# Patient Record
Sex: MALE | Race: WHITE
[De-identification: names, ages, dates, MRNs, and addresses within clinical notes are randomized per-mention and may not be internally consistent; named-entity substitution may affect disease eponyms.]

---
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
## 2018-01-08 PROBLEM — S09.90XA HEAD INJURY: Status: ACTIVE | Noted: 2018-01-08

## 2018-01-08 PROBLEM — J01.00 ACUTE MAXILLARY SINUSITIS: Status: ACTIVE | Noted: 2018-01-08
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
## 2018-07-03 ENCOUNTER — HOSPITAL ENCOUNTER (EMERGENCY)
Dept: HOSPITAL 83 - ED | Age: 53
LOS: 1 days | Discharge: HOME | End: 2018-07-04
Payer: COMMERCIAL

## 2018-07-03 VITALS — HEIGHT: 67.99 IN | WEIGHT: 123 LBS | BODY MASS INDEX: 18.64 KG/M2

## 2018-07-03 DIAGNOSIS — S80.811A: ICD-10-CM
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
## 2018-07-03 DIAGNOSIS — Z88.1: ICD-10-CM
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
## 2018-07-03 DIAGNOSIS — Z91.030: ICD-10-CM
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
## 2018-07-03 DIAGNOSIS — S80.812A: ICD-10-CM

## 2018-07-03 DIAGNOSIS — S20.419A: ICD-10-CM

## 2018-07-03 DIAGNOSIS — S40.219A: ICD-10-CM

## 2018-07-03 DIAGNOSIS — S93.402A: Primary | ICD-10-CM

## 2018-07-04 LAB
ALBUMIN SERPL-MCNC: 3.6 GM/DL (ref 3.1–4.5)
ALP SERPL-CCNC: 97 U/L (ref 45–117)
ALT SERPL W P-5'-P-CCNC: 34 U/L (ref 12–78)
APPEARANCE UR: CLEAR
AST SERPL-CCNC: 43 IU/L (ref 3–35)
BACTERIA #/AREA URNS HPF: (no result) /[HPF]
BASOPHILS # BLD AUTO: 0 10*3/UL (ref 0–0.1)
BASOPHILS NFR BLD AUTO: 0.4 % (ref 0–1)
BILIRUB UR QL STRIP: NEGATIVE
BUN SERPL-MCNC: 18 MG/DL (ref 7–24)
CHLORIDE SERPL-SCNC: 111 MMOL/L (ref 98–107)
COLOR UR: YELLOW
CREAT SERPL-MCNC: 0.83 MG/DL (ref 0.7–1.3)
EOSINOPHIL # BLD AUTO: 0.1 10*3/UL (ref 0–0.4)
EOSINOPHIL # BLD AUTO: 1.1 % (ref 1–4)
ERYTHROCYTE [DISTWIDTH] IN BLOOD BY AUTOMATED COUNT: 12.2 % (ref 0–14.5)
GLUCOSE UR QL: NEGATIVE
HCT VFR BLD AUTO: 39.3 % (ref 42–52)
HGB BLD-MCNC: 13.3 G/DL (ref 14–18)
HGB UR QL STRIP: NEGATIVE
KETONES UR QL STRIP: (no result)
LEUKOCYTE ESTERASE UR QL STRIP: NEGATIVE
LYMPHOCYTES # BLD AUTO: 2.6 10*3/UL (ref 1.3–4.4)
LYMPHOCYTES NFR BLD AUTO: 23.8 % (ref 27–41)
MCH RBC QN AUTO: 32.4 PG (ref 27–31)
MCHC RBC AUTO-ENTMCNC: 33.8 G/DL (ref 33–37)
MCV RBC AUTO: 95.9 FL (ref 80–94)
MONOCYTES # BLD AUTO: 0.7 10*3/UL (ref 0.1–1)
MONOCYTES NFR BLD MANUAL: 6.4 % (ref 3–9)
MUCOUS THREADS URNS QL MICRO: (no result)
NEUT #: 7.4 10*3/UL (ref 2.3–7.9)
NEUT %: 67.9 % (ref 47–73)
NITRITE UR QL STRIP: NEGATIVE
NRBC BLD QL AUTO: 0 10*3/UL (ref 0–0)
PH UR STRIP: 6 [PH] (ref 5–9)
PLATELET # BLD AUTO: 155 10*3/UL (ref 130–400)
PMV BLD AUTO: 10.2 FL (ref 9.6–12.3)
POTASSIUM SERPL-SCNC: 3.6 MMOL/L (ref 3.5–5.1)
PROT SERPL-MCNC: 6.8 GM/DL (ref 6.4–8.2)
RBC # BLD AUTO: 4.1 10*6/UL (ref 4.5–5.9)
RBC #/AREA URNS HPF: (no result) RBC/HPF (ref 0–2)
SODIUM SERPL-SCNC: 147 MMOL/L (ref 136–145)
SP GR UR: >= 1.03 (ref 1–1.03)
UROBILINOGEN UR STRIP-MCNC: 1 E.U./DL (ref 0.2–1)
WBC #/AREA URNS HPF: (no result) WBC/HPF (ref 0–5)
WBC NRBC COR # BLD AUTO: 10.9 10*3/UL (ref 4.8–10.8)

## 2019-08-16 ENCOUNTER — HOSPITAL ENCOUNTER (EMERGENCY)
Dept: HOSPITAL 83 - ED | Age: 54
Discharge: HOME | End: 2019-08-16
Payer: COMMERCIAL

## 2019-08-16 VITALS — HEIGHT: 67.99 IN | BODY MASS INDEX: 18.19 KG/M2 | WEIGHT: 120 LBS

## 2019-08-16 DIAGNOSIS — Z91.030: ICD-10-CM

## 2019-08-16 DIAGNOSIS — Z79.2: ICD-10-CM

## 2019-08-16 DIAGNOSIS — Z88.1: ICD-10-CM
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
## 2019-08-16 DIAGNOSIS — T63.441A: Primary | ICD-10-CM

## 2019-08-16 LAB
ALBUMIN SERPL-MCNC: 3.8 GM/DL (ref 3.1–4.5)
ALP SERPL-CCNC: 89 U/L (ref 45–117)
ALT SERPL W P-5'-P-CCNC: 26 U/L (ref 12–78)
AST SERPL-CCNC: 23 IU/L (ref 3–35)
BASOPHILS # BLD AUTO: 0.1 10*3/UL (ref 0–0.1)
BASOPHILS NFR BLD AUTO: 0.5 % (ref 0–1)
BUN SERPL-MCNC: 15 MG/DL (ref 7–24)
CHLORIDE SERPL-SCNC: 111 MMOL/L (ref 98–107)
CREAT SERPL-MCNC: 1.13 MG/DL (ref 0.7–1.3)
EOSINOPHIL # BLD AUTO: 0.1 10*3/UL (ref 0–0.4)
EOSINOPHIL # BLD AUTO: 0.7 % (ref 1–4)
ERYTHROCYTE [DISTWIDTH] IN BLOOD BY AUTOMATED COUNT: 11.9 % (ref 0–14.5)
HCT VFR BLD AUTO: 39.9 % (ref 42–52)
HGB BLD-MCNC: 14.1 G/DL (ref 14–18)
LYMPHOCYTES # BLD AUTO: 1.7 10*3/UL (ref 1.3–4.4)
LYMPHOCYTES NFR BLD AUTO: 16.3 % (ref 27–41)
MCH RBC QN AUTO: 32.6 PG (ref 27–31)
MCHC RBC AUTO-ENTMCNC: 35.3 G/DL (ref 33–37)
MCV RBC AUTO: 92.4 FL (ref 80–94)
MONOCYTES # BLD AUTO: 0.5 10*3/UL (ref 0.1–1)
MONOCYTES NFR BLD MANUAL: 4.7 % (ref 3–9)
NEUT #: 8.1 10*3/UL (ref 2.3–7.9)
NEUT %: 77.6 % (ref 47–73)
NRBC BLD QL AUTO: 0 % (ref 0–0)
PLATELET # BLD AUTO: 201 10*3/UL (ref 130–400)
PMV BLD AUTO: 10.5 FL (ref 9.6–12.3)
POTASSIUM SERPL-SCNC: 3.7 MMOL/L (ref 3.5–5.1)
PROT SERPL-MCNC: 7.3 GM/DL (ref 6.4–8.2)
RBC # BLD AUTO: 4.32 10*6/UL (ref 4.5–5.9)
SODIUM SERPL-SCNC: 142 MMOL/L (ref 136–145)
WBC NRBC COR # BLD AUTO: 10.4 10*3/UL (ref 4.8–10.8)

## 2019-09-05 ENCOUNTER — HOSPITAL ENCOUNTER (EMERGENCY)
Dept: HOSPITAL 83 - ED | Age: 54
Discharge: ANOTHER HOSPITAL | End: 2019-09-05
Payer: SELF-PAY
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
## 2019-09-05 DIAGNOSIS — T26.12XA: Primary | ICD-10-CM

## 2019-09-05 DIAGNOSIS — Z91.030: ICD-10-CM

## 2019-09-05 DIAGNOSIS — F17.200: ICD-10-CM
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
## 2019-09-05 DIAGNOSIS — Z88.1: ICD-10-CM

## 2020-08-13 ENCOUNTER — HOSPITAL ENCOUNTER (EMERGENCY)
Dept: HOSPITAL 83 - ED | Age: 55
Discharge: HOME | End: 2020-08-13
Payer: SELF-PAY

## 2020-08-13 VITALS — HEIGHT: 60 IN | WEIGHT: 139 LBS

## 2020-08-13 DIAGNOSIS — T63.441A: Primary | ICD-10-CM

## 2020-08-13 DIAGNOSIS — Z88.1: ICD-10-CM
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
## 2020-08-13 DIAGNOSIS — Z91.030: ICD-10-CM

## 2020-08-13 LAB
ALBUMIN SERPL-MCNC: 3.5 GM/DL (ref 3.1–4.5)
ALP SERPL-CCNC: 85 U/L (ref 45–117)
ALT SERPL W P-5'-P-CCNC: 31 U/L (ref 12–78)
AST SERPL-CCNC: 31 IU/L (ref 3–35)
BASOPHILS # BLD AUTO: 0 10*3/UL (ref 0–0.1)
BASOPHILS NFR BLD AUTO: 0.5 % (ref 0–1)
BUN SERPL-MCNC: 11 MG/DL (ref 7–24)
CHLORIDE SERPL-SCNC: 111 MMOL/L (ref 98–107)
CREAT SERPL-MCNC: 0.83 MG/DL (ref 0.7–1.3)
EOSINOPHIL # BLD AUTO: 0.1 10*3/UL (ref 0–0.4)
EOSINOPHIL # BLD AUTO: 2.4 % (ref 1–4)
ERYTHROCYTE [DISTWIDTH] IN BLOOD BY AUTOMATED COUNT: 12.4 % (ref 0–14.5)
HCT VFR BLD AUTO: 39.8 % (ref 42–52)
LYMPHOCYTES # BLD AUTO: 2.1 10*3/UL (ref 1.3–4.4)
LYMPHOCYTES NFR BLD AUTO: 35.2 % (ref 27–41)
MCH RBC QN AUTO: 31.3 PG (ref 27–31)
MCHC RBC AUTO-ENTMCNC: 34.7 G/DL (ref 33–37)
MCV RBC AUTO: 90.2 FL (ref 80–94)
MONOCYTES # BLD AUTO: 0.4 10*3/UL (ref 0.1–1)
MONOCYTES NFR BLD MANUAL: 7.1 % (ref 3–9)
NEUT #: 3.2 10*3/UL (ref 2.3–7.9)
NEUT %: 54.5 % (ref 47–73)
NRBC BLD QL AUTO: 0 10*3/UL (ref 0–0)
PLATELET # BLD AUTO: 163 10*3/UL (ref 130–400)
PMV BLD AUTO: 10.5 FL (ref 9.6–12.3)
POTASSIUM SERPL-SCNC: 3.4 MMOL/L (ref 3.5–5.1)
PROT SERPL-MCNC: 7 GM/DL (ref 6.4–8.2)
RBC # BLD AUTO: 4.41 10*6/UL (ref 4.5–5.9)
SODIUM SERPL-SCNC: 142 MMOL/L (ref 136–145)
TROPONIN I SERPL-MCNC: < 0.015 NG/ML (ref ?–0.04)
WBC NRBC COR # BLD AUTO: 5.9 10*3/UL (ref 4.8–10.8)

## 2022-06-13 ENCOUNTER — APPOINTMENT (OUTPATIENT)
Dept: CT IMAGING | Age: 57
End: 2022-06-13
Payer: COMMERCIAL

## 2022-06-13 ENCOUNTER — HOSPITAL ENCOUNTER (EMERGENCY)
Age: 57
Discharge: HOME | End: 2022-06-13
Payer: COMMERCIAL

## 2022-06-13 VITALS
RESPIRATION RATE: 16 BRPM | BODY MASS INDEX: 18.64 KG/M2 | HEIGHT: 68 IN | WEIGHT: 123 LBS | SYSTOLIC BLOOD PRESSURE: 128 MMHG | HEART RATE: 65 BPM | TEMPERATURE: 97.6 F | DIASTOLIC BLOOD PRESSURE: 66 MMHG | OXYGEN SATURATION: 99 %

## 2022-06-13 DIAGNOSIS — M54.31 BILATERAL SCIATICA: ICD-10-CM

## 2022-06-13 DIAGNOSIS — G89.29 ACUTE EXACERBATION OF CHRONIC LOW BACK PAIN: Primary | ICD-10-CM

## 2022-06-13 DIAGNOSIS — M62.838 SPASM OF MUSCLE: ICD-10-CM

## 2022-06-13 DIAGNOSIS — M48.061 SPINAL STENOSIS OF LUMBAR REGION, UNSPECIFIED WHETHER NEUROGENIC CLAUDICATION PRESENT: ICD-10-CM

## 2022-06-13 DIAGNOSIS — M54.50 ACUTE EXACERBATION OF CHRONIC LOW BACK PAIN: Primary | ICD-10-CM

## 2022-06-13 DIAGNOSIS — M51.36 DEGENERATIVE DISC DISEASE, LUMBAR: ICD-10-CM

## 2022-06-13 DIAGNOSIS — M54.32 BILATERAL SCIATICA: ICD-10-CM

## 2022-06-13 PROCEDURE — 72131 CT LUMBAR SPINE W/O DYE: CPT

## 2022-06-13 PROCEDURE — 96372 THER/PROPH/DIAG INJ SC/IM: CPT

## 2022-06-13 PROCEDURE — 6360000002 HC RX W HCPCS: Performed by: PHYSICIAN ASSISTANT

## 2022-06-13 PROCEDURE — 6370000000 HC RX 637 (ALT 250 FOR IP): Performed by: PHYSICIAN ASSISTANT

## 2022-06-13 PROCEDURE — 99284 EMERGENCY DEPT VISIT MOD MDM: CPT

## 2022-06-13 RX ORDER — DIAZEPAM 5 MG/1
5 TABLET ORAL ONCE
Status: COMPLETED | OUTPATIENT
Start: 2022-06-13 | End: 2022-06-13

## 2022-06-13 RX ORDER — OXYCODONE HYDROCHLORIDE AND ACETAMINOPHEN 5; 325 MG/1; MG/1
1 TABLET ORAL EVERY 6 HOURS PRN
Qty: 20 TABLET | Refills: 0 | Status: SHIPPED | OUTPATIENT
Start: 2022-06-13 | End: 2022-06-18

## 2022-06-13 RX ORDER — DEXAMETHASONE SODIUM PHOSPHATE 10 MG/ML
10 INJECTION INTRAMUSCULAR; INTRAVENOUS ONCE
Status: COMPLETED | OUTPATIENT
Start: 2022-06-13 | End: 2022-06-13

## 2022-06-13 RX ORDER — TIZANIDINE HYDROCHLORIDE 4 MG/1
4 CAPSULE, GELATIN COATED ORAL 2 TIMES DAILY PRN
Qty: 30 CAPSULE | Refills: 0 | Status: SHIPPED | OUTPATIENT
Start: 2022-06-13

## 2022-06-13 RX ORDER — OXYCODONE HYDROCHLORIDE AND ACETAMINOPHEN 5; 325 MG/1; MG/1
1 TABLET ORAL ONCE
Status: COMPLETED | OUTPATIENT
Start: 2022-06-13 | End: 2022-06-13

## 2022-06-13 RX ORDER — PREDNISONE 20 MG/1
TABLET ORAL
Qty: 18 TABLET | Refills: 0 | Status: SHIPPED | OUTPATIENT
Start: 2022-06-13 | End: 2022-06-23

## 2022-06-13 RX ADMIN — DEXAMETHASONE SODIUM PHOSPHATE 10 MG: 10 INJECTION INTRAMUSCULAR; INTRAVENOUS at 15:27

## 2022-06-13 RX ADMIN — OXYCODONE AND ACETAMINOPHEN 1 TABLET: 5; 325 TABLET ORAL at 13:33

## 2022-06-13 RX ADMIN — DIAZEPAM 5 MG: 5 TABLET ORAL at 13:33

## 2022-06-13 ASSESSMENT — PAIN DESCRIPTION - LOCATION
LOCATION: BACK
LOCATION: BACK

## 2022-06-13 ASSESSMENT — PAIN DESCRIPTION - FREQUENCY: FREQUENCY: CONTINUOUS

## 2022-06-13 ASSESSMENT — PAIN DESCRIPTION - DESCRIPTORS: DESCRIPTORS: DISCOMFORT

## 2022-06-13 ASSESSMENT — PAIN DESCRIPTION - PAIN TYPE: TYPE: ACUTE PAIN;CHRONIC PAIN

## 2022-06-13 ASSESSMENT — PAIN - FUNCTIONAL ASSESSMENT: PAIN_FUNCTIONAL_ASSESSMENT: 0-10

## 2022-06-13 ASSESSMENT — PAIN DESCRIPTION - ORIENTATION: ORIENTATION: LOWER

## 2022-06-13 ASSESSMENT — PAIN SCALES - GENERAL
PAINLEVEL_OUTOF10: 8
PAINLEVEL_OUTOF10: 8

## 2022-06-13 NOTE — Clinical Note
Alexandria Dance was seen and treated in our emergency department on 6/13/2022. He may return to work on 06/17/2022. If you have any questions or concerns, please don't hesitate to call.       Laura Roman PA-C

## 2022-06-13 NOTE — ED PROVIDER NOTES
[facility]     Department of Emergency Medicine   ED  Provider Note  Admit Date/RoomTime: 6/13/2022 12:55 PM  ED Room: 31/31   Chief Complaint:   Back Pain (back injury in 1996 no recent injury) and Numbness (both legs numb intermittently-more when he walks)    History of Present Illness      Taina Mercado is a 49 y.o. old male who presents to the ED for lower back pain. Patient has history of lumbar fusion. He states his legs feel weak and he is also having numbness down both legs. He denies loss of bowel or bladder, saddle paresthesias, or urinary retention. Patient states the pain worsened last night while at work. He denies nay direct injury or trauma. He has no recent falls. Patient denies chest pain, SOB, pain with breathing, urinary complaints, abdominal pain, nausea, vomiting, diarrhea, limb swelling, rash, or fever/chills. Patient is alert and oriented x3 and in mi;ld distress due to pain at this exam. He is nontoxic appearing. ROS   Pertinent positives and negatives are stated within HPI, all other systems reviewed and are negative. Past Medical History:  has a past medical history of Cervicalgia, Neuropathy, Retrolisthesis, and Spondylolisthesis. Past Surgical History:  has a past surgical history that includes lumbar laminectomy. Social History:  reports that he has been smoking cigarettes. He has been smoking about 0.50 packs per day. He has quit using smokeless tobacco. He reports that he does not drink alcohol and does not use drugs. Family History: family history is not on file. Allergies: Keflex [cephalexin]    Physical Exam   VS:  /66   Pulse 65   Temp 97.6 °F (36.4 °C) (Temporal)   Resp 16   Ht 5' 8\" (1.727 m)   Wt 123 lb (55.8 kg)   SpO2 99%   BMI 18.70 kg/m²      Oxygen Saturation Interpretation: Normal.    Constitutional:  Alert and oriented x4, development consistent with age, NAD  HEENT:  NC/NT. No bruising or lacerations, Airway patent.   Neck: Normal ROM. Supple. No meningeal signs. Non-tender    Respiratory:  Clear to auscultation and breath sounds equal.  CV:  Regular rate and rhythm  GI:  Abdomen soft, nontender, non-distended, No firm or pulsatile mass. Back: lower lumbar spine bilateral and midline. Tenderness: Moderate. Swelling: no.              Range of Motion: diminished range with pain. Skin:  no erythema, rash or swelling noted. Distal Function:              Motor deficit: none. Sensory deficit: none. Pulse deficit: none. Extremities: Full ROM x4,  No edema or tenderness   Straight leg raising: positive bilaterally   Integument:  Normal turgor. Warm, dry, without visible rash. Neurological: CN II-XII grossly intact, Motor functions intact   Gait: limp. Lab / Imaging Results   (All laboratory and radiology results have been personally reviewed by myself)  Labs:  No results found for this visit on 06/13/22. Imaging: All Radiology results interpreted by Radiologist unless otherwise noted. CT LUMBAR SPINE WO CONTRAST   Final Result   1. Stable postsurgical changes involving lower thoracic and upper lumbar   spine. 2. No acute abnormality involving the lumbar spine. ED Course / Medical Decision Making     Medications   dexamethasone (DECADRON) injection 10 mg (has no administration in time range)   oxyCODONE-acetaminophen (PERCOCET) 5-325 MG per tablet 1 tablet (1 tablet Oral Given 6/13/22 1333)   diazePAM (VALIUM) tablet 5 mg (5 mg Oral Given 6/13/22 1333)     Re-examination:  Patients symptoms are improving. He states he is feeling better after medications. Consult(s):  None    Procedure(s):  None    Medical Decision Making: At this time the patient is without objective evidence of an acute process requiring inpatient management. Counseling:     The emergency provider has spoken with the patient/caregiver and discussed todays results, in addition to providing specific details for the plan of care and counseling regarding the diagnosis and prognosis. Questions are answered at this time and they are agreeable with the plan. I discussed the differential, results and discharge plan with the patient and/or family/friend/caregiver if present. I emphasized the importance of follow-up with the physician I referred them to in the timeframe recommended. I explained reasons for the patient to return to the Emergency Department. Additional verbal discharge instructions were also given and discussed with the patient to supplement those generated by the EMR. We also discussed medications that were prescribed (if any) including common side effects and interactions. The patient was advised to abstain from driving, operating heavy machinery or making significant decisions while taking medications such as opiates and muscle relaxers that may impair this. All questions were addressed. They understand return precautions and discharge instructions. The patient and/or family/friend/caregiver expressed understanding. Vitals were stable and they were in no distress at discharge. Patient was educated on red flag symptoms that would require his emergent return to the ED, including, but not limited to, loss of bowel or bladder, urinary retention, worsening pain, worsening numbness/tingling or sensation changes, limb weakness, or difficulty or inability to ambulate. Assessment      1. Acute exacerbation of chronic low back pain    2. Bilateral sciatica    3. Spinal stenosis of lumbar region, unspecified whether neurogenic claudication present    4. Degenerative disc disease, lumbar    5. Spasm of muscle      Plan   Discharge to home with early PCP follow-up for future MRI   Patient condition is good    New Medications     New Prescriptions    OXYCODONE-ACETAMINOPHEN (PERCOCET) 5-325 MG PER TABLET    Take 1 tablet by mouth every 6 hours as needed for Pain for up to 5 days. PREDNISONE (DELTASONE) 20 MG TABLET    Sig.: Take 60mg  Po qd x 3 days, then 40mg po qd x3 days, then 20mg po qd x 3 days. QS x 9 days    TIZANIDINE (ZANAFLEX) 4 MG CAPSULE    Take 1 capsule by mouth 2 times daily as needed for Muscle spasms     Electronically signed by Jacey Ventura PA-C   DD: 6/13/22    **This report was transcribed using voice recognition software. Every effort was made to ensure accuracy; however, inadvertent computerized transcription errors may be present.     END OF ED PROVIDER NOTE       Jacey Ventura PA-C  06/13/22 0937

## 2022-06-27 ENCOUNTER — INITIAL CONSULT (OUTPATIENT)
Dept: NEUROSURGERY | Age: 57
End: 2022-06-27
Payer: COMMERCIAL

## 2022-06-27 VITALS
WEIGHT: 123 LBS | HEART RATE: 57 BPM | HEIGHT: 68 IN | OXYGEN SATURATION: 100 % | SYSTOLIC BLOOD PRESSURE: 116 MMHG | RESPIRATION RATE: 20 BRPM | BODY MASS INDEX: 18.64 KG/M2 | DIASTOLIC BLOOD PRESSURE: 62 MMHG | TEMPERATURE: 98.1 F

## 2022-06-27 DIAGNOSIS — Z98.1 HISTORY OF LUMBAR FUSION: ICD-10-CM

## 2022-06-27 DIAGNOSIS — G89.29 CHRONIC BILATERAL LOW BACK PAIN WITH BILATERAL SCIATICA: Primary | ICD-10-CM

## 2022-06-27 DIAGNOSIS — M54.41 CHRONIC BILATERAL LOW BACK PAIN WITH BILATERAL SCIATICA: Primary | ICD-10-CM

## 2022-06-27 DIAGNOSIS — M54.42 CHRONIC BILATERAL LOW BACK PAIN WITH BILATERAL SCIATICA: Primary | ICD-10-CM

## 2022-06-27 DIAGNOSIS — M54.9 MID BACK PAIN: ICD-10-CM

## 2022-06-27 PROCEDURE — 99204 OFFICE O/P NEW MOD 45 MIN: CPT | Performed by: PHYSICIAN ASSISTANT

## 2022-06-27 ASSESSMENT — ENCOUNTER SYMPTOMS
BACK PAIN: 1
SHORTNESS OF BREATH: 0
ABDOMINAL PAIN: 0
TROUBLE SWALLOWING: 0
PHOTOPHOBIA: 0

## 2022-06-27 NOTE — PROGRESS NOTES
Subjective:      Patient ID: Rodney Aleman is a 49 y.o. male. Davislaura Crawford is a 49year old male with a past medical history of multiple spine surgeries with fusion from T12-L3 in [place]. Pt smokes 1/2ppd. He presents to the office today as a new patient c/o mid back pain, low back pain, and bilateral leg numbness. Describes the pain as sharp, shooting, stabbing, burning, and aching. Back pain has been present for several years. His leg numbness suddenly developed about 2 weeks ago. Pt states he feels like the screws are popping in and out. Any type of movement of his back makes the pain worse. He has tried heat, laying flat, muscle relaxers, steroids, and oxycodone without significant relief. Denies recent PT or PHYLLIS. Denies loss of bowel or bladder, saddle anesthesia, headache, loss of dexterity, abnormal gait, fever, chills, N/V, SOB, or chest pain. CT lumbar spine 6/13/22: Postsurgical changes at levels of T12 to L3 with evidence of corpectomy and bone graft.  Posterior fusion hardware at L2/L3 and evidence of laminectomy.  There is stable, satisfactory alignment.  No evidence of fracture.  No prevertebral soft tissue edema.              Review of Systems   Constitutional: Negative for fever and unexpected weight change. HENT: Negative for trouble swallowing. Eyes: Negative for photophobia and visual disturbance. Respiratory: Negative for shortness of breath. Cardiovascular: Negative for chest pain. Gastrointestinal: Negative for abdominal pain. Endocrine: Negative for heat intolerance. Genitourinary: Negative for flank pain. Musculoskeletal: Positive for arthralgias, back pain and myalgias. Negative for gait problem and neck pain. Skin: Negative for wound. Neurological: Positive for weakness and numbness. Negative for headaches. Psychiatric/Behavioral: Negative for confusion. Objective:   Physical Exam  Constitutional:       Appearance: Normal appearance.  He is well-developed. HENT:      Head: Normocephalic and atraumatic. Eyes:      Extraocular Movements: Extraocular movements intact. Conjunctiva/sclera: Conjunctivae normal.      Pupils: Pupils are equal, round, and reactive to light. Cardiovascular:      Rate and Rhythm: Normal rate. Pulmonary:      Effort: Pulmonary effort is normal.   Abdominal:      General: There is no distension. Musculoskeletal:      Cervical back: Normal range of motion and neck supple. Comments: Diffuse tenderness to palpation of thoracic and lumbar spine   Skin:     General: Skin is warm and dry. Neurological:      Mental Status: He is alert. Comments: Alert and oriented x3  CN3-12 intact  Upper strength full  BLE 4/5 strength  Decreased sensation lateral right calf compared to left     Psychiatric:         Thought Content: Thought content normal.         Assessment: This is a 49year old male presenting for mid back pain, low back pain, and bilateral leg numbness. Hx multiple spine surgeries including fusion.       Plan:      -Obtain thoracic and lumbar CT myelogram  -OARRS report reviewed   -Return to Neurosurgery clinic after completion of imaging to discuss results and further treatment plan  -Call/return sooner if symptoms worsen or new issues arise in the interim           Cruz Woodall PA-C

## 2022-06-27 NOTE — PROGRESS NOTES
Rodney Aleman, was evaluated through a synchronous (real-time) audio-video encounter. The patient (or guardian if applicable) is aware that this is a billable service. Verbal consent to proceed has been obtained. The visit was conducted pursuant to the emergency declaration under the [address] [place] [address] and the [employer] General Act. Patient identification was verified, and a caregiver was present when appropriate. The patient was located at {[facility] POS - Patient:[id number]}  The provider was located at {[facility] POS - Provider:[id number]}     Total time spent on this encounter: {Time Spent:[id number]}    --Gail Guaman LPN on 7/95/0221 at 98:71 AM    An electronic signature was used to authenticate this note.

## 2022-07-11 ENCOUNTER — TELEPHONE (OUTPATIENT)
Dept: NEUROSURGERY | Age: 57
End: 2022-07-11

## 2022-07-11 NOTE — TELEPHONE ENCOUNTER
[facility]  CT/Myelogram Lumbar  Case# [id number]         APPROVED  Dates 07/11/2022 - 08/09/2022      Faxed to IR for scheduling

## 2022-07-18 NOTE — TELEPHONE ENCOUNTER
[facility]  CT/Myelogram Thoracic  Order # [id number]  Non-Authorized  Submitting additional clinicals    APPROVED  CT Myelogram THORACIC  Order ID: [id number]  Authorized  07/18/2022 - 08/16/2022        Faxed to IR for scheduling

## 2022-07-20 NOTE — TELEPHONE ENCOUNTER
CT/MYELOGRAM Thoracic & Lumbar   Date:8/4/2022  Facility:[facility]  Arrival Time:9:15am    NPO after [employer]  Will be at facility [employer] ID, insurance cards, covid vaccine card and list of current medications. ASA/Ibuprofen/Blood Thinners to be held for 7days prior  Antidepressants hold for 3 days prior    Labs: PT/INR, Platelets  Covid test 5 days prior if not vaccinated      LVM to inform of appt date, time, location and instructions. Pt acknowledged.

## 2022-08-04 ENCOUNTER — HOSPITAL ENCOUNTER (OUTPATIENT)
Dept: GENERAL RADIOLOGY | Age: 57
Discharge: HOME | End: 2022-08-06
Payer: COMMERCIAL

## 2022-08-04 ENCOUNTER — HOSPITAL ENCOUNTER (OUTPATIENT)
Age: 57
Discharge: HOME | End: 2022-08-04
Payer: COMMERCIAL

## 2022-08-04 ENCOUNTER — HOSPITAL ENCOUNTER (OUTPATIENT)
Dept: CT IMAGING | Age: 57
Discharge: HOME | End: 2022-08-06
Payer: COMMERCIAL

## 2022-08-04 VITALS
OXYGEN SATURATION: 98 % | SYSTOLIC BLOOD PRESSURE: 133 MMHG | HEART RATE: 68 BPM | TEMPERATURE: 97 F | DIASTOLIC BLOOD PRESSURE: 65 MMHG | RESPIRATION RATE: 20 BRPM

## 2022-08-04 DIAGNOSIS — M54.41 CHRONIC BILATERAL LOW BACK PAIN WITH BILATERAL SCIATICA: ICD-10-CM

## 2022-08-04 DIAGNOSIS — M54.42 CHRONIC BILATERAL LOW BACK PAIN WITH BILATERAL SCIATICA: ICD-10-CM

## 2022-08-04 DIAGNOSIS — M54.9 MID BACK PAIN: ICD-10-CM

## 2022-08-04 DIAGNOSIS — G89.29 CHRONIC BILATERAL LOW BACK PAIN WITH BILATERAL SCIATICA: ICD-10-CM

## 2022-08-04 DIAGNOSIS — Z98.1 HISTORY OF LUMBAR FUSION: ICD-10-CM

## 2022-08-04 DIAGNOSIS — M54.42 ACUTE BACK PAIN WITH SCIATICA, LEFT: ICD-10-CM

## 2022-08-04 LAB
INR BLD: 1.1
PLATELET # BLD: 178 E9/L (ref 130–450)
PROTHROMBIN TIME: 11.6 SEC (ref 9.3–12.4)

## 2022-08-04 PROCEDURE — 2500000003 HC RX 250 WO HCPCS: Performed by: RADIOLOGY

## 2022-08-04 PROCEDURE — 36415 COLL VENOUS BLD VENIPUNCTURE: CPT

## 2022-08-04 PROCEDURE — 85049 AUTOMATED PLATELET COUNT: CPT

## 2022-08-04 PROCEDURE — 7100000010 HC PHASE II RECOVERY - FIRST 15 MIN

## 2022-08-04 PROCEDURE — 72129 CT CHEST SPINE W/DYE: CPT

## 2022-08-04 PROCEDURE — 6360000004 HC RX CONTRAST MEDICATION: Performed by: RADIOLOGY

## 2022-08-04 PROCEDURE — 72132 CT LUMBAR SPINE W/DYE: CPT

## 2022-08-04 PROCEDURE — 85610 PROTHROMBIN TIME: CPT

## 2022-08-04 PROCEDURE — 7100000011 HC PHASE II RECOVERY - ADDTL 15 MIN

## 2022-08-04 PROCEDURE — 62305 MYELOGRAPHY LUMBAR INJECTION: CPT

## 2022-08-04 RX ORDER — LIDOCAINE HYDROCHLORIDE 10 MG/ML
INJECTION, SOLUTION INFILTRATION; PERINEURAL
Status: COMPLETED | OUTPATIENT
Start: 2022-08-04 | End: 2022-08-04

## 2022-08-04 RX ADMIN — LIDOCAINE HYDROCHLORIDE 10 ML: 10 INJECTION, SOLUTION INFILTRATION; PERINEURAL at 11:37

## 2022-08-04 RX ADMIN — IOPAMIDOL 13 ML: 612 INJECTION, SOLUTION INTRAVENOUS at 12:13

## 2022-08-04 ASSESSMENT — PAIN SCALES - GENERAL
PAINLEVEL_OUTOF10: 0

## 2022-08-04 ASSESSMENT — PAIN DESCRIPTION - LOCATION
LOCATION: BACK

## 2022-08-04 ASSESSMENT — PAIN DESCRIPTION - ORIENTATION
ORIENTATION: LOWER
ORIENTATION: LOWER
ORIENTATION: MID;LOWER
ORIENTATION: LOWER

## 2022-08-04 ASSESSMENT — PAIN - FUNCTIONAL ASSESSMENT: PAIN_FUNCTIONAL_ASSESSMENT: NONE - DENIES PAIN

## 2022-08-04 NOTE — PROGRESS NOTES
PT RECEIVED IN PACU 2 FROM XRAY PT POST MYELOGRAM , AWAKE AND ALERT MENDEZ X4 , FEET FLEX AND DORSOFLEX SLIGHTLY WEAK PT STATES THATS NORMAL FOR ME HAD SEVERAL BACK SURGERIES ALREADY FEET WARM PULSES STRONG NOURISHMENT GIVEN , BANDAID INTACT AT MYELOGRAM SITE/DRY  1330 SMALL DOT SIZE DRAINAGE ON BANDAID , OTHERWISE NO CHANGE PT TAKING PO NO DIFFICULTY   1400 VSS PT COMFORTABLE NO CHANGES FRIEND AT BEDSIDE  1420 DISCHARGE INSTRUCTIONS GIVEN PT AND FRIEND VERBALIZED UNDERSTANDING PAMPHLETS EXPLAINED TO CALL  THAT ORDERED MYELOGRAM AND INFORM THEM TEST WAS DONE SO CAN GET APPOINTMENT SCHEDULED

## 2022-08-04 NOTE — OR NURSING
Patient arrival from home to radiology for lumbar/thoracic myelogram. Vitals taken, consent signed, and Dr. Nona Fleischer in to speak with the patient about the procedure. Patient placed prone on Fluoroscopy table, patient prepped and draped. Using fluoroscopy imaging, needle placed to lower back by Dr. Nona Fleischer @ [phone]. 13 cc Isovue 200M Contrast injected. Needle removed, site cleansed, and band aid applied to site. Patient placed on cart and taken to CT. Report called to stage II recovery MACI Snell.

## 2022-08-04 NOTE — DISCHARGE INSTRUCTIONS
Post Myelogram    1) DRINK PLENTY OF FLUIDS OVER THE NEXT 48 HOURS. Avoid alcoholic beverages, but resume your normal diet. 2) You should remain on bed rest for 2 hours with your head elevated at a 0  degree angle. hours. You may be up to use the toilet but do not bend your head forward. You may resume your normal activities after 48 hours. 3) Call your doctor if you experience persistent headache, neck stiffness, nausea, or vomiting. Do not take any medication for nausea until you have talked with your doctor. 4) If a needle was inserted in the neck, you may use ice on the site for 24 hours. 5) Resume any medications prescribed by your doctor. 6) Some localized pain is not unusual. HEADACHE, NAUSEA, AND VOMITING for the first 24 hours may occur as a side effect of the contrast media. Please follow the instructions in #3 above.

## 2022-08-15 ENCOUNTER — OFFICE VISIT (OUTPATIENT)
Dept: NEUROSURGERY | Age: 57
End: 2022-08-15
Payer: COMMERCIAL

## 2022-08-15 VITALS
OXYGEN SATURATION: 94 % | BODY MASS INDEX: 18.64 KG/M2 | RESPIRATION RATE: 20 BRPM | WEIGHT: 123 LBS | DIASTOLIC BLOOD PRESSURE: 59 MMHG | HEIGHT: 68 IN | HEART RATE: 68 BPM | TEMPERATURE: 98.1 F | SYSTOLIC BLOOD PRESSURE: 115 MMHG

## 2022-08-15 DIAGNOSIS — M54.41 CHRONIC BILATERAL LOW BACK PAIN WITH BILATERAL SCIATICA: Primary | ICD-10-CM

## 2022-08-15 DIAGNOSIS — Z98.1 HISTORY OF LUMBAR FUSION: ICD-10-CM

## 2022-08-15 DIAGNOSIS — G89.29 CHRONIC BILATERAL LOW BACK PAIN WITH BILATERAL SCIATICA: Primary | ICD-10-CM

## 2022-08-15 DIAGNOSIS — M54.9 MID BACK PAIN: ICD-10-CM

## 2022-08-15 DIAGNOSIS — M54.42 CHRONIC BILATERAL LOW BACK PAIN WITH BILATERAL SCIATICA: Primary | ICD-10-CM

## 2022-08-15 PROCEDURE — 99213 OFFICE O/P EST LOW 20 MIN: CPT | Performed by: PHYSICIAN ASSISTANT

## 2022-08-15 NOTE — PROGRESS NOTES
Office Follow-up Visit     Subjective: Julieta Santa is a 49year old male with a past medical history of multiple spine surgeries with fusion from T12-L3 in [place]. Pt smokes 1/2ppd. He initially presented to the office 6/27/22 c/o mid back pain, low back pain, and bilateral leg numbness. Describes the pain as sharp, shooting, stabbing, burning, and aching. Back pain has been present for several years. His leg numbness suddenly developed about 2 weeks ago. Pt states he feels like the screws are popping in and out. Any type of movement of his back makes the pain worse. He has tried heat, laying flat, muscle relaxers, steroids, and oxycodone without significant relief. Denies recent PT or PHYLLIS. Thoracic and lumbar CT myelograms were ordered at that time. Patient presents to the office today to review imaging. Pain remains the same, no better or worse. Denies loss of bowel or bladder, saddle anesthesia, headache, loss of dexterity, abnormal gait, fever, chills, N/V, SOB, or chest pain. Physical Exam:              WDWN, no apparent distress              Non-labored breathing               Vitals Stable              Alert and oriented x3              CN 3-12 intact              PERRL              EOMI  BLE 4/5 strength  Decreased sensation lateral right calf compared to left   Diffuse tenderness to palpation of thoracic and lumbar spine                Imaging: CT myelogram thoracic and lumbar spine 8/4/22:   1. CT myelography was performed. 2. Stable postoperative changes related to surgical fusion from T12-L2. No   postoperative complication seen. 3.  No significant central canal or neural foraminal stenosis. CT OF THE LUMBAR SPINE:       1. Stable postoperative changes from surgical fusion from T12-L2 and at L2-3. No postoperative complication identified. 2.  No fracture or joint dislocation. 3. Mild degenerative changes. No central canal or lateral recess stenosis.    4.  Multilevel neural foraminal stenoses, worst (mild-to-moderate) at L2-3   and L3-4.   5. Bilateral nephrolithiasis. No hydronephrosis. Assessment: This is a 56 y.o.  male presenting for a follow-up for thoracic and lumbar pain/review imaging. Hx T12-L3 corpectomy and fusion in [place]. Plan:  -CT myelograms reviewed and discussed in detail. No further surgical intervention needed  -Referral for PT/dry needling faxed  -EMG bilateral lower extremities ordered to further evaluate numbness. Will call with results  -Smoking cessation   -Letter written for work  -OARRS report reviewed   -Follow-up in neurosurgery clinic PRN  -Call or return to neurosurgery office sooner if symptoms worsen or if new issues arise in the interim.     Electronically signed by Joao Hamilton PA-C on 8/15/2022 at 4:18 PM

## 2022-08-15 NOTE — LETTER
[address] [place] [address] [address] [place] [address]  Phone: [phone]  Fax: [phone]    Daxanile Michael        August 15, 2022     Patient: Elodia Quiñonez   YOB: 1965   Date of Visit: 8/15/2022       To Whom It May Concern:    Sharron Hernandez was seen in our Neurosurgical office today 8/15/22. It is my medical opinion that Sharron Hernandez my return to work without restrictions. If you have any questions or concerns, please don't hesitate to call.     Sincerely,        Main Yoon PA-C

## 2023-09-23 ENCOUNTER — HOSPITAL ENCOUNTER (EMERGENCY)
Age: 58
Discharge: HOME | End: 2023-09-23
Attending: STUDENT IN AN ORGANIZED HEALTH CARE EDUCATION/TRAINING PROGRAM

## 2023-09-23 VITALS
RESPIRATION RATE: 12 BRPM | OXYGEN SATURATION: 96 % | WEIGHT: 123 LBS | BODY MASS INDEX: 18.64 KG/M2 | SYSTOLIC BLOOD PRESSURE: 113 MMHG | TEMPERATURE: 98 F | DIASTOLIC BLOOD PRESSURE: 62 MMHG | HEIGHT: 68 IN | HEART RATE: 55 BPM

## 2023-09-23 DIAGNOSIS — T78.2XXA ANAPHYLAXIS, INITIAL ENCOUNTER: Primary | ICD-10-CM

## 2023-09-23 PROCEDURE — 2500000003 HC RX 250 WO HCPCS: Performed by: STUDENT IN AN ORGANIZED HEALTH CARE EDUCATION/TRAINING PROGRAM

## 2023-09-23 PROCEDURE — 96372 THER/PROPH/DIAG INJ SC/IM: CPT

## 2023-09-23 PROCEDURE — 2580000003 HC RX 258: Performed by: STUDENT IN AN ORGANIZED HEALTH CARE EDUCATION/TRAINING PROGRAM

## 2023-09-23 PROCEDURE — A4216 STERILE WATER/SALINE, 10 ML: HCPCS | Performed by: STUDENT IN AN ORGANIZED HEALTH CARE EDUCATION/TRAINING PROGRAM

## 2023-09-23 PROCEDURE — 99285 EMERGENCY DEPT VISIT HI MDM: CPT

## 2023-09-23 PROCEDURE — 6360000002 HC RX W HCPCS: Performed by: STUDENT IN AN ORGANIZED HEALTH CARE EDUCATION/TRAINING PROGRAM

## 2023-09-23 PROCEDURE — 96374 THER/PROPH/DIAG INJ IV PUSH: CPT

## 2023-09-23 PROCEDURE — 96375 TX/PRO/DX INJ NEW DRUG ADDON: CPT

## 2023-09-23 RX ORDER — DIPHENHYDRAMINE HYDROCHLORIDE 50 MG/ML
50 INJECTION INTRAMUSCULAR; INTRAVENOUS ONCE
Status: COMPLETED | OUTPATIENT
Start: 2023-09-23 | End: 2023-09-23

## 2023-09-23 RX ORDER — EPINEPHRINE 1 MG/ML
0.5 INJECTION, SOLUTION, CONCENTRATE INTRAVENOUS ONCE
Status: COMPLETED | OUTPATIENT
Start: 2023-09-23 | End: 2023-09-23

## 2023-09-23 RX ORDER — EPINEPHRINE 0.3 MG/.3ML
0.3 INJECTION SUBCUTANEOUS
Qty: 1 EACH | Refills: 0 | Status: SHIPPED | OUTPATIENT
Start: 2023-09-23 | End: 2023-09-23

## 2023-09-23 RX ORDER — 0.9 % SODIUM CHLORIDE 0.9 %
2000 INTRAVENOUS SOLUTION INTRAVENOUS ONCE
Status: COMPLETED | OUTPATIENT
Start: 2023-09-23 | End: 2023-09-23

## 2023-09-23 RX ADMIN — EPINEPHRINE 0.5 MG: 1 INJECTION, SOLUTION, CONCENTRATE INTRAVENOUS at 20:02

## 2023-09-23 RX ADMIN — METHYLPREDNISOLONE SODIUM SUCCINATE 125 MG: 125 INJECTION, POWDER, FOR SOLUTION INTRAMUSCULAR; INTRAVENOUS at 20:02

## 2023-09-23 RX ADMIN — DIPHENHYDRAMINE HYDROCHLORIDE 50 MG: 50 INJECTION, SOLUTION INTRAMUSCULAR; INTRAVENOUS at 20:01

## 2023-09-23 RX ADMIN — SODIUM CHLORIDE 2000 ML: 9 INJECTION, SOLUTION INTRAVENOUS at 20:03

## 2023-09-23 RX ADMIN — Medication 20 MG: at 20:02

## 2023-09-23 ASSESSMENT — LIFESTYLE VARIABLES
HOW MANY STANDARD DRINKS CONTAINING ALCOHOL DO YOU HAVE ON A TYPICAL DAY: PATIENT DOES NOT DRINK
HOW OFTEN DO YOU HAVE A DRINK CONTAINING ALCOHOL: NEVER

## 2023-09-23 ASSESSMENT — PAIN - FUNCTIONAL ASSESSMENT: PAIN_FUNCTIONAL_ASSESSMENT: NONE - DENIES PAIN

## 2023-09-24 NOTE — DISCHARGE INSTRUCTIONS
Follow-up closely with family doctor. Refill EpiPen. Use Benadryl as needed for itching. Return for any significant worsening symptoms or other acute symptoms or concerns.

## 2023-09-24 NOTE — ED NOTES
Pt alert. Engaging in conversation.  States no complaints at this time     Elizabeth Manzanares RN  09/23/23 2007

## 2024-08-06 ENCOUNTER — OFFICE VISIT (OUTPATIENT)
Dept: FAMILY MEDICINE CLINIC | Age: 59
End: 2024-08-06

## 2024-08-06 VITALS
WEIGHT: 139 LBS | HEART RATE: 78 BPM | HEIGHT: 68 IN | SYSTOLIC BLOOD PRESSURE: 118 MMHG | DIASTOLIC BLOOD PRESSURE: 68 MMHG | BODY MASS INDEX: 21.07 KG/M2 | TEMPERATURE: 98.4 F | OXYGEN SATURATION: 97 % | RESPIRATION RATE: 18 BRPM

## 2024-08-06 DIAGNOSIS — J01.40 ACUTE NON-RECURRENT PANSINUSITIS: Primary | ICD-10-CM

## 2024-08-06 DIAGNOSIS — H10.33 ACUTE CONJUNCTIVITIS OF BOTH EYES, UNSPECIFIED ACUTE CONJUNCTIVITIS TYPE: ICD-10-CM

## 2024-08-06 PROCEDURE — 99213 OFFICE O/P EST LOW 20 MIN: CPT | Performed by: FAMILY MEDICINE

## 2024-08-06 RX ORDER — AZITHROMYCIN 250 MG/1
TABLET, FILM COATED ORAL
Qty: 6 TABLET | Refills: 0 | Status: SHIPPED | OUTPATIENT
Start: 2024-08-06 | End: 2024-08-16

## 2024-08-06 RX ORDER — MOXIFLOXACIN 5 MG/ML
1 SOLUTION/ DROPS OPHTHALMIC 2 TIMES DAILY
Qty: 3 ML | Refills: 0 | Status: SHIPPED | OUTPATIENT
Start: 2024-08-06 | End: 2024-08-13

## 2024-08-06 SDOH — ECONOMIC STABILITY: HOUSING INSECURITY
IN THE LAST 12 MONTHS, WAS THERE A TIME WHEN YOU DID NOT HAVE A STEADY PLACE TO SLEEP OR SLEPT IN A SHELTER (INCLUDING NOW)?: NO

## 2024-08-06 SDOH — ECONOMIC STABILITY: FOOD INSECURITY: WITHIN THE PAST 12 MONTHS, THE FOOD YOU BOUGHT JUST DIDN'T LAST AND YOU DIDN'T HAVE MONEY TO GET MORE.: NEVER TRUE

## 2024-08-06 SDOH — ECONOMIC STABILITY: FOOD INSECURITY: WITHIN THE PAST 12 MONTHS, YOU WORRIED THAT YOUR FOOD WOULD RUN OUT BEFORE YOU GOT MONEY TO BUY MORE.: NEVER TRUE

## 2024-08-06 SDOH — ECONOMIC STABILITY: INCOME INSECURITY: HOW HARD IS IT FOR YOU TO PAY FOR THE VERY BASICS LIKE FOOD, HOUSING, MEDICAL CARE, AND HEATING?: PATIENT DECLINED

## 2024-08-06 ASSESSMENT — ENCOUNTER SYMPTOMS
VOMITING: 0
NAUSEA: 0
TROUBLE SWALLOWING: 0
PHOTOPHOBIA: 0
SORE THROAT: 0
EYE PAIN: 0
EYE ITCHING: 0
EYE DISCHARGE: 1
ALLERGIC/IMMUNOLOGIC NEGATIVE: 1
COUGH: 0
DIARRHEA: 0
BACK PAIN: 0
BLOOD IN STOOL: 0
EYE REDNESS: 1
SHORTNESS OF BREATH: 0
SINUS PRESSURE: 1
SINUS PAIN: 0
ABDOMINAL PAIN: 0
CHEST TIGHTNESS: 0

## 2024-08-06 ASSESSMENT — PATIENT HEALTH QUESTIONNAIRE - PHQ9
SUM OF ALL RESPONSES TO PHQ QUESTIONS 1-9: 0
SUM OF ALL RESPONSES TO PHQ QUESTIONS 1-9: 0
SUM OF ALL RESPONSES TO PHQ9 QUESTIONS 1 & 2: 0
1. LITTLE INTEREST OR PLEASURE IN DOING THINGS: NOT AT ALL
SUM OF ALL RESPONSES TO PHQ QUESTIONS 1-9: 0
2. FEELING DOWN, DEPRESSED OR HOPELESS: NOT AT ALL
SUM OF ALL RESPONSES TO PHQ QUESTIONS 1-9: 0

## 2024-12-26 ENCOUNTER — OFFICE VISIT (OUTPATIENT)
Dept: FAMILY MEDICINE CLINIC | Age: 59
End: 2024-12-26

## 2024-12-26 VITALS
TEMPERATURE: 97.9 F | WEIGHT: 145 LBS | BODY MASS INDEX: 21.98 KG/M2 | DIASTOLIC BLOOD PRESSURE: 76 MMHG | OXYGEN SATURATION: 98 % | HEART RATE: 68 BPM | SYSTOLIC BLOOD PRESSURE: 110 MMHG | HEIGHT: 68 IN

## 2024-12-26 DIAGNOSIS — R13.10 DYSPHAGIA, UNSPECIFIED TYPE: Primary | ICD-10-CM

## 2024-12-26 DIAGNOSIS — R59.0 ANTERIOR CERVICAL ADENOPATHY: ICD-10-CM

## 2024-12-26 DIAGNOSIS — R13.10 ODYNOPHAGIA: ICD-10-CM

## 2024-12-26 LAB — S PYO AG THROAT QL: NORMAL

## 2024-12-26 PROCEDURE — 87880 STREP A ASSAY W/OPTIC: CPT | Performed by: PHYSICIAN ASSISTANT

## 2024-12-26 PROCEDURE — 99214 OFFICE O/P EST MOD 30 MIN: CPT | Performed by: PHYSICIAN ASSISTANT

## 2024-12-26 NOTE — PROGRESS NOTES
Chief Complaint       Pharyngitis (Sore throat, bilateral ear pain (worse on the R) and fever/)      History of Present Illness   Source of history provided by:  patient.     Prasanna Parnell is a 59 y.o. old male presenting to the walk in clinic for evaluation of painful swallowing and difficulty swallowing which has been present intermittently for the past few months but worsening over the past few days.  Patient also reports intermittent fevers at night.  Reports difficulty swallowing solids and liquids.  Reports swelling of his right lateral neck as well.  Denies any CP, SOB, cough, nausea, vomiting, rash, or lethargy.  Denies sick exposures.    ROS    Unless otherwise stated in this report or unable to obtain because of the patient's clinical or mental status as evidenced by the medical record, this patients's positive and negative responses for Review of Systems, constitutional, psych, eyes, ENT, cardiovascular, respiratory, gastrointestinal, neurological, genitourinary, musculoskeletal, integument systems and systems related to the presenting problem are either stated in the preceding or were not pertinent or were negative for the symptoms and/or complaints related to the medical problem.    Past Medical History:  has a past medical history of Cervicalgia, Neuropathy, Retrolisthesis, and Spondylolisthesis.  Past Surgical History:  has a past surgical history that includes lumbar laminectomy.  Social History:  reports that he has been smoking cigarettes. He has quit using smokeless tobacco. He reports that he does not drink alcohol and does not use drugs.  Family History: family history is not on file.   Allergies: Bee venom and Keflex [cephalexin]    Physical Exam         VS:  /76   Pulse 68   Temp 97.9 °F (36.6 °C)   Ht 1.727 m (5' 8\")   Wt 65.8 kg (145 lb)   SpO2 98%   BMI 22.05 kg/m²    Oxygen Saturation Interpretation: Normal.    Constitutional:  Alert, development consistent with

## 2024-12-27 ENCOUNTER — APPOINTMENT (OUTPATIENT)
Dept: CT IMAGING | Age: 59
End: 2024-12-27

## 2024-12-27 ENCOUNTER — HOSPITAL ENCOUNTER (EMERGENCY)
Age: 59
Discharge: HOME | End: 2024-12-27
Attending: EMERGENCY MEDICINE

## 2024-12-27 ENCOUNTER — TELEPHONE (OUTPATIENT)
Dept: ENT CLINIC | Age: 59
End: 2024-12-27

## 2024-12-27 VITALS
BODY MASS INDEX: 21.13 KG/M2 | DIASTOLIC BLOOD PRESSURE: 64 MMHG | SYSTOLIC BLOOD PRESSURE: 124 MMHG | HEART RATE: 88 BPM | OXYGEN SATURATION: 97 % | WEIGHT: 139 LBS | TEMPERATURE: 97.6 F | RESPIRATION RATE: 18 BRPM

## 2024-12-27 DIAGNOSIS — J44.9 CHRONIC OBSTRUCTIVE PULMONARY DISEASE, UNSPECIFIED COPD TYPE (HCC): ICD-10-CM

## 2024-12-27 DIAGNOSIS — R91.8 LUNG MASS: ICD-10-CM

## 2024-12-27 DIAGNOSIS — K13.70 ORAL LESION: Primary | ICD-10-CM

## 2024-12-27 DIAGNOSIS — R59.1 LYMPHADENOPATHY: ICD-10-CM

## 2024-12-27 LAB
ALBUMIN SERPL-MCNC: 4.2 G/DL (ref 3.5–5.2)
ALP SERPL-CCNC: 111 U/L (ref 40–129)
ALT SERPL-CCNC: 19 U/L (ref 0–40)
ANION GAP SERPL CALCULATED.3IONS-SCNC: 10 MMOL/L (ref 7–16)
AST SERPL-CCNC: 22 U/L (ref 0–39)
BASOPHILS # BLD: 0.05 K/UL (ref 0–0.2)
BASOPHILS NFR BLD: 1 % (ref 0–2)
BILIRUB SERPL-MCNC: 0.5 MG/DL (ref 0–1.2)
BUN SERPL-MCNC: 14 MG/DL (ref 6–20)
CALCIUM SERPL-MCNC: 10 MG/DL (ref 8.6–10.2)
CHLORIDE SERPL-SCNC: 104 MMOL/L (ref 98–107)
CO2 SERPL-SCNC: 26 MMOL/L (ref 22–29)
CREAT SERPL-MCNC: 1 MG/DL (ref 0.7–1.2)
EOSINOPHIL # BLD: 0.13 K/UL (ref 0.05–0.5)
EOSINOPHILS RELATIVE PERCENT: 2 % (ref 0–6)
ERYTHROCYTE [DISTWIDTH] IN BLOOD BY AUTOMATED COUNT: 11.8 % (ref 11.5–15)
FLUAV RNA RESP QL NAA+PROBE: NOT DETECTED
FLUBV RNA RESP QL NAA+PROBE: NOT DETECTED
GFR, ESTIMATED: 87 ML/MIN/1.73M2
GLUCOSE SERPL-MCNC: 106 MG/DL (ref 74–99)
HCT VFR BLD AUTO: 43 % (ref 37–54)
HETEROPH AB BLD QL IA: NEGATIVE
HGB BLD-MCNC: 14.9 G/DL (ref 12.5–16.5)
IMM GRANULOCYTES # BLD AUTO: <0.03 K/UL (ref 0–0.58)
IMM GRANULOCYTES NFR BLD: 0 % (ref 0–5)
LYMPHOCYTES NFR BLD: 2.24 K/UL (ref 1.5–4)
LYMPHOCYTES RELATIVE PERCENT: 29 % (ref 20–42)
MCH RBC QN AUTO: 31.2 PG (ref 26–35)
MCHC RBC AUTO-ENTMCNC: 34.7 G/DL (ref 32–34.5)
MCV RBC AUTO: 90 FL (ref 80–99.9)
MONOCYTES NFR BLD: 0.4 K/UL (ref 0.1–0.95)
MONOCYTES NFR BLD: 5 % (ref 2–12)
NEUTROPHILS NFR BLD: 63 % (ref 43–80)
NEUTS SEG NFR BLD: 4.9 K/UL (ref 1.8–7.3)
PLATELET # BLD AUTO: 189 K/UL (ref 130–450)
PMV BLD AUTO: 9.9 FL (ref 7–12)
POTASSIUM SERPL-SCNC: 4.7 MMOL/L (ref 3.5–5)
PROT SERPL-MCNC: 7.5 G/DL (ref 6.4–8.3)
RBC # BLD AUTO: 4.78 M/UL (ref 3.8–5.8)
SARS-COV-2 RNA RESP QL NAA+PROBE: NOT DETECTED
SODIUM SERPL-SCNC: 140 MMOL/L (ref 132–146)
SOURCE: NORMAL
SPECIMEN DESCRIPTION: NORMAL
SPECIMEN SOURCE: NORMAL
STREP A, MOLECULAR: NEGATIVE
WBC OTHER # BLD: 7.7 K/UL (ref 4.5–11.5)

## 2024-12-27 PROCEDURE — 86308 HETEROPHILE ANTIBODY SCREEN: CPT

## 2024-12-27 PROCEDURE — 2580000003 HC RX 258: Performed by: EMERGENCY MEDICINE

## 2024-12-27 PROCEDURE — 96374 THER/PROPH/DIAG INJ IV PUSH: CPT

## 2024-12-27 PROCEDURE — 85025 COMPLETE CBC W/AUTO DIFF WBC: CPT

## 2024-12-27 PROCEDURE — 6360000002 HC RX W HCPCS: Performed by: EMERGENCY MEDICINE

## 2024-12-27 PROCEDURE — 96375 TX/PRO/DX INJ NEW DRUG ADDON: CPT

## 2024-12-27 PROCEDURE — 80053 COMPREHEN METABOLIC PANEL: CPT

## 2024-12-27 PROCEDURE — 6360000004 HC RX CONTRAST MEDICATION: Performed by: RADIOLOGY

## 2024-12-27 PROCEDURE — 87651 STREP A DNA AMP PROBE: CPT

## 2024-12-27 PROCEDURE — 99285 EMERGENCY DEPT VISIT HI MDM: CPT

## 2024-12-27 PROCEDURE — 71250 CT THORAX DX C-: CPT

## 2024-12-27 PROCEDURE — 87636 SARSCOV2 & INF A&B AMP PRB: CPT

## 2024-12-27 PROCEDURE — 70491 CT SOFT TISSUE NECK W/DYE: CPT

## 2024-12-27 RX ORDER — IOPAMIDOL 755 MG/ML
75 INJECTION, SOLUTION INTRAVASCULAR
Status: COMPLETED | OUTPATIENT
Start: 2024-12-27 | End: 2024-12-27

## 2024-12-27 RX ORDER — PREDNISONE 20 MG/1
40 TABLET ORAL DAILY
Qty: 20 TABLET | Refills: 0 | Status: SHIPPED | OUTPATIENT
Start: 2024-12-27 | End: 2025-01-06

## 2024-12-27 RX ORDER — 0.9 % SODIUM CHLORIDE 0.9 %
1000 INTRAVENOUS SOLUTION INTRAVENOUS ONCE
Status: COMPLETED | OUTPATIENT
Start: 2024-12-27 | End: 2024-12-27

## 2024-12-27 RX ORDER — DEXAMETHASONE SODIUM PHOSPHATE 10 MG/ML
10 INJECTION INTRAMUSCULAR; INTRAVENOUS ONCE
Status: COMPLETED | OUTPATIENT
Start: 2024-12-27 | End: 2024-12-27

## 2024-12-27 RX ORDER — KETOROLAC TROMETHAMINE 30 MG/ML
30 INJECTION, SOLUTION INTRAMUSCULAR; INTRAVENOUS ONCE
Status: COMPLETED | OUTPATIENT
Start: 2024-12-27 | End: 2024-12-27

## 2024-12-27 RX ADMIN — SODIUM CHLORIDE 1000 ML: 9 INJECTION, SOLUTION INTRAVENOUS at 09:44

## 2024-12-27 RX ADMIN — IOPAMIDOL 75 ML: 755 INJECTION, SOLUTION INTRAVENOUS at 10:45

## 2024-12-27 RX ADMIN — KETOROLAC TROMETHAMINE 30 MG: 30 INJECTION, SOLUTION INTRAMUSCULAR at 09:45

## 2024-12-27 RX ADMIN — DEXAMETHASONE SODIUM PHOSPHATE 10 MG: 10 INJECTION INTRAMUSCULAR; INTRAVENOUS at 09:44

## 2024-12-27 ASSESSMENT — PAIN DESCRIPTION - PAIN TYPE: TYPE: ACUTE PAIN

## 2024-12-27 ASSESSMENT — PAIN DESCRIPTION - FREQUENCY: FREQUENCY: CONTINUOUS

## 2024-12-27 ASSESSMENT — PAIN DESCRIPTION - ONSET: ONSET: ON-GOING

## 2024-12-27 ASSESSMENT — PAIN DESCRIPTION - LOCATION: LOCATION: THROAT

## 2024-12-27 ASSESSMENT — PAIN DESCRIPTION - ORIENTATION: ORIENTATION: RIGHT

## 2024-12-27 ASSESSMENT — PAIN SCALES - GENERAL
PAINLEVEL_OUTOF10: 10
PAINLEVEL_OUTOF10: 6

## 2024-12-27 ASSESSMENT — PAIN - FUNCTIONAL ASSESSMENT: PAIN_FUNCTIONAL_ASSESSMENT: 0-10

## 2024-12-27 NOTE — ED PROVIDER NOTES
DISPOSITION  Disposition: Discharge to home  Patient condition is stable    PATIENT REFERRED TO:  Jonathan Bowden DO  [address] [address] [phone]    Call today  otolaryngology    Jeannette Szymanski DO  [address] [address]  [phone]    Call today  pulmonology    [facility]  [phone]  Call   for a primary care physician    [facility]  [address] [place] [address]  [phone]  Go to   If symptoms worsen      DISCHARGE MEDICATIONS:  New Prescriptions    PREDNISONE (DELTASONE) 20 MG TABLET    Take 2 tablets by mouth daily for 10 days       DISCONTINUED MEDICATIONS:  Discontinued Medications    No medications on file              (Please note that portions of this note were completed with a voice recognition program.  Efforts were made to edit the dictations but occasionally words are mis-transcribed.)    Roya Gan DO (electronically signed)            Roya Gan DO  12/27/24 5416

## 2024-12-27 NOTE — DISCHARGE INSTRUCTIONS
Please follow-up with the specified individuals for evaluation in office and potential biopsy.  Do not delay this testing.

## 2024-12-27 NOTE — TELEPHONE ENCOUNTER
LMOVM for patient to call to schedule    Patient seen in [facility] for mouth lesion    Appt 1/2/25 1200 or 6725

## 2025-01-01 ENCOUNTER — APPOINTMENT (OUTPATIENT)
Dept: ULTRASOUND IMAGING | Age: 60
End: 2025-01-01
Payer: MEDICARE

## 2025-01-01 ENCOUNTER — APPOINTMENT (OUTPATIENT)
Dept: GENERAL RADIOLOGY | Age: 60
End: 2025-01-01
Payer: MEDICARE

## 2025-01-01 ENCOUNTER — APPOINTMENT (OUTPATIENT)
Dept: CT IMAGING | Age: 60
End: 2025-01-01
Payer: MEDICARE

## 2025-01-01 ENCOUNTER — HOSPITAL ENCOUNTER (INPATIENT)
Age: 60
LOS: 14 days | End: 2025-08-15
Attending: EMERGENCY MEDICINE | Admitting: STUDENT IN AN ORGANIZED HEALTH CARE EDUCATION/TRAINING PROGRAM
Payer: MEDICARE

## 2025-01-01 ENCOUNTER — HOSPITAL ENCOUNTER (OUTPATIENT)
Dept: INFUSION THERAPY | Age: 60
Discharge: HOME | End: 2025-08-06

## 2025-01-01 ENCOUNTER — APPOINTMENT (OUTPATIENT)
Age: 60
End: 2025-01-01
Payer: MEDICARE

## 2025-01-01 VITALS
WEIGHT: 110.23 LBS | OXYGEN SATURATION: 93 % | SYSTOLIC BLOOD PRESSURE: 93 MMHG | DIASTOLIC BLOOD PRESSURE: 57 MMHG | TEMPERATURE: 99.9 F | BODY MASS INDEX: 16.71 KG/M2 | HEART RATE: 125 BPM | RESPIRATION RATE: 18 BRPM | HEIGHT: 68 IN

## 2025-01-01 DIAGNOSIS — C14.0 THROAT CANCER (HCC): ICD-10-CM

## 2025-01-01 DIAGNOSIS — R13.10 DYSPHAGIA, UNSPECIFIED TYPE: ICD-10-CM

## 2025-01-01 DIAGNOSIS — R62.7 FAILURE TO THRIVE IN ADULT: Primary | ICD-10-CM

## 2025-01-01 DIAGNOSIS — R07.9 CHEST PAIN, UNSPECIFIED TYPE: ICD-10-CM

## 2025-01-01 DIAGNOSIS — R06.02 SHORTNESS OF BREATH: ICD-10-CM

## 2025-01-01 DIAGNOSIS — C09.8 MALIGNANT NEOPLASM OF OVERLAPPING SITES OF TONSIL (HCC): Primary | ICD-10-CM

## 2025-01-01 LAB
ABO/RH: NORMAL
ACB COMPLEX DNA BLD POS QL NAA+NON-PROBE: NOT DETECTED
ALBUMIN SERPL-MCNC: 1.9 G/DL (ref 3.5–5.2)
ALBUMIN SERPL-MCNC: 2 G/DL (ref 3.5–5.2)
ALBUMIN SERPL-MCNC: 2.1 G/DL (ref 3.5–5.2)
ALBUMIN SERPL-MCNC: 2.1 G/DL (ref 3.5–5.2)
ALBUMIN SERPL-MCNC: 2.2 G/DL (ref 3.5–5.2)
ALBUMIN SERPL-MCNC: 2.3 G/DL (ref 3.5–5.2)
ALBUMIN SERPL-MCNC: 2.3 G/DL (ref 3.5–5.2)
ALBUMIN SERPL-MCNC: 2.4 G/DL (ref 3.5–5.2)
ALBUMIN SERPL-MCNC: 2.5 G/DL (ref 3.5–5.2)
ALBUMIN SERPL-MCNC: 2.5 G/DL (ref 3.5–5.2)
ALBUMIN SERPL-MCNC: 3.3 G/DL (ref 3.5–5.2)
ALP SERPL-CCNC: 241 U/L (ref 40–129)
ALP SERPL-CCNC: 279 U/L (ref 40–129)
ALP SERPL-CCNC: 289 U/L (ref 40–129)
ALP SERPL-CCNC: 292 U/L (ref 40–129)
ALP SERPL-CCNC: 292 U/L (ref 40–129)
ALP SERPL-CCNC: 303 U/L (ref 40–129)
ALP SERPL-CCNC: 304 U/L (ref 40–129)
ALP SERPL-CCNC: 343 U/L (ref 40–129)
ALP SERPL-CCNC: 372 U/L (ref 40–129)
ALP SERPL-CCNC: 379 U/L (ref 40–129)
ALP SERPL-CCNC: 381 U/L (ref 40–129)
ALP SERPL-CCNC: 408 U/L (ref 40–129)
ALP SERPL-CCNC: 489 U/L (ref 40–129)
ALP SERPL-CCNC: 563 U/L (ref 40–129)
ALP SERPL-CCNC: 567 U/L (ref 40–129)
ALP SERPL-CCNC: 682 U/L (ref 40–129)
ALP SERPL-CCNC: 769 U/L (ref 40–129)
ALT SERPL-CCNC: 1014 U/L (ref 0–50)
ALT SERPL-CCNC: 197 U/L (ref 0–50)
ALT SERPL-CCNC: 236 U/L (ref 0–50)
ALT SERPL-CCNC: 247 U/L (ref 0–50)
ALT SERPL-CCNC: 266 U/L (ref 0–50)
ALT SERPL-CCNC: 326 U/L (ref 0–50)
ALT SERPL-CCNC: 327 U/L (ref 0–50)
ALT SERPL-CCNC: 358 U/L (ref 0–50)
ALT SERPL-CCNC: 361 U/L (ref 0–50)
ALT SERPL-CCNC: 386 U/L (ref 0–50)
ALT SERPL-CCNC: 394 U/L (ref 0–50)
ALT SERPL-CCNC: 410 U/L (ref 0–50)
ALT SERPL-CCNC: 449 U/L (ref 0–50)
ALT SERPL-CCNC: 559 U/L (ref 0–50)
ALT SERPL-CCNC: 648 U/L (ref 0–50)
ALT SERPL-CCNC: 888 U/L (ref 0–50)
ALT SERPL-CCNC: 915 U/L (ref 0–50)
ANION GAP SERPL CALCULATED.3IONS-SCNC: 0 MMOL/L (ref 7–16)
ANION GAP SERPL CALCULATED.3IONS-SCNC: 10 MMOL/L (ref 7–16)
ANION GAP SERPL CALCULATED.3IONS-SCNC: 11 MMOL/L (ref 7–16)
ANION GAP SERPL CALCULATED.3IONS-SCNC: 11 MMOL/L (ref 7–16)
ANION GAP SERPL CALCULATED.3IONS-SCNC: 13 MMOL/L (ref 7–16)
ANION GAP SERPL CALCULATED.3IONS-SCNC: 13 MMOL/L (ref 7–16)
ANION GAP SERPL CALCULATED.3IONS-SCNC: 5 MMOL/L (ref 7–16)
ANION GAP SERPL CALCULATED.3IONS-SCNC: 6 MMOL/L (ref 7–16)
ANION GAP SERPL CALCULATED.3IONS-SCNC: 7 MMOL/L (ref 7–16)
ANION GAP SERPL CALCULATED.3IONS-SCNC: 8 MMOL/L (ref 7–16)
ANION GAP SERPL CALCULATED.3IONS-SCNC: 9 MMOL/L (ref 7–16)
ANTIBODY SCREEN: NEGATIVE
ARM BAND NUMBER: NORMAL
AST SERPL-CCNC: 111 U/L (ref 0–50)
AST SERPL-CCNC: 144 U/L (ref 0–50)
AST SERPL-CCNC: 150 U/L (ref 0–50)
AST SERPL-CCNC: 174 U/L (ref 0–50)
AST SERPL-CCNC: 175 U/L (ref 0–50)
AST SERPL-CCNC: 220 U/L (ref 0–50)
AST SERPL-CCNC: 272 U/L (ref 0–50)
AST SERPL-CCNC: 278 U/L (ref 0–50)
AST SERPL-CCNC: 280 U/L (ref 0–50)
AST SERPL-CCNC: 294 U/L (ref 0–50)
AST SERPL-CCNC: 356 U/L (ref 0–50)
AST SERPL-CCNC: 430 U/L (ref 0–50)
AST SERPL-CCNC: 716 U/L (ref 0–50)
AST SERPL-CCNC: 845 U/L (ref 0–50)
AST SERPL-CCNC: 897 U/L (ref 0–50)
AST SERPL-CCNC: 949 U/L (ref 0–50)
AST SERPL-CCNC: 959 U/L (ref 0–50)
ATYPICAL LYMPHOCYTE ABSOLUTE COUNT: 0.04 K/UL (ref 0–0.46)
ATYPICAL LYMPHOCYTES: 1 % (ref 0–4)
B FRAGILIS DNA BLD POS QL NAA+NON-PROBE: NOT DETECTED
B PARAP IS1001 DNA NPH QL NAA+NON-PROBE: NOT DETECTED
B PERT DNA SPEC QL NAA+PROBE: NOT DETECTED
B.E.: 11.6 MMOL/L (ref -3–3)
B.E.: 13.1 MMOL/L (ref -3–3)
B.E.: 4.4 MMOL/L (ref -3–3)
B.E.: 8.1 MMOL/L (ref -3–3)
B.E.: 8.1 MMOL/L (ref -3–3)
B.E.: 9.2 MMOL/L (ref -3–3)
B.E.: 9.5 MMOL/L (ref -3–3)
BASOPHILS # BLD: 0 K/UL (ref 0–0.2)
BASOPHILS # BLD: 0.01 K/UL (ref 0–0.2)
BASOPHILS # BLD: 0.05 K/UL (ref 0–0.2)
BASOPHILS NFR BLD: 0 % (ref 0–2)
BASOPHILS NFR BLD: 1 % (ref 0–2)
BILIRUB DIRECT SERPL-MCNC: 0.2 MG/DL (ref 0–0.2)
BILIRUB DIRECT SERPL-MCNC: 0.5 MG/DL (ref 0–0.2)
BILIRUB DIRECT SERPL-MCNC: 0.6 MG/DL (ref 0–0.2)
BILIRUB INDIRECT SERPL-MCNC: 0.1 MG/DL (ref 0–1)
BILIRUB INDIRECT SERPL-MCNC: 0.5 MG/DL (ref 0–1)
BILIRUB INDIRECT SERPL-MCNC: 0.6 MG/DL (ref 0–1)
BILIRUB SERPL-MCNC: 0.2 MG/DL (ref 0–1.2)
BILIRUB SERPL-MCNC: 0.3 MG/DL (ref 0–1.2)
BILIRUB SERPL-MCNC: 0.4 MG/DL (ref 0–1.2)
BILIRUB SERPL-MCNC: 0.5 MG/DL (ref 0–1.2)
BILIRUB SERPL-MCNC: 0.6 MG/DL (ref 0–1.2)
BILIRUB SERPL-MCNC: 0.6 MG/DL (ref 0–1.2)
BILIRUB SERPL-MCNC: 0.8 MG/DL (ref 0–1.2)
BILIRUB SERPL-MCNC: 0.9 MG/DL (ref 0–1.2)
BILIRUB SERPL-MCNC: 1.1 MG/DL (ref 0–1.2)
BIOFIRE TEST COMMENT: ABNORMAL
BLACTX-M ISLT/SPM QL: NOT DETECTED
BLAIMP ISLT/SPM QL: NOT DETECTED
BLAKPC ISLT/SPM QL: NOT DETECTED
BLAOXA-48-LIKE ISLT/SPM QL: NOT DETECTED
BLAVIM ISLT/SPM QL: NOT DETECTED
BLOOD BANK BLOOD PRODUCT EXPIRATION DATE: NORMAL
BLOOD BANK DISPENSE STATUS: NORMAL
BLOOD BANK ISBT PRODUCT BLOOD TYPE: 5100
BLOOD BANK PRODUCT CODE: NORMAL
BLOOD BANK SAMPLE EXPIRATION: NORMAL
BLOOD BANK UNIT TYPE AND RH: NORMAL
BNP SERPL-MCNC: 1043 PG/ML (ref 0–125)
BPU ID: NORMAL
BUN SERPL-MCNC: 17 MG/DL (ref 6–20)
BUN SERPL-MCNC: 17 MG/DL (ref 6–20)
BUN SERPL-MCNC: 18 MG/DL (ref 6–20)
BUN SERPL-MCNC: 19 MG/DL (ref 6–20)
BUN SERPL-MCNC: 20 MG/DL (ref 6–20)
BUN SERPL-MCNC: 21 MG/DL (ref 6–20)
BUN SERPL-MCNC: 21 MG/DL (ref 6–20)
BUN SERPL-MCNC: 22 MG/DL (ref 6–20)
BUN SERPL-MCNC: 22 MG/DL (ref 6–20)
BUN SERPL-MCNC: 23 MG/DL (ref 6–20)
BUN SERPL-MCNC: 24 MG/DL (ref 6–20)
BUN SERPL-MCNC: 26 MG/DL (ref 6–20)
BUN SERPL-MCNC: 37 MG/DL (ref 6–20)
BUN SERPL-MCNC: 55 MG/DL (ref 6–20)
C ALBICANS DNA BLD POS QL NAA+NON-PROBE: NOT DETECTED
C AURIS DNA BLD POS QL NAA+NON-PROBE: NOT DETECTED
C GATTII+NEOFOR DNA BLD POS QL NAA+N-PRB: NOT DETECTED
C GLABRATA DNA BLD POS QL NAA+NON-PROBE: NOT DETECTED
C KRUSEI DNA BLD POS QL NAA+NON-PROBE: NOT DETECTED
C PARAP DNA BLD POS QL NAA+NON-PROBE: NOT DETECTED
C PNEUM DNA NPH QL NAA+NON-PROBE: NOT DETECTED
C TROPICLS DNA BLD POS QL NAA+NON-PROBE: NOT DETECTED
CA-I BLD-SCNC: 1.14 MMOL/L (ref 1.15–1.33)
CA-I BLD-SCNC: 1.15 MMOL/L (ref 1.15–1.33)
CA-I BLD-SCNC: 1.15 MMOL/L (ref 1.15–1.33)
CA-I BLD-SCNC: 1.16 MMOL/L (ref 1.15–1.33)
CA-I BLD-SCNC: 1.17 MMOL/L (ref 1.15–1.33)
CA-I BLD-SCNC: 1.17 MMOL/L (ref 1.15–1.33)
CA-I BLD-SCNC: 1.18 MMOL/L (ref 1.15–1.33)
CA-I BLD-SCNC: 1.19 MMOL/L (ref 1.15–1.33)
CA-I BLD-SCNC: 1.2 MMOL/L (ref 1.15–1.33)
CA-I BLD-SCNC: 1.21 MMOL/L (ref 1.15–1.33)
CALCIUM SERPL-MCNC: 7.6 MG/DL (ref 8.6–10)
CALCIUM SERPL-MCNC: 7.7 MG/DL (ref 8.6–10)
CALCIUM SERPL-MCNC: 7.7 MG/DL (ref 8.6–10)
CALCIUM SERPL-MCNC: 7.8 MG/DL (ref 8.6–10)
CALCIUM SERPL-MCNC: 7.9 MG/DL (ref 8.6–10)
CALCIUM SERPL-MCNC: 7.9 MG/DL (ref 8.6–10)
CALCIUM SERPL-MCNC: 8 MG/DL (ref 8.6–10)
CALCIUM SERPL-MCNC: 8.1 MG/DL (ref 8.6–10)
CALCIUM SERPL-MCNC: 8.2 MG/DL (ref 8.6–10)
CALCIUM SERPL-MCNC: 8.2 MG/DL (ref 8.6–10)
CALCIUM SERPL-MCNC: 8.3 MG/DL (ref 8.6–10)
CALCIUM SERPL-MCNC: 8.3 MG/DL (ref 8.6–10)
CALCIUM SERPL-MCNC: 8.4 MG/DL (ref 8.6–10)
CALCIUM SERPL-MCNC: 8.5 MG/DL (ref 8.6–10)
CALCIUM SERPL-MCNC: 9.5 MG/DL (ref 8.6–10)
CHLORIDE SERPL-SCNC: 100 MMOL/L (ref 98–107)
CHLORIDE SERPL-SCNC: 102 MMOL/L (ref 98–107)
CHLORIDE SERPL-SCNC: 102 MMOL/L (ref 98–107)
CHLORIDE SERPL-SCNC: 103 MMOL/L (ref 98–107)
CHLORIDE SERPL-SCNC: 103 MMOL/L (ref 98–107)
CHLORIDE SERPL-SCNC: 104 MMOL/L (ref 98–107)
CHLORIDE SERPL-SCNC: 105 MMOL/L (ref 98–107)
CHLORIDE SERPL-SCNC: 106 MMOL/L (ref 98–107)
CHLORIDE SERPL-SCNC: 107 MMOL/L (ref 98–107)
CHLORIDE SERPL-SCNC: 107 MMOL/L (ref 98–107)
CHLORIDE SERPL-SCNC: 108 MMOL/L (ref 98–107)
CHLORIDE SERPL-SCNC: 95 MMOL/L (ref 98–107)
CHLORIDE SERPL-SCNC: 96 MMOL/L (ref 98–107)
CHLORIDE SERPL-SCNC: 96 MMOL/L (ref 98–107)
CHLORIDE SERPL-SCNC: 98 MMOL/L (ref 98–107)
CHLORIDE SERPL-SCNC: 99 MMOL/L (ref 98–107)
CO2 SERPL-SCNC: 24 MMOL/L (ref 22–29)
CO2 SERPL-SCNC: 25 MMOL/L (ref 22–29)
CO2 SERPL-SCNC: 26 MMOL/L (ref 22–29)
CO2 SERPL-SCNC: 27 MMOL/L (ref 22–29)
CO2 SERPL-SCNC: 28 MMOL/L (ref 22–29)
CO2 SERPL-SCNC: 28 MMOL/L (ref 22–29)
CO2 SERPL-SCNC: 29 MMOL/L (ref 22–29)
CO2 SERPL-SCNC: 30 MMOL/L (ref 22–29)
CO2 SERPL-SCNC: 31 MMOL/L (ref 22–29)
CO2 SERPL-SCNC: 31 MMOL/L (ref 22–29)
CO2 SERPL-SCNC: 32 MMOL/L (ref 22–29)
CO2 SERPL-SCNC: 33 MMOL/L (ref 22–29)
CO2 SERPL-SCNC: 34 MMOL/L (ref 22–29)
COHB: 0.1 % (ref 0–1.5)
COHB: 0.2 % (ref 0–1.5)
COHB: 0.3 % (ref 0–1.5)
COHB: 0.4 % (ref 0–1.5)
COHB: 0.8 % (ref 0–1.5)
COLISTIN RES MCR-1 ISLT/SPM QL: NOT DETECTED
COMPONENT: NORMAL
COPPER SERPL-MCNC: 120.6 UG/DL (ref 70–140)
CORTIS SERPL-MCNC: 22.7 UG/DL (ref 2.7–18.4)
CORTIS SERPL-MCNC: 49.1 UG/DL (ref 2.7–18.4)
CREAT SERPL-MCNC: 0.2 MG/DL (ref 0.7–1.2)
CREAT SERPL-MCNC: 0.2 MG/DL (ref 0.7–1.2)
CREAT SERPL-MCNC: 0.3 MG/DL (ref 0.7–1.2)
CREAT SERPL-MCNC: 0.4 MG/DL (ref 0.7–1.2)
CREAT SERPL-MCNC: 0.5 MG/DL (ref 0.7–1.2)
CREAT SERPL-MCNC: 0.6 MG/DL (ref 0.7–1.2)
CRITICAL: ABNORMAL
CROSSMATCH RESULT: NORMAL
CRP SERPL HS-MCNC: 86 MG/L (ref 0–5)
CRP SERPL HS-MCNC: 96 MG/L (ref 0–5)
DATE ANALYZED: ABNORMAL
DATE OF COLLECTION: ABNORMAL
E CLOAC COMP DNA BLD POS NAA+NON-PROBE: NOT DETECTED
E COLI DNA BLD POS QL NAA+NON-PROBE: NOT DETECTED
E FAECALIS DNA BLD POS QL NAA+NON-PROBE: NOT DETECTED
E FAECIUM DNA BLD POS QL NAA+NON-PROBE: NOT DETECTED
ECHO BSA: 1.34 M2
ECHO EST RA PRESSURE: 3 MMHG
ECHO LV EF PHYSICIAN: 64 %
ECHO LV FRACTIONAL SHORTENING: 30 % (ref 28–44)
ECHO LV INTERNAL DIMENSION DIASTOLE INDEX: 2.03 CM/M2
ECHO LV INTERNAL DIMENSION DIASTOLIC: 3 CM (ref 4.2–5.9)
ECHO LV INTERNAL DIMENSION SYSTOLIC INDEX: 1.42 CM/M2
ECHO LV INTERNAL DIMENSION SYSTOLIC: 2.1 CM
ECHO RIGHT VENTRICULAR SYSTOLIC PRESSURE (RVSP): 30 MMHG
ECHO RV TAPSE: 1.4 CM (ref 1.7–?)
ECHO TV REGURGITANT MAX VELOCITY: 2.58 M/S
ECHO TV REGURGITANT PEAK GRADIENT: 27 MMHG
EKG ATRIAL RATE: 110 BPM
EKG ATRIAL RATE: 59 BPM
EKG ATRIAL RATE: 65 BPM
EKG ATRIAL RATE: 73 BPM
EKG ATRIAL RATE: 76 BPM
EKG ATRIAL RATE: 86 BPM
EKG P AXIS: 102 DEGREES
EKG P AXIS: 62 DEGREES
EKG P AXIS: 76 DEGREES
EKG P AXIS: 78 DEGREES
EKG P AXIS: 79 DEGREES
EKG P AXIS: 80 DEGREES
EKG P-R INTERVAL: 114 MS
EKG P-R INTERVAL: 118 MS
EKG P-R INTERVAL: 122 MS
EKG P-R INTERVAL: 124 MS
EKG P-R INTERVAL: 128 MS
EKG P-R INTERVAL: 136 MS
EKG Q-T INTERVAL: 324 MS
EKG Q-T INTERVAL: 364 MS
EKG Q-T INTERVAL: 372 MS
EKG Q-T INTERVAL: 386 MS
EKG Q-T INTERVAL: 396 MS
EKG Q-T INTERVAL: 408 MS
EKG QRS DURATION: 102 MS
EKG QRS DURATION: 104 MS
EKG QRS DURATION: 90 MS
EKG QRS DURATION: 94 MS
EKG QRS DURATION: 96 MS
EKG QRS DURATION: 98 MS
EKG QTC CALCULATION (BAZETT): 382 MS
EKG QTC CALCULATION (BAZETT): 409 MS
EKG QTC CALCULATION (BAZETT): 411 MS
EKG QTC CALCULATION (BAZETT): 435 MS
EKG QTC CALCULATION (BAZETT): 438 MS
EKG QTC CALCULATION (BAZETT): 459 MS
EKG R AXIS: 80 DEGREES
EKG R AXIS: 82 DEGREES
EKG R AXIS: 82 DEGREES
EKG R AXIS: 85 DEGREES
EKG R AXIS: 86 DEGREES
EKG R AXIS: 86 DEGREES
EKG T AXIS: -80 DEGREES
EKG T AXIS: -85 DEGREES
EKG T AXIS: 10 DEGREES
EKG T AXIS: 60 DEGREES
EKG T AXIS: 85 DEGREES
EKG T AXIS: 87 DEGREES
EKG VENTRICULAR RATE: 110 BPM
EKG VENTRICULAR RATE: 59 BPM
EKG VENTRICULAR RATE: 65 BPM
EKG VENTRICULAR RATE: 73 BPM
EKG VENTRICULAR RATE: 76 BPM
EKG VENTRICULAR RATE: 86 BPM
ENTEROBACTERALES DNA BLD POS NAA+N-PRB: DETECTED
EOSINOPHIL # BLD: 0 K/UL (ref 0.05–0.5)
EOSINOPHIL # BLD: 0.01 K/UL (ref 0.05–0.5)
EOSINOPHIL # BLD: 0.01 K/UL (ref 0.05–0.5)
EOSINOPHIL # BLD: 0.03 K/UL (ref 0.05–0.5)
EOSINOPHILS RELATIVE PERCENT: 0 % (ref 0–6)
EOSINOPHILS RELATIVE PERCENT: 1 % (ref 0–6)
ERYTHROCYTE [DISTWIDTH] IN BLOOD BY AUTOMATED COUNT: 14.3 % (ref 11.5–15)
ERYTHROCYTE [DISTWIDTH] IN BLOOD BY AUTOMATED COUNT: 14.3 % (ref 11.5–15)
ERYTHROCYTE [DISTWIDTH] IN BLOOD BY AUTOMATED COUNT: 14.4 % (ref 11.5–15)
ERYTHROCYTE [DISTWIDTH] IN BLOOD BY AUTOMATED COUNT: 14.5 % (ref 11.5–15)
ERYTHROCYTE [DISTWIDTH] IN BLOOD BY AUTOMATED COUNT: 14.6 % (ref 11.5–15)
ERYTHROCYTE [DISTWIDTH] IN BLOOD BY AUTOMATED COUNT: 14.8 % (ref 11.5–15)
ERYTHROCYTE [DISTWIDTH] IN BLOOD BY AUTOMATED COUNT: 14.9 % (ref 11.5–15)
ERYTHROCYTE [SEDIMENTATION RATE] IN BLOOD BY WESTERGREN METHOD: 55 MM/HR (ref 0–15)
FERRITIN SERPL-MCNC: >2000 NG/ML
FIO2: 100 %
FIO2: 100 %
FLUAV RNA NPH QL NAA+NON-PROBE: NOT DETECTED
FLUBV RNA NPH QL NAA+NON-PROBE: NOT DETECTED
FOLATE SERPL-MCNC: 6 NG/ML (ref 4.6–34.8)
GFR, ESTIMATED: >90 ML/MIN/1.73M2
GLUCOSE BLD-MCNC: 101 MG/DL (ref 74–99)
GLUCOSE BLD-MCNC: 104 MG/DL (ref 74–99)
GLUCOSE BLD-MCNC: 107 MG/DL (ref 74–99)
GLUCOSE BLD-MCNC: 108 MG/DL (ref 74–99)
GLUCOSE BLD-MCNC: 122 MG/DL (ref 74–99)
GLUCOSE BLD-MCNC: 123 MG/DL (ref 74–99)
GLUCOSE BLD-MCNC: 128 MG/DL (ref 74–99)
GLUCOSE BLD-MCNC: 131 MG/DL (ref 74–99)
GLUCOSE BLD-MCNC: 134 MG/DL (ref 74–99)
GLUCOSE BLD-MCNC: 138 MG/DL (ref 74–99)
GLUCOSE BLD-MCNC: 141 MG/DL (ref 74–99)
GLUCOSE BLD-MCNC: 143 MG/DL (ref 74–99)
GLUCOSE BLD-MCNC: 146 MG/DL (ref 74–99)
GLUCOSE BLD-MCNC: 146 MG/DL (ref 74–99)
GLUCOSE BLD-MCNC: 147 MG/DL (ref 74–99)
GLUCOSE BLD-MCNC: 153 MG/DL (ref 74–99)
GLUCOSE BLD-MCNC: 157 MG/DL (ref 74–99)
GLUCOSE BLD-MCNC: 158 MG/DL (ref 74–99)
GLUCOSE BLD-MCNC: 158 MG/DL (ref 74–99)
GLUCOSE BLD-MCNC: 161 MG/DL (ref 74–99)
GLUCOSE BLD-MCNC: 163 MG/DL (ref 74–99)
GLUCOSE BLD-MCNC: 164 MG/DL (ref 74–99)
GLUCOSE BLD-MCNC: 165 MG/DL (ref 74–99)
GLUCOSE BLD-MCNC: 174 MG/DL (ref 74–99)
GLUCOSE BLD-MCNC: 191 MG/DL (ref 74–99)
GLUCOSE BLD-MCNC: 192 MG/DL (ref 74–99)
GLUCOSE BLD-MCNC: 61 MG/DL (ref 74–99)
GLUCOSE BLD-MCNC: 81 MG/DL (ref 74–99)
GLUCOSE BLD-MCNC: 83 MG/DL (ref 74–99)
GLUCOSE BLD-MCNC: 93 MG/DL (ref 74–99)
GLUCOSE BLD-MCNC: 97 MG/DL (ref 74–99)
GLUCOSE SERPL-MCNC: 101 MG/DL (ref 74–99)
GLUCOSE SERPL-MCNC: 103 MG/DL (ref 74–99)
GLUCOSE SERPL-MCNC: 104 MG/DL (ref 74–99)
GLUCOSE SERPL-MCNC: 108 MG/DL (ref 74–99)
GLUCOSE SERPL-MCNC: 109 MG/DL (ref 74–99)
GLUCOSE SERPL-MCNC: 111 MG/DL (ref 74–99)
GLUCOSE SERPL-MCNC: 114 MG/DL (ref 74–99)
GLUCOSE SERPL-MCNC: 114 MG/DL (ref 74–99)
GLUCOSE SERPL-MCNC: 123 MG/DL (ref 74–99)
GLUCOSE SERPL-MCNC: 123 MG/DL (ref 74–99)
GLUCOSE SERPL-MCNC: 128 MG/DL (ref 74–99)
GLUCOSE SERPL-MCNC: 131 MG/DL (ref 74–99)
GLUCOSE SERPL-MCNC: 132 MG/DL (ref 74–99)
GLUCOSE SERPL-MCNC: 135 MG/DL (ref 74–99)
GLUCOSE SERPL-MCNC: 138 MG/DL (ref 74–99)
GLUCOSE SERPL-MCNC: 141 MG/DL (ref 74–99)
GLUCOSE SERPL-MCNC: 142 MG/DL (ref 74–99)
GLUCOSE SERPL-MCNC: 151 MG/DL (ref 74–99)
GLUCOSE SERPL-MCNC: 152 MG/DL (ref 74–99)
GLUCOSE SERPL-MCNC: 155 MG/DL (ref 74–99)
GLUCOSE SERPL-MCNC: 186 MG/DL (ref 74–99)
GLUCOSE SERPL-MCNC: 53 MG/DL (ref 74–99)
GLUCOSE SERPL-MCNC: 82 MG/DL (ref 74–99)
GLUCOSE SERPL-MCNC: 89 MG/DL (ref 74–99)
GP B STREP DNA BLD POS QL NAA+NON-PROBE: NOT DETECTED
HADV DNA NPH QL NAA+NON-PROBE: NOT DETECTED
HAEM INFLU DNA BLD POS QL NAA+NON-PROBE: NOT DETECTED
HCO3: 28.1 MMOL/L (ref 22–26)
HCO3: 31.8 MMOL/L (ref 22–26)
HCO3: 33.3 MMOL/L (ref 22–26)
HCO3: 33.7 MMOL/L (ref 22–26)
HCO3: 34.3 MMOL/L (ref 22–26)
HCO3: 36.8 MMOL/L (ref 22–26)
HCO3: 37.1 MMOL/L (ref 22–26)
HCOV 229E RNA NPH QL NAA+NON-PROBE: NOT DETECTED
HCOV HKU1 RNA NPH QL NAA+NON-PROBE: NOT DETECTED
HCOV NL63 RNA NPH QL NAA+NON-PROBE: NOT DETECTED
HCOV OC43 RNA NPH QL NAA+NON-PROBE: NOT DETECTED
HCT VFR BLD AUTO: 19.1 % (ref 37–54)
HCT VFR BLD AUTO: 20.2 % (ref 37–54)
HCT VFR BLD AUTO: 21.7 % (ref 37–54)
HCT VFR BLD AUTO: 22.5 % (ref 37–54)
HCT VFR BLD AUTO: 23 % (ref 37–54)
HCT VFR BLD AUTO: 24 % (ref 37–54)
HCT VFR BLD AUTO: 25.2 % (ref 37–54)
HCT VFR BLD AUTO: 25.3 % (ref 37–54)
HCT VFR BLD AUTO: 25.4 % (ref 37–54)
HCT VFR BLD AUTO: 25.5 % (ref 37–54)
HCT VFR BLD AUTO: 26.2 % (ref 37–54)
HCT VFR BLD AUTO: 26.4 % (ref 37–54)
HCT VFR BLD AUTO: 27.9 % (ref 37–54)
HCT VFR BLD AUTO: 28.6 % (ref 37–54)
HCT VFR BLD AUTO: 29 % (ref 37–54)
HCT VFR BLD AUTO: 30.3 % (ref 37–54)
HCT VFR BLD AUTO: 38.8 % (ref 37–54)
HEMOCCULT STL QL: ABNORMAL
HGB BLD-MCNC: 10 G/DL (ref 12.5–16.5)
HGB BLD-MCNC: 12.9 G/DL (ref 12.5–16.5)
HGB BLD-MCNC: 6.6 G/DL (ref 12.5–16.5)
HGB BLD-MCNC: 6.6 G/DL (ref 12.5–16.5)
HGB BLD-MCNC: 7.6 G/DL (ref 12.5–16.5)
HGB BLD-MCNC: 7.8 G/DL (ref 12.5–16.5)
HGB BLD-MCNC: 7.9 G/DL (ref 12.5–16.5)
HGB BLD-MCNC: 8 G/DL (ref 12.5–16.5)
HGB BLD-MCNC: 8.1 G/DL (ref 12.5–16.5)
HGB BLD-MCNC: 8.2 G/DL (ref 12.5–16.5)
HGB BLD-MCNC: 8.4 G/DL (ref 12.5–16.5)
HGB BLD-MCNC: 8.6 G/DL (ref 12.5–16.5)
HGB BLD-MCNC: 8.8 G/DL (ref 12.5–16.5)
HGB BLD-MCNC: 9 G/DL (ref 12.5–16.5)
HGB BLD-MCNC: 9.2 G/DL (ref 12.5–16.5)
HGB BLD-MCNC: 9.9 G/DL (ref 12.5–16.5)
HGB BLD-MCNC: 9.9 G/DL (ref 12.5–16.5)
HHB: 0.8 % (ref 0–5)
HHB: 1.1 % (ref 0–5)
HHB: 1.6 % (ref 0–5)
HHB: 18 % (ref 0–5)
HHB: 3 % (ref 0–5)
HHB: 31 % (ref 0–5)
HHB: 5.3 % (ref 0–5)
HMPV RNA NPH QL NAA+NON-PROBE: NOT DETECTED
HPIV1 RNA NPH QL NAA+NON-PROBE: NOT DETECTED
HPIV2 RNA NPH QL NAA+NON-PROBE: NOT DETECTED
HPIV3 RNA NPH QL NAA+NON-PROBE: NOT DETECTED
HPIV4 RNA NPH QL NAA+NON-PROBE: NOT DETECTED
IMM GRANULOCYTES # BLD AUTO: 0.04 K/UL (ref 0–0.58)
IMM GRANULOCYTES # BLD AUTO: 0.04 K/UL (ref 0–0.58)
IMM GRANULOCYTES # BLD AUTO: 0.05 K/UL (ref 0–0.58)
IMM GRANULOCYTES # BLD AUTO: 0.06 K/UL (ref 0–0.58)
IMM GRANULOCYTES # BLD AUTO: 0.07 K/UL (ref 0–0.58)
IMM GRANULOCYTES # BLD AUTO: 0.08 K/UL (ref 0–0.58)
IMM GRANULOCYTES # BLD AUTO: 0.08 K/UL (ref 0–0.58)
IMM GRANULOCYTES # BLD AUTO: 0.11 K/UL (ref 0–0.58)
IMM GRANULOCYTES NFR BLD: 1 % (ref 0–5)
IMM GRANULOCYTES NFR BLD: 2 % (ref 0–5)
IMM RETICS NFR: 35.5 % (ref 2.3–13.4)
INR PPP: 1.9
IRON SATN MFR SERPL: 75 % (ref 20–55)
IRON SERPL-MCNC: 78 UG/DL (ref 61–157)
K OXYTOCA DNA BLD POS QL NAA+NON-PROBE: NOT DETECTED
KLEBSIELLA SP DNA BLD POS QL NAA+NON-PRB: DETECTED
KLEBSIELLA SP DNA BLD POS QL NAA+NON-PRB: NOT DETECTED
L MONOCYTOG DNA BLD POS QL NAA+NON-PROBE: NOT DETECTED
LAB: ABNORMAL
LACTATE BLDV-SCNC: 0.6 MMOL/L (ref 0.5–1.9)
LACTATE BLDV-SCNC: 0.7 MMOL/L (ref 0.5–2.2)
LACTATE BLDV-SCNC: 0.8 MMOL/L (ref 0.5–1.9)
LACTATE BLDV-SCNC: 0.8 MMOL/L (ref 0.5–2.2)
LACTATE BLDV-SCNC: 0.8 MMOL/L (ref 0.5–2.2)
LACTATE BLDV-SCNC: 0.9 MMOL/L (ref 0.5–2.2)
LACTATE BLDV-SCNC: 1 MMOL/L (ref 0.5–2.2)
LACTATE BLDV-SCNC: 1.7 MMOL/L (ref 0.5–2.2)
LACTATE BLDV-SCNC: 2.2 MMOL/L (ref 0.5–2.2)
LACTATE BLDV-SCNC: 2.5 MMOL/L (ref 0.5–2.2)
LYMPHOCYTES NFR BLD: 0.07 K/UL (ref 1.5–4)
LYMPHOCYTES NFR BLD: 0.08 K/UL (ref 1.5–4)
LYMPHOCYTES NFR BLD: 0.09 K/UL (ref 1.5–4)
LYMPHOCYTES NFR BLD: 0.11 K/UL (ref 1.5–4)
LYMPHOCYTES NFR BLD: 0.12 K/UL (ref 1.5–4)
LYMPHOCYTES NFR BLD: 0.14 K/UL (ref 1.5–4)
LYMPHOCYTES NFR BLD: 0.15 K/UL (ref 1.5–4)
LYMPHOCYTES NFR BLD: 0.15 K/UL (ref 1.5–4)
LYMPHOCYTES NFR BLD: 0.16 K/UL (ref 1.5–4)
LYMPHOCYTES NFR BLD: 0.17 K/UL (ref 1.5–4)
LYMPHOCYTES NFR BLD: 0.2 K/UL (ref 1.5–4)
LYMPHOCYTES RELATIVE PERCENT: 2 % (ref 20–42)
LYMPHOCYTES RELATIVE PERCENT: 3 % (ref 20–42)
LYMPHOCYTES RELATIVE PERCENT: 4 % (ref 20–42)
LYMPHOCYTES RELATIVE PERCENT: 5 % (ref 20–42)
LYMPHOCYTES RELATIVE PERCENT: 7 % (ref 20–42)
Lab: 1525
Lab: 1705
Lab: 1755
Lab: 2355
Lab: 518
Lab: 720
Lab: 851
M PNEUMO DNA NPH QL NAA+NON-PROBE: NOT DETECTED
MAGNESIUM SERPL-MCNC: 1.4 MG/DL (ref 1.6–2.6)
MAGNESIUM SERPL-MCNC: 1.4 MG/DL (ref 1.6–2.6)
MAGNESIUM SERPL-MCNC: 1.5 MG/DL (ref 1.6–2.6)
MAGNESIUM SERPL-MCNC: 1.6 MG/DL (ref 1.6–2.6)
MAGNESIUM SERPL-MCNC: 1.7 MG/DL (ref 1.6–2.6)
MAGNESIUM SERPL-MCNC: 1.8 MG/DL (ref 1.6–2.6)
MAGNESIUM SERPL-MCNC: 1.9 MG/DL (ref 1.6–2.6)
MAGNESIUM SERPL-MCNC: 2.1 MG/DL (ref 1.6–2.6)
MAGNESIUM SERPL-MCNC: 2.2 MG/DL (ref 1.6–2.6)
MAGNESIUM SERPL-MCNC: 2.3 MG/DL (ref 1.6–2.6)
MAGNESIUM SERPL-MCNC: 2.3 MG/DL (ref 1.6–2.6)
MCH RBC QN AUTO: 30.9 PG (ref 26–35)
MCH RBC QN AUTO: 31.1 PG (ref 26–35)
MCH RBC QN AUTO: 31.3 PG (ref 26–35)
MCH RBC QN AUTO: 31.4 PG (ref 26–35)
MCH RBC QN AUTO: 31.5 PG (ref 26–35)
MCH RBC QN AUTO: 31.5 PG (ref 26–35)
MCH RBC QN AUTO: 31.7 PG (ref 26–35)
MCH RBC QN AUTO: 31.8 PG (ref 26–35)
MCH RBC QN AUTO: 31.8 PG (ref 26–35)
MCH RBC QN AUTO: 31.9 PG (ref 26–35)
MCH RBC QN AUTO: 31.9 PG (ref 26–35)
MCH RBC QN AUTO: 32 PG (ref 26–35)
MCH RBC QN AUTO: 32.4 PG (ref 26–35)
MCHC RBC AUTO-ENTMCNC: 31.7 G/DL (ref 32–34.5)
MCHC RBC AUTO-ENTMCNC: 31.9 G/DL (ref 32–34.5)
MCHC RBC AUTO-ENTMCNC: 32.7 G/DL (ref 32–34.5)
MCHC RBC AUTO-ENTMCNC: 32.7 G/DL (ref 32–34.5)
MCHC RBC AUTO-ENTMCNC: 33.2 G/DL (ref 32–34.5)
MCHC RBC AUTO-ENTMCNC: 33.2 G/DL (ref 32–34.5)
MCHC RBC AUTO-ENTMCNC: 33.3 G/DL (ref 32–34.5)
MCHC RBC AUTO-ENTMCNC: 33.7 G/DL (ref 32–34.5)
MCHC RBC AUTO-ENTMCNC: 33.9 G/DL (ref 32–34.5)
MCHC RBC AUTO-ENTMCNC: 34.2 G/DL (ref 32–34.5)
MCHC RBC AUTO-ENTMCNC: 34.4 G/DL (ref 32–34.5)
MCHC RBC AUTO-ENTMCNC: 34.5 G/DL (ref 32–34.5)
MCHC RBC AUTO-ENTMCNC: 35 G/DL (ref 32–34.5)
MCHC RBC AUTO-ENTMCNC: 35.1 G/DL (ref 32–34.5)
MCHC RBC AUTO-ENTMCNC: 35.5 G/DL (ref 32–34.5)
MCV RBC AUTO: 90.4 FL (ref 80–99.9)
MCV RBC AUTO: 91.1 FL (ref 80–99.9)
MCV RBC AUTO: 91.2 FL (ref 80–99.9)
MCV RBC AUTO: 92.3 FL (ref 80–99.9)
MCV RBC AUTO: 92.7 FL (ref 80–99.9)
MCV RBC AUTO: 92.9 FL (ref 80–99.9)
MCV RBC AUTO: 93.4 FL (ref 80–99.9)
MCV RBC AUTO: 93.7 FL (ref 80–99.9)
MCV RBC AUTO: 93.9 FL (ref 80–99.9)
MCV RBC AUTO: 94.3 FL (ref 80–99.9)
MCV RBC AUTO: 95.6 FL (ref 80–99.9)
MCV RBC AUTO: 96.2 FL (ref 80–99.9)
MCV RBC AUTO: 96.2 FL (ref 80–99.9)
MCV RBC AUTO: 97.3 FL (ref 80–99.9)
MCV RBC AUTO: 98.1 FL (ref 80–99.9)
METAMYELOCYTES ABSOLUTE COUNT: 0.01 K/UL (ref 0–0.12)
METAMYELOCYTES ABSOLUTE COUNT: 0.03 K/UL (ref 0–0.12)
METAMYELOCYTES ABSOLUTE COUNT: 0.06 K/UL (ref 0–0.12)
METAMYELOCYTES: 1 % (ref 0–1)
METAMYELOCYTES: 1 % (ref 0–1)
METAMYELOCYTES: 2 % (ref 0–1)
METHB: 0.3 % (ref 0–1.5)
MICROORGANISM SPEC CULT: ABNORMAL
MICROORGANISM SPEC CULT: NORMAL
MICROORGANISM/AGENT SPEC: ABNORMAL
MICROORGANISM/AGENT SPEC: NORMAL
MODE: ABNORMAL
MONOCYTES NFR BLD: 0.04 K/UL (ref 0.1–0.95)
MONOCYTES NFR BLD: 0.04 K/UL (ref 0.1–0.95)
MONOCYTES NFR BLD: 0.06 K/UL (ref 0.1–0.95)
MONOCYTES NFR BLD: 0.08 K/UL (ref 0.1–0.95)
MONOCYTES NFR BLD: 0.08 K/UL (ref 0.1–0.95)
MONOCYTES NFR BLD: 0.09 K/UL (ref 0.1–0.95)
MONOCYTES NFR BLD: 0.13 K/UL (ref 0.1–0.95)
MONOCYTES NFR BLD: 0.14 K/UL (ref 0.1–0.95)
MONOCYTES NFR BLD: 0.14 K/UL (ref 0.1–0.95)
MONOCYTES NFR BLD: 0.18 K/UL (ref 0.1–0.95)
MONOCYTES NFR BLD: 0.2 K/UL (ref 0.1–0.95)
MONOCYTES NFR BLD: 0.24 K/UL (ref 0.1–0.95)
MONOCYTES NFR BLD: 0.25 K/UL (ref 0.1–0.95)
MONOCYTES NFR BLD: 0.31 K/UL (ref 0.1–0.95)
MONOCYTES NFR BLD: 0.33 K/UL (ref 0.1–0.95)
MONOCYTES NFR BLD: 1 % (ref 2–12)
MONOCYTES NFR BLD: 2 % (ref 2–12)
MONOCYTES NFR BLD: 3 % (ref 2–12)
MONOCYTES NFR BLD: 4 % (ref 2–12)
MONOCYTES NFR BLD: 6 % (ref 2–12)
MONOCYTES NFR BLD: 7 % (ref 2–12)
MONOCYTES NFR BLD: 7 % (ref 2–12)
MONOCYTES NFR BLD: 8 % (ref 2–12)
MTB AND RIF RESISTANCE PNL ISLT/SPM: NEGATIVE
MYELOCYTES ABSOLUTE COUNT: 0.01 K/UL
MYELOCYTES: 1 %
N MEN DNA BLD POS QL NAA+NON-PROBE: NOT DETECTED
NEUTROPHILS NFR BLD: 83 % (ref 43–80)
NEUTROPHILS NFR BLD: 87 % (ref 43–80)
NEUTROPHILS NFR BLD: 87 % (ref 43–80)
NEUTROPHILS NFR BLD: 88 % (ref 43–80)
NEUTROPHILS NFR BLD: 91 % (ref 43–80)
NEUTROPHILS NFR BLD: 92 % (ref 43–80)
NEUTROPHILS NFR BLD: 92 % (ref 43–80)
NEUTROPHILS NFR BLD: 93 % (ref 43–80)
NEUTROPHILS NFR BLD: 93 % (ref 43–80)
NEUTROPHILS NFR BLD: 94 % (ref 43–80)
NEUTROPHILS NFR BLD: 95 % (ref 43–80)
NEUTROPHILS NFR BLD: 96 % (ref 43–80)
NEUTROPHILS NFR BLD: 96 % (ref 43–80)
NEUTS SEG NFR BLD: 1 K/UL (ref 1.8–7.3)
NEUTS SEG NFR BLD: 2.39 K/UL (ref 1.8–7.3)
NEUTS SEG NFR BLD: 2.67 K/UL (ref 1.8–7.3)
NEUTS SEG NFR BLD: 3.28 K/UL (ref 1.8–7.3)
NEUTS SEG NFR BLD: 3.4 K/UL (ref 1.8–7.3)
NEUTS SEG NFR BLD: 3.5 K/UL (ref 1.8–7.3)
NEUTS SEG NFR BLD: 3.56 K/UL (ref 1.8–7.3)
NEUTS SEG NFR BLD: 3.61 K/UL (ref 1.8–7.3)
NEUTS SEG NFR BLD: 3.9 K/UL (ref 1.8–7.3)
NEUTS SEG NFR BLD: 4.07 K/UL (ref 1.8–7.3)
NEUTS SEG NFR BLD: 4.11 K/UL (ref 1.8–7.3)
NEUTS SEG NFR BLD: 4.43 K/UL (ref 1.8–7.3)
NEUTS SEG NFR BLD: 5.12 K/UL (ref 1.8–7.3)
NEUTS SEG NFR BLD: 5.48 K/UL (ref 1.8–7.3)
NEUTS SEG NFR BLD: 6.02 K/UL (ref 1.8–7.3)
NUCLEATED RED BLOOD CELLS: 3 PER 100 WBC
O2 CONTENT: 11.9 ML/DL
O2 CONTENT: 13.7 ML/DL
O2 CONTENT: 13.8 ML/DL
O2 CONTENT: 14.2 ML/DL
O2 CONTENT: 14.3 ML/DL
O2 CONTENT: 19.3 ML/DL
O2 CONTENT: 9.2 ML/DL
O2 SATURATION: 68.8 % (ref 92–98.5)
O2 SATURATION: 81.9 % (ref 92–98.5)
O2 SATURATION: 94.6 % (ref 92–98.5)
O2 SATURATION: 97 % (ref 92–98.5)
O2 SATURATION: 98.4 % (ref 92–98.5)
O2 SATURATION: 98.9 % (ref 92–98.5)
O2 SATURATION: 99.2 % (ref 92–98.5)
O2HB: 68.3 % (ref 94–97)
O2HB: 81.5 % (ref 94–97)
O2HB: 93.6 % (ref 94–97)
O2HB: 96.4 % (ref 94–97)
O2HB: 97.8 % (ref 94–97)
O2HB: 98.3 % (ref 94–97)
O2HB: 98.8 % (ref 94–97)
OPERATOR ID: 1023
OPERATOR ID: 166
OPERATOR ID: 3114
OPERATOR ID: 46
OPERATOR ID: 5133
OPERATOR ID: 6032
OPERATOR ID: 913
P AERUGINOSA DNA BLD POS NAA+NON-PROBE: NOT DETECTED
PATH REV BLD -IMP: NORMAL
PATIENT TEMP: 37 C
PCO2: 38.4 MMHG (ref 35–45)
PCO2: 40.6 MMHG (ref 35–45)
PCO2: 43.3 MMHG (ref 35–45)
PCO2: 45.9 MMHG (ref 35–45)
PCO2: 48.5 MMHG (ref 35–45)
PCO2: 49.4 MMHG (ref 35–45)
PCO2: 54.5 MMHG (ref 35–45)
PEEP/CPAP: 10 CMH2O
PFO2: 0.99 MMHG/%
PFO2: 2.23 MMHG/%
PH BLOOD GAS: 7.45 (ref 7.35–7.45)
PH BLOOD GAS: 7.45 (ref 7.35–7.45)
PH BLOOD GAS: 7.47 (ref 7.35–7.45)
PH BLOOD GAS: 7.48 (ref 7.35–7.45)
PH BLOOD GAS: 7.48 (ref 7.35–7.45)
PH BLOOD GAS: 7.51 (ref 7.35–7.45)
PH BLOOD GAS: 7.55 (ref 7.35–7.45)
PHOSPHATE SERPL-MCNC: 1.1 MG/DL (ref 2.5–4.5)
PHOSPHATE SERPL-MCNC: 1.4 MG/DL (ref 2.5–4.5)
PHOSPHATE SERPL-MCNC: 1.4 MG/DL (ref 2.5–4.5)
PHOSPHATE SERPL-MCNC: 1.8 MG/DL (ref 2.5–4.5)
PHOSPHATE SERPL-MCNC: 1.9 MG/DL (ref 2.5–4.5)
PHOSPHATE SERPL-MCNC: 2 MG/DL (ref 2.5–4.5)
PHOSPHATE SERPL-MCNC: 2 MG/DL (ref 2.5–4.5)
PHOSPHATE SERPL-MCNC: 2.1 MG/DL (ref 2.5–4.5)
PHOSPHATE SERPL-MCNC: 2.1 MG/DL (ref 2.5–4.5)
PHOSPHATE SERPL-MCNC: 2.2 MG/DL (ref 2.5–4.5)
PHOSPHATE SERPL-MCNC: 2.3 MG/DL (ref 2.5–4.5)
PHOSPHATE SERPL-MCNC: 2.3 MG/DL (ref 2.5–4.5)
PHOSPHATE SERPL-MCNC: 2.5 MG/DL (ref 2.5–4.5)
PHOSPHATE SERPL-MCNC: 2.6 MG/DL (ref 2.5–4.5)
PHOSPHATE SERPL-MCNC: 2.8 MG/DL (ref 2.5–4.5)
PHOSPHATE SERPL-MCNC: 2.9 MG/DL (ref 2.5–4.5)
PHOSPHATE SERPL-MCNC: 3.2 MG/DL (ref 2.5–4.5)
PHOSPHATE SERPL-MCNC: 3.3 MG/DL (ref 2.5–4.5)
PHOSPHATE SERPL-MCNC: 3.8 MG/DL (ref 2.5–4.5)
PLATELET # BLD AUTO: 106 K/UL (ref 130–450)
PLATELET # BLD AUTO: 120 K/UL (ref 130–450)
PLATELET # BLD AUTO: 64 K/UL (ref 130–450)
PLATELET # BLD AUTO: 72 K/UL (ref 130–450)
PLATELET # BLD AUTO: 87 K/UL (ref 130–450)
PLATELET # BLD AUTO: 90 K/UL (ref 130–450)
PLATELET # BLD AUTO: 91 K/UL (ref 130–450)
PLATELET CONFIRMATION: NORMAL
PLATELET, FLUORESCENCE: 103 K/UL (ref 130–450)
PLATELET, FLUORESCENCE: 116 K/UL (ref 130–450)
PLATELET, FLUORESCENCE: 58 K/UL (ref 130–450)
PLATELET, FLUORESCENCE: 80 K/UL (ref 130–450)
PLATELET, FLUORESCENCE: 82 K/UL (ref 130–450)
PLATELET, FLUORESCENCE: 87 K/UL (ref 130–450)
PLATELET, FLUORESCENCE: 89 K/UL (ref 130–450)
PLATELET, FLUORESCENCE: 99 K/UL (ref 130–450)
PMV BLD AUTO: 10.1 FL (ref 7–12)
PMV BLD AUTO: 10.4 FL (ref 7–12)
PMV BLD AUTO: 10.5 FL (ref 7–12)
PMV BLD AUTO: 10.6 FL (ref 7–12)
PMV BLD AUTO: 10.9 FL (ref 7–12)
PMV BLD AUTO: 11 FL (ref 7–12)
PMV BLD AUTO: 11.1 FL (ref 7–12)
PMV BLD AUTO: 11.5 FL (ref 7–12)
PMV BLD AUTO: 11.6 FL (ref 7–12)
PMV BLD AUTO: 12.1 FL (ref 7–12)
PO2: 147.5 MMHG (ref 75–100)
PO2: 208.6 MMHG (ref 75–100)
PO2: 223.4 MMHG (ref 75–100)
PO2: 34.6 MMHG (ref 75–100)
PO2: 43.4 MMHG (ref 75–100)
PO2: 70.9 MMHG (ref 75–100)
PO2: 98.5 MMHG (ref 75–100)
POTASSIUM SERPL-SCNC: 3 MMOL/L (ref 3.5–5.1)
POTASSIUM SERPL-SCNC: 3.1 MMOL/L (ref 3.5–5.1)
POTASSIUM SERPL-SCNC: 3.1 MMOL/L (ref 3.5–5.1)
POTASSIUM SERPL-SCNC: 3.2 MMOL/L (ref 3.5–5.1)
POTASSIUM SERPL-SCNC: 3.3 MMOL/L (ref 3.5–5.1)
POTASSIUM SERPL-SCNC: 3.4 MMOL/L (ref 3.5–5.1)
POTASSIUM SERPL-SCNC: 3.6 MMOL/L (ref 3.5–5.1)
POTASSIUM SERPL-SCNC: 3.6 MMOL/L (ref 3.5–5.1)
POTASSIUM SERPL-SCNC: 3.7 MMOL/L (ref 3.5–5.1)
POTASSIUM SERPL-SCNC: 3.7 MMOL/L (ref 3.5–5.1)
POTASSIUM SERPL-SCNC: 3.9 MMOL/L (ref 3.5–5.1)
POTASSIUM SERPL-SCNC: 4 MMOL/L (ref 3.5–5.1)
POTASSIUM SERPL-SCNC: 4.1 MMOL/L (ref 3.5–5.1)
POTASSIUM SERPL-SCNC: 4.1 MMOL/L (ref 3.5–5.1)
POTASSIUM SERPL-SCNC: 4.2 MMOL/L (ref 3.5–5.1)
POTASSIUM SERPL-SCNC: 4.3 MMOL/L (ref 3.5–5.1)
POTASSIUM SERPL-SCNC: 4.5 MMOL/L (ref 3.5–5.1)
POTASSIUM SERPL-SCNC: 4.7 MMOL/L (ref 3.5–5.1)
PREALB SERPL-MCNC: 5 MG/DL (ref 20–40)
PROCALCITONIN SERPL-MCNC: 1.3 NG/ML (ref 0–0.08)
PROCALCITONIN SERPL-MCNC: 7.72 NG/ML (ref 0–0.08)
PROMYELOCYTES ABSOLUTE COUNT: 0.01 K/UL
PROMYELOCYTES ABSOLUTE COUNT: 0.05 K/UL
PROMYELOCYTES: 1 %
PROMYELOCYTES: 1 %
PROT SERPL-MCNC: 4.2 G/DL (ref 6.4–8.3)
PROT SERPL-MCNC: 4.4 G/DL (ref 6.4–8.3)
PROT SERPL-MCNC: 4.4 G/DL (ref 6.4–8.3)
PROT SERPL-MCNC: 4.5 G/DL (ref 6.4–8.3)
PROT SERPL-MCNC: 4.6 G/DL (ref 6.4–8.3)
PROT SERPL-MCNC: 4.6 G/DL (ref 6.4–8.3)
PROT SERPL-MCNC: 4.7 G/DL (ref 6.4–8.3)
PROT SERPL-MCNC: 4.7 G/DL (ref 6.4–8.3)
PROT SERPL-MCNC: 4.9 G/DL (ref 6.4–8.3)
PROT SERPL-MCNC: 5.1 G/DL (ref 6.4–8.3)
PROT SERPL-MCNC: 5.2 G/DL (ref 6.4–8.3)
PROT SERPL-MCNC: 6.9 G/DL (ref 6.4–8.3)
PROTEUS SP DNA BLD POS QL NAA+NON-PROBE: NOT DETECTED
PROTHROMBIN TIME: 20.8 SEC (ref 9.3–12.4)
PS: 15 CMH20
RBC # BLD AUTO: 2.1 M/UL (ref 3.8–5.8)
RBC # BLD AUTO: 2.4 M/UL (ref 3.8–5.8)
RBC # BLD AUTO: 2.45 M/UL (ref 3.8–5.8)
RBC # BLD AUTO: 2.47 M/UL (ref 3.8–5.8)
RBC # BLD AUTO: 2.59 M/UL (ref 3.8–5.8)
RBC # BLD AUTO: 2.59 M/UL (ref 3.8–5.8)
RBC # BLD AUTO: 2.6 M/UL (ref 3.8–5.8)
RBC # BLD AUTO: 2.7 M/UL (ref 3.8–5.8)
RBC # BLD AUTO: 2.73 M/UL (ref 3.8–5.8)
RBC # BLD AUTO: 2.8 M/UL (ref 3.8–5.8)
RBC # BLD AUTO: 2.82 M/UL (ref 3.8–5.8)
RBC # BLD AUTO: 3.06 M/UL (ref 3.8–5.8)
RBC # BLD AUTO: 3.13 M/UL (ref 3.8–5.8)
RBC # BLD AUTO: 3.15 M/UL (ref 3.8–5.8)
RBC # BLD AUTO: 4.06 M/UL (ref 3.8–5.8)
RBC # BLD: ABNORMAL 10*6/UL
RESISTANT GENE NDM BY PCR: NOT DETECTED
RETIC HEMOGLOBIN: 35.7 PG (ref 28.2–36.6)
RETICS # AUTO: 0.04 M/UL
RETICS/RBC NFR AUTO: 1.5 % (ref 0.4–1.9)
RI(T): 1.95
RI(T): 5.75
RSV RNA NPH QL NAA+NON-PROBE: NOT DETECTED
RV+EV RNA NPH QL NAA+NON-PROBE: NOT DETECTED
S AUREUS DNA BLD POS QL NAA+NON-PROBE: NOT DETECTED
S AUREUS+CONS DNA BLD POS NAA+NON-PROBE: NOT DETECTED
S EPIDERMIDIS DNA BLD POS QL NAA+NON-PRB: NOT DETECTED
S LUGDUNENSIS DNA BLD POS QL NAA+NON-PRB: NOT DETECTED
S MALTOPHILIA DNA BLD POS QL NAA+NON-PRB: NOT DETECTED
S MARCESCENS DNA BLD POS NAA+NON-PROBE: NOT DETECTED
S PNEUM DNA BLD POS QL NAA+NON-PROBE: NOT DETECTED
S PYO DNA BLD POS QL NAA+NON-PROBE: NOT DETECTED
SALMONELLA DNA BLD POS QL NAA+NON-PROBE: NOT DETECTED
SARS-COV-2 RNA NPH QL NAA+NON-PROBE: NOT DETECTED
SERVICE CMNT-IMP: ABNORMAL
SERVICE CMNT-IMP: ABNORMAL
SERVICE CMNT-IMP: NORMAL
SODIUM SERPL-SCNC: 132 MMOL/L (ref 136–145)
SODIUM SERPL-SCNC: 132 MMOL/L (ref 136–145)
SODIUM SERPL-SCNC: 133 MMOL/L (ref 136–145)
SODIUM SERPL-SCNC: 134 MMOL/L (ref 136–145)
SODIUM SERPL-SCNC: 134 MMOL/L (ref 136–145)
SODIUM SERPL-SCNC: 135 MMOL/L (ref 136–145)
SODIUM SERPL-SCNC: 135 MMOL/L (ref 136–145)
SODIUM SERPL-SCNC: 136 MMOL/L (ref 136–145)
SODIUM SERPL-SCNC: 139 MMOL/L (ref 136–145)
SODIUM SERPL-SCNC: 140 MMOL/L (ref 136–145)
SODIUM SERPL-SCNC: 140 MMOL/L (ref 136–145)
SODIUM SERPL-SCNC: 141 MMOL/L (ref 136–145)
SODIUM SERPL-SCNC: 142 MMOL/L (ref 136–145)
SODIUM SERPL-SCNC: 142 MMOL/L (ref 136–145)
SODIUM SERPL-SCNC: 143 MMOL/L (ref 136–145)
SODIUM SERPL-SCNC: 144 MMOL/L (ref 136–145)
SODIUM SERPL-SCNC: 145 MMOL/L (ref 136–145)
SODIUM SERPL-SCNC: 147 MMOL/L (ref 136–145)
SOURCE, BLOOD GAS: ABNORMAL
SPECIMEN DESCRIPTION: ABNORMAL
SPECIMEN DESCRIPTION: NORMAL
STREPTOCOCCUS DNA BLD POS NAA+NON-PROBE: NOT DETECTED
THB: 10 G/DL (ref 11.5–15.5)
THB: 10.1 G/DL (ref 11.5–15.5)
THB: 10.1 G/DL (ref 11.5–15.5)
THB: 10.4 G/DL (ref 11.5–15.5)
THB: 14.7 G/DL (ref 11.5–15.5)
THB: 9.5 G/DL (ref 11.5–15.5)
THB: 9.6 G/DL (ref 11.5–15.5)
TIBC SERPL-MCNC: 104 UG/DL (ref 250–450)
TIME ANALYZED: 1529
TIME ANALYZED: 1709
TIME ANALYZED: 1801
TIME ANALYZED: 2359
TIME ANALYZED: 528
TIME ANALYZED: 725
TIME ANALYZED: 911
TRANSFUSION STATUS: NORMAL
TRIGL SERPL-MCNC: 114 MG/DL
TROPONIN I SERPL HS-MCNC: 11 NG/L (ref 0–22)
TROPONIN I SERPL HS-MCNC: 11 NG/L (ref 0–22)
TROPONIN I SERPL HS-MCNC: 35 NG/L (ref 0–22)
TROPONIN I SERPL HS-MCNC: 37 NG/L (ref 0–22)
TROPONIN I SERPL HS-MCNC: 93 NG/L (ref 0–22)
TSH SERPL DL<=0.05 MIU/L-ACNC: 1.51 UIU/ML (ref 0.27–4.2)
UNIT DIVISION: 0
UNIT ISSUE DATE/TIME: NORMAL
VANCOMYCIN SERPL-MCNC: 7.3 UG/ML (ref 5–40)
VIT B12 SERPL-MCNC: >2000 PG/ML (ref 232–1245)
WBC # BLD: ABNORMAL 10*3/UL
WBC OTHER # BLD: 1.2 K/UL (ref 4.5–11.5)
WBC OTHER # BLD: 2.5 K/UL (ref 4.5–11.5)
WBC OTHER # BLD: 2.9 K/UL (ref 4.5–11.5)
WBC OTHER # BLD: 3.5 K/UL (ref 4.5–11.5)
WBC OTHER # BLD: 3.7 K/UL (ref 4.5–11.5)
WBC OTHER # BLD: 3.9 K/UL (ref 4.5–11.5)
WBC OTHER # BLD: 4 K/UL (ref 4.5–11.5)
WBC OTHER # BLD: 4.1 K/UL (ref 4.5–11.5)
WBC OTHER # BLD: 4.3 K/UL (ref 4.5–11.5)
WBC OTHER # BLD: 4.5 K/UL (ref 4.5–11.5)
WBC OTHER # BLD: 4.5 K/UL (ref 4.5–11.5)
WBC OTHER # BLD: 4.8 K/UL (ref 4.5–11.5)
WBC OTHER # BLD: 5.4 K/UL (ref 4.5–11.5)
WBC OTHER # BLD: 6 K/UL (ref 4.5–11.5)
WBC OTHER # BLD: 6.5 K/UL (ref 4.5–11.5)
ZINC SERPL-MCNC: 88.1 UG/DL (ref 60–120)

## 2025-01-01 PROCEDURE — 6370000000 HC RX 637 (ALT 250 FOR IP): Performed by: NURSE PRACTITIONER

## 2025-01-01 PROCEDURE — 94640 AIRWAY INHALATION TREATMENT: CPT

## 2025-01-01 PROCEDURE — 85014 HEMATOCRIT: CPT

## 2025-01-01 PROCEDURE — 6360000002 HC RX W HCPCS: Performed by: NURSE PRACTITIONER

## 2025-01-01 PROCEDURE — 6370000000 HC RX 637 (ALT 250 FOR IP)

## 2025-01-01 PROCEDURE — 6360000002 HC RX W HCPCS

## 2025-01-01 PROCEDURE — 2700000000 HC OXYGEN THERAPY PER DAY

## 2025-01-01 PROCEDURE — 36415 COLL VENOUS BLD VENIPUNCTURE: CPT

## 2025-01-01 PROCEDURE — 2500000003 HC RX 250 WO HCPCS

## 2025-01-01 PROCEDURE — 80202 ASSAY OF VANCOMYCIN: CPT

## 2025-01-01 PROCEDURE — 99232 SBSQ HOSP IP/OBS MODERATE 35: CPT | Performed by: INTERNAL MEDICINE

## 2025-01-01 PROCEDURE — 84145 PROCALCITONIN (PCT): CPT

## 2025-01-01 PROCEDURE — 6360000002 HC RX W HCPCS: Performed by: INTERNAL MEDICINE

## 2025-01-01 PROCEDURE — 84484 ASSAY OF TROPONIN QUANT: CPT

## 2025-01-01 PROCEDURE — 2500000003 HC RX 250 WO HCPCS: Performed by: NURSE PRACTITIONER

## 2025-01-01 PROCEDURE — 2500000003 HC RX 250 WO HCPCS: Performed by: STUDENT IN AN ORGANIZED HEALTH CARE EDUCATION/TRAINING PROGRAM

## 2025-01-01 PROCEDURE — 71045 X-RAY EXAM CHEST 1 VIEW: CPT

## 2025-01-01 PROCEDURE — 82330 ASSAY OF CALCIUM: CPT

## 2025-01-01 PROCEDURE — 2000000000 HC ICU R&B

## 2025-01-01 PROCEDURE — 87150 DNA/RNA AMPLIFIED PROBE: CPT

## 2025-01-01 PROCEDURE — 83735 ASSAY OF MAGNESIUM: CPT

## 2025-01-01 PROCEDURE — 82962 GLUCOSE BLOOD TEST: CPT

## 2025-01-01 PROCEDURE — 83550 IRON BINDING TEST: CPT

## 2025-01-01 PROCEDURE — 94660 CPAP INITIATION&MGMT: CPT

## 2025-01-01 PROCEDURE — 99221 1ST HOSP IP/OBS SF/LOW 40: CPT | Performed by: OTOLARYNGOLOGY

## 2025-01-01 PROCEDURE — 2580000003 HC RX 258

## 2025-01-01 PROCEDURE — 2580000003 HC RX 258: Performed by: INTERNAL MEDICINE

## 2025-01-01 PROCEDURE — 2060000000 HC ICU INTERMEDIATE R&B

## 2025-01-01 PROCEDURE — 87070 CULTURE OTHR SPECIMN AEROBIC: CPT

## 2025-01-01 PROCEDURE — 85025 COMPLETE CBC W/AUTO DIFF WBC: CPT

## 2025-01-01 PROCEDURE — 85610 PROTHROMBIN TIME: CPT

## 2025-01-01 PROCEDURE — 6370000000 HC RX 637 (ALT 250 FOR IP): Performed by: INTERNAL MEDICINE

## 2025-01-01 PROCEDURE — 80053 COMPREHEN METABOLIC PANEL: CPT

## 2025-01-01 PROCEDURE — 82533 TOTAL CORTISOL: CPT

## 2025-01-01 PROCEDURE — 99291 CRITICAL CARE FIRST HOUR: CPT | Performed by: NURSE PRACTITIONER

## 2025-01-01 PROCEDURE — 6370000000 HC RX 637 (ALT 250 FOR IP): Performed by: STUDENT IN AN ORGANIZED HEALTH CARE EDUCATION/TRAINING PROGRAM

## 2025-01-01 PROCEDURE — 99232 SBSQ HOSP IP/OBS MODERATE 35: CPT | Performed by: NURSE PRACTITIONER

## 2025-01-01 PROCEDURE — 2500000003 HC RX 250 WO HCPCS: Performed by: INTERNAL MEDICINE

## 2025-01-01 PROCEDURE — 96375 TX/PRO/DX INJ NEW DRUG ADDON: CPT

## 2025-01-01 PROCEDURE — 93308 TTE F-UP OR LMTD: CPT | Performed by: INTERNAL MEDICINE

## 2025-01-01 PROCEDURE — 99233 SBSQ HOSP IP/OBS HIGH 50: CPT | Performed by: NURSE PRACTITIONER

## 2025-01-01 PROCEDURE — 51702 INSERT TEMP BLADDER CATH: CPT

## 2025-01-01 PROCEDURE — 2580000003 HC RX 258: Performed by: EMERGENCY MEDICINE

## 2025-01-01 PROCEDURE — 93010 ELECTROCARDIOGRAM REPORT: CPT | Performed by: INTERNAL MEDICINE

## 2025-01-01 PROCEDURE — 80048 BASIC METABOLIC PNL TOTAL CA: CPT

## 2025-01-01 PROCEDURE — 80076 HEPATIC FUNCTION PANEL: CPT

## 2025-01-01 PROCEDURE — 96374 THER/PROPH/DIAG INJ IV PUSH: CPT

## 2025-01-01 PROCEDURE — 71275 CT ANGIOGRAPHY CHEST: CPT

## 2025-01-01 PROCEDURE — 84100 ASSAY OF PHOSPHORUS: CPT

## 2025-01-01 PROCEDURE — 99231 SBSQ HOSP IP/OBS SF/LOW 25: CPT | Performed by: FAMILY MEDICINE

## 2025-01-01 PROCEDURE — 99232 SBSQ HOSP IP/OBS MODERATE 35: CPT | Performed by: STUDENT IN AN ORGANIZED HEALTH CARE EDUCATION/TRAINING PROGRAM

## 2025-01-01 PROCEDURE — 82746 ASSAY OF FOLIC ACID SERUM: CPT

## 2025-01-01 PROCEDURE — 51798 US URINE CAPACITY MEASURE: CPT

## 2025-01-01 PROCEDURE — 82607 VITAMIN B-12: CPT

## 2025-01-01 PROCEDURE — 99232 SBSQ HOSP IP/OBS MODERATE 35: CPT | Performed by: FAMILY MEDICINE

## 2025-01-01 PROCEDURE — 83605 ASSAY OF LACTIC ACID: CPT

## 2025-01-01 PROCEDURE — 93005 ELECTROCARDIOGRAM TRACING: CPT

## 2025-01-01 PROCEDURE — 86901 BLOOD TYPING SEROLOGIC RH(D): CPT

## 2025-01-01 PROCEDURE — 99233 SBSQ HOSP IP/OBS HIGH 50: CPT | Performed by: INTERNAL MEDICINE

## 2025-01-01 PROCEDURE — 74230 X-RAY XM SWLNG FUNCJ C+: CPT

## 2025-01-01 PROCEDURE — 92526 ORAL FUNCTION THERAPY: CPT

## 2025-01-01 PROCEDURE — 99222 1ST HOSP IP/OBS MODERATE 55: CPT | Performed by: INTERNAL MEDICINE

## 2025-01-01 PROCEDURE — 82728 ASSAY OF FERRITIN: CPT

## 2025-01-01 PROCEDURE — 30233N1 TRANSFUSION OF NONAUTOLOGOUS RED BLOOD CELLS INTO PERIPHERAL VEIN, PERCUTANEOUS APPROACH: ICD-10-PCS | Performed by: FAMILY MEDICINE

## 2025-01-01 PROCEDURE — 3E0336Z INTRODUCTION OF NUTRITIONAL SUBSTANCE INTO PERIPHERAL VEIN, PERCUTANEOUS APPROACH: ICD-10-PCS | Performed by: FAMILY MEDICINE

## 2025-01-01 PROCEDURE — 2580000003 HC RX 258: Performed by: STUDENT IN AN ORGANIZED HEALTH CARE EDUCATION/TRAINING PROGRAM

## 2025-01-01 PROCEDURE — 94668 MNPJ CHEST WALL SBSQ: CPT

## 2025-01-01 PROCEDURE — 93321 DOPPLER ECHO F-UP/LMTD STD: CPT | Performed by: INTERNAL MEDICINE

## 2025-01-01 PROCEDURE — 99222 1ST HOSP IP/OBS MODERATE 55: CPT | Performed by: STUDENT IN AN ORGANIZED HEALTH CARE EDUCATION/TRAINING PROGRAM

## 2025-01-01 PROCEDURE — 99232 SBSQ HOSP IP/OBS MODERATE 35: CPT

## 2025-01-01 PROCEDURE — 5A09357 ASSISTANCE WITH RESPIRATORY VENTILATION, LESS THAN 24 CONSECUTIVE HOURS, CONTINUOUS POSITIVE AIRWAY PRESSURE: ICD-10-PCS | Performed by: FAMILY MEDICINE

## 2025-01-01 PROCEDURE — 6370000000 HC RX 637 (ALT 250 FOR IP): Performed by: FAMILY MEDICINE

## 2025-01-01 PROCEDURE — 6360000004 HC RX CONTRAST MEDICATION: Performed by: RADIOLOGY

## 2025-01-01 PROCEDURE — 6360000002 HC RX W HCPCS: Performed by: SURGERY

## 2025-01-01 PROCEDURE — 2500000003 HC RX 250 WO HCPCS: Performed by: SURGERY

## 2025-01-01 PROCEDURE — 87077 CULTURE AEROBIC IDENTIFY: CPT

## 2025-01-01 PROCEDURE — 99231 SBSQ HOSP IP/OBS SF/LOW 25: CPT | Performed by: STUDENT IN AN ORGANIZED HEALTH CARE EDUCATION/TRAINING PROGRAM

## 2025-01-01 PROCEDURE — 99291 CRITICAL CARE FIRST HOUR: CPT | Performed by: STUDENT IN AN ORGANIZED HEALTH CARE EDUCATION/TRAINING PROGRAM

## 2025-01-01 PROCEDURE — 87206 SMEAR FLUORESCENT/ACID STAI: CPT

## 2025-01-01 PROCEDURE — 2580000003 HC RX 258: Performed by: NURSE PRACTITIONER

## 2025-01-01 PROCEDURE — 83880 ASSAY OF NATRIURETIC PEPTIDE: CPT

## 2025-01-01 PROCEDURE — 82525 ASSAY OF COPPER: CPT

## 2025-01-01 PROCEDURE — 94669 MECHANICAL CHEST WALL OSCILL: CPT

## 2025-01-01 PROCEDURE — 93325 DOPPLER ECHO COLOR FLOW MAPG: CPT | Performed by: INTERNAL MEDICINE

## 2025-01-01 PROCEDURE — 82805 BLOOD GASES W/O2 SATURATION: CPT

## 2025-01-01 PROCEDURE — 94667 MNPJ CHEST WALL 1ST: CPT

## 2025-01-01 PROCEDURE — 99238 HOSP IP/OBS DSCHRG MGMT 30/<: CPT | Performed by: FAMILY MEDICINE

## 2025-01-01 PROCEDURE — 87205 SMEAR GRAM STAIN: CPT

## 2025-01-01 PROCEDURE — 93005 ELECTROCARDIOGRAM TRACING: CPT | Performed by: STUDENT IN AN ORGANIZED HEALTH CARE EDUCATION/TRAINING PROGRAM

## 2025-01-01 PROCEDURE — 97161 PT EVAL LOW COMPLEX 20 MIN: CPT

## 2025-01-01 PROCEDURE — 87040 BLOOD CULTURE FOR BACTERIA: CPT

## 2025-01-01 PROCEDURE — 99223 1ST HOSP IP/OBS HIGH 75: CPT | Performed by: INTERNAL MEDICINE

## 2025-01-01 PROCEDURE — 85018 HEMOGLOBIN: CPT

## 2025-01-01 PROCEDURE — 84478 ASSAY OF TRIGLYCERIDES: CPT

## 2025-01-01 PROCEDURE — 86850 RBC ANTIBODY SCREEN: CPT

## 2025-01-01 PROCEDURE — 51701 INSERT BLADDER CATHETER: CPT

## 2025-01-01 PROCEDURE — 99222 1ST HOSP IP/OBS MODERATE 55: CPT

## 2025-01-01 PROCEDURE — 1200000000 HC SEMI PRIVATE

## 2025-01-01 PROCEDURE — APPSS60 APP SPLIT SHARED TIME 46-60 MINUTES

## 2025-01-01 PROCEDURE — 82248 BILIRUBIN DIRECT: CPT

## 2025-01-01 PROCEDURE — 93005 ELECTROCARDIOGRAM TRACING: CPT | Performed by: EMERGENCY MEDICINE

## 2025-01-01 PROCEDURE — 92611 MOTION FLUOROSCOPY/SWALLOW: CPT

## 2025-01-01 PROCEDURE — 87556 M.TUBERCULO DNA AMP PROBE: CPT

## 2025-01-01 PROCEDURE — 2500000003 HC RX 250 WO HCPCS: Performed by: RADIOLOGY

## 2025-01-01 PROCEDURE — 85652 RBC SED RATE AUTOMATED: CPT

## 2025-01-01 PROCEDURE — 76705 ECHO EXAM OF ABDOMEN: CPT

## 2025-01-01 PROCEDURE — P9016 RBC LEUKOCYTES REDUCED: HCPCS

## 2025-01-01 PROCEDURE — C8924 2D TTE W OR W/O FOL W/CON,FU: HCPCS

## 2025-01-01 PROCEDURE — 6360000002 HC RX W HCPCS: Performed by: STUDENT IN AN ORGANIZED HEALTH CARE EDUCATION/TRAINING PROGRAM

## 2025-01-01 PROCEDURE — 87015 SPECIMEN INFECT AGNT CONCNTJ: CPT

## 2025-01-01 PROCEDURE — 86140 C-REACTIVE PROTEIN: CPT

## 2025-01-01 PROCEDURE — 97165 OT EVAL LOW COMPLEX 30 MIN: CPT

## 2025-01-01 PROCEDURE — 74018 RADEX ABDOMEN 1 VIEW: CPT

## 2025-01-01 PROCEDURE — 36591 DRAW BLOOD OFF VENOUS DEVICE: CPT

## 2025-01-01 PROCEDURE — 6360000004 HC RX CONTRAST MEDICATION

## 2025-01-01 PROCEDURE — 0202U NFCT DS 22 TRGT SARS-COV-2: CPT

## 2025-01-01 PROCEDURE — 86923 COMPATIBILITY TEST ELECTRIC: CPT

## 2025-01-01 PROCEDURE — 84443 ASSAY THYROID STIM HORMONE: CPT

## 2025-01-01 PROCEDURE — 99285 EMERGENCY DEPT VISIT HI MDM: CPT

## 2025-01-01 PROCEDURE — 99233 SBSQ HOSP IP/OBS HIGH 50: CPT | Performed by: CLINICAL NURSE SPECIALIST

## 2025-01-01 PROCEDURE — 86900 BLOOD TYPING SEROLOGIC ABO: CPT

## 2025-01-01 PROCEDURE — 93970 EXTREMITY STUDY: CPT

## 2025-01-01 PROCEDURE — 6360000002 HC RX W HCPCS: Performed by: EMERGENCY MEDICINE

## 2025-01-01 PROCEDURE — 87116 MYCOBACTERIA CULTURE: CPT

## 2025-01-01 PROCEDURE — 87081 CULTURE SCREEN ONLY: CPT

## 2025-01-01 PROCEDURE — 74176 CT ABD & PELVIS W/O CONTRAST: CPT

## 2025-01-01 PROCEDURE — 83540 ASSAY OF IRON: CPT

## 2025-01-01 PROCEDURE — 5A0955A ASSISTANCE WITH RESPIRATORY VENTILATION, GREATER THAN 96 CONSECUTIVE HOURS, HIGH NASAL FLOW/VELOCITY: ICD-10-PCS | Performed by: FAMILY MEDICINE

## 2025-01-01 PROCEDURE — 74220 X-RAY XM ESOPHAGUS 1CNTRST: CPT

## 2025-01-01 PROCEDURE — 84630 ASSAY OF ZINC: CPT

## 2025-01-01 PROCEDURE — 36430 TRANSFUSION BLD/BLD COMPNT: CPT

## 2025-01-01 PROCEDURE — 84134 ASSAY OF PREALBUMIN: CPT

## 2025-01-01 PROCEDURE — 85045 AUTOMATED RETICULOCYTE COUNT: CPT

## 2025-01-01 RX ORDER — POLYETHYLENE GLYCOL 3350 17 G/17G
17 POWDER, FOR SOLUTION ORAL DAILY
Status: DISCONTINUED | OUTPATIENT
Start: 2025-01-01 | End: 2025-01-01

## 2025-01-01 RX ORDER — POLYETHYLENE GLYCOL 3350 17 G/17G
17 POWDER, FOR SOLUTION ORAL DAILY PRN
Status: DISCONTINUED | OUTPATIENT
Start: 2025-01-01 | End: 2025-01-01

## 2025-01-01 RX ORDER — 0.9 % SODIUM CHLORIDE 0.9 %
1000 INTRAVENOUS SOLUTION INTRAVENOUS ONCE
Status: COMPLETED | OUTPATIENT
Start: 2025-01-01 | End: 2025-01-01

## 2025-01-01 RX ORDER — VITS A,C,E/LUTEIN/MINERALS 300MCG-200
1 TABLET ORAL DAILY
Status: DISCONTINUED | OUTPATIENT
Start: 2025-01-01 | End: 2025-01-01

## 2025-01-01 RX ORDER — SODIUM CHLORIDE 9 MG/ML
INJECTION, SOLUTION INTRAVENOUS CONTINUOUS
Status: ACTIVE | OUTPATIENT
Start: 2025-01-01 | End: 2025-01-01

## 2025-01-01 RX ORDER — HYDROCORTISONE SODIUM SUCCINATE 100 MG/2ML
100 INJECTION INTRAMUSCULAR; INTRAVENOUS
Status: CANCELLED | OUTPATIENT
Start: 2025-01-01

## 2025-01-01 RX ORDER — LANSOPRAZOLE 30 MG/1
30 TABLET, ORALLY DISINTEGRATING, DELAYED RELEASE ORAL 2 TIMES DAILY
Status: DISCONTINUED | OUTPATIENT
Start: 2025-01-01 | End: 2025-08-16 | Stop reason: HOSPADM

## 2025-01-01 RX ORDER — OXYCODONE HYDROCHLORIDE 5 MG/1
25 TABLET ORAL
Status: SHIPPED | DISCHARGE
Start: 2025-01-01 | End: 2025-08-16

## 2025-01-01 RX ORDER — WATER 10 ML/10ML
INJECTION INTRAMUSCULAR; INTRAVENOUS; SUBCUTANEOUS
Status: COMPLETED
Start: 2025-01-01 | End: 2025-01-01

## 2025-01-01 RX ORDER — POLYETHYLENE GLYCOL 3350 17 G/17G
17 POWDER, FOR SOLUTION ORAL DAILY
Status: DISCONTINUED | OUTPATIENT
Start: 2025-01-01 | End: 2025-08-16 | Stop reason: HOSPADM

## 2025-01-01 RX ORDER — METHYLPREDNISOLONE SODIUM SUCCINATE 125 MG/2ML
80 INJECTION INTRAMUSCULAR; INTRAVENOUS ONCE
Status: COMPLETED | OUTPATIENT
Start: 2025-01-01 | End: 2025-01-01

## 2025-01-01 RX ORDER — ALBUTEROL SULFATE 0.83 MG/ML
2.5 SOLUTION RESPIRATORY (INHALATION)
Status: DISCONTINUED | OUTPATIENT
Start: 2025-01-01 | End: 2025-01-01

## 2025-01-01 RX ORDER — PREDNISONE 20 MG/1
40 TABLET ORAL DAILY
Status: DISCONTINUED | OUTPATIENT
Start: 2025-01-01 | End: 2025-01-01

## 2025-01-01 RX ORDER — EPINEPHRINE 1 MG/ML
0.3 INJECTION, SOLUTION, CONCENTRATE INTRAVENOUS PRN
Status: CANCELLED | OUTPATIENT
Start: 2025-01-01

## 2025-01-01 RX ORDER — MAGNESIUM SULFATE IN WATER 40 MG/ML
2000 INJECTION, SOLUTION INTRAVENOUS ONCE
Status: COMPLETED | OUTPATIENT
Start: 2025-01-01 | End: 2025-01-01

## 2025-01-01 RX ORDER — METHYLPREDNISOLONE SODIUM SUCCINATE 40 MG/ML
40 INJECTION INTRAMUSCULAR; INTRAVENOUS DAILY
Status: DISCONTINUED | OUTPATIENT
Start: 2025-01-01 | End: 2025-01-01

## 2025-01-01 RX ORDER — SODIUM CHLORIDE 9 MG/ML
INJECTION, SOLUTION INTRAVENOUS CONTINUOUS
Status: CANCELLED | OUTPATIENT
Start: 2025-01-01

## 2025-01-01 RX ORDER — SENNA AND DOCUSATE SODIUM 50; 8.6 MG/1; MG/1
2 TABLET, FILM COATED ORAL 2 TIMES DAILY
Status: DISCONTINUED | OUTPATIENT
Start: 2025-01-01 | End: 2025-01-01

## 2025-01-01 RX ORDER — ARFORMOTEROL TARTRATE 15 UG/2ML
15 SOLUTION RESPIRATORY (INHALATION)
Status: DISCONTINUED | OUTPATIENT
Start: 2025-01-01 | End: 2025-01-01

## 2025-01-01 RX ORDER — SODIUM CHLORIDE 0.9 % (FLUSH) 0.9 %
5-40 SYRINGE (ML) INJECTION EVERY 12 HOURS SCHEDULED
Status: DISCONTINUED | OUTPATIENT
Start: 2025-01-01 | End: 2025-08-16 | Stop reason: HOSPADM

## 2025-01-01 RX ORDER — IPRATROPIUM BROMIDE AND ALBUTEROL SULFATE 2.5; .5 MG/3ML; MG/3ML
1 SOLUTION RESPIRATORY (INHALATION) ONCE
Status: COMPLETED | OUTPATIENT
Start: 2025-01-01 | End: 2025-01-01

## 2025-01-01 RX ORDER — MAGNESIUM SULFATE 1 G/100ML
1000 INJECTION INTRAVENOUS ONCE
Status: COMPLETED | OUTPATIENT
Start: 2025-01-01 | End: 2025-01-01

## 2025-01-01 RX ORDER — DOXYCYCLINE 100 MG/1
100 CAPSULE ORAL EVERY 12 HOURS SCHEDULED
Status: DISCONTINUED | OUTPATIENT
Start: 2025-01-01 | End: 2025-01-01

## 2025-01-01 RX ORDER — ACETYLCYSTEINE 100 MG/ML
4 SOLUTION ORAL; RESPIRATORY (INHALATION)
Status: COMPLETED | OUTPATIENT
Start: 2025-01-01 | End: 2025-01-01

## 2025-01-01 RX ORDER — SODIUM CHLORIDE 9 MG/ML
INJECTION, SOLUTION INTRAVENOUS CONTINUOUS
Status: DISCONTINUED | OUTPATIENT
Start: 2025-01-01 | End: 2025-01-01

## 2025-01-01 RX ORDER — ASPIRIN 300 MG/1
300 SUPPOSITORY RECTAL DAILY
Status: DISCONTINUED | OUTPATIENT
Start: 2025-01-01 | End: 2025-01-01

## 2025-01-01 RX ORDER — POTASSIUM CHLORIDE 7.45 MG/ML
10 INJECTION INTRAVENOUS
Status: COMPLETED | OUTPATIENT
Start: 2025-01-01 | End: 2025-01-01

## 2025-01-01 RX ORDER — LANOLIN ALCOHOL/MO/W.PET/CERES
100 CREAM (GRAM) TOPICAL DAILY
Status: DISCONTINUED | OUTPATIENT
Start: 2025-01-01 | End: 2025-01-01

## 2025-01-01 RX ORDER — HEPARIN 100 UNIT/ML
500 SYRINGE INTRAVENOUS PRN
Status: DISCONTINUED | OUTPATIENT
Start: 2025-01-01 | End: 2025-01-01 | Stop reason: HOSPADM

## 2025-01-01 RX ORDER — PROCHLORPERAZINE MALEATE 10 MG
10 TABLET ORAL EVERY 6 HOURS PRN
Status: ON HOLD | COMMUNITY
End: 2025-01-01 | Stop reason: HOSPADM

## 2025-01-01 RX ORDER — MAGNESIUM SULFATE IN WATER 40 MG/ML
4000 INJECTION, SOLUTION INTRAVENOUS ONCE
Status: COMPLETED | OUTPATIENT
Start: 2025-01-01 | End: 2025-01-01

## 2025-01-01 RX ORDER — SODIUM CHLORIDE 0.9 % (FLUSH) 0.9 %
5-40 SYRINGE (ML) INJECTION PRN
Status: DISCONTINUED | OUTPATIENT
Start: 2025-01-01 | End: 2025-08-16 | Stop reason: HOSPADM

## 2025-01-01 RX ORDER — SODIUM CHLORIDE 0.9 % (FLUSH) 0.9 %
5-40 SYRINGE (ML) INJECTION PRN
Status: DISCONTINUED | OUTPATIENT
Start: 2025-01-01 | End: 2025-01-01 | Stop reason: HOSPADM

## 2025-01-01 RX ORDER — ONDANSETRON 2 MG/ML
4 INJECTION INTRAMUSCULAR; INTRAVENOUS ONCE
Status: COMPLETED | OUTPATIENT
Start: 2025-01-01 | End: 2025-01-01

## 2025-01-01 RX ORDER — LEVALBUTEROL INHALATION SOLUTION 0.63 MG/3ML
0.63 SOLUTION RESPIRATORY (INHALATION) EVERY 6 HOURS
Status: DISCONTINUED | OUTPATIENT
Start: 2025-01-01 | End: 2025-01-01

## 2025-01-01 RX ORDER — OXYCODONE HYDROCHLORIDE 20 MG/1
20 TABLET ORAL
Status: SHIPPED | DISCHARGE
Start: 2025-01-01 | End: 2025-08-16

## 2025-01-01 RX ORDER — SENNOSIDES 8.6 MG
325 CAPSULE ORAL ONCE
Status: DISCONTINUED | OUTPATIENT
Start: 2025-01-01 | End: 2025-01-01

## 2025-01-01 RX ORDER — ONDANSETRON 2 MG/ML
8 INJECTION INTRAMUSCULAR; INTRAVENOUS
Status: CANCELLED | OUTPATIENT
Start: 2025-01-01

## 2025-01-01 RX ORDER — METOCLOPRAMIDE 10 MG/1
10 TABLET ORAL
Status: DISCONTINUED | OUTPATIENT
Start: 2025-01-01 | End: 2025-01-01

## 2025-01-01 RX ORDER — MIRTAZAPINE 15 MG/1
15 TABLET, FILM COATED ORAL NIGHTLY
Status: DISCONTINUED | OUTPATIENT
Start: 2025-01-01 | End: 2025-01-01

## 2025-01-01 RX ORDER — POTASSIUM CHLORIDE 7.45 MG/ML
10 INJECTION INTRAVENOUS ONCE
Status: COMPLETED | OUTPATIENT
Start: 2025-01-01 | End: 2025-01-01

## 2025-01-01 RX ORDER — HYDROMORPHONE HYDROCHLORIDE 2 MG/1
8 TABLET ORAL ONCE
Refills: 0 | Status: COMPLETED | OUTPATIENT
Start: 2025-01-01 | End: 2025-01-01

## 2025-01-01 RX ORDER — ACETYLCYSTEINE 100 MG/ML
600 SOLUTION ORAL; RESPIRATORY (INHALATION)
Status: DISCONTINUED | OUTPATIENT
Start: 2025-01-01 | End: 2025-01-01

## 2025-01-01 RX ORDER — ALBUTEROL SULFATE 0.83 MG/ML
2.5 SOLUTION RESPIRATORY (INHALATION) EVERY 4 HOURS
Status: DISCONTINUED | OUTPATIENT
Start: 2025-01-01 | End: 2025-01-01

## 2025-01-01 RX ORDER — HEPARIN 100 UNIT/ML
500 SYRINGE INTRAVENOUS PRN
Status: CANCELLED | OUTPATIENT
Start: 2025-01-01

## 2025-01-01 RX ORDER — MIDAZOLAM HYDROCHLORIDE 2 MG/2ML
1 INJECTION, SOLUTION INTRAMUSCULAR; INTRAVENOUS
Status: DISCONTINUED | OUTPATIENT
Start: 2025-01-01 | End: 2025-08-16 | Stop reason: HOSPADM

## 2025-01-01 RX ORDER — METOCLOPRAMIDE 10 MG/1
5 TABLET ORAL
Status: DISCONTINUED | OUTPATIENT
Start: 2025-01-01 | End: 2025-08-16 | Stop reason: HOSPADM

## 2025-01-01 RX ORDER — SODIUM CHLORIDE FOR INHALATION 3 %
4 VIAL, NEBULIZER (ML) INHALATION 2 TIMES DAILY PRN
Status: DISCONTINUED | OUTPATIENT
Start: 2025-01-01 | End: 2025-08-16 | Stop reason: HOSPADM

## 2025-01-01 RX ORDER — GLYCOPYRROLATE 0.2 MG/ML
0.4 INJECTION INTRAMUSCULAR; INTRAVENOUS EVERY 4 HOURS PRN
Status: DISCONTINUED | OUTPATIENT
Start: 2025-01-01 | End: 2025-08-16 | Stop reason: HOSPADM

## 2025-01-01 RX ORDER — DIPHENHYDRAMINE HYDROCHLORIDE 50 MG/ML
50 INJECTION, SOLUTION INTRAMUSCULAR; INTRAVENOUS
Status: CANCELLED | OUTPATIENT
Start: 2025-01-01

## 2025-01-01 RX ORDER — DEXTROSE MONOHYDRATE 100 MG/ML
INJECTION, SOLUTION INTRAVENOUS CONTINUOUS PRN
Status: DISCONTINUED | OUTPATIENT
Start: 2025-01-01 | End: 2025-08-16 | Stop reason: HOSPADM

## 2025-01-01 RX ORDER — SODIUM CHLORIDE 9 MG/ML
INJECTION, SOLUTION INTRAVENOUS PRN
Status: DISCONTINUED | OUTPATIENT
Start: 2025-01-01 | End: 2025-01-01

## 2025-01-01 RX ORDER — HEPARIN 100 UNIT/ML
100 SYRINGE INTRAVENOUS PRN
Status: DISCONTINUED | OUTPATIENT
Start: 2025-01-01 | End: 2025-08-16 | Stop reason: HOSPADM

## 2025-01-01 RX ORDER — FOLIC ACID 1 MG/1
1 TABLET ORAL DAILY
Status: DISCONTINUED | OUTPATIENT
Start: 2025-01-01 | End: 2025-01-01

## 2025-01-01 RX ORDER — MAGNESIUM SULFATE IN WATER 40 MG/ML
2000 INJECTION, SOLUTION INTRAVENOUS ONCE
Status: DISCONTINUED | OUTPATIENT
Start: 2025-01-01 | End: 2025-01-01

## 2025-01-01 RX ORDER — BUDESONIDE 0.5 MG/2ML
0.5 INHALANT ORAL
Status: DISCONTINUED | OUTPATIENT
Start: 2025-01-01 | End: 2025-01-01

## 2025-01-01 RX ORDER — DEXTROSE MONOHYDRATE 100 MG/ML
INJECTION, SOLUTION INTRAVENOUS CONTINUOUS
Status: DISCONTINUED | OUTPATIENT
Start: 2025-01-01 | End: 2025-01-01

## 2025-01-01 RX ORDER — SENNA AND DOCUSATE SODIUM 50; 8.6 MG/1; MG/1
2 TABLET, FILM COATED ORAL DAILY
Status: DISCONTINUED | OUTPATIENT
Start: 2025-01-01 | End: 2025-01-01

## 2025-01-01 RX ORDER — ENOXAPARIN SODIUM 100 MG/ML
30 INJECTION SUBCUTANEOUS DAILY
Status: DISCONTINUED | OUTPATIENT
Start: 2025-01-01 | End: 2025-01-01

## 2025-01-01 RX ORDER — HYDROMORPHONE HYDROCHLORIDE 2 MG/1
8 TABLET ORAL EVERY 4 HOURS PRN
Refills: 0 | Status: DISCONTINUED | OUTPATIENT
Start: 2025-01-01 | End: 2025-01-01

## 2025-01-01 RX ORDER — ALBUTEROL SULFATE 90 UG/1
4 INHALANT RESPIRATORY (INHALATION) PRN
Status: CANCELLED | OUTPATIENT
Start: 2025-01-01

## 2025-01-01 RX ORDER — ONDANSETRON 4 MG/1
4 TABLET, ORALLY DISINTEGRATING ORAL EVERY 8 HOURS PRN
Status: DISCONTINUED | OUTPATIENT
Start: 2025-01-01 | End: 2025-08-16 | Stop reason: HOSPADM

## 2025-01-01 RX ORDER — 0.9 % SODIUM CHLORIDE 0.9 %
2000 INTRAVENOUS SOLUTION INTRAVENOUS ONCE
Status: COMPLETED | OUTPATIENT
Start: 2025-01-01 | End: 2025-01-01

## 2025-01-01 RX ORDER — SODIUM CHLORIDE 0.9 % (FLUSH) 0.9 %
5-40 SYRINGE (ML) INJECTION PRN
Status: CANCELLED | OUTPATIENT
Start: 2025-01-01

## 2025-01-01 RX ORDER — IOPAMIDOL 755 MG/ML
75 INJECTION, SOLUTION INTRAVASCULAR
Status: COMPLETED | OUTPATIENT
Start: 2025-01-01 | End: 2025-01-01

## 2025-01-01 RX ORDER — GUAIFENESIN 200 MG/10ML
400 LIQUID ORAL EVERY 6 HOURS PRN
Status: DISCONTINUED | OUTPATIENT
Start: 2025-01-01 | End: 2025-01-01

## 2025-01-01 RX ORDER — METHYLPREDNISOLONE SODIUM SUCCINATE 40 MG/ML
40 INJECTION INTRAMUSCULAR; INTRAVENOUS EVERY 12 HOURS
Status: DISCONTINUED | OUTPATIENT
Start: 2025-01-01 | End: 2025-01-01

## 2025-01-01 RX ORDER — GLUCAGON 1 MG/ML
1 KIT INJECTION PRN
Status: DISCONTINUED | OUTPATIENT
Start: 2025-01-01 | End: 2025-08-16 | Stop reason: HOSPADM

## 2025-01-01 RX ORDER — ACETAMINOPHEN 325 MG/1
650 TABLET ORAL
Status: CANCELLED | OUTPATIENT
Start: 2025-01-01

## 2025-01-01 RX ORDER — NYSTATIN 100000 [USP'U]/ML
500000 SUSPENSION ORAL 4 TIMES DAILY
Status: DISCONTINUED | OUTPATIENT
Start: 2025-01-01 | End: 2025-01-01

## 2025-01-01 RX ORDER — SODIUM CHLORIDE FOR INHALATION 3 %
4 VIAL, NEBULIZER (ML) INHALATION 2 TIMES DAILY
Status: DISCONTINUED | OUTPATIENT
Start: 2025-01-01 | End: 2025-01-01

## 2025-01-01 RX ORDER — IPRATROPIUM BROMIDE AND ALBUTEROL SULFATE 2.5; .5 MG/3ML; MG/3ML
1 SOLUTION RESPIRATORY (INHALATION) 2 TIMES DAILY PRN
Status: DISCONTINUED | OUTPATIENT
Start: 2025-01-01 | End: 2025-08-16 | Stop reason: HOSPADM

## 2025-01-01 RX ORDER — IPRATROPIUM BROMIDE AND ALBUTEROL SULFATE 2.5; .5 MG/3ML; MG/3ML
3 SOLUTION RESPIRATORY (INHALATION) 2 TIMES DAILY PRN
DISCHARGE
Start: 2025-01-01 | End: 2025-08-16

## 2025-01-01 RX ORDER — POLYETHYLENE GLYCOL 3350 17 G/17G
17 POWDER, FOR SOLUTION ORAL 2 TIMES DAILY
Status: DISCONTINUED | OUTPATIENT
Start: 2025-01-01 | End: 2025-01-01

## 2025-01-01 RX ORDER — ACETAMINOPHEN 325 MG/1
650 TABLET ORAL EVERY 6 HOURS PRN
Status: DISCONTINUED | OUTPATIENT
Start: 2025-01-01 | End: 2025-01-01

## 2025-01-01 RX ORDER — ONDANSETRON 2 MG/ML
4 INJECTION INTRAMUSCULAR; INTRAVENOUS EVERY 6 HOURS PRN
Status: DISCONTINUED | OUTPATIENT
Start: 2025-01-01 | End: 2025-08-16 | Stop reason: HOSPADM

## 2025-01-01 RX ORDER — ACETAMINOPHEN 650 MG/1
650 SUPPOSITORY RECTAL EVERY 6 HOURS PRN
Status: DISCONTINUED | OUTPATIENT
Start: 2025-01-01 | End: 2025-01-01

## 2025-01-01 RX ORDER — METOCLOPRAMIDE 10 MG/1
5 TABLET ORAL
Status: DISCONTINUED | OUTPATIENT
Start: 2025-01-01 | End: 2025-01-01

## 2025-01-01 RX ORDER — SODIUM PHOSPHATE, DIBASIC AND SODIUM PHOSPHATE, MONOBASIC 7; 19 G/230ML; G/230ML
1 ENEMA RECTAL
Status: DISCONTINUED | OUTPATIENT
Start: 2025-01-01 | End: 2025-01-01

## 2025-01-01 RX ORDER — METHYLPREDNISOLONE SODIUM SUCCINATE 40 MG/ML
40 INJECTION INTRAMUSCULAR; INTRAVENOUS EVERY 8 HOURS
Status: DISCONTINUED | OUTPATIENT
Start: 2025-01-01 | End: 2025-01-01

## 2025-01-01 RX ORDER — GLUCAGON 1 MG/ML
1 KIT INJECTION ONCE
Status: COMPLETED | OUTPATIENT
Start: 2025-01-01 | End: 2025-01-01

## 2025-01-01 RX ORDER — ONDANSETRON 4 MG/1
8 TABLET, ORALLY DISINTEGRATING ORAL EVERY 8 HOURS PRN
Status: DISCONTINUED | OUTPATIENT
Start: 2025-01-01 | End: 2025-01-01 | Stop reason: ALTCHOICE

## 2025-01-01 RX ORDER — IPRATROPIUM BROMIDE AND ALBUTEROL SULFATE 2.5; .5 MG/3ML; MG/3ML
1 SOLUTION RESPIRATORY (INHALATION)
Status: DISCONTINUED | OUTPATIENT
Start: 2025-01-01 | End: 2025-01-01

## 2025-01-01 RX ORDER — GUAIFENESIN 200 MG/10ML
400 LIQUID ORAL EVERY 6 HOURS
Status: DISCONTINUED | OUTPATIENT
Start: 2025-01-01 | End: 2025-08-16 | Stop reason: HOSPADM

## 2025-01-01 RX ORDER — SODIUM CHLORIDE 9 MG/ML
25 INJECTION, SOLUTION INTRAVENOUS PRN
Status: CANCELLED | OUTPATIENT
Start: 2025-01-01

## 2025-01-01 RX ORDER — KETOROLAC TROMETHAMINE 15 MG/ML
30 INJECTION, SOLUTION INTRAMUSCULAR; INTRAVENOUS ONCE
Status: COMPLETED | OUTPATIENT
Start: 2025-01-01 | End: 2025-01-01

## 2025-01-01 RX ORDER — SENNA AND DOCUSATE SODIUM 50; 8.6 MG/1; MG/1
2 TABLET, FILM COATED ORAL DAILY PRN
Status: DISCONTINUED | OUTPATIENT
Start: 2025-01-01 | End: 2025-01-01

## 2025-01-01 RX ORDER — FUROSEMIDE 10 MG/ML
20 INJECTION INTRAMUSCULAR; INTRAVENOUS ONCE
Status: COMPLETED | OUTPATIENT
Start: 2025-01-01 | End: 2025-01-01

## 2025-01-01 RX ADMIN — SODIUM CHLORIDE: 0.9 INJECTION, SOLUTION INTRAVENOUS at 07:07

## 2025-01-01 RX ADMIN — METOCLOPRAMIDE 5 MG: 10 TABLET ORAL at 16:53

## 2025-01-01 RX ADMIN — METOCLOPRAMIDE 10 MG: 10 TABLET ORAL at 17:47

## 2025-01-01 RX ADMIN — MIRTAZAPINE 15 MG: 15 TABLET, FILM COATED ORAL at 20:29

## 2025-01-01 RX ADMIN — PIPERACILLIN AND TAZOBACTAM 3375 MG: 3; .375 INJECTION, POWDER, LYOPHILIZED, FOR SOLUTION INTRAVENOUS at 11:37

## 2025-01-01 RX ADMIN — IPRATROPIUM BROMIDE AND ALBUTEROL SULFATE 1 DOSE: .5; 2.5 SOLUTION RESPIRATORY (INHALATION) at 13:39

## 2025-01-01 RX ADMIN — OXYCODONE HYDROCHLORIDE 20 MG: 5 TABLET ORAL at 14:23

## 2025-01-01 RX ADMIN — IPRATROPIUM BROMIDE AND ALBUTEROL SULFATE 1 DOSE: .5; 2.5 SOLUTION RESPIRATORY (INHALATION) at 12:02

## 2025-01-01 RX ADMIN — ENOXAPARIN SODIUM 30 MG: 100 INJECTION SUBCUTANEOUS at 11:29

## 2025-01-01 RX ADMIN — NYSTATIN 500000 UNITS: 100000 SUSPENSION ORAL at 15:49

## 2025-01-01 RX ADMIN — POTASSIUM PHOSPHATE, MONOBASIC AND POTASSIUM PHOSPHATE, DIBASIC 30 MMOL: 224; 236 INJECTION, SOLUTION, CONCENTRATE INTRAVENOUS at 15:49

## 2025-01-01 RX ADMIN — LEVALBUTEROL 0.63 MG: 0.63 SOLUTION RESPIRATORY (INHALATION) at 00:51

## 2025-01-01 RX ADMIN — NYSTATIN 500000 UNITS: 100000 SUSPENSION ORAL at 11:43

## 2025-01-01 RX ADMIN — NYSTATIN 500000 UNITS: 100000 SUSPENSION ORAL at 15:30

## 2025-01-01 RX ADMIN — ARFORMOTEROL TARTRATE 15 MCG: 15 SOLUTION RESPIRATORY (INHALATION) at 08:09

## 2025-01-01 RX ADMIN — VANCOMYCIN HYDROCHLORIDE 750 MG: 1 INJECTION, POWDER, LYOPHILIZED, FOR SOLUTION INTRAVENOUS at 17:46

## 2025-01-01 RX ADMIN — BUDESONIDE 500 MCG: 0.5 SUSPENSION RESPIRATORY (INHALATION) at 20:31

## 2025-01-01 RX ADMIN — METOCLOPRAMIDE 5 MG: 10 TABLET ORAL at 06:42

## 2025-01-01 RX ADMIN — HYDROMORPHONE HYDROCHLORIDE 0.25 MG: 1 INJECTION, SOLUTION INTRAMUSCULAR; INTRAVENOUS; SUBCUTANEOUS at 10:12

## 2025-01-01 RX ADMIN — ALBUTEROL SULFATE 2.5 MG: 2.5 SOLUTION RESPIRATORY (INHALATION) at 08:10

## 2025-01-01 RX ADMIN — ENOXAPARIN SODIUM 30 MG: 100 INJECTION SUBCUTANEOUS at 08:02

## 2025-01-01 RX ADMIN — FOLIC ACID 1 MG: 1 TABLET ORAL at 09:14

## 2025-01-01 RX ADMIN — SODIUM CHLORIDE, PRESERVATIVE FREE 10 ML: 5 INJECTION INTRAVENOUS at 00:19

## 2025-01-01 RX ADMIN — METOCLOPRAMIDE 5 MG: 10 TABLET ORAL at 11:37

## 2025-01-01 RX ADMIN — HYDROMORPHONE HYDROCHLORIDE 0.25 MG: 1 INJECTION, SOLUTION INTRAMUSCULAR; INTRAVENOUS; SUBCUTANEOUS at 12:33

## 2025-01-01 RX ADMIN — ALBUTEROL SULFATE 2.5 MG: 2.5 SOLUTION RESPIRATORY (INHALATION) at 16:10

## 2025-01-01 RX ADMIN — POTASSIUM CHLORIDE 10 MEQ: 7.46 INJECTION, SOLUTION INTRAVENOUS at 11:09

## 2025-01-01 RX ADMIN — SODIUM CHLORIDE, PRESERVATIVE FREE 10 ML: 5 INJECTION INTRAVENOUS at 09:49

## 2025-01-01 RX ADMIN — ENOXAPARIN SODIUM 30 MG: 100 INJECTION SUBCUTANEOUS at 09:48

## 2025-01-01 RX ADMIN — METHYLPREDNISOLONE SODIUM SUCCINATE 80 MG: 125 INJECTION INTRAMUSCULAR; INTRAVENOUS at 10:14

## 2025-01-01 RX ADMIN — HYDROMORPHONE HYDROCHLORIDE 0.25 MG: 1 INJECTION, SOLUTION INTRAMUSCULAR; INTRAVENOUS; SUBCUTANEOUS at 19:53

## 2025-01-01 RX ADMIN — IPRATROPIUM BROMIDE AND ALBUTEROL SULFATE 1 DOSE: .5; 2.5 SOLUTION RESPIRATORY (INHALATION) at 13:08

## 2025-01-01 RX ADMIN — METOCLOPRAMIDE 5 MG: 10 TABLET ORAL at 06:14

## 2025-01-01 RX ADMIN — LEVALBUTEROL 0.63 MG: 0.63 SOLUTION RESPIRATORY (INHALATION) at 13:08

## 2025-01-01 RX ADMIN — LANSOPRAZOLE 30 MG: 30 TABLET, ORALLY DISINTEGRATING ORAL at 20:30

## 2025-01-01 RX ADMIN — GUAIFENESIN 400 MG: 200 SOLUTION ORAL at 06:57

## 2025-01-01 RX ADMIN — METOCLOPRAMIDE 5 MG: 10 TABLET ORAL at 10:26

## 2025-01-01 RX ADMIN — HYDROMORPHONE HYDROCHLORIDE 0.25 MG: 1 INJECTION, SOLUTION INTRAMUSCULAR; INTRAVENOUS; SUBCUTANEOUS at 01:02

## 2025-01-01 RX ADMIN — METOCLOPRAMIDE 5 MG: 10 TABLET ORAL at 09:14

## 2025-01-01 RX ADMIN — ARFORMOTEROL TARTRATE 15 MCG: 15 SOLUTION RESPIRATORY (INHALATION) at 08:36

## 2025-01-01 RX ADMIN — ACETYLCYSTEINE 400 MG: 100 INHALANT RESPIRATORY (INHALATION) at 14:38

## 2025-01-01 RX ADMIN — SODIUM CHLORIDE 1000 ML: 0.9 INJECTION, SOLUTION INTRAVENOUS at 14:54

## 2025-01-01 RX ADMIN — MAGNESIUM SULFATE HEPTAHYDRATE 1000 MG: 1 INJECTION, SOLUTION INTRAVENOUS at 00:54

## 2025-01-01 RX ADMIN — BUDESONIDE 500 MCG: 0.5 SUSPENSION RESPIRATORY (INHALATION) at 20:19

## 2025-01-01 RX ADMIN — SODIUM CHLORIDE, PRESERVATIVE FREE 10 ML: 5 INJECTION INTRAVENOUS at 10:07

## 2025-01-01 RX ADMIN — Medication 4 ML: at 08:06

## 2025-01-01 RX ADMIN — SODIUM CHLORIDE, PRESERVATIVE FREE 10 ML: 5 INJECTION INTRAVENOUS at 08:00

## 2025-01-01 RX ADMIN — PIPERACILLIN AND TAZOBACTAM 3375 MG: 3; .375 INJECTION, POWDER, LYOPHILIZED, FOR SOLUTION INTRAVENOUS at 03:59

## 2025-01-01 RX ADMIN — NYSTATIN 500000 UNITS: 100000 SUSPENSION ORAL at 11:45

## 2025-01-01 RX ADMIN — HYDROMORPHONE HYDROCHLORIDE 0.25 MG: 1 INJECTION, SOLUTION INTRAMUSCULAR; INTRAVENOUS; SUBCUTANEOUS at 04:38

## 2025-01-01 RX ADMIN — OXYCODONE HYDROCHLORIDE 25 MG: 5 TABLET ORAL at 20:21

## 2025-01-01 RX ADMIN — DEXTROSE: 10 SOLUTION INTRAVENOUS at 10:40

## 2025-01-01 RX ADMIN — PREDNISONE 40 MG: 20 TABLET ORAL at 08:31

## 2025-01-01 RX ADMIN — NYSTATIN 500000 UNITS: 100000 SUSPENSION ORAL at 21:09

## 2025-01-01 RX ADMIN — VANCOMYCIN HYDROCHLORIDE 750 MG: 1 INJECTION, POWDER, LYOPHILIZED, FOR SOLUTION INTRAVENOUS at 10:26

## 2025-01-01 RX ADMIN — GUAIFENESIN 400 MG: 200 SOLUTION ORAL at 06:05

## 2025-01-01 RX ADMIN — DOCUSATE SODIUM 50 MG AND SENNOSIDES 8.6 MG 2 TABLET: 8.6; 5 TABLET, FILM COATED ORAL at 10:45

## 2025-01-01 RX ADMIN — BUDESONIDE 500 MCG: 0.5 SUSPENSION RESPIRATORY (INHALATION) at 08:05

## 2025-01-01 RX ADMIN — GUAIFENESIN 400 MG: 200 SOLUTION ORAL at 00:44

## 2025-01-01 RX ADMIN — ALBUTEROL SULFATE 2.5 MG: 2.5 SOLUTION RESPIRATORY (INHALATION) at 14:38

## 2025-01-01 RX ADMIN — NYSTATIN 500000 UNITS: 100000 SUSPENSION ORAL at 12:41

## 2025-01-01 RX ADMIN — OXYCODONE HYDROCHLORIDE 20 MG: 5 TABLET ORAL at 22:07

## 2025-01-01 RX ADMIN — BUDESONIDE 500 MCG: 0.5 SUSPENSION RESPIRATORY (INHALATION) at 08:36

## 2025-01-01 RX ADMIN — IPRATROPIUM BROMIDE AND ALBUTEROL SULFATE 1 DOSE: .5; 2.5 SOLUTION RESPIRATORY (INHALATION) at 16:11

## 2025-01-01 RX ADMIN — NYSTATIN 500000 UNITS: 100000 SUSPENSION ORAL at 20:04

## 2025-01-01 RX ADMIN — HYDROMORPHONE HYDROCHLORIDE 8 MG: 2 TABLET ORAL at 11:20

## 2025-01-01 RX ADMIN — SODIUM CHLORIDE, PRESERVATIVE FREE 10 ML: 5 INJECTION INTRAVENOUS at 08:42

## 2025-01-01 RX ADMIN — KETOROLAC TROMETHAMINE 30 MG: 15 INJECTION, SOLUTION INTRAMUSCULAR; INTRAVENOUS at 12:33

## 2025-01-01 RX ADMIN — HYDROMORPHONE HYDROCHLORIDE 1 MG: 1 INJECTION, SOLUTION INTRAMUSCULAR; INTRAVENOUS; SUBCUTANEOUS at 04:55

## 2025-01-01 RX ADMIN — GUAIFENESIN 400 MG: 200 SOLUTION ORAL at 00:11

## 2025-01-01 RX ADMIN — GUAIFENESIN 400 MG: 200 SOLUTION ORAL at 16:20

## 2025-01-01 RX ADMIN — METOCLOPRAMIDE 5 MG: 10 TABLET ORAL at 05:38

## 2025-01-01 RX ADMIN — GUAIFENESIN 400 MG: 200 SOLUTION ORAL at 06:03

## 2025-01-01 RX ADMIN — POTASSIUM CHLORIDE 10 MEQ: 7.46 INJECTION, SOLUTION INTRAVENOUS at 12:37

## 2025-01-01 RX ADMIN — OXYCODONE HYDROCHLORIDE 20 MG: 5 TABLET ORAL at 06:27

## 2025-01-01 RX ADMIN — LEVALBUTEROL 0.63 MG: 0.63 SOLUTION RESPIRATORY (INHALATION) at 20:31

## 2025-01-01 RX ADMIN — IPRATROPIUM BROMIDE AND ALBUTEROL SULFATE 1 DOSE: .5; 2.5 SOLUTION RESPIRATORY (INHALATION) at 12:56

## 2025-01-01 RX ADMIN — OXYCODONE HYDROCHLORIDE 25 MG: 5 TABLET ORAL at 16:18

## 2025-01-01 RX ADMIN — BARIUM SULFATE 176 G: 960 POWDER, FOR SUSPENSION ORAL at 08:59

## 2025-01-01 RX ADMIN — HYDROMORPHONE HYDROCHLORIDE 0.25 MG: 1 INJECTION, SOLUTION INTRAMUSCULAR; INTRAVENOUS; SUBCUTANEOUS at 03:54

## 2025-01-01 RX ADMIN — GUAIFENESIN 400 MG: 200 SOLUTION ORAL at 11:20

## 2025-01-01 RX ADMIN — SODIUM CHLORIDE, PRESERVATIVE FREE 10 ML: 5 INJECTION INTRAVENOUS at 10:40

## 2025-01-01 RX ADMIN — METOCLOPRAMIDE 5 MG: 10 TABLET ORAL at 16:10

## 2025-01-01 RX ADMIN — ASCORBIC ACID, VITAMIN A PALMITATE, CHOLECALCIFEROL, THIAMINE HYDROCHLORIDE, RIBOFLAVIN-5 PHOSPHATE SODIUM, PYRIDOXINE HYDROCHLORIDE, NIACINAMIDE, DEXPANTHENOL, ALPHA-TOCOPHEROL ACETATE, VITAMIN K1, FOLIC ACID, BIOTIN, CYANOCOBALAMIN: 200; 3300; 200; 6; 3.6; 6; 40; 15; 10; 150; 600; 60; 5 INJECTION, SOLUTION INTRAVENOUS at 18:25

## 2025-01-01 RX ADMIN — PIPERACILLIN AND TAZOBACTAM 3375 MG: 3; .375 INJECTION, POWDER, LYOPHILIZED, FOR SOLUTION INTRAVENOUS at 18:34

## 2025-01-01 RX ADMIN — HYDROMORPHONE HYDROCHLORIDE 0.25 MG: 1 INJECTION, SOLUTION INTRAMUSCULAR; INTRAVENOUS; SUBCUTANEOUS at 19:45

## 2025-01-01 RX ADMIN — MAGNESIUM SULFATE HEPTAHYDRATE 2000 MG: 40 INJECTION, SOLUTION INTRAVENOUS at 10:42

## 2025-01-01 RX ADMIN — IPRATROPIUM BROMIDE AND ALBUTEROL SULFATE 1 DOSE: .5; 2.5 SOLUTION RESPIRATORY (INHALATION) at 13:48

## 2025-01-01 RX ADMIN — OXYCODONE HYDROCHLORIDE 25 MG: 5 TABLET ORAL at 19:02

## 2025-01-01 RX ADMIN — POLYETHYLENE GLYCOL 3350 17 G: 17 POWDER, FOR SOLUTION ORAL at 10:07

## 2025-01-01 RX ADMIN — METOCLOPRAMIDE 5 MG: 10 TABLET ORAL at 06:05

## 2025-01-01 RX ADMIN — ENOXAPARIN SODIUM 30 MG: 100 INJECTION SUBCUTANEOUS at 10:32

## 2025-01-01 RX ADMIN — METOCLOPRAMIDE 5 MG: 10 TABLET ORAL at 17:41

## 2025-01-01 RX ADMIN — GUAIFENESIN 400 MG: 200 SOLUTION ORAL at 23:54

## 2025-01-01 RX ADMIN — OXYCODONE HYDROCHLORIDE 25 MG: 5 TABLET ORAL at 04:41

## 2025-01-01 RX ADMIN — MAGNESIUM SULFATE HEPTAHYDRATE 2000 MG: 40 INJECTION, SOLUTION INTRAVENOUS at 12:47

## 2025-01-01 RX ADMIN — HYDROMORPHONE HYDROCHLORIDE 0.25 MG: 1 INJECTION, SOLUTION INTRAMUSCULAR; INTRAVENOUS; SUBCUTANEOUS at 20:25

## 2025-01-01 RX ADMIN — LANSOPRAZOLE 30 MG: 30 TABLET, ORALLY DISINTEGRATING ORAL at 09:15

## 2025-01-01 RX ADMIN — PANTOPRAZOLE SODIUM 40 MG: 40 INJECTION, POWDER, FOR SOLUTION INTRAVENOUS at 02:20

## 2025-01-01 RX ADMIN — SODIUM CHLORIDE, PRESERVATIVE FREE 10 ML: 5 INJECTION INTRAVENOUS at 10:28

## 2025-01-01 RX ADMIN — LANSOPRAZOLE 30 MG: 30 TABLET, ORALLY DISINTEGRATING ORAL at 08:30

## 2025-01-01 RX ADMIN — HYDROMORPHONE HYDROCHLORIDE 0.25 MG: 1 INJECTION, SOLUTION INTRAMUSCULAR; INTRAVENOUS; SUBCUTANEOUS at 20:15

## 2025-01-01 RX ADMIN — Medication 100 MG: at 08:37

## 2025-01-01 RX ADMIN — SODIUM CHLORIDE, PRESERVATIVE FREE 10 ML: 5 INJECTION INTRAVENOUS at 08:31

## 2025-01-01 RX ADMIN — ENOXAPARIN SODIUM 30 MG: 100 INJECTION SUBCUTANEOUS at 11:00

## 2025-01-01 RX ADMIN — SODIUM CHLORIDE, PRESERVATIVE FREE 10 ML: 5 INJECTION INTRAVENOUS at 10:48

## 2025-01-01 RX ADMIN — IPRATROPIUM BROMIDE AND ALBUTEROL SULFATE 1 DOSE: .5; 2.5 SOLUTION RESPIRATORY (INHALATION) at 02:09

## 2025-01-01 RX ADMIN — HYDROMORPHONE HYDROCHLORIDE 0.25 MG: 1 INJECTION, SOLUTION INTRAMUSCULAR; INTRAVENOUS; SUBCUTANEOUS at 06:41

## 2025-01-01 RX ADMIN — IPRATROPIUM BROMIDE AND ALBUTEROL SULFATE 1 DOSE: .5; 2.5 SOLUTION RESPIRATORY (INHALATION) at 15:42

## 2025-01-01 RX ADMIN — SODIUM CHLORIDE, PRESERVATIVE FREE 10 ML: 5 INJECTION INTRAVENOUS at 21:01

## 2025-01-01 RX ADMIN — LEVALBUTEROL 0.63 MG: 0.63 SOLUTION RESPIRATORY (INHALATION) at 13:27

## 2025-01-01 RX ADMIN — HYDROMORPHONE HYDROCHLORIDE 0.25 MG: 1 INJECTION, SOLUTION INTRAMUSCULAR; INTRAVENOUS; SUBCUTANEOUS at 11:44

## 2025-01-01 RX ADMIN — GUAIFENESIN 400 MG: 200 SOLUTION ORAL at 12:58

## 2025-01-01 RX ADMIN — MAGNESIUM SULFATE HEPTAHYDRATE 2000 MG: 2 INJECTION, SOLUTION INTRAVENOUS at 06:45

## 2025-01-01 RX ADMIN — Medication 4 ML: at 20:15

## 2025-01-01 RX ADMIN — SODIUM CHLORIDE, PRESERVATIVE FREE 10 ML: 5 INJECTION INTRAVENOUS at 08:38

## 2025-01-01 RX ADMIN — HYDROMORPHONE HYDROCHLORIDE 0.25 MG: 1 INJECTION, SOLUTION INTRAMUSCULAR; INTRAVENOUS; SUBCUTANEOUS at 21:32

## 2025-01-01 RX ADMIN — FOLIC ACID 1 MG: 1 TABLET ORAL at 10:27

## 2025-01-01 RX ADMIN — HYDROMORPHONE HYDROCHLORIDE 0.25 MG: 1 INJECTION, SOLUTION INTRAMUSCULAR; INTRAVENOUS; SUBCUTANEOUS at 20:09

## 2025-01-01 RX ADMIN — NYSTATIN 500000 UNITS: 100000 SUSPENSION ORAL at 20:42

## 2025-01-01 RX ADMIN — OXYCODONE HYDROCHLORIDE 20 MG: 5 TABLET ORAL at 18:30

## 2025-01-01 RX ADMIN — ARFORMOTEROL TARTRATE 15 MCG: 15 SOLUTION RESPIRATORY (INHALATION) at 20:12

## 2025-01-01 RX ADMIN — NYSTATIN 500000 UNITS: 100000 SUSPENSION ORAL at 19:55

## 2025-01-01 RX ADMIN — BUDESONIDE 500 MCG: 0.5 SUSPENSION RESPIRATORY (INHALATION) at 07:29

## 2025-01-01 RX ADMIN — NYSTATIN 500000 UNITS: 100000 SUSPENSION ORAL at 08:42

## 2025-01-01 RX ADMIN — PIPERACILLIN AND TAZOBACTAM 3375 MG: 3; .375 INJECTION, POWDER, LYOPHILIZED, FOR SOLUTION INTRAVENOUS at 20:49

## 2025-01-01 RX ADMIN — AMOXICILLIN AND CLAVULANATE POTASSIUM 1 TABLET: 875; 125 TABLET, FILM COATED ORAL at 00:03

## 2025-01-01 RX ADMIN — NYSTATIN 500000 UNITS: 100000 SUSPENSION ORAL at 20:29

## 2025-01-01 RX ADMIN — OXYCODONE HYDROCHLORIDE 25 MG: 5 TABLET ORAL at 14:21

## 2025-01-01 RX ADMIN — DOCUSATE SODIUM 50 MG AND SENNOSIDES 8.6 MG 2 TABLET: 8.6; 5 TABLET, FILM COATED ORAL at 10:07

## 2025-01-01 RX ADMIN — SOYBEAN OIL 250 ML: 20 INJECTION, SOLUTION INTRAVENOUS at 18:07

## 2025-01-01 RX ADMIN — BUDESONIDE 500 MCG: 0.5 SUSPENSION RESPIRATORY (INHALATION) at 19:38

## 2025-01-01 RX ADMIN — METOCLOPRAMIDE 5 MG: 10 TABLET ORAL at 10:27

## 2025-01-01 RX ADMIN — OXYCODONE HYDROCHLORIDE 20 MG: 5 TABLET ORAL at 10:26

## 2025-01-01 RX ADMIN — HYDROMORPHONE HYDROCHLORIDE 0.25 MG: 1 INJECTION, SOLUTION INTRAMUSCULAR; INTRAVENOUS; SUBCUTANEOUS at 17:57

## 2025-01-01 RX ADMIN — OXYCODONE HYDROCHLORIDE 25 MG: 5 TABLET ORAL at 11:39

## 2025-01-01 RX ADMIN — HYDROMORPHONE HYDROCHLORIDE 0.25 MG: 1 INJECTION, SOLUTION INTRAMUSCULAR; INTRAVENOUS; SUBCUTANEOUS at 16:09

## 2025-01-01 RX ADMIN — HYDROMORPHONE HYDROCHLORIDE 0.25 MG: 1 INJECTION, SOLUTION INTRAMUSCULAR; INTRAVENOUS; SUBCUTANEOUS at 04:30

## 2025-01-01 RX ADMIN — ARFORMOTEROL TARTRATE 15 MCG: 15 SOLUTION RESPIRATORY (INHALATION) at 08:06

## 2025-01-01 RX ADMIN — METOCLOPRAMIDE 5 MG: 10 TABLET ORAL at 11:28

## 2025-01-01 RX ADMIN — GUAIFENESIN 400 MG: 200 SOLUTION ORAL at 05:58

## 2025-01-01 RX ADMIN — OXYCODONE HYDROCHLORIDE 20 MG: 5 TABLET ORAL at 09:09

## 2025-01-01 RX ADMIN — IPRATROPIUM BROMIDE AND ALBUTEROL SULFATE 1 DOSE: .5; 2.5 SOLUTION RESPIRATORY (INHALATION) at 07:43

## 2025-01-01 RX ADMIN — METHYLPREDNISOLONE SODIUM SUCCINATE 40 MG: 40 INJECTION, POWDER, LYOPHILIZED, FOR SOLUTION INTRAMUSCULAR; INTRAVENOUS at 08:38

## 2025-01-01 RX ADMIN — METOCLOPRAMIDE 5 MG: 10 TABLET ORAL at 16:34

## 2025-01-01 RX ADMIN — LANSOPRAZOLE 30 MG: 30 TABLET, ORALLY DISINTEGRATING ORAL at 08:22

## 2025-01-01 RX ADMIN — ARFORMOTEROL TARTRATE 15 MCG: 15 SOLUTION RESPIRATORY (INHALATION) at 20:15

## 2025-01-01 RX ADMIN — VANCOMYCIN HYDROCHLORIDE 750 MG: 1 INJECTION, POWDER, LYOPHILIZED, FOR SOLUTION INTRAVENOUS at 10:00

## 2025-01-01 RX ADMIN — NYSTATIN 500000 UNITS: 100000 SUSPENSION ORAL at 16:20

## 2025-01-01 RX ADMIN — GUAIFENESIN 400 MG: 200 SOLUTION ORAL at 22:06

## 2025-01-01 RX ADMIN — POTASSIUM PHOSPHATE, MONOBASIC AND POTASSIUM PHOSPHATE, DIBASIC 15 MMOL: 224; 236 INJECTION, SOLUTION, CONCENTRATE INTRAVENOUS at 07:22

## 2025-01-01 RX ADMIN — SODIUM CHLORIDE 2000 ML: 0.9 INJECTION, SOLUTION INTRAVENOUS at 10:11

## 2025-01-01 RX ADMIN — HYDROMORPHONE HYDROCHLORIDE 0.25 MG: 1 INJECTION, SOLUTION INTRAMUSCULAR; INTRAVENOUS; SUBCUTANEOUS at 14:21

## 2025-01-01 RX ADMIN — OXYCODONE HYDROCHLORIDE 25 MG: 5 TABLET ORAL at 09:52

## 2025-01-01 RX ADMIN — OXYCODONE HYDROCHLORIDE 25 MG: 5 TABLET ORAL at 08:30

## 2025-01-01 RX ADMIN — POLYETHYLENE GLYCOL 3350 17 G: 17 POWDER, FOR SOLUTION ORAL at 19:59

## 2025-01-01 RX ADMIN — HYDROMORPHONE HYDROCHLORIDE 1 MG: 1 INJECTION, SOLUTION INTRAMUSCULAR; INTRAVENOUS; SUBCUTANEOUS at 13:59

## 2025-01-01 RX ADMIN — POLYETHYLENE GLYCOL 3350 17 G: 17 POWDER, FOR SOLUTION ORAL at 09:48

## 2025-01-01 RX ADMIN — NYSTATIN 500000 UNITS: 100000 SUSPENSION ORAL at 16:09

## 2025-01-01 RX ADMIN — NYSTATIN 500000 UNITS: 100000 SUSPENSION ORAL at 22:04

## 2025-01-01 RX ADMIN — PREDNISONE 40 MG: 20 TABLET ORAL at 08:22

## 2025-01-01 RX ADMIN — MAJOR MAGNESIUM CITRATE ORAL SOLUTION - LEMON 296 ML: 1.75 LIQUID ORAL at 19:59

## 2025-01-01 RX ADMIN — DOXYCYCLINE 100 MG: 100 INJECTION, POWDER, LYOPHILIZED, FOR SOLUTION INTRAVENOUS at 15:38

## 2025-01-01 RX ADMIN — GUAIFENESIN 400 MG: 200 SOLUTION ORAL at 15:00

## 2025-01-01 RX ADMIN — NYSTATIN 500000 UNITS: 100000 SUSPENSION ORAL at 10:31

## 2025-01-01 RX ADMIN — OXYCODONE HYDROCHLORIDE 20 MG: 5 TABLET ORAL at 20:05

## 2025-01-01 RX ADMIN — NYSTATIN 500000 UNITS: 100000 SUSPENSION ORAL at 12:51

## 2025-01-01 RX ADMIN — BARIUM SULFATE 70 G: 0.81 POWDER, FOR SUSPENSION ORAL at 09:40

## 2025-01-01 RX ADMIN — ENOXAPARIN SODIUM 30 MG: 100 INJECTION SUBCUTANEOUS at 08:38

## 2025-01-01 RX ADMIN — NYSTATIN 500000 UNITS: 100000 SUSPENSION ORAL at 08:24

## 2025-01-01 RX ADMIN — OXYCODONE HYDROCHLORIDE 20 MG: 5 TABLET ORAL at 23:22

## 2025-01-01 RX ADMIN — PIPERACILLIN AND TAZOBACTAM 3375 MG: 3; .375 INJECTION, POWDER, LYOPHILIZED, FOR SOLUTION INTRAVENOUS at 18:45

## 2025-01-01 RX ADMIN — METOCLOPRAMIDE 5 MG: 10 TABLET ORAL at 16:18

## 2025-01-01 RX ADMIN — FOLIC ACID 1 MG: 1 TABLET ORAL at 08:43

## 2025-01-01 RX ADMIN — METOCLOPRAMIDE 5 MG: 10 TABLET ORAL at 05:59

## 2025-01-01 RX ADMIN — ENOXAPARIN SODIUM 30 MG: 100 INJECTION SUBCUTANEOUS at 08:52

## 2025-01-01 RX ADMIN — NYSTATIN 500000 UNITS: 100000 SUSPENSION ORAL at 17:24

## 2025-01-01 RX ADMIN — NYSTATIN 500000 UNITS: 100000 SUSPENSION ORAL at 09:47

## 2025-01-01 RX ADMIN — LANSOPRAZOLE 30 MG: 30 TABLET, ORALLY DISINTEGRATING ORAL at 20:23

## 2025-01-01 RX ADMIN — METHYLPREDNISOLONE SODIUM SUCCINATE 40 MG: 40 INJECTION, POWDER, LYOPHILIZED, FOR SOLUTION INTRAMUSCULAR; INTRAVENOUS at 17:42

## 2025-01-01 RX ADMIN — HYDROMORPHONE HYDROCHLORIDE 8 MG: 2 TABLET ORAL at 16:10

## 2025-01-01 RX ADMIN — NYSTATIN 500000 UNITS: 100000 SUSPENSION ORAL at 08:54

## 2025-01-01 RX ADMIN — METOCLOPRAMIDE 5 MG: 10 TABLET ORAL at 10:51

## 2025-01-01 RX ADMIN — Medication 100 MG: at 09:48

## 2025-01-01 RX ADMIN — ACETYLCYSTEINE 400 MG: 100 INHALANT RESPIRATORY (INHALATION) at 08:09

## 2025-01-01 RX ADMIN — IPRATROPIUM BROMIDE AND ALBUTEROL SULFATE 1 DOSE: .5; 2.5 SOLUTION RESPIRATORY (INHALATION) at 08:34

## 2025-01-01 RX ADMIN — POTASSIUM CHLORIDE 10 MEQ: 7.46 INJECTION, SOLUTION INTRAVENOUS at 11:28

## 2025-01-01 RX ADMIN — SODIUM CHLORIDE 25 ML: 9 INJECTION, SOLUTION INTRAVENOUS at 14:24

## 2025-01-01 RX ADMIN — POTASSIUM & SODIUM PHOSPHATES POWDER PACK 280-160-250 MG 500 MG: 280-160-250 PACK at 12:34

## 2025-01-01 RX ADMIN — ENOXAPARIN SODIUM 30 MG: 100 INJECTION SUBCUTANEOUS at 22:05

## 2025-01-01 RX ADMIN — LANSOPRAZOLE 30 MG: 30 TABLET, ORALLY DISINTEGRATING ORAL at 21:09

## 2025-01-01 RX ADMIN — METOCLOPRAMIDE 5 MG: 10 TABLET ORAL at 06:28

## 2025-01-01 RX ADMIN — POTASSIUM PHOSPHATE, MONOBASIC AND POTASSIUM PHOSPHATE, DIBASIC 20 MMOL: 224; 236 INJECTION, SOLUTION, CONCENTRATE INTRAVENOUS at 09:26

## 2025-01-01 RX ADMIN — IPRATROPIUM BROMIDE AND ALBUTEROL SULFATE 1 DOSE: .5; 2.5 SOLUTION RESPIRATORY (INHALATION) at 07:29

## 2025-01-01 RX ADMIN — IPRATROPIUM BROMIDE AND ALBUTEROL SULFATE 1 DOSE: .5; 2.5 SOLUTION RESPIRATORY (INHALATION) at 13:49

## 2025-01-01 RX ADMIN — IPRATROPIUM BROMIDE AND ALBUTEROL SULFATE 1 DOSE: .5; 2.5 SOLUTION RESPIRATORY (INHALATION) at 16:03

## 2025-01-01 RX ADMIN — POTASSIUM PHOSPHATE, MONOBASIC AND POTASSIUM PHOSPHATE, DIBASIC 20 MMOL: 224; 236 INJECTION, SOLUTION, CONCENTRATE INTRAVENOUS at 22:31

## 2025-01-01 RX ADMIN — PIPERACILLIN AND TAZOBACTAM 3375 MG: 3; .375 INJECTION, POWDER, LYOPHILIZED, FOR SOLUTION INTRAVENOUS at 19:18

## 2025-01-01 RX ADMIN — OXYCODONE HYDROCHLORIDE 25 MG: 5 TABLET ORAL at 16:19

## 2025-01-01 RX ADMIN — POLYETHYLENE GLYCOL 3350 17 G: 17 POWDER, FOR SOLUTION ORAL at 10:45

## 2025-01-01 RX ADMIN — METOCLOPRAMIDE 5 MG: 10 TABLET ORAL at 15:49

## 2025-01-01 RX ADMIN — ALBUTEROL SULFATE 2.5 MG: 2.5 SOLUTION RESPIRATORY (INHALATION) at 19:58

## 2025-01-01 RX ADMIN — SODIUM CHLORIDE, PRESERVATIVE FREE 10 ML: 5 INJECTION INTRAVENOUS at 20:55

## 2025-01-01 RX ADMIN — NYSTATIN 500000 UNITS: 100000 SUSPENSION ORAL at 08:30

## 2025-01-01 RX ADMIN — METOCLOPRAMIDE 5 MG: 10 TABLET ORAL at 14:51

## 2025-01-01 RX ADMIN — METOCLOPRAMIDE 5 MG: 10 TABLET ORAL at 16:19

## 2025-01-01 RX ADMIN — LEVALBUTEROL 0.63 MG: 0.63 SOLUTION RESPIRATORY (INHALATION) at 08:36

## 2025-01-01 RX ADMIN — HYDROMORPHONE HYDROCHLORIDE 0.25 MG: 1 INJECTION, SOLUTION INTRAMUSCULAR; INTRAVENOUS; SUBCUTANEOUS at 13:47

## 2025-01-01 RX ADMIN — SODIUM CHLORIDE, PRESERVATIVE FREE 10 ML: 5 INJECTION INTRAVENOUS at 03:16

## 2025-01-01 RX ADMIN — GUAIFENESIN 400 MG: 200 SOLUTION ORAL at 20:06

## 2025-01-01 RX ADMIN — POTASSIUM & SODIUM PHOSPHATES POWDER PACK 280-160-250 MG 500 MG: 280-160-250 PACK at 16:42

## 2025-01-01 RX ADMIN — DOCUSATE SODIUM 50 MG AND SENNOSIDES 8.6 MG 2 TABLET: 8.6; 5 TABLET, FILM COATED ORAL at 09:56

## 2025-01-01 RX ADMIN — LANSOPRAZOLE 30 MG: 30 TABLET, ORALLY DISINTEGRATING ORAL at 20:04

## 2025-01-01 RX ADMIN — OXYCODONE HYDROCHLORIDE 25 MG: 5 TABLET ORAL at 14:48

## 2025-01-01 RX ADMIN — SODIUM CHLORIDE, PRESERVATIVE FREE 10 ML: 5 INJECTION INTRAVENOUS at 20:21

## 2025-01-01 RX ADMIN — METHYLPREDNISOLONE SODIUM SUCCINATE 40 MG: 40 INJECTION, POWDER, LYOPHILIZED, FOR SOLUTION INTRAMUSCULAR; INTRAVENOUS at 09:56

## 2025-01-01 RX ADMIN — GUAIFENESIN 400 MG: 200 SOLUTION ORAL at 03:54

## 2025-01-01 RX ADMIN — POTASSIUM PHOSPHATE, MONOBASIC AND POTASSIUM PHOSPHATE, DIBASIC 10 MMOL: 224; 236 INJECTION, SOLUTION, CONCENTRATE INTRAVENOUS at 18:21

## 2025-01-01 RX ADMIN — ALBUTEROL SULFATE 2.5 MG: 2.5 SOLUTION RESPIRATORY (INHALATION) at 08:06

## 2025-01-01 RX ADMIN — SOYBEAN OIL 250 ML: 20 INJECTION, SOLUTION INTRAVENOUS at 18:24

## 2025-01-01 RX ADMIN — GUAIFENESIN 400 MG: 200 SOLUTION ORAL at 10:27

## 2025-01-01 RX ADMIN — OXYCODONE HYDROCHLORIDE 25 MG: 5 TABLET ORAL at 18:46

## 2025-01-01 RX ADMIN — SODIUM CHLORIDE 1000 ML: 0.9 INJECTION, SOLUTION INTRAVENOUS at 08:27

## 2025-01-01 RX ADMIN — Medication 100 MG: at 08:22

## 2025-01-01 RX ADMIN — BUDESONIDE 500 MCG: 0.5 SUSPENSION RESPIRATORY (INHALATION) at 07:43

## 2025-01-01 RX ADMIN — MIRTAZAPINE 15 MG: 15 TABLET, FILM COATED ORAL at 20:13

## 2025-01-01 RX ADMIN — HYDROMORPHONE HYDROCHLORIDE 0.25 MG: 1 INJECTION, SOLUTION INTRAMUSCULAR; INTRAVENOUS; SUBCUTANEOUS at 08:42

## 2025-01-01 RX ADMIN — DOXYCYCLINE HYCLATE 100 MG: 100 CAPSULE ORAL at 09:10

## 2025-01-01 RX ADMIN — LEVALBUTEROL 0.63 MG: 0.63 SOLUTION RESPIRATORY (INHALATION) at 21:01

## 2025-01-01 RX ADMIN — ENOXAPARIN SODIUM 30 MG: 100 INJECTION SUBCUTANEOUS at 09:19

## 2025-01-01 RX ADMIN — BUDESONIDE 500 MCG: 0.5 SUSPENSION RESPIRATORY (INHALATION) at 09:51

## 2025-01-01 RX ADMIN — HYDROMORPHONE HYDROCHLORIDE 0.25 MG: 1 INJECTION, SOLUTION INTRAMUSCULAR; INTRAVENOUS; SUBCUTANEOUS at 00:19

## 2025-01-01 RX ADMIN — Medication 100 MG: at 09:56

## 2025-01-01 RX ADMIN — OXYCODONE HYDROCHLORIDE 20 MG: 5 TABLET ORAL at 00:55

## 2025-01-01 RX ADMIN — SODIUM CHLORIDE 3000 MG: 9 INJECTION, SOLUTION INTRAVENOUS at 13:31

## 2025-01-01 RX ADMIN — GUAIFENESIN 400 MG: 200 SOLUTION ORAL at 17:36

## 2025-01-01 RX ADMIN — METHYLPREDNISOLONE SODIUM SUCCINATE 40 MG: 40 INJECTION, POWDER, LYOPHILIZED, FOR SOLUTION INTRAMUSCULAR; INTRAVENOUS at 10:39

## 2025-01-01 RX ADMIN — LEVALBUTEROL 0.63 MG: 0.63 SOLUTION RESPIRATORY (INHALATION) at 07:45

## 2025-01-01 RX ADMIN — IPRATROPIUM BROMIDE AND ALBUTEROL SULFATE 1 DOSE: .5; 2.5 SOLUTION RESPIRATORY (INHALATION) at 09:48

## 2025-01-01 RX ADMIN — METOCLOPRAMIDE 5 MG: 10 TABLET ORAL at 15:53

## 2025-01-01 RX ADMIN — HYDROMORPHONE HYDROCHLORIDE 0.25 MG: 1 INJECTION, SOLUTION INTRAMUSCULAR; INTRAVENOUS; SUBCUTANEOUS at 14:45

## 2025-01-01 RX ADMIN — METOCLOPRAMIDE 5 MG: 10 TABLET ORAL at 05:58

## 2025-01-01 RX ADMIN — MAGNESIUM SULFATE HEPTAHYDRATE 4000 MG: 40 INJECTION, SOLUTION INTRAVENOUS at 06:09

## 2025-01-01 RX ADMIN — ENOXAPARIN SODIUM 30 MG: 100 INJECTION SUBCUTANEOUS at 09:56

## 2025-01-01 RX ADMIN — POTASSIUM & SODIUM PHOSPHATES POWDER PACK 280-160-250 MG 500 MG: 280-160-250 PACK at 20:31

## 2025-01-01 RX ADMIN — ACETYLCYSTEINE 400 MG: 100 INHALANT RESPIRATORY (INHALATION) at 11:08

## 2025-01-01 RX ADMIN — HYDROMORPHONE HYDROCHLORIDE 0.25 MG: 1 INJECTION, SOLUTION INTRAMUSCULAR; INTRAVENOUS; SUBCUTANEOUS at 01:06

## 2025-01-01 RX ADMIN — NYSTATIN 500000 UNITS: 100000 SUSPENSION ORAL at 20:12

## 2025-01-01 RX ADMIN — NYSTATIN 500000 UNITS: 100000 SUSPENSION ORAL at 10:27

## 2025-01-01 RX ADMIN — SODIUM CHLORIDE, PRESERVATIVE FREE 10 ML: 5 INJECTION INTRAVENOUS at 01:03

## 2025-01-01 RX ADMIN — OXYCODONE HYDROCHLORIDE 20 MG: 5 TABLET ORAL at 16:35

## 2025-01-01 RX ADMIN — SODIUM PHOSPHATE, MONOBASIC, MONOHYDRATE AND SODIUM PHOSPHATE, DIBASIC, ANHYDROUS 30 MMOL: 142; 276 INJECTION, SOLUTION INTRAVENOUS at 08:42

## 2025-01-01 RX ADMIN — NYSTATIN 500000 UNITS: 100000 SUSPENSION ORAL at 12:55

## 2025-01-01 RX ADMIN — IPRATROPIUM BROMIDE AND ALBUTEROL SULFATE 1 DOSE: .5; 2.5 SOLUTION RESPIRATORY (INHALATION) at 16:23

## 2025-01-01 RX ADMIN — METHYLPREDNISOLONE SODIUM SUCCINATE 40 MG: 40 INJECTION, POWDER, LYOPHILIZED, FOR SOLUTION INTRAMUSCULAR; INTRAVENOUS at 08:43

## 2025-01-01 RX ADMIN — MIRTAZAPINE 15 MG: 15 TABLET, FILM COATED ORAL at 19:59

## 2025-01-01 RX ADMIN — FOLIC ACID 1 MG: 1 TABLET ORAL at 08:02

## 2025-01-01 RX ADMIN — HYDROMORPHONE HYDROCHLORIDE 0.25 MG: 1 INJECTION, SOLUTION INTRAMUSCULAR; INTRAVENOUS; SUBCUTANEOUS at 13:33

## 2025-01-01 RX ADMIN — NYSTATIN 500000 UNITS: 100000 SUSPENSION ORAL at 08:02

## 2025-01-01 RX ADMIN — IPRATROPIUM BROMIDE AND ALBUTEROL SULFATE 1 DOSE: .5; 2.5 SOLUTION RESPIRATORY (INHALATION) at 09:51

## 2025-01-01 RX ADMIN — GUAIFENESIN 400 MG: 200 SOLUTION ORAL at 17:24

## 2025-01-01 RX ADMIN — HYDROMORPHONE HYDROCHLORIDE 1 MG: 1 INJECTION, SOLUTION INTRAMUSCULAR; INTRAVENOUS; SUBCUTANEOUS at 15:40

## 2025-01-01 RX ADMIN — ALBUTEROL SULFATE 2.5 MG: 2.5 SOLUTION RESPIRATORY (INHALATION) at 13:02

## 2025-01-01 RX ADMIN — NYSTATIN 500000 UNITS: 100000 SUSPENSION ORAL at 20:23

## 2025-01-01 RX ADMIN — IPRATROPIUM BROMIDE AND ALBUTEROL SULFATE 1 DOSE: .5; 2.5 SOLUTION RESPIRATORY (INHALATION) at 15:53

## 2025-01-01 RX ADMIN — NYSTATIN 500000 UNITS: 100000 SUSPENSION ORAL at 12:58

## 2025-01-01 RX ADMIN — Medication 4 ML: at 08:13

## 2025-01-01 RX ADMIN — Medication 100 MG: at 08:30

## 2025-01-01 RX ADMIN — ALBUTEROL SULFATE 2.5 MG: 2.5 SOLUTION RESPIRATORY (INHALATION) at 20:15

## 2025-01-01 RX ADMIN — GUAIFENESIN 400 MG: 200 SOLUTION ORAL at 12:55

## 2025-01-01 RX ADMIN — SODIUM CHLORIDE, PRESERVATIVE FREE 10 ML: 5 INJECTION INTRAVENOUS at 20:29

## 2025-01-01 RX ADMIN — METHYLPREDNISOLONE SODIUM SUCCINATE 40 MG: 40 INJECTION, POWDER, LYOPHILIZED, FOR SOLUTION INTRAMUSCULAR; INTRAVENOUS at 08:02

## 2025-01-01 RX ADMIN — OXYCODONE HYDROCHLORIDE 20 MG: 5 TABLET ORAL at 08:07

## 2025-01-01 RX ADMIN — MAGNESIUM SULFATE HEPTAHYDRATE 4000 MG: 40 INJECTION, SOLUTION INTRAVENOUS at 11:12

## 2025-01-01 RX ADMIN — SODIUM CHLORIDE 1000 ML: 0.9 INJECTION, SOLUTION INTRAVENOUS at 16:41

## 2025-01-01 RX ADMIN — NYSTATIN 500000 UNITS: 100000 SUSPENSION ORAL at 13:43

## 2025-01-01 RX ADMIN — OXYCODONE HYDROCHLORIDE 20 MG: 5 TABLET ORAL at 12:10

## 2025-01-01 RX ADMIN — POTASSIUM PHOSPHATE, MONOBASIC AND POTASSIUM PHOSPHATE, DIBASIC 30 MMOL: 224; 236 INJECTION, SOLUTION, CONCENTRATE INTRAVENOUS at 13:40

## 2025-01-01 RX ADMIN — PIPERACILLIN AND TAZOBACTAM 3375 MG: 3; .375 INJECTION, POWDER, LYOPHILIZED, FOR SOLUTION INTRAVENOUS at 04:06

## 2025-01-01 RX ADMIN — ENOXAPARIN SODIUM 30 MG: 100 INJECTION SUBCUTANEOUS at 08:42

## 2025-01-01 RX ADMIN — METHYLPREDNISOLONE SODIUM SUCCINATE 40 MG: 40 INJECTION, POWDER, LYOPHILIZED, FOR SOLUTION INTRAMUSCULAR; INTRAVENOUS at 08:00

## 2025-01-01 RX ADMIN — SODIUM CHLORIDE, PRESERVATIVE FREE 10 ML: 5 INJECTION INTRAVENOUS at 08:24

## 2025-01-01 RX ADMIN — BUDESONIDE 500 MCG: 0.5 SUSPENSION RESPIRATORY (INHALATION) at 20:15

## 2025-01-01 RX ADMIN — PIPERACILLIN AND TAZOBACTAM 3375 MG: 3; .375 INJECTION, POWDER, LYOPHILIZED, FOR SOLUTION INTRAVENOUS at 04:01

## 2025-01-01 RX ADMIN — OXYCODONE HYDROCHLORIDE 25 MG: 5 TABLET ORAL at 10:12

## 2025-01-01 RX ADMIN — OXYCODONE HYDROCHLORIDE 25 MG: 5 TABLET ORAL at 19:06

## 2025-01-01 RX ADMIN — POTASSIUM & SODIUM PHOSPHATES POWDER PACK 280-160-250 MG 500 MG: 280-160-250 PACK at 21:01

## 2025-01-01 RX ADMIN — IPRATROPIUM BROMIDE AND ALBUTEROL SULFATE 1 DOSE: .5; 2.5 SOLUTION RESPIRATORY (INHALATION) at 19:38

## 2025-01-01 RX ADMIN — Medication 100 MG: at 09:15

## 2025-01-01 RX ADMIN — SODIUM CHLORIDE, PRESERVATIVE FREE 10 ML: 5 INJECTION INTRAVENOUS at 20:45

## 2025-01-01 RX ADMIN — PIPERACILLIN AND TAZOBACTAM 3375 MG: 3; .375 INJECTION, POWDER, LYOPHILIZED, FOR SOLUTION INTRAVENOUS at 12:43

## 2025-01-01 RX ADMIN — HYDROMORPHONE HYDROCHLORIDE 0.25 MG: 1 INJECTION, SOLUTION INTRAMUSCULAR; INTRAVENOUS; SUBCUTANEOUS at 16:42

## 2025-01-01 RX ADMIN — MAGNESIUM SULFATE HEPTAHYDRATE 2000 MG: 40 INJECTION, SOLUTION INTRAVENOUS at 07:41

## 2025-01-01 RX ADMIN — ENOXAPARIN SODIUM 30 MG: 100 INJECTION SUBCUTANEOUS at 08:31

## 2025-01-01 RX ADMIN — FOLIC ACID 1 MG: 1 TABLET ORAL at 08:00

## 2025-01-01 RX ADMIN — POLYETHYLENE GLYCOL 3350 17 G: 17 POWDER, FOR SOLUTION ORAL at 20:51

## 2025-01-01 RX ADMIN — NYSTATIN 500000 UNITS: 100000 SUSPENSION ORAL at 12:59

## 2025-01-01 RX ADMIN — FOLIC ACID 1 MG: 1 TABLET ORAL at 16:35

## 2025-01-01 RX ADMIN — BARIUM SULFATE 120 ML: 400 SUSPENSION ORAL at 09:41

## 2025-01-01 RX ADMIN — DOCUSATE SODIUM 50 MG AND SENNOSIDES 8.6 MG 2 TABLET: 8.6; 5 TABLET, FILM COATED ORAL at 19:59

## 2025-01-01 RX ADMIN — NYSTATIN 500000 UNITS: 100000 SUSPENSION ORAL at 22:03

## 2025-01-01 RX ADMIN — SODIUM CHLORIDE, PRESERVATIVE FREE 10 ML: 5 INJECTION INTRAVENOUS at 20:04

## 2025-01-01 RX ADMIN — Medication 1 TABLET: at 10:27

## 2025-01-01 RX ADMIN — POTASSIUM CHLORIDE 10 MEQ: 7.46 INJECTION, SOLUTION INTRAVENOUS at 02:25

## 2025-01-01 RX ADMIN — SODIUM CHLORIDE: 9 INJECTION, SOLUTION INTRAVENOUS at 20:39

## 2025-01-01 RX ADMIN — PIPERACILLIN AND TAZOBACTAM 3375 MG: 3; .375 INJECTION, POWDER, FOR SOLUTION INTRAVENOUS at 22:09

## 2025-01-01 RX ADMIN — METOCLOPRAMIDE 5 MG: 10 TABLET ORAL at 06:57

## 2025-01-01 RX ADMIN — Medication 1 TABLET: at 08:43

## 2025-01-01 RX ADMIN — METHYLPREDNISOLONE SODIUM SUCCINATE 40 MG: 40 INJECTION, POWDER, LYOPHILIZED, FOR SOLUTION INTRAMUSCULAR; INTRAVENOUS at 02:19

## 2025-01-01 RX ADMIN — HYDROMORPHONE HYDROCHLORIDE 0.25 MG: 1 INJECTION, SOLUTION INTRAMUSCULAR; INTRAVENOUS; SUBCUTANEOUS at 10:57

## 2025-01-01 RX ADMIN — METOCLOPRAMIDE 5 MG: 10 TABLET ORAL at 12:58

## 2025-01-01 RX ADMIN — SOYBEAN OIL 250 ML: 20 INJECTION, SOLUTION INTRAVENOUS at 18:25

## 2025-01-01 RX ADMIN — OXYCODONE HYDROCHLORIDE 25 MG: 5 TABLET ORAL at 14:59

## 2025-01-01 RX ADMIN — PIPERACILLIN AND TAZOBACTAM 3375 MG: 3; .375 INJECTION, POWDER, FOR SOLUTION INTRAVENOUS at 15:03

## 2025-01-01 RX ADMIN — OXYCODONE HYDROCHLORIDE 20 MG: 5 TABLET ORAL at 04:27

## 2025-01-01 RX ADMIN — SODIUM CHLORIDE: 0.9 INJECTION, SOLUTION INTRAVENOUS at 22:12

## 2025-01-01 RX ADMIN — METOCLOPRAMIDE 5 MG: 10 TABLET ORAL at 06:11

## 2025-01-01 RX ADMIN — ACETYLCYSTEINE 400 MG: 100 INHALANT RESPIRATORY (INHALATION) at 13:02

## 2025-01-01 RX ADMIN — PIPERACILLIN AND TAZOBACTAM 3375 MG: 3; .375 INJECTION, POWDER, FOR SOLUTION INTRAVENOUS at 06:28

## 2025-01-01 RX ADMIN — METOCLOPRAMIDE 10 MG: 10 TABLET ORAL at 08:55

## 2025-01-01 RX ADMIN — ONDANSETRON 4 MG: 2 INJECTION, SOLUTION INTRAMUSCULAR; INTRAVENOUS at 06:05

## 2025-01-01 RX ADMIN — BUDESONIDE 500 MCG: 0.5 SUSPENSION RESPIRATORY (INHALATION) at 21:01

## 2025-01-01 RX ADMIN — MIRTAZAPINE 15 MG: 15 TABLET, FILM COATED ORAL at 20:04

## 2025-01-01 RX ADMIN — GUAIFENESIN 400 MG: 200 SOLUTION ORAL at 21:01

## 2025-01-01 RX ADMIN — OXYCODONE HYDROCHLORIDE 25 MG: 5 TABLET ORAL at 05:38

## 2025-01-01 RX ADMIN — BUDESONIDE 500 MCG: 0.5 SUSPENSION RESPIRATORY (INHALATION) at 08:09

## 2025-01-01 RX ADMIN — NYSTATIN 500000 UNITS: 100000 SUSPENSION ORAL at 17:42

## 2025-01-01 RX ADMIN — METHYLPREDNISOLONE SODIUM SUCCINATE 40 MG: 40 INJECTION, POWDER, LYOPHILIZED, FOR SOLUTION INTRAMUSCULAR; INTRAVENOUS at 19:55

## 2025-01-01 RX ADMIN — ENOXAPARIN SODIUM 30 MG: 100 INJECTION SUBCUTANEOUS at 08:24

## 2025-01-01 RX ADMIN — OXYCODONE HYDROCHLORIDE 25 MG: 5 TABLET ORAL at 06:05

## 2025-01-01 RX ADMIN — IPRATROPIUM BROMIDE AND ALBUTEROL SULFATE 1 DOSE: .5; 2.5 SOLUTION RESPIRATORY (INHALATION) at 00:47

## 2025-01-01 RX ADMIN — ASPIRIN 300 MG: 300 SUPPOSITORY RECTAL at 13:12

## 2025-01-01 RX ADMIN — POLYETHYLENE GLYCOL 3350 17 G: 17 POWDER, FOR SOLUTION ORAL at 09:57

## 2025-01-01 RX ADMIN — Medication 1 TABLET: at 09:14

## 2025-01-01 RX ADMIN — FOLIC ACID 1 MG: 1 TABLET ORAL at 08:37

## 2025-01-01 RX ADMIN — OXYCODONE HYDROCHLORIDE 25 MG: 5 TABLET ORAL at 04:59

## 2025-01-01 RX ADMIN — PIPERACILLIN AND TAZOBACTAM 3375 MG: 3; .375 INJECTION, POWDER, FOR SOLUTION INTRAVENOUS at 06:42

## 2025-01-01 RX ADMIN — ALBUTEROL SULFATE 2.5 MG: 2.5 SOLUTION RESPIRATORY (INHALATION) at 02:18

## 2025-01-01 RX ADMIN — ALBUTEROL SULFATE 2.5 MG: 2.5 SOLUTION RESPIRATORY (INHALATION) at 19:28

## 2025-01-01 RX ADMIN — AMOXICILLIN AND CLAVULANATE POTASSIUM 1 TABLET: 875; 125 TABLET, FILM COATED ORAL at 10:46

## 2025-01-01 RX ADMIN — SODIUM CHLORIDE, PRESERVATIVE FREE 10 ML: 5 INJECTION INTRAVENOUS at 08:55

## 2025-01-01 RX ADMIN — Medication 100 MG: at 13:24

## 2025-01-01 RX ADMIN — OXYCODONE HYDROCHLORIDE 25 MG: 5 TABLET ORAL at 21:50

## 2025-01-01 RX ADMIN — METOCLOPRAMIDE 5 MG: 10 TABLET ORAL at 10:39

## 2025-01-01 RX ADMIN — Medication 1 TABLET: at 09:48

## 2025-01-01 RX ADMIN — SALINE NASAL SPRAY 2 SPRAY: 1.5 SOLUTION NASAL at 18:31

## 2025-01-01 RX ADMIN — PIPERACILLIN AND TAZOBACTAM 3375 MG: 3; .375 INJECTION, POWDER, LYOPHILIZED, FOR SOLUTION INTRAVENOUS at 12:50

## 2025-01-01 RX ADMIN — POTASSIUM PHOSPHATE, MONOBASIC AND POTASSIUM PHOSPHATE, DIBASIC 30 MMOL: 224; 236 INJECTION, SOLUTION, CONCENTRATE INTRAVENOUS at 09:55

## 2025-01-01 RX ADMIN — GLUCAGON 1 MG: 1 INJECTION, POWDER, LYOPHILIZED, FOR SOLUTION INTRAMUSCULAR; INTRAVENOUS at 10:40

## 2025-01-01 RX ADMIN — MIRTAZAPINE 15 MG: 15 TABLET, FILM COATED ORAL at 20:42

## 2025-01-01 RX ADMIN — GUAIFENESIN 400 MG: 200 SOLUTION ORAL at 02:22

## 2025-01-01 RX ADMIN — HYDROMORPHONE HYDROCHLORIDE 0.25 MG: 1 INJECTION, SOLUTION INTRAMUSCULAR; INTRAVENOUS; SUBCUTANEOUS at 06:29

## 2025-01-01 RX ADMIN — OXYCODONE HYDROCHLORIDE 20 MG: 5 TABLET ORAL at 04:10

## 2025-01-01 RX ADMIN — Medication 100 MG: at 08:02

## 2025-01-01 RX ADMIN — SULFUR HEXAFLUORIDE 2 ML: 60.7; .19; .19 INJECTION, POWDER, LYOPHILIZED, FOR SUSPENSION INTRAVENOUS; INTRAVESICAL at 08:58

## 2025-01-01 RX ADMIN — BUDESONIDE 500 MCG: 0.5 SUSPENSION RESPIRATORY (INHALATION) at 07:45

## 2025-01-01 RX ADMIN — FOLIC ACID 1 MG: 1 TABLET ORAL at 10:45

## 2025-01-01 RX ADMIN — POTASSIUM CHLORIDE 10 MEQ: 7.46 INJECTION, SOLUTION INTRAVENOUS at 10:07

## 2025-01-01 RX ADMIN — BUDESONIDE 500 MCG: 0.5 SUSPENSION RESPIRATORY (INHALATION) at 20:26

## 2025-01-01 RX ADMIN — ASCORBIC ACID, VITAMIN A PALMITATE, CHOLECALCIFEROL, THIAMINE HYDROCHLORIDE, RIBOFLAVIN-5 PHOSPHATE SODIUM, PYRIDOXINE HYDROCHLORIDE, NIACINAMIDE, DEXPANTHENOL, ALPHA-TOCOPHEROL ACETATE, VITAMIN K1, FOLIC ACID, BIOTIN, CYANOCOBALAMIN: 200; 3300; 200; 6; 3.6; 6; 40; 15; 10; 150; 600; 60; 5 INJECTION, SOLUTION INTRAVENOUS at 18:07

## 2025-01-01 RX ADMIN — GUAIFENESIN 400 MG: 200 SOLUTION ORAL at 16:09

## 2025-01-01 RX ADMIN — HYDROMORPHONE HYDROCHLORIDE 0.25 MG: 1 INJECTION, SOLUTION INTRAMUSCULAR; INTRAVENOUS; SUBCUTANEOUS at 21:59

## 2025-01-01 RX ADMIN — SODIUM CHLORIDE: 9 INJECTION, SOLUTION INTRAVENOUS at 02:28

## 2025-01-01 RX ADMIN — OXYCODONE HYDROCHLORIDE 25 MG: 5 TABLET ORAL at 06:46

## 2025-01-01 RX ADMIN — IPRATROPIUM BROMIDE AND ALBUTEROL SULFATE 1 DOSE: .5; 2.5 SOLUTION RESPIRATORY (INHALATION) at 20:31

## 2025-01-01 RX ADMIN — METHYLPREDNISOLONE SODIUM SUCCINATE 40 MG: 40 INJECTION, POWDER, LYOPHILIZED, FOR SOLUTION INTRAMUSCULAR; INTRAVENOUS at 10:14

## 2025-01-01 RX ADMIN — IPRATROPIUM BROMIDE AND ALBUTEROL SULFATE 1 DOSE: .5; 2.5 SOLUTION RESPIRATORY (INHALATION) at 12:38

## 2025-01-01 RX ADMIN — SODIUM CHLORIDE 3000 MG: 9 INJECTION, SOLUTION INTRAVENOUS at 16:53

## 2025-01-01 RX ADMIN — POLYETHYLENE GLYCOL 3350 17 G: 17 POWDER, FOR SOLUTION ORAL at 00:55

## 2025-01-01 RX ADMIN — FOLIC ACID 1 MG: 1 TABLET ORAL at 08:22

## 2025-01-01 RX ADMIN — NYSTATIN 500000 UNITS: 100000 SUSPENSION ORAL at 16:18

## 2025-01-01 RX ADMIN — SODIUM CHLORIDE, PRESERVATIVE FREE 10 ML: 5 INJECTION INTRAVENOUS at 20:25

## 2025-01-01 RX ADMIN — IPRATROPIUM BROMIDE AND ALBUTEROL SULFATE 1 DOSE: .5; 2.5 SOLUTION RESPIRATORY (INHALATION) at 01:45

## 2025-01-01 RX ADMIN — OXYCODONE HYDROCHLORIDE 25 MG: 5 TABLET ORAL at 01:34

## 2025-01-01 RX ADMIN — SODIUM CHLORIDE, PRESERVATIVE FREE 10 ML: 5 INJECTION INTRAVENOUS at 20:28

## 2025-01-01 RX ADMIN — METHYLPREDNISOLONE SODIUM SUCCINATE 40 MG: 40 INJECTION, POWDER, LYOPHILIZED, FOR SOLUTION INTRAMUSCULAR; INTRAVENOUS at 22:22

## 2025-01-01 RX ADMIN — VANCOMYCIN HYDROCHLORIDE 1000 MG: 1 INJECTION, POWDER, LYOPHILIZED, FOR SOLUTION INTRAVENOUS at 22:20

## 2025-01-01 RX ADMIN — Medication 1 TABLET: at 08:22

## 2025-01-01 RX ADMIN — ACETYLCYSTEINE 400 MG: 100 INHALANT RESPIRATORY (INHALATION) at 19:58

## 2025-01-01 RX ADMIN — HYDROMORPHONE HYDROCHLORIDE 8 MG: 2 TABLET ORAL at 20:30

## 2025-01-01 RX ADMIN — POTASSIUM CHLORIDE 10 MEQ: 10 INJECTION, SOLUTION INTRAVENOUS at 14:28

## 2025-01-01 RX ADMIN — SODIUM CHLORIDE, PRESERVATIVE FREE 10 ML: 5 INJECTION INTRAVENOUS at 22:05

## 2025-01-01 RX ADMIN — PIPERACILLIN AND TAZOBACTAM 3375 MG: 3; .375 INJECTION, POWDER, LYOPHILIZED, FOR SOLUTION INTRAVENOUS at 12:40

## 2025-01-01 RX ADMIN — METOCLOPRAMIDE 5 MG: 10 TABLET ORAL at 15:31

## 2025-01-01 RX ADMIN — BUDESONIDE 500 MCG: 0.5 SUSPENSION RESPIRATORY (INHALATION) at 20:12

## 2025-01-01 RX ADMIN — HYDROMORPHONE HYDROCHLORIDE 0.25 MG: 1 INJECTION, SOLUTION INTRAMUSCULAR; INTRAVENOUS; SUBCUTANEOUS at 15:49

## 2025-01-01 RX ADMIN — ACETYLCYSTEINE 400 MG: 100 INHALANT RESPIRATORY (INHALATION) at 02:18

## 2025-01-01 RX ADMIN — POTASSIUM PHOSPHATE, MONOBASIC AND POTASSIUM PHOSPHATE, DIBASIC 30 MMOL: 224; 236 INJECTION, SOLUTION, CONCENTRATE INTRAVENOUS at 07:23

## 2025-01-01 RX ADMIN — ACETYLCYSTEINE 400 MG: 100 INHALANT RESPIRATORY (INHALATION) at 19:29

## 2025-01-01 RX ADMIN — ENOXAPARIN SODIUM 30 MG: 100 INJECTION SUBCUTANEOUS at 10:27

## 2025-01-01 RX ADMIN — OXYCODONE HYDROCHLORIDE 25 MG: 5 TABLET ORAL at 00:00

## 2025-01-01 RX ADMIN — POTASSIUM CHLORIDE 10 MEQ: 10 INJECTION, SOLUTION INTRAVENOUS at 12:18

## 2025-01-01 RX ADMIN — IPRATROPIUM BROMIDE AND ALBUTEROL SULFATE 1 DOSE: .5; 2.5 SOLUTION RESPIRATORY (INHALATION) at 20:25

## 2025-01-01 RX ADMIN — OXYCODONE HYDROCHLORIDE 25 MG: 5 TABLET ORAL at 02:19

## 2025-01-01 RX ADMIN — ONDANSETRON 4 MG: 2 INJECTION, SOLUTION INTRAMUSCULAR; INTRAVENOUS at 19:52

## 2025-01-01 RX ADMIN — NYSTATIN 500000 UNITS: 100000 SUSPENSION ORAL at 12:34

## 2025-01-01 RX ADMIN — NYSTATIN 500000 UNITS: 100000 SUSPENSION ORAL at 16:35

## 2025-01-01 RX ADMIN — ARFORMOTEROL TARTRATE 15 MCG: 15 SOLUTION RESPIRATORY (INHALATION) at 21:01

## 2025-01-01 RX ADMIN — PANTOPRAZOLE SODIUM 40 MG: 40 INJECTION, POWDER, FOR SOLUTION INTRAVENOUS at 03:54

## 2025-01-01 RX ADMIN — LEVALBUTEROL 0.63 MG: 0.63 SOLUTION RESPIRATORY (INHALATION) at 00:46

## 2025-01-01 RX ADMIN — PREDNISONE 40 MG: 20 TABLET ORAL at 09:14

## 2025-01-01 RX ADMIN — HYDROMORPHONE HYDROCHLORIDE 0.25 MG: 1 INJECTION, SOLUTION INTRAMUSCULAR; INTRAVENOUS; SUBCUTANEOUS at 06:56

## 2025-01-01 RX ADMIN — BUDESONIDE 500 MCG: 0.5 SUSPENSION RESPIRATORY (INHALATION) at 20:03

## 2025-01-01 RX ADMIN — ASPIRIN 300 MG: 300 SUPPOSITORY RECTAL at 09:57

## 2025-01-01 RX ADMIN — ENOXAPARIN SODIUM 30 MG: 100 INJECTION SUBCUTANEOUS at 08:00

## 2025-01-01 RX ADMIN — POTASSIUM CHLORIDE 10 MEQ: 10 INJECTION, SOLUTION INTRAVENOUS at 13:23

## 2025-01-01 RX ADMIN — ASCORBIC ACID, VITAMIN A PALMITATE, CHOLECALCIFEROL, THIAMINE HYDROCHLORIDE, RIBOFLAVIN-5 PHOSPHATE SODIUM, PYRIDOXINE HYDROCHLORIDE, NIACINAMIDE, DEXPANTHENOL, ALPHA-TOCOPHEROL ACETATE, VITAMIN K1, FOLIC ACID, BIOTIN, CYANOCOBALAMIN: 200; 3300; 200; 6; 3.6; 6; 40; 15; 10; 150; 600; 60; 5 INJECTION, SOLUTION INTRAVENOUS at 18:21

## 2025-01-01 RX ADMIN — PIPERACILLIN AND TAZOBACTAM 3375 MG: 3; .375 INJECTION, POWDER, FOR SOLUTION INTRAVENOUS at 22:51

## 2025-01-01 RX ADMIN — POTASSIUM BICARBONATE 40 MEQ: 782 TABLET, EFFERVESCENT ORAL at 10:41

## 2025-01-01 RX ADMIN — BUDESONIDE 500 MCG: 0.5 SUSPENSION RESPIRATORY (INHALATION) at 19:28

## 2025-01-01 RX ADMIN — SODIUM CHLORIDE, PRESERVATIVE FREE 10 ML: 5 INJECTION INTRAVENOUS at 08:03

## 2025-01-01 RX ADMIN — HYDROMORPHONE HYDROCHLORIDE 0.25 MG: 1 INJECTION, SOLUTION INTRAMUSCULAR; INTRAVENOUS; SUBCUTANEOUS at 10:27

## 2025-01-01 RX ADMIN — Medication 100 MG: at 10:45

## 2025-01-01 RX ADMIN — ASCORBIC ACID, VITAMIN A PALMITATE, CHOLECALCIFEROL, THIAMINE HYDROCHLORIDE, RIBOFLAVIN-5 PHOSPHATE SODIUM, PYRIDOXINE HYDROCHLORIDE, NIACINAMIDE, DEXPANTHENOL, ALPHA-TOCOPHEROL ACETATE, VITAMIN K1, FOLIC ACID, BIOTIN, CYANOCOBALAMIN: 200; 3300; 200; 6; 3.6; 6; 40; 15; 10; 150; 600; 60; 5 INJECTION, SOLUTION INTRAVENOUS at 18:05

## 2025-01-01 RX ADMIN — NYSTATIN 500000 UNITS: 100000 SUSPENSION ORAL at 19:56

## 2025-01-01 RX ADMIN — NYSTATIN 500000 UNITS: 100000 SUSPENSION ORAL at 16:10

## 2025-01-01 RX ADMIN — OXYCODONE HYDROCHLORIDE 25 MG: 5 TABLET ORAL at 14:36

## 2025-01-01 RX ADMIN — OXYCODONE HYDROCHLORIDE 25 MG: 5 TABLET ORAL at 10:39

## 2025-01-01 RX ADMIN — IPRATROPIUM BROMIDE AND ALBUTEROL SULFATE 1 DOSE: .5; 2.5 SOLUTION RESPIRATORY (INHALATION) at 15:55

## 2025-01-01 RX ADMIN — OXYCODONE HYDROCHLORIDE 20 MG: 5 TABLET ORAL at 02:35

## 2025-01-01 RX ADMIN — IPRATROPIUM BROMIDE AND ALBUTEROL SULFATE 1 DOSE: .5; 2.5 SOLUTION RESPIRATORY (INHALATION) at 15:52

## 2025-01-01 RX ADMIN — MAGNESIUM SULFATE HEPTAHYDRATE 1000 MG: 1 INJECTION, SOLUTION INTRAVENOUS at 06:20

## 2025-01-01 RX ADMIN — METOCLOPRAMIDE 5 MG: 10 TABLET ORAL at 10:41

## 2025-01-01 RX ADMIN — POTASSIUM PHOSPHATE, MONOBASIC AND POTASSIUM PHOSPHATE, DIBASIC 30 MMOL: 224; 236 INJECTION, SOLUTION, CONCENTRATE INTRAVENOUS at 15:03

## 2025-01-01 RX ADMIN — NYSTATIN 500000 UNITS: 100000 SUSPENSION ORAL at 16:43

## 2025-01-01 RX ADMIN — Medication 100 MG: at 08:00

## 2025-01-01 RX ADMIN — NYSTATIN 500000 UNITS: 100000 SUSPENSION ORAL at 13:33

## 2025-01-01 RX ADMIN — MIRTAZAPINE 15 MG: 15 TABLET, FILM COATED ORAL at 20:27

## 2025-01-01 RX ADMIN — PANTOPRAZOLE SODIUM 40 MG: 40 INJECTION, POWDER, FOR SOLUTION INTRAVENOUS at 15:00

## 2025-01-01 RX ADMIN — WATER 10 ML: 1 INJECTION INTRAMUSCULAR; INTRAVENOUS; SUBCUTANEOUS at 10:47

## 2025-01-01 RX ADMIN — ALBUTEROL SULFATE 2.5 MG: 2.5 SOLUTION RESPIRATORY (INHALATION) at 11:08

## 2025-01-01 RX ADMIN — NYSTATIN 500000 UNITS: 100000 SUSPENSION ORAL at 07:38

## 2025-01-01 RX ADMIN — GUAIFENESIN 400 MG: 200 SOLUTION ORAL at 08:42

## 2025-01-01 RX ADMIN — NYSTATIN 500000 UNITS: 100000 SUSPENSION ORAL at 21:01

## 2025-01-01 RX ADMIN — SODIUM CHLORIDE, PRESERVATIVE FREE 10 ML: 5 INJECTION INTRAVENOUS at 09:15

## 2025-01-01 RX ADMIN — METOCLOPRAMIDE 5 MG: 10 TABLET ORAL at 06:08

## 2025-01-01 RX ADMIN — Medication 1 TABLET: at 08:30

## 2025-01-01 RX ADMIN — NYSTATIN 500000 UNITS: 100000 SUSPENSION ORAL at 13:12

## 2025-01-01 RX ADMIN — SODIUM CHLORIDE 1000 ML: 0.9 INJECTION, SOLUTION INTRAVENOUS at 07:35

## 2025-01-01 RX ADMIN — NYSTATIN 500000 UNITS: 100000 SUSPENSION ORAL at 20:21

## 2025-01-01 RX ADMIN — ARFORMOTEROL TARTRATE 15 MCG: 15 SOLUTION RESPIRATORY (INHALATION) at 19:28

## 2025-01-01 RX ADMIN — OXYCODONE HYDROCHLORIDE 20 MG: 5 TABLET ORAL at 20:13

## 2025-01-01 RX ADMIN — OXYCODONE HYDROCHLORIDE 25 MG: 5 TABLET ORAL at 00:44

## 2025-01-01 RX ADMIN — METOCLOPRAMIDE 10 MG: 10 TABLET ORAL at 12:58

## 2025-01-01 RX ADMIN — IPRATROPIUM BROMIDE AND ALBUTEROL SULFATE 1 DOSE: .5; 2.5 SOLUTION RESPIRATORY (INHALATION) at 20:19

## 2025-01-01 RX ADMIN — MIRTAZAPINE 15 MG: 15 TABLET, FILM COATED ORAL at 20:21

## 2025-01-01 RX ADMIN — OXYCODONE HYDROCHLORIDE 25 MG: 5 TABLET ORAL at 06:31

## 2025-01-01 RX ADMIN — FOLIC ACID 1 MG: 1 TABLET ORAL at 09:48

## 2025-01-01 RX ADMIN — SODIUM CHLORIDE, PRESERVATIVE FREE 10 ML: 5 INJECTION INTRAVENOUS at 19:55

## 2025-01-01 RX ADMIN — ARFORMOTEROL TARTRATE 15 MCG: 15 SOLUTION RESPIRATORY (INHALATION) at 07:45

## 2025-01-01 RX ADMIN — HYDROMORPHONE HYDROCHLORIDE 0.25 MG: 1 INJECTION, SOLUTION INTRAMUSCULAR; INTRAVENOUS; SUBCUTANEOUS at 00:18

## 2025-01-01 RX ADMIN — PREDNISONE 40 MG: 20 TABLET ORAL at 09:48

## 2025-01-01 RX ADMIN — VANCOMYCIN HYDROCHLORIDE 750 MG: 1 INJECTION, POWDER, LYOPHILIZED, FOR SOLUTION INTRAVENOUS at 05:21

## 2025-01-01 RX ADMIN — SODIUM CHLORIDE 3000 MG: 9 INJECTION, SOLUTION INTRAVENOUS at 18:30

## 2025-01-01 RX ADMIN — Medication 100 MG: at 08:43

## 2025-01-01 RX ADMIN — IPRATROPIUM BROMIDE AND ALBUTEROL SULFATE 1 DOSE: .5; 2.5 SOLUTION RESPIRATORY (INHALATION) at 20:26

## 2025-01-01 RX ADMIN — ARFORMOTEROL TARTRATE 15 MCG: 15 SOLUTION RESPIRATORY (INHALATION) at 20:32

## 2025-01-01 RX ADMIN — IPRATROPIUM BROMIDE AND ALBUTEROL SULFATE 1 DOSE: .5; 2.5 SOLUTION RESPIRATORY (INHALATION) at 20:03

## 2025-01-01 RX ADMIN — OXYCODONE HYDROCHLORIDE 25 MG: 5 TABLET ORAL at 15:30

## 2025-01-01 RX ADMIN — NYSTATIN 500000 UNITS: 100000 SUSPENSION ORAL at 20:27

## 2025-01-01 RX ADMIN — DOXYCYCLINE HYCLATE 100 MG: 100 CAPSULE ORAL at 20:13

## 2025-01-01 RX ADMIN — NYSTATIN 500000 UNITS: 100000 SUSPENSION ORAL at 09:15

## 2025-01-01 RX ADMIN — BUDESONIDE 500 MCG: 0.5 SUSPENSION RESPIRATORY (INHALATION) at 08:34

## 2025-01-01 RX ADMIN — METOCLOPRAMIDE 5 MG: 10 TABLET ORAL at 10:14

## 2025-01-01 RX ADMIN — HYDROMORPHONE HYDROCHLORIDE 1 MG: 1 INJECTION, SOLUTION INTRAMUSCULAR; INTRAVENOUS; SUBCUTANEOUS at 10:13

## 2025-01-01 RX ADMIN — METOCLOPRAMIDE 5 MG: 10 TABLET ORAL at 11:29

## 2025-01-01 RX ADMIN — SODIUM CHLORIDE, PRESERVATIVE FREE 10 ML: 5 INJECTION INTRAVENOUS at 22:03

## 2025-01-01 RX ADMIN — NYSTATIN 500000 UNITS: 100000 SUSPENSION ORAL at 16:53

## 2025-01-01 RX ADMIN — PIPERACILLIN AND TAZOBACTAM 3375 MG: 3; .375 INJECTION, POWDER, LYOPHILIZED, FOR SOLUTION INTRAVENOUS at 19:59

## 2025-01-01 RX ADMIN — OXYCODONE HYDROCHLORIDE 20 MG: 5 TABLET ORAL at 23:26

## 2025-01-01 RX ADMIN — NYSTATIN 500000 UNITS: 100000 SUSPENSION ORAL at 11:37

## 2025-01-01 RX ADMIN — SOYBEAN OIL 250 ML: 20 INJECTION, SOLUTION INTRAVENOUS at 18:04

## 2025-01-01 RX ADMIN — BUDESONIDE 500 MCG: 0.5 SUSPENSION RESPIRATORY (INHALATION) at 09:48

## 2025-01-01 RX ADMIN — PIPERACILLIN AND TAZOBACTAM 3375 MG: 3; .375 INJECTION, POWDER, LYOPHILIZED, FOR SOLUTION INTRAVENOUS at 06:23

## 2025-01-01 RX ADMIN — NYSTATIN 500000 UNITS: 100000 SUSPENSION ORAL at 10:45

## 2025-01-01 RX ADMIN — POTASSIUM PHOSPHATE, MONOBASIC AND POTASSIUM PHOSPHATE, DIBASIC 30 MMOL: 224; 236 INJECTION, SOLUTION, CONCENTRATE INTRAVENOUS at 10:39

## 2025-01-01 RX ADMIN — MIRTAZAPINE 15 MG: 15 TABLET, FILM COATED ORAL at 21:09

## 2025-01-01 RX ADMIN — SODIUM CHLORIDE, PRESERVATIVE FREE 10 ML: 5 INJECTION INTRAVENOUS at 10:15

## 2025-01-01 RX ADMIN — POTASSIUM PHOSPHATE, MONOBASIC AND POTASSIUM PHOSPHATE, DIBASIC 20 MMOL: 224; 236 INJECTION, SOLUTION, CONCENTRATE INTRAVENOUS at 07:56

## 2025-01-01 RX ADMIN — MIRTAZAPINE 15 MG: 15 TABLET, FILM COATED ORAL at 21:01

## 2025-01-01 RX ADMIN — MIRTAZAPINE 15 MG: 15 TABLET, FILM COATED ORAL at 20:23

## 2025-01-01 RX ADMIN — POTASSIUM PHOSPHATE, MONOBASIC AND POTASSIUM PHOSPHATE, DIBASIC 30 MMOL: 224; 236 INJECTION, SOLUTION, CONCENTRATE INTRAVENOUS at 10:04

## 2025-01-01 RX ADMIN — METOCLOPRAMIDE 5 MG: 10 TABLET ORAL at 11:47

## 2025-01-01 RX ADMIN — FOLIC ACID 1 MG: 1 TABLET ORAL at 08:30

## 2025-01-01 RX ADMIN — FUROSEMIDE 20 MG: 10 INJECTION, SOLUTION INTRAMUSCULAR; INTRAVENOUS at 17:24

## 2025-01-01 RX ADMIN — POTASSIUM CHLORIDE 10 MEQ: 7.46 INJECTION, SOLUTION INTRAVENOUS at 03:18

## 2025-01-01 RX ADMIN — METHYLPREDNISOLONE SODIUM SUCCINATE 40 MG: 40 INJECTION, POWDER, LYOPHILIZED, FOR SOLUTION INTRAMUSCULAR; INTRAVENOUS at 00:49

## 2025-01-01 RX ADMIN — ONDANSETRON 4 MG: 2 INJECTION, SOLUTION INTRAMUSCULAR; INTRAVENOUS at 10:13

## 2025-01-01 RX ADMIN — MAGNESIUM SULFATE HEPTAHYDRATE 2000 MG: 40 INJECTION, SOLUTION INTRAVENOUS at 06:36

## 2025-01-01 RX ADMIN — LANSOPRAZOLE 30 MG: 30 TABLET, ORALLY DISINTEGRATING ORAL at 10:27

## 2025-01-01 RX ADMIN — BUDESONIDE 500 MCG: 0.5 SUSPENSION RESPIRATORY (INHALATION) at 20:25

## 2025-01-01 RX ADMIN — Medication 100 MG: at 10:27

## 2025-01-01 RX ADMIN — IOPAMIDOL 75 ML: 755 INJECTION, SOLUTION INTRAVENOUS at 20:41

## 2025-01-01 RX ADMIN — OXYCODONE HYDROCHLORIDE 25 MG: 5 TABLET ORAL at 16:10

## 2025-01-01 RX ADMIN — PANTOPRAZOLE SODIUM 40 MG: 40 INJECTION, POWDER, FOR SOLUTION INTRAVENOUS at 13:43

## 2025-01-01 RX ADMIN — METOCLOPRAMIDE 5 MG: 10 TABLET ORAL at 16:43

## 2025-01-01 RX ADMIN — POTASSIUM & SODIUM PHOSPHATES POWDER PACK 280-160-250 MG 500 MG: 280-160-250 PACK at 08:42

## 2025-01-01 RX ADMIN — GUAIFENESIN 400 MG: 200 SOLUTION ORAL at 11:47

## 2025-01-01 RX ADMIN — MIRTAZAPINE 15 MG: 15 TABLET, FILM COATED ORAL at 19:56

## 2025-01-01 RX ADMIN — LANSOPRAZOLE 30 MG: 30 TABLET, ORALLY DISINTEGRATING ORAL at 09:48

## 2025-01-01 ASSESSMENT — PAIN SCALES - GENERAL
PAINLEVEL_OUTOF10: 6
PAINLEVEL_OUTOF10: 9
PAINLEVEL_OUTOF10: 7
PAINLEVEL_OUTOF10: 8
PAINLEVEL_OUTOF10: 9
PAINLEVEL_OUTOF10: 8
PAINLEVEL_OUTOF10: 10
PAINLEVEL_OUTOF10: 8
PAINLEVEL_OUTOF10: 9
PAINLEVEL_OUTOF10: 7
PAINLEVEL_OUTOF10: 8
PAINLEVEL_OUTOF10: 7
PAINLEVEL_OUTOF10: 8
PAINLEVEL_OUTOF10: 8
PAINLEVEL_OUTOF10: 9
PAINLEVEL_OUTOF10: 5
PAINLEVEL_OUTOF10: 7
PAINLEVEL_OUTOF10: 5
PAINLEVEL_OUTOF10: 9
PAINLEVEL_OUTOF10: 9
PAINLEVEL_OUTOF10: 6
PAINLEVEL_OUTOF10: 0
PAINLEVEL_OUTOF10: 6
PAINLEVEL_OUTOF10: 7
PAINLEVEL_OUTOF10: 9
PAINLEVEL_OUTOF10: 6
PAINLEVEL_OUTOF10: 6
PAINLEVEL_OUTOF10: 8
PAINLEVEL_OUTOF10: 9
PAINLEVEL_OUTOF10: 5
PAINLEVEL_OUTOF10: 6
PAINLEVEL_OUTOF10: 5
PAINLEVEL_OUTOF10: 8
PAINLEVEL_OUTOF10: 5
PAINLEVEL_OUTOF10: 7
PAINLEVEL_OUTOF10: 5
PAINLEVEL_OUTOF10: 6
PAINLEVEL_OUTOF10: 7
PAINLEVEL_OUTOF10: 6
PAINLEVEL_OUTOF10: 8
PAINLEVEL_OUTOF10: 7
PAINLEVEL_OUTOF10: 8
PAINLEVEL_OUTOF10: 7
PAINLEVEL_OUTOF10: 8
PAINLEVEL_OUTOF10: 7
PAINLEVEL_OUTOF10: 5
PAINLEVEL_OUTOF10: 9
PAINLEVEL_OUTOF10: 8
PAINLEVEL_OUTOF10: 7
PAINLEVEL_OUTOF10: 3
PAINLEVEL_OUTOF10: 8
PAINLEVEL_OUTOF10: 7
PAINLEVEL_OUTOF10: 8
PAINLEVEL_OUTOF10: 7
PAINLEVEL_OUTOF10: 8
PAINLEVEL_OUTOF10: 7
PAINLEVEL_OUTOF10: 9
PAINLEVEL_OUTOF10: 8
PAINLEVEL_OUTOF10: 8
PAINLEVEL_OUTOF10: 7
PAINLEVEL_OUTOF10: 8
PAINLEVEL_OUTOF10: 10
PAINLEVEL_OUTOF10: 6
PAINLEVEL_OUTOF10: 7
PAINLEVEL_OUTOF10: 9
PAINLEVEL_OUTOF10: 8
PAINLEVEL_OUTOF10: 7
PAINLEVEL_OUTOF10: 9
PAINLEVEL_OUTOF10: 4
PAINLEVEL_OUTOF10: 8
PAINLEVEL_OUTOF10: 8
PAINLEVEL_OUTOF10: 5
PAINLEVEL_OUTOF10: 0
PAINLEVEL_OUTOF10: 0
PAINLEVEL_OUTOF10: 8
PAINLEVEL_OUTOF10: 7
PAINLEVEL_OUTOF10: 7
PAINLEVEL_OUTOF10: 0
PAINLEVEL_OUTOF10: 7
PAINLEVEL_OUTOF10: 7
PAINLEVEL_OUTOF10: 8
PAINLEVEL_OUTOF10: 6
PAINLEVEL_OUTOF10: 9
PAINLEVEL_OUTOF10: 7
PAINLEVEL_OUTOF10: 8
PAINLEVEL_OUTOF10: 7
PAINLEVEL_OUTOF10: 6
PAINLEVEL_OUTOF10: 7
PAINLEVEL_OUTOF10: 7
PAINLEVEL_OUTOF10: 10
PAINLEVEL_OUTOF10: 8
PAINLEVEL_OUTOF10: 4
PAINLEVEL_OUTOF10: 8
PAINLEVEL_OUTOF10: 7
PAINLEVEL_OUTOF10: 7
PAINLEVEL_OUTOF10: 10
PAINLEVEL_OUTOF10: 8
PAINLEVEL_OUTOF10: 8
PAINLEVEL_OUTOF10: 6
PAINLEVEL_OUTOF10: 7
PAINLEVEL_OUTOF10: 7
PAINLEVEL_OUTOF10: 8
PAINLEVEL_OUTOF10: 8
PAINLEVEL_OUTOF10: 7
PAINLEVEL_OUTOF10: 9
PAINLEVEL_OUTOF10: 9
PAINLEVEL_OUTOF10: 5
PAINLEVEL_OUTOF10: 8
PAINLEVEL_OUTOF10: 5
PAINLEVEL_OUTOF10: 7
PAINLEVEL_OUTOF10: 7

## 2025-01-01 ASSESSMENT — PAIN DESCRIPTION - LOCATION
LOCATION: GENERALIZED
LOCATION: BACK;NECK
LOCATION: BACK;NECK
LOCATION: BACK
LOCATION: BACK
LOCATION: BACK;COCCYX;GENERALIZED
LOCATION: BACK;RECTUM
LOCATION: BACK;OTHER (COMMENT)
LOCATION: GENERALIZED
LOCATION: BACK
LOCATION: BACK;GENERALIZED
LOCATION: BACK;NECK
LOCATION: BACK
LOCATION: BACK;NECK
LOCATION: GENERALIZED
LOCATION: BACK
LOCATION: BACK
LOCATION: BACK;COCCYX
LOCATION: BACK;NECK
LOCATION: ABDOMEN
LOCATION: BACK;NECK
LOCATION: BACK;NECK
LOCATION: GENERALIZED
LOCATION: BACK
LOCATION: GENERALIZED
LOCATION: BACK
LOCATION: ABDOMEN;THROAT;BACK
LOCATION: GENERALIZED
LOCATION: GENERALIZED
LOCATION: BACK
LOCATION: BACK;ABDOMEN
LOCATION: GENERALIZED
LOCATION: GENERALIZED
LOCATION: BACK
LOCATION: BACK;NECK
LOCATION: GENERALIZED
LOCATION: BACK;NECK
LOCATION: GENERALIZED
LOCATION: GENERALIZED
LOCATION: BACK
LOCATION: BACK;NECK
LOCATION: BACK;NECK
LOCATION: GENERALIZED
LOCATION: BACK;COCCYX
LOCATION: GENERALIZED
LOCATION: GENERALIZED;EAR
LOCATION: BACK;COCCYX
LOCATION: BACK;NECK;GENERALIZED
LOCATION: GENERALIZED
LOCATION: BACK
LOCATION: COCCYX;BACK;GENERALIZED
LOCATION: BACK;NECK
LOCATION: BACK;NECK
LOCATION: BACK
LOCATION: BACK;COCCYX
LOCATION: BACK
LOCATION: GENERALIZED
LOCATION: BACK
LOCATION: BACK
LOCATION: GENERALIZED
LOCATION: SHOULDER;BACK
LOCATION: GENERALIZED
LOCATION: COCCYX;BACK
LOCATION: BACK
LOCATION: CHEST;THROAT
LOCATION: BACK
LOCATION: GENERALIZED
LOCATION: GENERALIZED
LOCATION: BACK;NECK
LOCATION: BACK;ABDOMEN;NECK
LOCATION: BACK
LOCATION: BACK;SHOULDER

## 2025-01-01 ASSESSMENT — PAIN DESCRIPTION - PAIN TYPE
TYPE: CHRONIC PAIN
TYPE: ACUTE PAIN
TYPE: CHRONIC PAIN
TYPE: ACUTE PAIN
TYPE: ACUTE PAIN
TYPE: CHRONIC PAIN
TYPE: ACUTE PAIN
TYPE: CHRONIC PAIN
TYPE: CHRONIC PAIN
TYPE: ACUTE PAIN
TYPE: CHRONIC PAIN
TYPE: CHRONIC PAIN
TYPE: ACUTE PAIN
TYPE: CHRONIC PAIN

## 2025-01-01 ASSESSMENT — PAIN DESCRIPTION - ONSET
ONSET: ON-GOING

## 2025-01-01 ASSESSMENT — PAIN DESCRIPTION - DESCRIPTORS
DESCRIPTORS: ACHING;THROBBING
DESCRIPTORS: ACHING;DULL;SHARP
DESCRIPTORS: ACHING;SHARP
DESCRIPTORS: ACHING;DISCOMFORT;THROBBING
DESCRIPTORS: ACHING;DISCOMFORT;TENDER
DESCRIPTORS: ACHING;DISCOMFORT
DESCRIPTORS: ACHING;THROBBING
DESCRIPTORS: DISCOMFORT
DESCRIPTORS: ACHING
DESCRIPTORS: ACHING;DULL;NAGGING
DESCRIPTORS: DULL;NAGGING;SHARP
DESCRIPTORS: ACHING;SORE;SHARP
DESCRIPTORS: ACHING;SHARP;STABBING
DESCRIPTORS: ACHING;DULL;SHARP;SHOOTING
DESCRIPTORS: ACHING
DESCRIPTORS: ACHING
DESCRIPTORS: DISCOMFORT;ACHING
DESCRIPTORS: ACHING;SORE
DESCRIPTORS: ACHING;DISCOMFORT
DESCRIPTORS: SHARP
DESCRIPTORS: ACHING
DESCRIPTORS: DISCOMFORT;ACHING
DESCRIPTORS: ACHING;DISCOMFORT
DESCRIPTORS: SHARP
DESCRIPTORS: ACHING;DISCOMFORT;TENDER
DESCRIPTORS: ACHING;DISCOMFORT;PRESSURE
DESCRIPTORS: ACHING;DISCOMFORT
DESCRIPTORS: ACHING
DESCRIPTORS: ACHING;DISCOMFORT
DESCRIPTORS: PRESSURE
DESCRIPTORS: ACHING;DISCOMFORT;PRESSURE
DESCRIPTORS: ACHING;DISCOMFORT;SORE
DESCRIPTORS: ACHING;DISCOMFORT
DESCRIPTORS: ACHING
DESCRIPTORS: ACHING;DISCOMFORT;PRESSURE
DESCRIPTORS: ACHING;DISCOMFORT;SORE
DESCRIPTORS: ACHING;DISCOMFORT;SORE
DESCRIPTORS: ACHING;DISCOMFORT
DESCRIPTORS: ACHING;DISCOMFORT
DESCRIPTORS: ACHING;DULL;SHARP
DESCRIPTORS: SHARP
DESCRIPTORS: ACHING;DISCOMFORT;PRESSURE
DESCRIPTORS: ACHING;DISCOMFORT
DESCRIPTORS: ACHING;SHARP
DESCRIPTORS: ACHING
DESCRIPTORS: ACHING;DISCOMFORT;THROBBING
DESCRIPTORS: ACHING;DULL
DESCRIPTORS: SHARP
DESCRIPTORS: ACHING;DISCOMFORT;HEAVINESS
DESCRIPTORS: DISCOMFORT;ACHING
DESCRIPTORS: ACHING;DULL;NAGGING
DESCRIPTORS: ACHING;DISCOMFORT;SORE
DESCRIPTORS: SORE;ACHING
DESCRIPTORS: SORE;SHARP;ACHING
DESCRIPTORS: ACHING;DISCOMFORT
DESCRIPTORS: ACHING
DESCRIPTORS: ACHING;DISCOMFORT;DULL;NAGGING
DESCRIPTORS: ACHING;DISCOMFORT
DESCRIPTORS: ACHING;DISCOMFORT
DESCRIPTORS: ACHING;DISCOMFORT;SORE
DESCRIPTORS: ACHING;DISCOMFORT
DESCRIPTORS: SHARP
DESCRIPTORS: ACHING;DISCOMFORT
DESCRIPTORS: ACHING;SORE

## 2025-01-01 ASSESSMENT — PAIN - FUNCTIONAL ASSESSMENT
PAIN_FUNCTIONAL_ASSESSMENT: PREVENTS OR INTERFERES SOME ACTIVE ACTIVITIES AND ADLS
PAIN_FUNCTIONAL_ASSESSMENT: 0-10
PAIN_FUNCTIONAL_ASSESSMENT: PREVENTS OR INTERFERES SOME ACTIVE ACTIVITIES AND ADLS
PAIN_FUNCTIONAL_ASSESSMENT: ACTIVITIES ARE NOT PREVENTED
PAIN_FUNCTIONAL_ASSESSMENT: 0-10
PAIN_FUNCTIONAL_ASSESSMENT: PREVENTS OR INTERFERES SOME ACTIVE ACTIVITIES AND ADLS
PAIN_FUNCTIONAL_ASSESSMENT: 0-10
PAIN_FUNCTIONAL_ASSESSMENT: PREVENTS OR INTERFERES SOME ACTIVE ACTIVITIES AND ADLS
PAIN_FUNCTIONAL_ASSESSMENT: 0-10
PAIN_FUNCTIONAL_ASSESSMENT: 0-10
PAIN_FUNCTIONAL_ASSESSMENT: PREVENTS OR INTERFERES SOME ACTIVE ACTIVITIES AND ADLS
PAIN_FUNCTIONAL_ASSESSMENT: PREVENTS OR INTERFERES WITH ALL ACTIVE AND SOME PASSIVE ACTIVITIES
PAIN_FUNCTIONAL_ASSESSMENT: 0-10
PAIN_FUNCTIONAL_ASSESSMENT: PREVENTS OR INTERFERES SOME ACTIVE ACTIVITIES AND ADLS
PAIN_FUNCTIONAL_ASSESSMENT: 0-10
PAIN_FUNCTIONAL_ASSESSMENT: PREVENTS OR INTERFERES WITH ALL ACTIVE AND SOME PASSIVE ACTIVITIES
PAIN_FUNCTIONAL_ASSESSMENT: ACTIVITIES ARE NOT PREVENTED
PAIN_FUNCTIONAL_ASSESSMENT: 0-10
PAIN_FUNCTIONAL_ASSESSMENT: 0-10
PAIN_FUNCTIONAL_ASSESSMENT: PREVENTS OR INTERFERES SOME ACTIVE ACTIVITIES AND ADLS
PAIN_FUNCTIONAL_ASSESSMENT: PREVENTS OR INTERFERES SOME ACTIVE ACTIVITIES AND ADLS
PAIN_FUNCTIONAL_ASSESSMENT: 0-10
PAIN_FUNCTIONAL_ASSESSMENT: 0-10
PAIN_FUNCTIONAL_ASSESSMENT: PREVENTS OR INTERFERES SOME ACTIVE ACTIVITIES AND ADLS
PAIN_FUNCTIONAL_ASSESSMENT: PREVENTS OR INTERFERES WITH MANY ACTIVE NOT PASSIVE ACTIVITIES
PAIN_FUNCTIONAL_ASSESSMENT: 0-10
PAIN_FUNCTIONAL_ASSESSMENT: 0-10
PAIN_FUNCTIONAL_ASSESSMENT: ACTIVITIES ARE NOT PREVENTED
PAIN_FUNCTIONAL_ASSESSMENT: 0-10
PAIN_FUNCTIONAL_ASSESSMENT: 0-10
PAIN_FUNCTIONAL_ASSESSMENT: ACTIVITIES ARE NOT PREVENTED
PAIN_FUNCTIONAL_ASSESSMENT: 0-10
PAIN_FUNCTIONAL_ASSESSMENT: ACTIVITIES ARE NOT PREVENTED
PAIN_FUNCTIONAL_ASSESSMENT: 0-10
PAIN_FUNCTIONAL_ASSESSMENT: PREVENTS OR INTERFERES WITH MANY ACTIVE NOT PASSIVE ACTIVITIES
PAIN_FUNCTIONAL_ASSESSMENT: PREVENTS OR INTERFERES WITH MANY ACTIVE NOT PASSIVE ACTIVITIES
PAIN_FUNCTIONAL_ASSESSMENT: PREVENTS OR INTERFERES SOME ACTIVE ACTIVITIES AND ADLS
PAIN_FUNCTIONAL_ASSESSMENT: 0-10
PAIN_FUNCTIONAL_ASSESSMENT: PREVENTS OR INTERFERES WITH MANY ACTIVE NOT PASSIVE ACTIVITIES
PAIN_FUNCTIONAL_ASSESSMENT: PREVENTS OR INTERFERES WITH MANY ACTIVE NOT PASSIVE ACTIVITIES
PAIN_FUNCTIONAL_ASSESSMENT: 0-10
PAIN_FUNCTIONAL_ASSESSMENT: PREVENTS OR INTERFERES SOME ACTIVE ACTIVITIES AND ADLS
PAIN_FUNCTIONAL_ASSESSMENT: 0-10
PAIN_FUNCTIONAL_ASSESSMENT: PREVENTS OR INTERFERES SOME ACTIVE ACTIVITIES AND ADLS
PAIN_FUNCTIONAL_ASSESSMENT: PREVENTS OR INTERFERES WITH MANY ACTIVE NOT PASSIVE ACTIVITIES
PAIN_FUNCTIONAL_ASSESSMENT: 0-10
PAIN_FUNCTIONAL_ASSESSMENT: PREVENTS OR INTERFERES WITH MANY ACTIVE NOT PASSIVE ACTIVITIES
PAIN_FUNCTIONAL_ASSESSMENT: PREVENTS OR INTERFERES SOME ACTIVE ACTIVITIES AND ADLS
PAIN_FUNCTIONAL_ASSESSMENT: PREVENTS OR INTERFERES WITH MANY ACTIVE NOT PASSIVE ACTIVITIES
PAIN_FUNCTIONAL_ASSESSMENT: ACTIVITIES ARE NOT PREVENTED
PAIN_FUNCTIONAL_ASSESSMENT: PREVENTS OR INTERFERES WITH ALL ACTIVE AND SOME PASSIVE ACTIVITIES
PAIN_FUNCTIONAL_ASSESSMENT: PREVENTS OR INTERFERES WITH MANY ACTIVE NOT PASSIVE ACTIVITIES
PAIN_FUNCTIONAL_ASSESSMENT: PREVENTS OR INTERFERES WITH ALL ACTIVE AND SOME PASSIVE ACTIVITIES
PAIN_FUNCTIONAL_ASSESSMENT: 0-10
PAIN_FUNCTIONAL_ASSESSMENT: 0-10
PAIN_FUNCTIONAL_ASSESSMENT: PREVENTS OR INTERFERES SOME ACTIVE ACTIVITIES AND ADLS
PAIN_FUNCTIONAL_ASSESSMENT: 0-10
PAIN_FUNCTIONAL_ASSESSMENT: 0-10
PAIN_FUNCTIONAL_ASSESSMENT: ACTIVITIES ARE NOT PREVENTED
PAIN_FUNCTIONAL_ASSESSMENT: 0-10
PAIN_FUNCTIONAL_ASSESSMENT: ACTIVITIES ARE NOT PREVENTED
PAIN_FUNCTIONAL_ASSESSMENT: 0-10
PAIN_FUNCTIONAL_ASSESSMENT: PREVENTS OR INTERFERES SOME ACTIVE ACTIVITIES AND ADLS
PAIN_FUNCTIONAL_ASSESSMENT: 0-10
PAIN_FUNCTIONAL_ASSESSMENT: 0-10
PAIN_FUNCTIONAL_ASSESSMENT: PREVENTS OR INTERFERES WITH MANY ACTIVE NOT PASSIVE ACTIVITIES

## 2025-01-01 ASSESSMENT — PAIN DESCRIPTION - FREQUENCY
FREQUENCY: CONTINUOUS
FREQUENCY: INTERMITTENT
FREQUENCY: CONTINUOUS

## 2025-01-01 ASSESSMENT — PAIN DESCRIPTION - ORIENTATION
ORIENTATION: RIGHT;LEFT
ORIENTATION: MID
ORIENTATION: RIGHT;LEFT
ORIENTATION: LOWER
ORIENTATION: LOWER;MID
ORIENTATION: LOWER;MID
ORIENTATION: RIGHT;LEFT
ORIENTATION: MID
ORIENTATION: MID
ORIENTATION: RIGHT;LEFT
ORIENTATION: MID
ORIENTATION: RIGHT;LEFT
ORIENTATION: RIGHT;LEFT
ORIENTATION: MID
ORIENTATION: MID
ORIENTATION: RIGHT;LEFT;MID
ORIENTATION: MID;LOWER
ORIENTATION: LOWER
ORIENTATION: LOWER;MID
ORIENTATION: MID;LOWER
ORIENTATION: MID
ORIENTATION: RIGHT;LEFT
ORIENTATION: MID;LOWER
ORIENTATION: RIGHT;LEFT
ORIENTATION: RIGHT;LEFT
ORIENTATION: LOWER;MID
ORIENTATION: RIGHT;LEFT
ORIENTATION: MID;LOWER

## 2025-01-01 ASSESSMENT — ENCOUNTER SYMPTOMS
BACK PAIN: 1
CONSTIPATION: 0
COUGH: 1
BACK PAIN: 1
COUGH: 1
COUGH: 1
SHORTNESS OF BREATH: 0
SHORTNESS OF BREATH: 0
ABDOMINAL PAIN: 0
CONSTIPATION: 0
NAUSEA: 0
NAUSEA: 0
ABDOMINAL PAIN: 0
COUGH: 1
VOMITING: 0
CONSTIPATION: 0
SHORTNESS OF BREATH: 1
DIARRHEA: 0
DIARRHEA: 0
NAUSEA: 0
VOMITING: 0
DIARRHEA: 0
ABDOMINAL PAIN: 0
ABDOMINAL PAIN: 0
BACK PAIN: 1
SHORTNESS OF BREATH: 1
DIARRHEA: 0
VOMITING: 0
NAUSEA: 0
BACK PAIN: 1
CONSTIPATION: 0
VOMITING: 0

## 2025-01-01 ASSESSMENT — LIFESTYLE VARIABLES
HOW MANY STANDARD DRINKS CONTAINING ALCOHOL DO YOU HAVE ON A TYPICAL DAY: PATIENT DOES NOT DRINK
HOW OFTEN DO YOU HAVE A DRINK CONTAINING ALCOHOL: NEVER
HOW MANY STANDARD DRINKS CONTAINING ALCOHOL DO YOU HAVE ON A TYPICAL DAY: PATIENT DOES NOT DRINK
HOW OFTEN DO YOU HAVE A DRINK CONTAINING ALCOHOL: NEVER

## 2025-01-02 ENCOUNTER — TELEPHONE (OUTPATIENT)
Dept: PULMONOLOGY | Age: 60
End: 2025-01-02

## 2025-01-02 ENCOUNTER — OFFICE VISIT (OUTPATIENT)
Dept: ENT CLINIC | Age: 60
End: 2025-01-02

## 2025-01-02 VITALS
WEIGHT: 148.5 LBS | HEIGHT: 68 IN | BODY MASS INDEX: 22.51 KG/M2 | DIASTOLIC BLOOD PRESSURE: 75 MMHG | SYSTOLIC BLOOD PRESSURE: 134 MMHG | TEMPERATURE: 98.1 F | HEART RATE: 68 BPM

## 2025-01-02 DIAGNOSIS — R59.0 CERVICAL LYMPHADENOPATHY: ICD-10-CM

## 2025-01-02 DIAGNOSIS — R25.2 TRISMUS: ICD-10-CM

## 2025-01-02 DIAGNOSIS — F17.200 SMOKER: ICD-10-CM

## 2025-01-02 DIAGNOSIS — K14.8 TONGUE MASS: ICD-10-CM

## 2025-01-02 DIAGNOSIS — J39.2 OROPHARYNGEAL MASS: ICD-10-CM

## 2025-01-02 DIAGNOSIS — C06.9 CANCER OF ORAL CAVITY (HCC): Primary | ICD-10-CM

## 2025-01-02 PROCEDURE — 31575 DIAGNOSTIC LARYNGOSCOPY: CPT | Performed by: OTOLARYNGOLOGY

## 2025-01-02 PROCEDURE — 99204 OFFICE O/P NEW MOD 45 MIN: CPT | Performed by: OTOLARYNGOLOGY

## 2025-01-02 RX ORDER — OXYCODONE AND ACETAMINOPHEN 5; 325 MG/1; MG/1
1 TABLET ORAL EVERY 6 HOURS
Qty: 40 TABLET | Refills: 0 | Status: SHIPPED | OUTPATIENT
Start: 2025-01-02 | End: 2025-01-12

## 2025-01-02 RX ORDER — LIDOCAINE HYDROCHLORIDE 20 MG/ML
15 SOLUTION OROPHARYNGEAL PRN
Qty: 100 ML | Refills: 0 | Status: SHIPPED | OUTPATIENT
Start: 2025-01-02

## 2025-01-02 ASSESSMENT — ENCOUNTER SYMPTOMS
COUGH: 0
STRIDOR: 0
TROUBLE SWALLOWING: 1
WHEEZING: 0
CHOKING: 0
SINUS PRESSURE: 0
SORE THROAT: 1
SHORTNESS OF BREATH: 1
RHINORRHEA: 0
SINUS PAIN: 0

## 2025-01-02 NOTE — H&P (VIEW-ONLY)
Department of Otolaryngology  Office Consult Note  1/2/25          Subjective:        Chief Complaint:  had concerns including Follow-up (NEW PATIENT - ED follow up oral lesion/).     Patient ID: Prasanna Parnell is a 59 y.o. male.    HPI: Patient presents as  new patient for ear pain throat pain and dysphagia, chronic. Symptoms worse with yawing and opening of mouth. Difficulty chewing. Symptoms worsening over the past 1 year, treated for ear infection for no improvement of symptoms. Pain is significant in nature. Dysphagia to solids and liquids, also pain with talking. Difficulty laying flat due to pressure. Significant headaches. Some mild dyspnea. Denies weight loss or hoarseness.   Smoker daily   Was seen in ED, had ct neck and chest done that show concern for right BOT mass, right lymphadenopathy and lung mass and referred to ent,    Review of Systems   Constitutional: Negative.    HENT:  Positive for ear pain, sore throat and trouble swallowing. Negative for congestion, ear discharge, hearing loss, rhinorrhea, sinus pressure, sinus pain and tinnitus.    Respiratory:  Positive for shortness of breath. Negative for cough, choking, wheezing and stridor.    Cardiovascular:  Negative for chest pain.   Skin:  Negative for rash.   Allergic/Immunologic: Negative for environmental allergies and immunocompromised state.   Neurological:  Negative for dizziness, weakness, light-headedness and headaches.   Psychiatric/Behavioral:  Negative for confusion.    All other systems reviewed and are negative.        Past Medical History:   Diagnosis Date    Cervicalgia     Neuropathy     Retrolisthesis     Spondylolisthesis      Past Surgical History:   Procedure Laterality Date    LUMBAR LAMINECTOMY         Current Outpatient Medications:     lidocaine viscous hcl (XYLOCAINE) 2 % SOLN solution, Take 15 mLs by mouth as needed for Irritation, Disp: 100 mL, Rfl: 0    oxyCODONE-acetaminophen (PERCOCET) 5-325 MG per tablet, Take 1  in esophagoscopy using a rigid scope. Tearing of the mucosa of the esophagus or hypopharynx can cause mediastinitus or a severe infection in the chest. Injury to the vocal cords can occur with laryngosopy as can injury to the lungs during bronchoscopy. Lung injury can cause air to leak into the chest cavity. If severe it can fill the chest cavity and compress the lungs producing a tension pneumothorax. Insertion of a chest tube would be emergently required to treat the patient.   - injury to lips, teeth or tongue         This note may be dictated with vocal recognition software. All grammatical or semantic errors should be considered accordingly.     Electronically signed by Jonathan Bowden DO on 1/2/25 at3:58 PM EST

## 2025-01-02 NOTE — TELEPHONE ENCOUNTER
A message was left with the patient domestic partner regarding calling the office for an appointment.  Office number left in the message

## 2025-01-02 NOTE — PROGRESS NOTES
Department of Otolaryngology  Office Consult Note  1/2/25          Subjective:        Chief Complaint:  had concerns including Follow-up (NEW PATIENT - ED follow up oral lesion/).     Patient ID: Prasanna Parnell is a 59 y.o. male.    HPI: Patient presents as  new patient for ear pain throat pain and dysphagia, chronic. Symptoms worse with yawing and opening of mouth. Difficulty chewing. Symptoms worsening over the past 1 year, treated for ear infection for no improvement of symptoms. Pain is significant in nature. Dysphagia to solids and liquids, also pain with talking. Difficulty laying flat due to pressure. Significant headaches. Some mild dyspnea. Denies weight loss or hoarseness.   Smoker daily   Was seen in ED, had ct neck and chest done that show concern for right BOT mass, right lymphadenopathy and lung mass and referred to ent,    Review of Systems   Constitutional: Negative.    HENT:  Positive for ear pain, sore throat and trouble swallowing. Negative for congestion, ear discharge, hearing loss, rhinorrhea, sinus pressure, sinus pain and tinnitus.    Respiratory:  Positive for shortness of breath. Negative for cough, choking, wheezing and stridor.    Cardiovascular:  Negative for chest pain.   Skin:  Negative for rash.   Allergic/Immunologic: Negative for environmental allergies and immunocompromised state.   Neurological:  Negative for dizziness, weakness, light-headedness and headaches.   Psychiatric/Behavioral:  Negative for confusion.    All other systems reviewed and are negative.        Past Medical History:   Diagnosis Date    Cervicalgia     Neuropathy     Retrolisthesis     Spondylolisthesis      Past Surgical History:   Procedure Laterality Date    LUMBAR LAMINECTOMY         Current Outpatient Medications:     lidocaine viscous hcl (XYLOCAINE) 2 % SOLN solution, Take 15 mLs by mouth as needed for Irritation, Disp: 100 mL, Rfl: 0    oxyCODONE-acetaminophen (PERCOCET) 5-325 MG per tablet, Take 1

## 2025-01-02 NOTE — TELEPHONE ENCOUNTER
Attempted to contact pt to make an appointment for a hospital f/u for a lung mass per Dr. Szymanski. VM left requesting a call back.

## 2025-01-03 ENCOUNTER — OFFICE VISIT (OUTPATIENT)
Dept: PULMONOLOGY | Age: 60
End: 2025-01-03

## 2025-01-03 ENCOUNTER — PREP FOR PROCEDURE (OUTPATIENT)
Dept: ENT CLINIC | Age: 60
End: 2025-01-03

## 2025-01-03 VITALS
RESPIRATION RATE: 12 BRPM | OXYGEN SATURATION: 98 % | SYSTOLIC BLOOD PRESSURE: 138 MMHG | DIASTOLIC BLOOD PRESSURE: 76 MMHG | HEART RATE: 62 BPM | TEMPERATURE: 98.2 F

## 2025-01-03 DIAGNOSIS — J47.9 BRONCHIECTASIS WITHOUT COMPLICATION (HCC): ICD-10-CM

## 2025-01-03 DIAGNOSIS — Z72.0 TOBACCO USE: ICD-10-CM

## 2025-01-03 DIAGNOSIS — C06.9 CANCER OF ORAL CAVITY (HCC): ICD-10-CM

## 2025-01-03 DIAGNOSIS — R59.0 MEDIASTINAL LYMPHADENOPATHY: ICD-10-CM

## 2025-01-03 DIAGNOSIS — R59.0 CERVICAL LYMPHADENOPATHY: ICD-10-CM

## 2025-01-03 DIAGNOSIS — Z87.768 HISTORY OF DIGITAL CLUBBING: ICD-10-CM

## 2025-01-03 DIAGNOSIS — R91.1 LUNG NODULE: Primary | ICD-10-CM

## 2025-01-03 DIAGNOSIS — J43.2 CENTRILOBULAR EMPHYSEMA (HCC): ICD-10-CM

## 2025-01-03 NOTE — PROGRESS NOTES
[facility]    PULMONARY/CRITICAL CARE CONSULTATION NOTE    Patient: Prasanna Parnell  MRN: [id number]  : 1965    Encounter Date: 1/3/2025  Encounter Time: 11:17 AM     PROBLEM LIST:  Patient Active Problem List   Diagnosis    Acute maxillary sinusitis    Head injury    Cancer of oral cavity (HCC)     Reason for Consultation: Lung Mass    HPI:   Prasanna Parnell is a 59 y.o. male with past medical history noted for tobacco use that presented to hospital for lung nodule evaluation.    Patient seen ENT (ear pain throat pain and dysphagia, chronic) 2025 after CT Chest/Neck 2024.    Symptoms worse with yawing and opening of mouth. Difficulty chewing. Symptoms worsening over the past 1 year, treated for ear infection for no improvement of symptoms. Pain is significant in nature. Dysphagia to solids and liquids, also pain with talking. Difficulty laying flat due to pressure. Significant headaches. Some mild dyspnea. Denies weight loss or hoarseness.     Smoker daily     Was seen in ED, had ct neck and chest done that show concern for right BOT mass, right lymphadenopathy and lung mass and referred to ent,    Patient had nasolaryngoscopy with ENT 2025.    Patient has complaints of night sweats, fevers, chills on occasion but mostly at night.    Social History: (+) tobacco use, no alcohol use, no drug use   - 1/2 PPD x 32 years    Occupational History: on disability  - no occupational exposures to asbestos, beryllium, silica      PAST MEDICAL HISTORY:     Past Medical History:   Diagnosis Date    Cervicalgia     Neuropathy     Retrolisthesis     Spondylolisthesis        PAST SURGICAL HISTORY:   Past Surgical History:   Procedure Laterality Date    LUMBAR LAMINECTOMY         FAMILY HISTORY:   No family history on file.    SOCIAL HISTORY:   Social History     Socioeconomic History    Marital status: Single     Spouse name: Not on file    Number of children: Not on file    Years of

## 2025-01-06 ENCOUNTER — HOSPITAL ENCOUNTER (OUTPATIENT)
Dept: CT IMAGING | Age: 60
Discharge: HOME | End: 2025-01-08
Attending: INTERNAL MEDICINE

## 2025-01-06 ENCOUNTER — TELEPHONE (OUTPATIENT)
Dept: PULMONOLOGY | Age: 60
End: 2025-01-06

## 2025-01-06 DIAGNOSIS — R91.1 LUNG NODULE: ICD-10-CM

## 2025-01-06 PROCEDURE — 71250 CT THORAX DX C-: CPT

## 2025-01-06 NOTE — TELEPHONE ENCOUNTER
Called patient to inform him that his CT Chest is scheduled for 1-6-25 at 12:00 pm at the [facility]. He must arrive by 11:30 am. There is no prep for this test.    His PET Scan is scheduled for 1-9-25 at 2:00 pm at the [facility]. He must arrive by 1:30 pm. He must follow the guidelines I told him about.    His PFT is scheduled for 1-23-25 at 9:30 am at the [facility]. He must arrive by 9:00 am. He is to have no caffeine for 24 hours prior to test and no resp meds for at least 4 hours prior to test

## 2025-01-08 ENCOUNTER — TELEPHONE (OUTPATIENT)
Dept: PULMONOLOGY | Age: 60
End: 2025-01-08

## 2025-01-08 NOTE — TELEPHONE ENCOUNTER
LMOM to please call back regarding his procedure that is rescheduled for 2-6-24 at 12:00 pm. He must arrive by 10:00 am

## 2025-01-09 ENCOUNTER — HOSPITAL ENCOUNTER (OUTPATIENT)
Dept: PET IMAGING | Age: 60
Discharge: HOME | End: 2025-01-11
Attending: OTOLARYNGOLOGY

## 2025-01-09 ENCOUNTER — HOSPITAL ENCOUNTER (OUTPATIENT)
Dept: NON INVASIVE DIAGNOSTICS | Age: 60
Discharge: HOME | End: 2025-01-09

## 2025-01-09 DIAGNOSIS — Z01.810 PRE-OPERATIVE CARDIOVASCULAR EXAMINATION: ICD-10-CM

## 2025-01-09 DIAGNOSIS — C06.9 CANCER OF ORAL CAVITY (HCC): ICD-10-CM

## 2025-01-09 LAB
EKG ATRIAL RATE: 55 BPM
EKG P AXIS: 70 DEGREES
EKG P-R INTERVAL: 126 MS
EKG Q-T INTERVAL: 388 MS
EKG QRS DURATION: 96 MS
EKG QTC CALCULATION (BAZETT): 371 MS
EKG R AXIS: 70 DEGREES
EKG T AXIS: 52 DEGREES
EKG VENTRICULAR RATE: 55 BPM
GLUCOSE BLD-MCNC: 90 MG/DL (ref 74–99)

## 2025-01-09 PROCEDURE — 93005 ELECTROCARDIOGRAM TRACING: CPT | Performed by: ANESTHESIOLOGY

## 2025-01-09 PROCEDURE — 93010 ELECTROCARDIOGRAM REPORT: CPT | Performed by: INTERNAL MEDICINE

## 2025-01-09 PROCEDURE — A9609 HC RX DIAGNOSTIC RADIOPHARMACEUTICAL: HCPCS | Performed by: RADIOLOGY

## 2025-01-09 PROCEDURE — 82962 GLUCOSE BLOOD TEST: CPT

## 2025-01-09 PROCEDURE — 78815 PET IMAGE W/CT SKULL-THIGH: CPT

## 2025-01-09 PROCEDURE — 3430000000 HC RX DIAGNOSTIC RADIOPHARMACEUTICAL: Performed by: RADIOLOGY

## 2025-01-09 RX ORDER — FLUDEOXYGLUCOSE F 18 200 MCI/ML
15 INJECTION, SOLUTION INTRAVENOUS
Status: COMPLETED | OUTPATIENT
Start: 2025-01-09 | End: 2025-01-09

## 2025-01-09 RX ADMIN — FLUDEOXYGLUCOSE F 18 15 MILLICURIE: 200 INJECTION, SOLUTION INTRAVENOUS at 13:47

## 2025-01-13 ENCOUNTER — ANESTHESIA EVENT (OUTPATIENT)
Dept: OPERATING ROOM | Age: 60
End: 2025-01-13

## 2025-01-13 NOTE — ANESTHESIA PRE PROCEDURE
Department of Anesthesiology  Preprocedure Note       Name:  Prasanna Parnell   Age:  59 y.o.  :  1965                                          MRN:  [id number]         Date:  2025      Surgeon: Surgeon(s):  Jonathan Bowden DO    Procedure: Procedure(s):  PANENDOSCOPY WITH BIOPSY    Medications prior to admission:   Prior to Admission medications    Medication Sig Start Date End Date Taking? Authorizing Provider   lidocaine viscous hcl (XYLOCAINE) 2 % SOLN solution Take 15 mLs by mouth as needed for Irritation 25   Jonathan Bowden DO   EPINEPHrine (EPIPEN 2-JUAN CARLOS) 0.3 MG/0.3ML SOAJ injection Inject 0.3 mLs into the muscle once as needed (anaphylaxis) Use as directed for allergic reaction 23  Jojo Almeida DO   tiZANidine (ZANAFLEX) 4 MG capsule Take 1 capsule by mouth 2 times daily as needed for Muscle spasms  Patient not taking: Reported on 2025   Hayde Barrientos PA-C   ALBUTEROL SULFATE IN Inhale into the lungs as needed  Patient not taking: Reported on 2025    Provider, MD Radha       Current medications:    No current facility-administered medications for this encounter.     Current Outpatient Medications   Medication Sig Dispense Refill   • lidocaine viscous hcl (XYLOCAINE) 2 % SOLN solution Take 15 mLs by mouth as needed for Irritation 100 mL 0   • EPINEPHrine (EPIPEN 2-JUAN CARLOS) 0.3 MG/0.3ML SOAJ injection Inject 0.3 mLs into the muscle once as needed (anaphylaxis) Use as directed for allergic reaction 1 each 0   • tiZANidine (ZANAFLEX) 4 MG capsule Take 1 capsule by mouth 2 times daily as needed for Muscle spasms (Patient not taking: Reported on 2025) 30 capsule 0   • ALBUTEROL SULFATE IN Inhale into the lungs as needed (Patient not taking: Reported on 2025)         Allergies:    Allergies   Allergen Reactions   • Bee Venom Anaphylaxis   • Keflex [Cephalexin] Swelling       Problem List:    Patient Active Problem List   Diagnosis Code   • Acute

## 2025-01-14 ENCOUNTER — HOSPITAL ENCOUNTER (OUTPATIENT)
Age: 60
Setting detail: OUTPATIENT SURGERY
Discharge: HOME | End: 2025-01-14
Attending: OTOLARYNGOLOGY | Admitting: OTOLARYNGOLOGY

## 2025-01-14 ENCOUNTER — ANESTHESIA (OUTPATIENT)
Dept: OPERATING ROOM | Age: 60
End: 2025-01-14

## 2025-01-14 VITALS
RESPIRATION RATE: 14 BRPM | OXYGEN SATURATION: 100 % | BODY MASS INDEX: 21.49 KG/M2 | TEMPERATURE: 98.3 F | WEIGHT: 141.8 LBS | DIASTOLIC BLOOD PRESSURE: 70 MMHG | SYSTOLIC BLOOD PRESSURE: 134 MMHG | HEART RATE: 68 BPM | HEIGHT: 68 IN

## 2025-01-14 DIAGNOSIS — C06.9 CANCER OF ORAL CAVITY (HCC): ICD-10-CM

## 2025-01-14 PROCEDURE — 7100000011 HC PHASE II RECOVERY - ADDTL 15 MIN: Performed by: OTOLARYNGOLOGY

## 2025-01-14 PROCEDURE — 3600000003 HC SURGERY LEVEL 3 BASE: Performed by: OTOLARYNGOLOGY

## 2025-01-14 PROCEDURE — 7100000001 HC PACU RECOVERY - ADDTL 15 MIN: Performed by: OTOLARYNGOLOGY

## 2025-01-14 PROCEDURE — 7100000010 HC PHASE II RECOVERY - FIRST 15 MIN: Performed by: OTOLARYNGOLOGY

## 2025-01-14 PROCEDURE — 2580000003 HC RX 258: Performed by: INTERNAL MEDICINE

## 2025-01-14 PROCEDURE — 3700000000 HC ANESTHESIA ATTENDED CARE: Performed by: OTOLARYNGOLOGY

## 2025-01-14 PROCEDURE — 3600000013 HC SURGERY LEVEL 3 ADDTL 15MIN: Performed by: OTOLARYNGOLOGY

## 2025-01-14 PROCEDURE — 43191 ESOPHAGOSCOPY RIGID TRNSO DX: CPT | Performed by: OTOLARYNGOLOGY

## 2025-01-14 PROCEDURE — 88342 IMHCHEM/IMCYTCHM 1ST ANTB: CPT

## 2025-01-14 PROCEDURE — 7100000000 HC PACU RECOVERY - FIRST 15 MIN: Performed by: OTOLARYNGOLOGY

## 2025-01-14 PROCEDURE — 2709999900 HC NON-CHARGEABLE SUPPLY: Performed by: OTOLARYNGOLOGY

## 2025-01-14 PROCEDURE — 2500000003 HC RX 250 WO HCPCS: Performed by: NURSE ANESTHETIST, CERTIFIED REGISTERED

## 2025-01-14 PROCEDURE — 6360000002 HC RX W HCPCS: Performed by: NURSE ANESTHETIST, CERTIFIED REGISTERED

## 2025-01-14 PROCEDURE — 88305 TISSUE EXAM BY PATHOLOGIST: CPT

## 2025-01-14 PROCEDURE — 31535 LARYNGOSCOPY W/BIOPSY: CPT | Performed by: OTOLARYNGOLOGY

## 2025-01-14 PROCEDURE — 6360000002 HC RX W HCPCS: Performed by: ANESTHESIOLOGY

## 2025-01-14 PROCEDURE — 6360000002 HC RX W HCPCS: Performed by: OTOLARYNGOLOGY

## 2025-01-14 PROCEDURE — 3700000001 HC ADD 15 MINUTES (ANESTHESIA): Performed by: OTOLARYNGOLOGY

## 2025-01-14 RX ORDER — MEPERIDINE HYDROCHLORIDE 25 MG/ML
12.5 INJECTION INTRAMUSCULAR; INTRAVENOUS; SUBCUTANEOUS EVERY 5 MIN PRN
Status: DISCONTINUED | OUTPATIENT
Start: 2025-01-14 | End: 2025-01-14 | Stop reason: HOSPADM

## 2025-01-14 RX ORDER — HYDROCODONE BITARTRATE AND ACETAMINOPHEN 5; 325 MG/1; MG/1
1 TABLET ORAL EVERY 4 HOURS PRN
Status: CANCELLED | OUTPATIENT
Start: 2025-01-14

## 2025-01-14 RX ORDER — MIDAZOLAM HYDROCHLORIDE 1 MG/ML
INJECTION, SOLUTION INTRAMUSCULAR; INTRAVENOUS
Status: DISCONTINUED | OUTPATIENT
Start: 2025-01-14 | End: 2025-01-14 | Stop reason: SDUPTHER

## 2025-01-14 RX ORDER — SODIUM CHLORIDE 0.9 % (FLUSH) 0.9 %
5-40 SYRINGE (ML) INJECTION EVERY 12 HOURS SCHEDULED
Status: DISCONTINUED | OUTPATIENT
Start: 2025-01-14 | End: 2025-01-14 | Stop reason: HOSPADM

## 2025-01-14 RX ORDER — NALOXONE HYDROCHLORIDE 0.4 MG/ML
INJECTION, SOLUTION INTRAMUSCULAR; INTRAVENOUS; SUBCUTANEOUS PRN
Status: DISCONTINUED | OUTPATIENT
Start: 2025-01-14 | End: 2025-01-14 | Stop reason: HOSPADM

## 2025-01-14 RX ORDER — EPINEPHRINE 1 MG/ML
INJECTION, SOLUTION, CONCENTRATE INTRAVENOUS PRN
Status: DISCONTINUED | OUTPATIENT
Start: 2025-01-14 | End: 2025-01-14 | Stop reason: ALTCHOICE

## 2025-01-14 RX ORDER — SODIUM CHLORIDE, SODIUM LACTATE, POTASSIUM CHLORIDE, CALCIUM CHLORIDE 600; 310; 30; 20 MG/100ML; MG/100ML; MG/100ML; MG/100ML
INJECTION, SOLUTION INTRAVENOUS CONTINUOUS
Status: DISCONTINUED | OUTPATIENT
Start: 2025-01-14 | End: 2025-01-14 | Stop reason: HOSPADM

## 2025-01-14 RX ORDER — HYDROCODONE BITARTRATE AND ACETAMINOPHEN 5; 325 MG/1; MG/1
2 TABLET ORAL EVERY 4 HOURS PRN
Status: CANCELLED | OUTPATIENT
Start: 2025-01-14

## 2025-01-14 RX ORDER — ONDANSETRON 2 MG/ML
INJECTION INTRAMUSCULAR; INTRAVENOUS
Status: DISCONTINUED | OUTPATIENT
Start: 2025-01-14 | End: 2025-01-14 | Stop reason: SDUPTHER

## 2025-01-14 RX ORDER — OXYCODONE HYDROCHLORIDE 10 MG/1
10 TABLET ORAL EVERY 6 HOURS PRN
Qty: 40 TABLET | Refills: 0 | Status: SHIPPED | OUTPATIENT
Start: 2025-01-14 | End: 2025-01-24

## 2025-01-14 RX ORDER — DEXMEDETOMIDINE HYDROCHLORIDE 100 UG/ML
INJECTION, SOLUTION INTRAVENOUS
Status: DISCONTINUED | OUTPATIENT
Start: 2025-01-14 | End: 2025-01-14 | Stop reason: SDUPTHER

## 2025-01-14 RX ORDER — PREDNISONE 20 MG/1
20 TABLET ORAL 2 TIMES DAILY
Qty: 10 TABLET | Refills: 0 | Status: SHIPPED | OUTPATIENT
Start: 2025-01-14 | End: 2025-01-19

## 2025-01-14 RX ORDER — FENTANYL CITRATE 50 UG/ML
INJECTION, SOLUTION INTRAMUSCULAR; INTRAVENOUS
Status: DISCONTINUED | OUTPATIENT
Start: 2025-01-14 | End: 2025-01-14 | Stop reason: SDUPTHER

## 2025-01-14 RX ORDER — SODIUM CHLORIDE 9 MG/ML
INJECTION, SOLUTION INTRAVENOUS PRN
Status: DISCONTINUED | OUTPATIENT
Start: 2025-01-14 | End: 2025-01-14 | Stop reason: HOSPADM

## 2025-01-14 RX ORDER — ACETAMINOPHEN 160 MG/5ML
500 SUSPENSION ORAL EVERY 4 HOURS PRN
Qty: 240 ML | Refills: 3 | Status: SHIPPED | OUTPATIENT
Start: 2025-01-14 | End: 2025-02-13

## 2025-01-14 RX ORDER — DEXAMETHASONE SODIUM PHOSPHATE 10 MG/ML
INJECTION, SOLUTION INTRAMUSCULAR; INTRAVENOUS
Status: DISCONTINUED | OUTPATIENT
Start: 2025-01-14 | End: 2025-01-14 | Stop reason: SDUPTHER

## 2025-01-14 RX ORDER — ONDANSETRON 4 MG/1
4 TABLET, ORALLY DISINTEGRATING ORAL 3 TIMES DAILY PRN
Qty: 21 TABLET | Refills: 0 | Status: SHIPPED | OUTPATIENT
Start: 2025-01-14

## 2025-01-14 RX ORDER — SODIUM CHLORIDE 0.9 % (FLUSH) 0.9 %
5-40 SYRINGE (ML) INJECTION PRN
Status: DISCONTINUED | OUTPATIENT
Start: 2025-01-14 | End: 2025-01-14 | Stop reason: HOSPADM

## 2025-01-14 RX ORDER — LIDOCAINE HYDROCHLORIDE 20 MG/ML
15 SOLUTION OROPHARYNGEAL PRN
Qty: 100 ML | Refills: 3 | Status: SHIPPED | OUTPATIENT
Start: 2025-01-14

## 2025-01-14 RX ORDER — DIPHENHYDRAMINE HYDROCHLORIDE 50 MG/ML
12.5 INJECTION INTRAMUSCULAR; INTRAVENOUS
Status: DISCONTINUED | OUTPATIENT
Start: 2025-01-14 | End: 2025-01-14 | Stop reason: HOSPADM

## 2025-01-14 RX ORDER — KETOROLAC TROMETHAMINE 30 MG/ML
INJECTION, SOLUTION INTRAMUSCULAR; INTRAVENOUS
Status: DISCONTINUED | OUTPATIENT
Start: 2025-01-14 | End: 2025-01-14 | Stop reason: SDUPTHER

## 2025-01-14 RX ORDER — ROCURONIUM BROMIDE 10 MG/ML
INJECTION, SOLUTION INTRAVENOUS
Status: DISCONTINUED | OUTPATIENT
Start: 2025-01-14 | End: 2025-01-14 | Stop reason: SDUPTHER

## 2025-01-14 RX ORDER — DIPHENHYDRAMINE HYDROCHLORIDE 50 MG/ML
INJECTION INTRAMUSCULAR; INTRAVENOUS
Status: DISCONTINUED | OUTPATIENT
Start: 2025-01-14 | End: 2025-01-14 | Stop reason: SDUPTHER

## 2025-01-14 RX ORDER — PROPOFOL 10 MG/ML
INJECTION, EMULSION INTRAVENOUS
Status: DISCONTINUED | OUTPATIENT
Start: 2025-01-14 | End: 2025-01-14 | Stop reason: SDUPTHER

## 2025-01-14 RX ORDER — SUCCINYLCHOLINE/SOD CL,ISO/PF 200MG/10ML
SYRINGE (ML) INTRAVENOUS
Status: DISCONTINUED | OUTPATIENT
Start: 2025-01-14 | End: 2025-01-14 | Stop reason: SDUPTHER

## 2025-01-14 RX ORDER — LIDOCAINE HYDROCHLORIDE 20 MG/ML
INJECTION, SOLUTION INTRAVENOUS
Status: DISCONTINUED | OUTPATIENT
Start: 2025-01-14 | End: 2025-01-14 | Stop reason: SDUPTHER

## 2025-01-14 RX ADMIN — MEPERIDINE HYDROCHLORIDE 12.5 MG: 25 INJECTION INTRAMUSCULAR; INTRAVENOUS; SUBCUTANEOUS at 12:35

## 2025-01-14 RX ADMIN — FENTANYL CITRATE 100 MCG: 50 INJECTION, SOLUTION INTRAMUSCULAR; INTRAVENOUS at 11:38

## 2025-01-14 RX ADMIN — SODIUM CHLORIDE, POTASSIUM CHLORIDE, SODIUM LACTATE AND CALCIUM CHLORIDE: 600; 310; 30; 20 INJECTION, SOLUTION INTRAVENOUS at 10:11

## 2025-01-14 RX ADMIN — KETOROLAC TROMETHAMINE 15 MG: 30 INJECTION, SOLUTION INTRAMUSCULAR at 12:25

## 2025-01-14 RX ADMIN — ROCURONIUM BROMIDE 20 MG: 10 INJECTION, SOLUTION INTRAVENOUS at 11:42

## 2025-01-14 RX ADMIN — SUGAMMADEX 300 MG: 100 INJECTION, SOLUTION INTRAVENOUS at 11:58

## 2025-01-14 RX ADMIN — ONDANSETRON 4 MG: 2 INJECTION, SOLUTION INTRAMUSCULAR; INTRAVENOUS at 11:46

## 2025-01-14 RX ADMIN — Medication 120 MG: at 11:38

## 2025-01-14 RX ADMIN — DEXMEDETOMIDINE HYDROCHLORIDE 10 MCG: 100 INJECTION, SOLUTION INTRAVENOUS at 12:11

## 2025-01-14 RX ADMIN — MEPERIDINE HYDROCHLORIDE 12.5 MG: 25 INJECTION INTRAMUSCULAR; INTRAVENOUS; SUBCUTANEOUS at 12:40

## 2025-01-14 RX ADMIN — Medication 30 MG: at 11:38

## 2025-01-14 RX ADMIN — LIDOCAINE HYDROCHLORIDE 100 MG: 20 INJECTION, SOLUTION INTRAVENOUS at 11:38

## 2025-01-14 RX ADMIN — PROPOFOL 100 MG: 10 INJECTION, EMULSION INTRAVENOUS at 11:38

## 2025-01-14 RX ADMIN — DEXAMETHASONE SODIUM PHOSPHATE 10 MG: 10 INJECTION INTRAMUSCULAR; INTRAVENOUS at 11:46

## 2025-01-14 RX ADMIN — HYDROMORPHONE HYDROCHLORIDE 0.25 MG: 1 INJECTION, SOLUTION INTRAMUSCULAR; INTRAVENOUS; SUBCUTANEOUS at 12:25

## 2025-01-14 RX ADMIN — DIPHENHYDRAMINE HYDROCHLORIDE 12.5 MG: 50 INJECTION, SOLUTION INTRAMUSCULAR; INTRAVENOUS at 11:45

## 2025-01-14 RX ADMIN — MIDAZOLAM 2 MG: 1 INJECTION INTRAMUSCULAR; INTRAVENOUS at 11:30

## 2025-01-14 RX ADMIN — HYDROMORPHONE HYDROCHLORIDE 0.25 MG: 1 INJECTION, SOLUTION INTRAMUSCULAR; INTRAVENOUS; SUBCUTANEOUS at 12:29

## 2025-01-14 RX ADMIN — DEXMEDETOMIDINE HYDROCHLORIDE 10 MCG: 100 INJECTION, SOLUTION INTRAVENOUS at 12:13

## 2025-01-14 ASSESSMENT — PAIN DESCRIPTION - DESCRIPTORS
DESCRIPTORS: BURNING
DESCRIPTORS: ACHING
DESCRIPTORS: BURNING

## 2025-01-14 ASSESSMENT — PAIN - FUNCTIONAL ASSESSMENT
PAIN_FUNCTIONAL_ASSESSMENT: 0-10
PAIN_FUNCTIONAL_ASSESSMENT: 0-10

## 2025-01-14 ASSESSMENT — PAIN SCALES - GENERAL
PAINLEVEL_OUTOF10: 10
PAINLEVEL_OUTOF10: 10
PAINLEVEL_OUTOF10: 9
PAINLEVEL_OUTOF10: 9

## 2025-01-14 ASSESSMENT — PAIN DESCRIPTION - LOCATION
LOCATION: THROAT

## 2025-01-14 ASSESSMENT — LIFESTYLE VARIABLES: SMOKING_STATUS: 1

## 2025-01-14 NOTE — DISCHARGE INSTRUCTIONS
ENT Post-Op Instructions    Follow up with Dr. Bowden  in 1 week(s). CALL OFFICE TO SCHEDULE/CONFIRM APPOINTMENT    Please follow the instructions below:    DIET INSTRUCTIONS:  Regular diet. Start with light meals today and increase as tolerated.  Intake as many calories as you can.  Drinking through a straw, soft foods will be easiest, smoothies/milkshakes/noodles    ACTIVITY INSTRUCTIONS:  Increase activity as tolerated    Elevate Head of bed   No heavy lifting or strenuous activity until your cleared at your post-operative appointment   No driving while taking pain medication  Strongly encourage smoking cessation    MEDICATION INSTRUCTIONS:  Take medication as prescribed.  Alternate between Tylenol and Advil every 3 hours.  Keep yourself on a pain medicine schedule.  Use narcotic in place of Tylenol for breakthrough pain.  When taking pain medications, you may experience dizziness or drowsiness.  Do not drink alcohol or drive when taking these medications.  Do not take any other acetaminophen (Tylenol) products while taking your pain medication.  You may experience constipation while taking narcotic pain medication - You may take over the counter stool softeners: docuscate (Colace) or sennosides S (Senokot - S).     Call physician for any of the following or for questions/concerns:   Fever over 101° F    Chest or back pain  Unrelieved nausea/vomiting    Unrelieved pain or increase in pain     Increase in shortness of breath           Infection After Surgery: Care Instructions  Overview  After surgery, an infection is always possible. It doesn't mean that the surgery didn't go well.  Because an infection can be serious, your doctor has taken steps to manage it.  Your doctor checked the infection and cleaned it if necessary. Your doctor may have made an opening in the area so that the pus can drain out. You may have gauze in the cut so that the area will stay open and keep draining. You may need antibiotics.  You  [employer] disclaims any warranty or liability for your use of this information.

## 2025-01-14 NOTE — OP NOTE
Operative Note      Patient: Prasanna Parnell  YOB: 1965  MRN: [id number]    Date of Procedure: 1/14/2025    Pre-Op Diagnosis Codes:   Right oropharyngeal mass  Post-Op Diagnosis: Same       Procedure(s):  Direct laryngoscopy with biopsy, rigid esophagoscopy    Surgeon(s):  Jonathan Bowden DO    Assistant:   * No surgical staff found *    Anesthesia: General    Estimated Blood Loss (mL): Minimal    Complications: None    Specimens:   ID Type Source Tests Collected by Time Destination   A : RIGHT TONSIL BIOPSY Tissue Tissue SURGICAL PATHOLOGY Jonathan Bowden DO 1/14/2025 1147    B : RIGHT BASE OF TONGUE TISSUE BIOPSY Tissue Tissue SURGICAL PATHOLOGY Jonathan Bowden DO 1/14/2025 1149        Implants:  * No implants in log *      Drains: * No LDAs found *    Findings:  Other Findings: Large ulcerative mass covering the entire right palatine tonsil that extends down filling the entire right base of tongue, into the vallecula, approaching midline but not crossing midline, this coincides with hard palpable mass in this area.  No obvious or palpable left palatine tonsil or left base of tongue mass.  Epiglottis is sharp.  Vocal cords are without masses.  Access is difficult due to trismus and poor neck extension from chronic C-spine issues.   Right level IIA mass adjacent to submandibular gland     Indications for procedure: This is a 59-year-old male with history of right tonsil mass progressively worsening.  Risk-benefit alternative discussed including but not limited to bleeding, infection, damage adjacent structure, need for further procedure.  Patient understood and agreed to proceed.    DESCRIPTION OF PROCEDURE: The patient was consented preoperatively, taken   back to the operating room, identified, and appropriately placed in the   supine position. Given anesthesia per general intubation. Once the patient   was intubated, they were turned 90 degrees, and the bed was elevated to my hip   height. A tooth

## 2025-01-14 NOTE — ANESTHESIA POSTPROCEDURE EVALUATION
Department of Anesthesiology  Postprocedure Note    Patient: Prasanna Parnell  MRN: [id number]  YOB: 1965  Date of evaluation: 1/14/2025    Procedure Summary       Date: 01/14/25 Room / Location: [address]    Anesthesia Start: 1130 Anesthesia Stop: 1216    Procedure: PANENDOSCOPY WITH BIOPSY Diagnosis:       Cancer of oral cavity (HCC)      (Cancer of oral cavity (HCC) [C06.9])    Surgeons: Jonathan Bowden DO Responsible Provider: Barber Wilhelm MD    Anesthesia Type: General ASA Status: 3            Anesthesia Type: General    Helen Phase I: Helen Score: 8    Helen Phase II: Helen Score: 10    Anesthesia Post Evaluation    Patient location during evaluation: PACU  Patient participation: complete - patient participated  Level of consciousness: awake and alert  Airway patency: patent  Nausea & Vomiting: no nausea and no vomiting  Cardiovascular status: hemodynamically stable  Respiratory status: acceptable  Hydration status: euvolemic  Multimodal analgesia pain management approach  Pain management: adequate    No notable events documented.

## 2025-01-14 NOTE — INTERVAL H&P NOTE
Update History & Physical    The patient's History and Physical within the last 30 days was reviewed with the patient and I examined the patient. There was no change. The surgical site was confirmed by the patient and me.     Plan: The risks, benefits, expected outcome, and alternative to the recommended procedure have been discussed with the patient. Patient understands and wants to proceed with the procedure.     Electronically signed by Jonathan Bowden DO on 1/14/2025 at 1:03 PM

## 2025-01-15 ENCOUNTER — TELEPHONE (OUTPATIENT)
Dept: PULMONOLOGY | Age: 60
End: 2025-01-15

## 2025-01-15 ENCOUNTER — TELEPHONE (OUTPATIENT)
Dept: SURGERY | Age: 60
End: 2025-01-15

## 2025-01-15 ENCOUNTER — TELEPHONE (OUTPATIENT)
Dept: ONCOLOGY | Age: 60
End: 2025-01-15

## 2025-01-15 NOTE — TELEPHONE ENCOUNTER
Scheduled patient for PEG placement on 1/20/25 @ 12:30pm at [facility] . Patient needs to report at the front entrance 1 hour before the procedure, NPO after the midnight the night before the procedure. No ASA products for 5 days. Do not stop Aspirin 81mg. MA left voicemail for patient's contact, Mary Anne to call Dr Frank's. Instruction sheet to be emailed with patient's consent. Encouraged to call our office if any questions.     Electronically signed by LAURO FRIEDMAN MA on 1/15/2025 at 9:51 AM

## 2025-01-15 NOTE — TELEPHONE ENCOUNTER
Patient returned phone call, informed him that his procedure is now on 1-23-25 at 1:30 pm here at the [facility] hospital. He must arrive by 11:30 am. Patient verbalized understanding

## 2025-01-15 NOTE — TELEPHONE ENCOUNTER
Contacted pt at request of ENT re: financial concerns.  Pt is 59-year-old male being evaluated for Head and Neck cancer.  Pt reported that he is currently unemployed and has no medical coverage.  He stated that he had previously been on SSDI due to back problems and had Medicare coverage.  He reported that he returned to work and therefore forfeited these benefits but has since had to quit working again due to loss of finger as well as current medical concerns.  He stated that he has considered contacting Social Security to schedule appointment to reestablish benefits but has not completed this due to long wait times; advised pt of the importance of completing this to start process of requesting benefits.  Noted that there may be some financial assistance programs available to provide some relief if he requires chemotherapy or radiation treatment but pt has not yet consulted in medical or radiation oncology.  Pt stated that he did submit paperwork for Medicaid application and was advised that he should be eligible for coverage backdated to 12/1/2024; pt was unsure of who assisted him with this but stated that he dropped it off \"where he got his EKG.\"    Discussed upcoming oncology appointments; pt confirmed plans to attend.  No additional needs identified at this time.  Reviewed role of oncology SW and encouraged pt to notify this provider if additional needs arise.    Emailed sent to [employer] representative due to chart not reflecting any current Financial Assistance application; per Thomas, no application received.    Spoke with Radiology Department, Registration, and ; no application found.    Attempted to contact pt to notify of above; message left encouraging pt to bring copies of application if possible.      Received return call from pt. Pt reported that he was able to locate name of representative helping him with Medicaid.  Contacted Cammy ([phone]) who was agreeable to resending

## 2025-01-15 NOTE — TELEPHONE ENCOUNTER
JORY to please call back regarding his procedure. I want to let him know that his procedure has been moved to 1-23-25 at 1:30 pm with Dr. Beebe from 2-6-25

## 2025-01-16 ENCOUNTER — TELEPHONE (OUTPATIENT)
Dept: PALLATIVE CARE | Age: 60
End: 2025-01-16

## 2025-01-16 ENCOUNTER — TELEPHONE (OUTPATIENT)
Dept: CASE MANAGEMENT | Age: 60
End: 2025-01-16

## 2025-01-16 ENCOUNTER — TELEPHONE (OUTPATIENT)
Dept: SURGERY | Age: 60
End: 2025-01-16

## 2025-01-16 ENCOUNTER — HOSPITAL ENCOUNTER (OUTPATIENT)
Dept: INFUSION THERAPY | Age: 60
Discharge: HOME | End: 2025-01-16

## 2025-01-16 ENCOUNTER — OFFICE VISIT (OUTPATIENT)
Dept: ONCOLOGY | Age: 60
End: 2025-01-16

## 2025-01-16 VITALS
HEIGHT: 68 IN | OXYGEN SATURATION: 99 % | DIASTOLIC BLOOD PRESSURE: 65 MMHG | WEIGHT: 148 LBS | SYSTOLIC BLOOD PRESSURE: 139 MMHG | TEMPERATURE: 97.7 F | HEART RATE: 58 BPM | BODY MASS INDEX: 22.43 KG/M2

## 2025-01-16 DIAGNOSIS — R91.8 MASS OF RIGHT LUNG: ICD-10-CM

## 2025-01-16 DIAGNOSIS — J39.2 OROPHARYNGEAL MASS: Primary | ICD-10-CM

## 2025-01-16 DIAGNOSIS — C06.9 CANCER OF ORAL CAVITY (HCC): Primary | ICD-10-CM

## 2025-01-16 PROCEDURE — 99204 OFFICE O/P NEW MOD 45 MIN: CPT | Performed by: STUDENT IN AN ORGANIZED HEALTH CARE EDUCATION/TRAINING PROGRAM

## 2025-01-16 RX ORDER — ALBUTEROL SULFATE 90 UG/1
2 INHALANT RESPIRATORY (INHALATION) EVERY 6 HOURS PRN
COMMUNITY

## 2025-01-16 NOTE — TELEPHONE ENCOUNTER
Called and spoke with Choco regarding referral for Palliative care. Explained Palliative  service and location of clinic. Dave set 2/11/25, will try to coordinate with treatment. Placed on wait list.

## 2025-01-16 NOTE — PROGRESS NOTES
[facility] PHYSICIANS [place] SPECIALTY CARE [facility]ly [facility], later [facility] MED ONCOLOGY  [address] [address]  Dept: [phone]  Loc: [phone]    Jayde Fernandez MD   ·   MD Adriana Lucero APRN  ·  FADI B[place]o      [place] [facility] [address] [address]  P: [phone]  F: [phone] [place] [facility] [place]  [address] [place] [address]  P: [phone]  F: [phone]  [place] [facility] [place]  [address] [place] [address]  P: [phone]  F: [phone]                 Hematology/Oncology   Clinic Consultation Note              Patient: Prasanna Parnell,  59 y.o. male    Date of Encounter: 01/16/2025     Referring Provider:  Jonathan Bowden DO    Reason for Visit:   Right tonsil/base of tongue malignancy        PCP:  No primary care provider on file.      Chief Complaint   Patient presents with    New Patient         History of Present Illness of Initial Consultation (1/16/2025):  The patient is a 59 y.o. male comes in for evaluation for probable/suspected cancer of head and neck primary, located of the right palate teen tonsil/base of tongue.    Relevant history begins about 1.5 years ago, when patient was in the hospital for wound/osteomyelitis of the right index finger, requiring amputation back in July 2023.  Patient was having increased ear pain, in which he had addressed to another provider previously, but his complaints were never readdressed since then.    And then in more recent weeks/months, he has had increased odynophagia and presented to the ED on 12/27/2024, in which CT soft tissue neck was completed, detecting a right sided submucosal edema and ill defined enhancement of the right oropharynx/tonsil.  There is also enlarged right neck lymph nodes, located at station 2A, measuring 1.8 cm.  There was also another enlarged abnormal appearing level 3 and 4/5 lymph nodes measuring 8 mm.  Furthermore, 
Patient did stop at check out window, ok'd to leave without AVS.  Patient has MYCHART.    
Inhale into the lungs as needed (Patient not taking: Reported on 1/2/2025), Disp: , Rfl:        Past Medical History:   Diagnosis Date    Cervicalgia     Neuropathy     Retrolisthesis     Spondylolisthesis        Past Surgical History:   Procedure Laterality Date    HERNIA REPAIR      LARYNGOSCOPY N/A 1/14/2025    PANENDOSCOPY WITH BIOPSY performed by Jonathan Bowden DO at [facility] OR    LUMBAR LAMINECTOMY      1998,2010       History reviewed. No pertinent family history.    Social History     Socioeconomic History    Marital status: Single     Spouse name: Not on file    Number of children: Not on file    Years of education: Not on file    Highest education level: Not on file   Occupational History    Not on file   Tobacco Use    Smoking status: Every Day     Current packs/day: 0.50     Average packs/day: 0.5 packs/day for 33.0 years (16.5 ttl pk-yrs)     Types: Cigarettes     Start date: 1992    Smokeless tobacco: Former   Vaping Use    Vaping status: Never Used   Substance and Sexual Activity    Alcohol use: No    Drug use: No    Sexual activity: Not on file   Other Topics Concern    Not on file   Social History Narrative    Not on file     Social Determinants of Health     Financial Resource Strain: Patient Declined (8/6/2024)    Overall Financial Resource Strain (CARDIA)     Difficulty of Paying Living Expenses: Patient declined   Food Insecurity: No Food Insecurity (8/6/2024)    Hunger Vital Sign     Worried About Running Out of Food in the Last Year: Never true     Ran Out of Food in the Last Year: Never true   Transportation Needs: Unknown (8/6/2024)    PRAPARE - Transportation     Lack of Transportation (Medical): Not on file     Lack of Transportation (Non-Medical): No   Physical Activity: Not on file   Stress: Low Risk (7/24/2023)    Received from [facility] ([facility])    Stress     Over the last 2 weeks, how often have you been bothered by the following

## 2025-01-16 NOTE — TELEPHONE ENCOUNTER
Met with patient during his initial consultation with Dr. Tomas for his recent oral and lung mass diagnosis. Introduced myself and explained my role with patients receiving treatment at our center.  Patient was friendly and receptive. Instructed on next steps including bronchoscopy, PEG placement, appointment with radiation oncologist and follow up after final pathology available per Dr. Tomas's recommendations and follow up care. Provided patient with transportation resource list and literature on support group services both in person and on line, [employer] stop smoking pamphlet, ACS Tobacco: What's it costing you and Patient Resource Head and Neck booklet given at request of patient.. Reviewed resources available to him such as Social Work, Financial Navigator and Dietitian. Patient has already had telephone conversation with , who forwarded this nurse with paperwork for patient to complete to assist in getting patient set up with medicaid and financial assistance due to him having no insurance and unable to work at this time.  Patient states that he will take forms home and send back upon completion as he is unable to stay today to fill out due to transportation.  He is also having issues eating and drinking and is having a PEG placed on 1/20/25 and will meet with the dietitian at his appointment today.  Will also refer patient to financial navigator for assistance at this time. Provided patient with gas card due to long travel time and gift card for food due to inability to work at this time.New patient nursing assessment, medical history, surgical history, family history completed. Medication list reviewed and updated. Provided with my contact information and instructed patient to call me with questions or concerns. Verbalizes understanding. Patient appreciative of visit. Will continue to follow. Cindy BENITEZN, RN-OCN Nurse Navigator

## 2025-01-16 NOTE — PROGRESS NOTES
Prasanna Parnell  1/16/2025  Ht Readings from Last 1 Encounters:   01/16/25 1.727 m (5' 8\")     Wt Readings from Last 10 Encounters:   01/16/25 67.1 kg (148 lb)   01/14/25 64.3 kg (141 lb 12.8 oz)   01/02/25 67.4 kg (148 lb 8 oz)   12/27/24 63 kg (139 lb)   12/26/24 65.8 kg (145 lb)   08/06/24 63 kg (139 lb)   09/23/23 55.8 kg (123 lb)   08/15/22 55.8 kg (123 lb)   06/27/22 55.8 kg (123 lb)   06/13/22 55.8 kg (123 lb)     BMI: 22.5    Assessment: Met w/ pt for introduction during initial consult for oral CA. Pt reports relatively stable wt, noting that he is wearing several layers as well as snow boots today. He is experiencing significant odynophagia, exacerbated by recent bx. Reviewed role of oncology dietitian in pt's care. Educated on likely nutrition related side effects of treatment. Pt is scheduled for prophylactic PEG placement on 1/20. He will require [facility] for TF teaching for 60 cc H2O flushes BID + tube maintenance. Pt would like to hold off on nutrition through tube until insurance confirmed as he is currently self-pay. He is aware that he will require [facility] visit following PEG placement. He does not have preference for [facility] company. Will send referral to [facility] for TF teaching. Reviewed PEG placement process. Educated on overall nutrition needs, providing w/ samples of Ensure Complete for trial. Pt is scheduled to see radiation oncology on 1/21. Will plan to meet again at that time for check in. Pt provided w/ contact information, encouraged to call this clinician as needed.    Weight change: stable per pt  Appetite: fair  Nutritional Side Effects: odynophagia  Calculated Needs: 30-32 kcal/kg CBW =  kcal, 1.3-1.5 gm/kg CBW =  gm pro, 1 ml/kcal =  ml fluids  Malnutrition Status: At risk  Nutrition Diagnosis: Inadequate oral intake r/t oral CA AEB reported decreased PO intake 2/2 odynophagia.    Pre-Hab Eligible?: Yes    Recommendations: Pt to consume at least 3 soft meals/day +

## 2025-01-16 NOTE — TELEPHONE ENCOUNTER
MA spoke with patient and gave instruction for PEG placement on 1/20/25. Notes in chart. Patient verbalized understanding.    Electronically signed by LAURO FRIEDMAN MA on 1/16/2025 at 12:25 PM

## 2025-01-16 NOTE — PROGRESS NOTES
[facility] PRE-ADMISSION TESTING INSTRUCTIONS    The Preadmission Testing patient is instructed accordingly using the following criteria (check applicable):    ARRIVAL INSTRUCTIONS:  [x] Parking the day of Surgery is located in the Main Entrance lot.  Upon entering the door, make an immediate right to the surgery reception desk    [x] Bring photo ID and insurance card    [x] Please be sure to arrange for responsible adult to provide transportation to and from the hospital    [x] Please arrange for responsible adult to be with you for the 24 hour period post procedure due to having anesthesia    [x] If you awake am of surgery not feeling well or have temperature >100 please call [phone]    GENERAL INSTRUCTIONS:    [x]   Nothing to eat or drink after midnight prior to procedure         No gum, candy or mints.    [x] You may brush your teeth, but do not swallow any water    [x] Take medications as instructed with 1-2 oz of water    [x] Shower or bath with soap, lather and rinse well, AM of Surgery, no lotion, powders or creams     [x] No tobacco products within 24 hours of surgery     [x] No alcohol or illegal drug use within 24 hours of surgery.    [x] Jewelry, body piercing's, eyeglasses, contact lenses and dentures are not permitted into surgery (bring cases)      [x] You can expect a call the business day prior to procedure to notify you if your arrival time changes    [x] If you receive a survey after surgery we would greatly appreciate your comments    [x] Please notify surgeon if you develop any illness between now and time of surgery (cold, cough, sore throat, fever, nausea, vomiting) or any signs of infections  including skin, wounds, and dental.    [x]  The Outpatient Pharmacy is available to fill your prescription here on your day of surgery, ask your preop nurse for details    [x] Other instructions: wear loose, comfortable clothing

## 2025-01-16 NOTE — TELEPHONE ENCOUNTER
Scheduled patient for PEG tube placement on 1/20/25 @ 12:30pm at [facility] . Patient needs to report at the front entrance 2 hours before the procedure, NPO after the midnight the night before the procedure. No ASA products for 5 days. Do not stop Aspirin 81mg. Patient verbalized understanding. Instruction letter emailed. Encouraged to call our office if any questions.     Electronically signed by LAURO FRIEDMAN MA on 1/16/2025 at 10:06 AM

## 2025-01-17 ENCOUNTER — CLINICAL DOCUMENTATION (OUTPATIENT)
Dept: ONCOLOGY | Age: 60
End: 2025-01-17

## 2025-01-17 NOTE — PROGRESS NOTES
[facility]   PRE-ADMISSION TESTING GENERAL INSTRUCTIONS  PAT Phone Number: [phone]      GENERAL INSTRUCTIONS:    [x] Antibacterial Soap Shower Night before AND the Morning of procedure.  [x] Do not wear  lotions, powders, deodorant the morning of surgery.  [x] No solid food after midnight. You may have SIPS of clear liquids up until 2 hours before your arrival time to the hospital.   [x] You may brush your teeth, gargle, but do not swallow water.   [x] No tobacco products, illegal drugs, or alcohol within 24 hours of your surgery.  [x] Jewelry or valuables should not be brought to the hospital. All body and/or tongue piercing's must be removed prior to arriving to hospital. No contact lens or hair pins.   [x] Arrange transportation with a responsible adult  to and from the hospital. Arrange for someone to be with you for the remainder of the day and for 24 hours after your procedure due to having had anesthesia.          -Who will be your  for transportation?  Mary Anne         -Who will be staying with you for 24 hrs after your procedure?  Mary Anne   [x] Bring insurance card and photo ID.  [] Bring copy of living will or healthcare power of  papers to be placed in your electronic record.  [] Urine Pregnancy test will be preformed the day of surgery. A specimen sample may be brought from home.  [] Transfusion (Green) Bracelet: Please bring with you to hospital, day of surgery.     PARKING INSTRUCTIONS:     [x] ARRIVAL DATE & TIME:   1/23  @  1130am  [x] Times are subject to change. We will contact you the business day before surgery if that were to occur.  [x] Enter into the [facility] Entrance. Two people may accompany you. Masks are not required.  [x] Parking Lot \"I\" is where you will park. It is located on the corner of [address] and [address]. The entrance is on [address].   Only one vehicle - per patient, is permitted in parking lot.   Upon

## 2025-01-17 NOTE — PROGRESS NOTES
Received call from [facility]. Unable to accept Medicaid pending. Spoke w/ Tyesha at [facility], requests information be sent for review. Updated referral for TF teach send to [facility].  Electronically signed by Zaira Pratt MS, RD, LD on 1/17/2025 at 3:05 PM    Received call from Tyesha at [facility]. Unable to accept pt d/t staffing at this time. Updated referral sent to [facility] at this time.  Electronically signed by Zaira Pratt MS, RD, LD on 1/17/2025 at 3:19 PM

## 2025-01-20 ENCOUNTER — ANESTHESIA (OUTPATIENT)
Dept: ENDOSCOPY | Age: 60
End: 2025-01-20

## 2025-01-20 ENCOUNTER — TELEPHONE (OUTPATIENT)
Dept: ONCOLOGY | Age: 60
End: 2025-01-20

## 2025-01-20 ENCOUNTER — HOSPITAL ENCOUNTER (OUTPATIENT)
Age: 60
Setting detail: OUTPATIENT SURGERY
Discharge: HOME | End: 2025-01-20
Attending: STUDENT IN AN ORGANIZED HEALTH CARE EDUCATION/TRAINING PROGRAM | Admitting: STUDENT IN AN ORGANIZED HEALTH CARE EDUCATION/TRAINING PROGRAM
Payer: MEDICAID

## 2025-01-20 ENCOUNTER — ANESTHESIA EVENT (OUTPATIENT)
Dept: ENDOSCOPY | Age: 60
End: 2025-01-20

## 2025-01-20 VITALS
WEIGHT: 148 LBS | BODY MASS INDEX: 22.43 KG/M2 | HEART RATE: 74 BPM | DIASTOLIC BLOOD PRESSURE: 59 MMHG | TEMPERATURE: 97.4 F | HEIGHT: 68 IN | RESPIRATION RATE: 16 BRPM | OXYGEN SATURATION: 98 % | SYSTOLIC BLOOD PRESSURE: 100 MMHG

## 2025-01-20 PROBLEM — C76.0 HEAD AND NECK CANCER (HCC): Status: ACTIVE | Noted: 2025-01-20

## 2025-01-20 LAB
GLUCOSE BLD-MCNC: 91 MG/DL (ref 74–99)
SURGICAL PATHOLOGY REPORT: NORMAL

## 2025-01-20 PROCEDURE — 43246 EGD PLACE GASTROSTOMY TUBE: CPT | Performed by: STUDENT IN AN ORGANIZED HEALTH CARE EDUCATION/TRAINING PROGRAM

## 2025-01-20 PROCEDURE — 2709999900 HC NON-CHARGEABLE SUPPLY: Performed by: STUDENT IN AN ORGANIZED HEALTH CARE EDUCATION/TRAINING PROGRAM

## 2025-01-20 PROCEDURE — 6360000002 HC RX W HCPCS: Performed by: NURSE ANESTHETIST, CERTIFIED REGISTERED

## 2025-01-20 PROCEDURE — 82962 GLUCOSE BLOOD TEST: CPT

## 2025-01-20 PROCEDURE — 7100000010 HC PHASE II RECOVERY - FIRST 15 MIN: Performed by: STUDENT IN AN ORGANIZED HEALTH CARE EDUCATION/TRAINING PROGRAM

## 2025-01-20 PROCEDURE — 6360000002 HC RX W HCPCS

## 2025-01-20 PROCEDURE — 7100000011 HC PHASE II RECOVERY - ADDTL 15 MIN: Performed by: STUDENT IN AN ORGANIZED HEALTH CARE EDUCATION/TRAINING PROGRAM

## 2025-01-20 PROCEDURE — 3609013300 HC EGD TUBE PLACEMENT: Performed by: STUDENT IN AN ORGANIZED HEALTH CARE EDUCATION/TRAINING PROGRAM

## 2025-01-20 PROCEDURE — 3700000000 HC ANESTHESIA ATTENDED CARE: Performed by: STUDENT IN AN ORGANIZED HEALTH CARE EDUCATION/TRAINING PROGRAM

## 2025-01-20 RX ORDER — SODIUM CHLORIDE, SODIUM LACTATE, POTASSIUM CHLORIDE, CALCIUM CHLORIDE 600; 310; 30; 20 MG/100ML; MG/100ML; MG/100ML; MG/100ML
INJECTION, SOLUTION INTRAVENOUS CONTINUOUS
Status: DISCONTINUED | OUTPATIENT
Start: 2025-01-20 | End: 2025-01-20 | Stop reason: HOSPADM

## 2025-01-20 RX ORDER — SODIUM CHLORIDE 9 MG/ML
25 INJECTION, SOLUTION INTRAVENOUS PRN
Status: DISCONTINUED | OUTPATIENT
Start: 2025-01-20 | End: 2025-01-20 | Stop reason: HOSPADM

## 2025-01-20 RX ORDER — KETOROLAC TROMETHAMINE 15 MG/ML
15 INJECTION, SOLUTION INTRAMUSCULAR; INTRAVENOUS ONCE
Status: DISCONTINUED | OUTPATIENT
Start: 2025-01-20 | End: 2025-01-20 | Stop reason: HOSPADM

## 2025-01-20 RX ORDER — SODIUM CHLORIDE 0.9 % (FLUSH) 0.9 %
5-40 SYRINGE (ML) INJECTION EVERY 12 HOURS SCHEDULED
Status: DISCONTINUED | OUTPATIENT
Start: 2025-01-20 | End: 2025-01-20 | Stop reason: HOSPADM

## 2025-01-20 RX ORDER — SODIUM CHLORIDE 0.9 % (FLUSH) 0.9 %
5-40 SYRINGE (ML) INJECTION PRN
Status: DISCONTINUED | OUTPATIENT
Start: 2025-01-20 | End: 2025-01-20 | Stop reason: HOSPADM

## 2025-01-20 RX ORDER — KETOROLAC TROMETHAMINE 30 MG/ML
INJECTION, SOLUTION INTRAMUSCULAR; INTRAVENOUS
Status: COMPLETED
Start: 2025-01-20 | End: 2025-01-20

## 2025-01-20 RX ORDER — PROPOFOL 10 MG/ML
INJECTION, EMULSION INTRAVENOUS
Status: DISCONTINUED | OUTPATIENT
Start: 2025-01-20 | End: 2025-01-20 | Stop reason: SDUPTHER

## 2025-01-20 RX ADMIN — PROPOFOL 300 MG: 10 INJECTION, EMULSION INTRAVENOUS at 11:59

## 2025-01-20 RX ADMIN — KETOROLAC TROMETHAMINE 15 MG: 30 INJECTION, SOLUTION INTRAMUSCULAR at 12:35

## 2025-01-20 ASSESSMENT — PAIN - FUNCTIONAL ASSESSMENT
PAIN_FUNCTIONAL_ASSESSMENT: 0-10
PAIN_FUNCTIONAL_ASSESSMENT: 0-10

## 2025-01-20 ASSESSMENT — LIFESTYLE VARIABLES: SMOKING_STATUS: 1

## 2025-01-20 ASSESSMENT — PAIN DESCRIPTION - LOCATION: LOCATION: NECK;THROAT

## 2025-01-20 ASSESSMENT — PAIN DESCRIPTION - DESCRIPTORS: DESCRIPTORS: ACHING;DISCOMFORT;SORE;SHARP

## 2025-01-20 ASSESSMENT — PAIN SCALES - GENERAL: PAINLEVEL_OUTOF10: 10

## 2025-01-20 NOTE — PROGRESS NOTES
Patient arrived in Stage 2 with 10/10 pain. He is lying right sided against the side rail and refusing to be moved due to his pain. Family and patient are very upset at the minimal pain control he has. I have spoken to , who ordered Toradol. After patient received it he visibly looked more comfortable but still rated his pain 9/10 which he states he has had chronically. I have asked if they wanted me to call the physician so they can voice their concerns and they said no they just want to go home. Patient meets all d/c criteria and is at baseline pain level at this time.     Patient discharged home with family via WC, they verbalize understanding of discharge instructions and have no further questions at this time.

## 2025-01-20 NOTE — TELEPHONE ENCOUNTER
Per Anya at [facility], pt has been accepted for TF teaching at this time. This clinician will provide pt w/ 60cc piston syringes, split gauze, and paper tape at tomorrow's radiation oncology consult.  Electronically signed by Zaira Pratt MS, RD, LD on 1/20/2025 at 2:16 PM

## 2025-01-20 NOTE — ANESTHESIA PRE PROCEDURE
12/27/2024 09:45 AM    BUN 14 12/27/2024 09:45 AM    CREATININE 1.0 12/27/2024 09:45 AM    LABGLOM 87 12/27/2024 09:45 AM    GLUCOSE 106 12/27/2024 09:45 AM    CALCIUM 10.0 12/27/2024 09:45 AM    BILITOT 0.5 12/27/2024 09:45 AM    ALKPHOS 111 12/27/2024 09:45 AM    AST 22 12/27/2024 09:45 AM    ALT 19 12/27/2024 09:45 AM       POC Tests: No results for input(s): \"POCGLU\", \"POCNA\", \"POCK\", \"POCCL\", \"POCBUN\", \"POCHEMO\", \"POCHCT\" in the last 72 hours.    Coags:   Lab Results   Component Value Date/Time    PROTIME 11.6 08/04/2022 09:16 AM    INR 1.1 08/04/2022 09:16 AM       HCG (If Applicable): No results found for: \"PREGTESTUR\", \"PREGSERUM\", \"HCG\", \"HCGQUANT\"     ABGs: No results found for: \"PHART\", \"PO2ART\", \"DJG2DKP\", \"WXX8XXH\", \"BEART\", \"G8TZIPQF\"     Type & Screen (If Applicable):  No results found for: \"ABORH\", \"LABANTI\"    Drug/Infectious Status (If Applicable):  No results found for: \"HIV\", \"HEPCAB\"    COVID-19 Screening (If Applicable):   Lab Results   Component Value Date/Time    COVID19 Not Detected 12/27/2024 09:45 AM           Anesthesia Evaluation  Patient summary reviewed   no history of anesthetic complications:   Airway: Mallampati: II  TM distance: >3 FB   Neck ROM: full     Dental:          Pulmonary: breath sounds clear to auscultation  (+)  COPD:          current smoker                           Cardiovascular:Negative CV ROS            Rhythm: regular  Rate: normal           Beta Blocker:  Not on Beta Blocker         Neuro/Psych:   (+) neuromuscular disease (Neuropathy):            GI/Hepatic/Renal: Neg GI/Hepatic/Renal ROS            Endo/Other:    (+) 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                  ROS comment: Oropharyngeal mass, tongue mass Abdominal:             Vascular: negative vascular ROS.         Other Findings:             Anesthesia Plan      MAC     ASA 3       Induction: intravenous.      Anesthetic plan and risks discussed with patient.      Plan discussed

## 2025-01-20 NOTE — H&P
companionship?: Hardly ever     How often do you feel left out?: Hardly Ever     How often do you feel isolated from others?: Hardly ever   Housing Stability: Unknown (8/6/2024)    Housing Stability Vital Sign     Unstable Housing in the Last Year: No       Family History   Problem Relation Age of Onset    Cancer Mother 65        leukemia    Alcohol Abuse Father     High Blood Pressure Father     Heart Attack Father     Stroke Father     Breast Cancer Maternal Grandmother     Cancer Paternal Grandfather         prostate             Objective:  Vitals:    01/16/25 1616 01/20/25 1143   BP:  (!) 112/58   Pulse:  84   Resp:  18   Temp:  97.4 °F (36.3 °C)   SpO2:  98%   Weight: 67.1 kg (148 lb)    Height: 1.727 m (5' 8\")           Physical Exam  Constitutional:       General: He is not in acute distress.     Appearance: Normal appearance. He is normal weight. He is ill-appearing. He is not toxic-appearing or diaphoretic.   HENT:      Head: Normocephalic and atraumatic.      Mouth/Throat:      Mouth: Mucous membranes are moist.      Pharynx: Oropharynx is clear.   Eyes:      General: No scleral icterus.     Conjunctiva/sclera: Conjunctivae normal.      Pupils: Pupils are equal, round, and reactive to light.   Cardiovascular:      Rate and Rhythm: Normal rate and regular rhythm.      Pulses: Normal pulses.   Pulmonary:      Effort: Pulmonary effort is normal. No respiratory distress.   Abdominal:      General: Abdomen is flat. There is no distension.      Palpations: Abdomen is soft. There is no mass.      Tenderness: There is no abdominal tenderness. There is no guarding or rebound.      Hernia: No hernia is present.   Musculoskeletal:         General: Normal range of motion.   Skin:     General: Skin is warm and dry.      Capillary Refill: Capillary refill takes less than 2 seconds.      Coloration: Skin is not jaundiced or pale.   Neurological:      General: No focal deficit present.      Mental Status: He is alert and

## 2025-01-20 NOTE — OP NOTE
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]    Post-Op Diagnosis: Same       Procedure(s):  ESOPHAGOGASTRODUODENOSCOPY PERCUTANEOUS ENDOSCOPIC GASTROSTOMY TUBE PLACEMENT    Surgeon(s):  Delvis Frank DO    Assistant:   Resident: Zaira Rivera DO    Anesthesia: Monitor Anesthesia Care    Estimated Blood Loss (mL): Minimal    Complications: None    Specimens:   * No specimens in log *    Implants:  * No implants in log *      Drains:   Gastrostomy/Enterostomy/Jejunostomy Tube Percutaneous Endoscopic Gastrostomy (PEG) LUQ 20 fr (Active)       Findings:  Infection Present At Time Of Surgery (PATOS) (choose all levels that have infection present):  No infection present  Other Findings: Mass noted at base of tongue, normal appearing gastric mucosa    Detailed Description of Procedure:   Under MAC anesthesia, the patient was positioned in supine position. A bite block was inserted. Under direct visualization the scope was passed through the oral cavity where the oral mass was noted, then into the esophagus and then into the stomach. Visualization on the gastroesophageal junction and fundus was unremarkable. The scope was then straightened and the distal stomach was inspected. This showed no evidence of gastritis or ulcer. No biopsies were performed. A good one to one transillumination was obtained with the scope in the distal stomach, felt to have a safe window for gastrostomy tube placement.     The area of the abdomen was prepped with Chloraprep. 5  cc 1% lidocaine was used for local anesthetic for the skin and subcutaneous tissue.  The 11 blade was used to make a small skin incision. The 18 gauge angio cath was used to stick the skin into the stomach. This was performed under direct visualization.  Next the wire was placed through the needle using the seldinger

## 2025-01-21 ENCOUNTER — TELEPHONE (OUTPATIENT)
Dept: CASE MANAGEMENT | Age: 60
End: 2025-01-21

## 2025-01-21 ENCOUNTER — HOSPITAL ENCOUNTER (OUTPATIENT)
Dept: RADIATION ONCOLOGY | Age: 60
Discharge: HOME | End: 2025-01-21

## 2025-01-21 ENCOUNTER — TELEPHONE (OUTPATIENT)
Dept: ONCOLOGY | Age: 60
End: 2025-01-21

## 2025-01-21 VITALS
BODY MASS INDEX: 20.65 KG/M2 | DIASTOLIC BLOOD PRESSURE: 64 MMHG | HEART RATE: 74 BPM | RESPIRATION RATE: 18 BRPM | TEMPERATURE: 97.6 F | OXYGEN SATURATION: 97 % | WEIGHT: 135.8 LBS | SYSTOLIC BLOOD PRESSURE: 141 MMHG

## 2025-01-21 PROBLEM — C09.8 MALIGNANT NEOPLASM OF OVERLAPPING SITES OF TONSIL (HCC): Status: ACTIVE | Noted: 2025-01-21

## 2025-01-21 PROCEDURE — 6370000000 HC RX 637 (ALT 250 FOR IP): Performed by: SPECIALIST

## 2025-01-21 PROCEDURE — 99205 OFFICE O/P NEW HI 60 MIN: CPT

## 2025-01-21 PROCEDURE — 6360000002 HC RX W HCPCS: Performed by: SPECIALIST

## 2025-01-21 RX ADMIN — Medication: at 15:58

## 2025-01-21 SDOH — ECONOMIC STABILITY: HOUSING INSECURITY: PLEASE ASSESS YOUR PATIENT'S LEVEL OF DISTRESS CONCERNING HOUSING (SCALE FROM 1-10): 0

## 2025-01-21 ASSESSMENT — PAIN SCALES - GENERAL: PAINLEVEL_OUTOF10: 7

## 2025-01-21 ASSESSMENT — PAIN DESCRIPTION - LOCATION: LOCATION: ABDOMEN

## 2025-01-21 NOTE — TELEPHONE ENCOUNTER
Attempted to contact pt as social work follow up; message left requesting callback.    IBETH Harp, SHANEKAW-S  Oncology Social Worker   [phone]

## 2025-01-21 NOTE — TELEPHONE ENCOUNTER
Met with patient during his initial consultation with Dr Rodríguez for his Head and Neck cancer diagnosis. Introduced myself and reviewed the Nurse Navigator role with patients receiving treatment at our center. He follows with Dr Tomas for Medical Oncology and Cindy LUX Navigator. Patient was friendly and receptive. He had his Medicaid paperwork with him that he had to sign. Forms were emailed to [facility]. He needs Dental Clearance and is agreeable to be seen at the [facility]. Provided patient information to the Dental Clinic. They requested the patient contact them to schedule an appointment. Updated patient that if he doesn't want to stop today we can call and get him an appointment. Next steps include Bronchoscopy with biopsy on 1/23, pathology review and potential treatments pending Dr Rodríguez's recommendations and follow up care. Provided patient with literature  on [facility] Radiation Therapy for Head and Neck, [facility] Rad[facility] for Head and Neck, [facility] Resources for Head and Neck Cancer, [facility] Care for Head and Neck Cancer and Dental Recommendations. Provided with my contact information and encouraged patient to call me with questions or concerns. Verbalizes understanding. Patient appreciative of visit. Will continue to follow.

## 2025-01-21 NOTE — PROGRESS NOTES
Endoscopy Procedure Note    Pre-operative Diagnosis:  Sore throat ab right ear pain    Post-operative Diagnosis:  SCC Right Tonsil    Indications: Hoarseness, dysphagia or aspiration - not able to be clearly evaluated by indirect laryngoscopy    Anesthesia: Lidocaine 2% and Afrin    Endoscopy Type:  nasal endoscopy, nasopharyngoscopy, laryngoscopy    Procedure Details   With the patient sitting upright in the examining chair informed consent was obtained.  The bilateral side(s) of the nose were topically anesthetized with spray.  After waiting an appropriate period of time for anesthesia/ vasoconstriction to become effective, the 0-degree  flexible laryngoscope was passed through the left side(s) of the nose, and the nose, nasopharynx, oropharynx, hypopharynx and larynx were examined.    Examination was performed during quiet respiration and with phonation.  I personally performed the procedure.  The following findings were noted.    Findings:  Mucosa:  without erythema or discharge   Nasal septum:  normal   Turbinates:  normal   Eustachian tubes:  normal   Mucous stranding:  present   Lesions:  Present, tonsillar mass with extension to lateral right base of tongue    Larynx Supraglottis, false and true vocal cord were normal.       Condition:  Stable    Complications:  None    Pictures:                                          
Met w/ pt during radiation oncology consult. Provided w/ 60cc piston syringes, gauze, and paper tape. Pt reports he has not spoke w/ anyone from [facility] as of now. Pt has partner, Mary Anne, with him today. Mary Anne reports she and pt would prefer not to have HHC in their home as Mary Anne has had several unpleasant experiences w/ [employer] employees in her line of work as an animal welfare representative. Attempted to discuss options. Mary Anne reports they have a family friend who works as a nurse who will assist w/ TF teaching, adamant that they will not have HHC in their home. Pt w/ questions regarding TF formula. Reiterated that pt had decided to wait to pursue nutrition assistance until insurance confirmed. Reviewed w/ Medicaid pt likely to have option for ONS coverage if he is not yet ready to use PEG for nutrition. Pt reports he is keeping his insurance options open as he is now also hoping for Medicare coverage as well. Encouraged to discuss w/ social work. Pt understands he must flush his PEG at least daily w/ 60cc water for maintenance. Will follow for POC.  Electronically signed by Zaira Pratt, MS, RD, LD on 1/23/2025 at 3:57 PM    
Also noted on a CT of the chest with 12/27/2024 was 1.8 cm right upper lobe nodule.  Subsequently the patient underwent a PET/CT on 1/9/2025 which again demonstrated multilevel disease in the neck as well as increased uptake in the right tonsil.  The patient was seen by Dr Bowden and underwent a direct laryngoscopy with biopsy on 1/14/2025.  This confirmed the presence of an ulcerative lesion involving the right palate teen tonsil extending to the base of tongue and vallecula.  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 Patient has a dental appt for 1/22/25 for clearance. Once clearance is given patient will be scheduled for a CT simulation appt. Patient also has a lung biopsy scheduled for 1/23/25. Patient verbalized understanding of care and all questions/concerns were answered from a nursing perspective.   Pacemaker/Defibulator/ICD:  No    Mediport: No        FALLS RISK SCREENING ASSESSMENT    Instructions:  Assess the patient and enter the appropriate indicators that are present for fall risk identification.   Total the numbers entered and assign a fall risk score from Table 2.  Reassess patient at a minimum every 12 weeks or with status change.    Assessment   Date  1/21/2025     1.  Mental Ability: confusion/cognitively impaired No - 0       2.  Elimination Issues: incontinence, frequency No - 0       3.  Ambulatory: use of assistive devices (walker, cane, off-loading devices), attached to equipment (IV pole, oxygen) No - 0     4.  Sensory Limitations: dizziness, vertigo, impaired vision No - 0       5.  Age Less than 65 years - 0       6.  Medication: diuretics, strong analgesics, hypnotics, sedatives, antihypertensive agents   Yes - 3   7.  Falls:  recent history of falls within the last 3 months (not to include slipping or tripping)   No - 0   TOTAL 3    If score of 4 or greater was education given? Yes       TABLE 2   Risk Score Risk Level Plan of Care   0-3 Little or  No Risk 1.  Provide 
accuracy; however, inadvertent computerized transcription errors may be present.

## 2025-01-21 NOTE — ANESTHESIA POSTPROCEDURE EVALUATION
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])    Surgeons: Delvis Frank DO Responsible Provider: Barber Wilhelm MD    Anesthesia Type: MAC ASA Status: 3            Anesthesia Type: MAC    Helen Phase I: Helen Score: 10    Helen Phase II: Helen Score: 10    Anesthesia Post Evaluation    Patient location during evaluation: PACU  Patient participation: complete - patient participated  Level of consciousness: awake and alert  Airway patency: patent  Nausea & Vomiting: no nausea and no vomiting  Cardiovascular status: hemodynamically stable  Respiratory status: acceptable  Hydration status: euvolemic  There was medical reason for not using a multimodal analgesia pain management approach.Pain management: adequate    No notable events documented.

## 2025-01-22 ENCOUNTER — ANESTHESIA EVENT (OUTPATIENT)
Dept: ENDOSCOPY | Age: 60
End: 2025-01-22

## 2025-01-23 ENCOUNTER — OFFICE VISIT (OUTPATIENT)
Dept: ENT CLINIC | Age: 60
End: 2025-01-23

## 2025-01-23 ENCOUNTER — ANESTHESIA (OUTPATIENT)
Dept: ENDOSCOPY | Age: 60
End: 2025-01-23

## 2025-01-23 ENCOUNTER — HOSPITAL ENCOUNTER (OUTPATIENT)
Age: 60
Setting detail: OUTPATIENT SURGERY
Discharge: HOME | End: 2025-01-23
Attending: INTERNAL MEDICINE | Admitting: INTERNAL MEDICINE

## 2025-01-23 ENCOUNTER — APPOINTMENT (OUTPATIENT)
Dept: GENERAL RADIOLOGY | Age: 60
End: 2025-01-23
Attending: INTERNAL MEDICINE

## 2025-01-23 VITALS
SYSTOLIC BLOOD PRESSURE: 130 MMHG | BODY MASS INDEX: 20.58 KG/M2 | DIASTOLIC BLOOD PRESSURE: 63 MMHG | HEART RATE: 73 BPM | TEMPERATURE: 97.7 F | WEIGHT: 135.8 LBS | HEIGHT: 68 IN

## 2025-01-23 VITALS
DIASTOLIC BLOOD PRESSURE: 69 MMHG | RESPIRATION RATE: 16 BRPM | TEMPERATURE: 98.2 F | HEART RATE: 67 BPM | SYSTOLIC BLOOD PRESSURE: 120 MMHG | OXYGEN SATURATION: 98 %

## 2025-01-23 DIAGNOSIS — R59.0 CERVICAL LYMPHADENOPATHY: ICD-10-CM

## 2025-01-23 DIAGNOSIS — F17.200 SMOKER: ICD-10-CM

## 2025-01-23 DIAGNOSIS — R25.2 TRISMUS: ICD-10-CM

## 2025-01-23 DIAGNOSIS — Z01.810 PRE-OPERATIVE CARDIOVASCULAR EXAMINATION: ICD-10-CM

## 2025-01-23 DIAGNOSIS — C10.9 OROPHARYNGEAL CANCER (HCC): Primary | ICD-10-CM

## 2025-01-23 DIAGNOSIS — J39.2 OROPHARYNGEAL MASS: ICD-10-CM

## 2025-01-23 DIAGNOSIS — Z01.812 PRE-OPERATIVE LABORATORY EXAMINATION: Primary | ICD-10-CM

## 2025-01-23 DIAGNOSIS — R91.1 LUNG NODULE: ICD-10-CM

## 2025-01-23 DIAGNOSIS — K14.8 TONGUE MASS: ICD-10-CM

## 2025-01-23 LAB
ERYTHROCYTE [DISTWIDTH] IN BLOOD BY AUTOMATED COUNT: 12.4 % (ref 11.5–15)
HCT VFR BLD AUTO: 40.2 % (ref 37–54)
HGB BLD-MCNC: 14 G/DL (ref 12.5–16.5)
INR PPP: 1.2
MCH RBC QN AUTO: 32.1 PG (ref 26–35)
MCHC RBC AUTO-ENTMCNC: 34.8 G/DL (ref 32–34.5)
MCV RBC AUTO: 92.2 FL (ref 80–99.9)
PLATELET # BLD AUTO: 279 K/UL (ref 130–450)
PMV BLD AUTO: 10 FL (ref 7–12)
PROTHROMBIN TIME: 13 SEC (ref 9.3–12.4)
RBC # BLD AUTO: 4.36 M/UL (ref 3.8–5.8)
WBC OTHER # BLD: 9.1 K/UL (ref 4.5–11.5)

## 2025-01-23 PROCEDURE — 31627 NAVIGATIONAL BRONCHOSCOPY: CPT | Performed by: INTERNAL MEDICINE

## 2025-01-23 PROCEDURE — 7100000001 HC PACU RECOVERY - ADDTL 15 MIN: Performed by: INTERNAL MEDICINE

## 2025-01-23 PROCEDURE — 7100000000 HC PACU RECOVERY - FIRST 15 MIN: Performed by: INTERNAL MEDICINE

## 2025-01-23 PROCEDURE — 71045 X-RAY EXAM CHEST 1 VIEW: CPT

## 2025-01-23 PROCEDURE — 99024 POSTOP FOLLOW-UP VISIT: CPT | Performed by: OTOLARYNGOLOGY

## 2025-01-23 PROCEDURE — 88341 IMHCHEM/IMCYTCHM EA ADD ANTB: CPT

## 2025-01-23 PROCEDURE — 3700000000 HC ANESTHESIA ATTENDED CARE: Performed by: INTERNAL MEDICINE

## 2025-01-23 PROCEDURE — 88177 CYTP FNA EVAL EA ADDL: CPT

## 2025-01-23 PROCEDURE — 31628 BRONCHOSCOPY/LUNG BX EACH: CPT | Performed by: INTERNAL MEDICINE

## 2025-01-23 PROCEDURE — 88360 TUMOR IMMUNOHISTOCHEM/MANUAL: CPT

## 2025-01-23 PROCEDURE — C1601 HC ENDOSCOPE, PULM, IMAGING/ILLUMINATION DEVICE: HCPCS | Performed by: INTERNAL MEDICINE

## 2025-01-23 PROCEDURE — 85027 COMPLETE CBC AUTOMATED: CPT

## 2025-01-23 PROCEDURE — 31629 BRONCHOSCOPY/NEEDLE BX EACH: CPT | Performed by: INTERNAL MEDICINE

## 2025-01-23 PROCEDURE — 88342 IMHCHEM/IMCYTCHM 1ST ANTB: CPT

## 2025-01-23 PROCEDURE — 3700000001 HC ADD 15 MINUTES (ANESTHESIA): Performed by: INTERNAL MEDICINE

## 2025-01-23 PROCEDURE — 2709999900 HC NON-CHARGEABLE SUPPLY: Performed by: INTERNAL MEDICINE

## 2025-01-23 PROCEDURE — 88173 CYTOPATH EVAL FNA REPORT: CPT

## 2025-01-23 PROCEDURE — 2500000003 HC RX 250 WO HCPCS

## 2025-01-23 PROCEDURE — 88112 CYTOPATH CELL ENHANCE TECH: CPT

## 2025-01-23 PROCEDURE — 6360000002 HC RX W HCPCS

## 2025-01-23 PROCEDURE — 2720000010 HC SURG SUPPLY STERILE: Performed by: INTERNAL MEDICINE

## 2025-01-23 PROCEDURE — 7100000010 HC PHASE II RECOVERY - FIRST 15 MIN: Performed by: INTERNAL MEDICINE

## 2025-01-23 PROCEDURE — 2580000003 HC RX 258

## 2025-01-23 PROCEDURE — 31652 BRONCH EBUS SAMPLNG 1/2 NODE: CPT | Performed by: INTERNAL MEDICINE

## 2025-01-23 PROCEDURE — 85610 PROTHROMBIN TIME: CPT

## 2025-01-23 PROCEDURE — 7100000011 HC PHASE II RECOVERY - ADDTL 15 MIN: Performed by: INTERNAL MEDICINE

## 2025-01-23 PROCEDURE — 3609020000 HC BRONCHOSCOPY W/EBUS FNA 3/> NODE: Performed by: INTERNAL MEDICINE

## 2025-01-23 PROCEDURE — 88305 TISSUE EXAM BY PATHOLOGIST: CPT

## 2025-01-23 PROCEDURE — 31624 DX BRONCHOSCOPE/LAVAGE: CPT | Performed by: INTERNAL MEDICINE

## 2025-01-23 PROCEDURE — 88172 CYTP DX EVAL FNA 1ST EA SITE: CPT

## 2025-01-23 RX ORDER — FENTANYL CITRATE 50 UG/ML
INJECTION, SOLUTION INTRAMUSCULAR; INTRAVENOUS
Status: DISCONTINUED | OUTPATIENT
Start: 2025-01-23 | End: 2025-01-23 | Stop reason: SDUPTHER

## 2025-01-23 RX ORDER — PROPOFOL 10 MG/ML
INJECTION, EMULSION INTRAVENOUS
Status: DISCONTINUED | OUTPATIENT
Start: 2025-01-23 | End: 2025-01-23 | Stop reason: SDUPTHER

## 2025-01-23 RX ORDER — DEXAMETHASONE SODIUM PHOSPHATE 10 MG/ML
INJECTION INTRAMUSCULAR; INTRAVENOUS
Status: DISCONTINUED | OUTPATIENT
Start: 2025-01-23 | End: 2025-01-23 | Stop reason: SDUPTHER

## 2025-01-23 RX ORDER — ROCURONIUM BROMIDE 10 MG/ML
INJECTION, SOLUTION INTRAVENOUS
Status: DISCONTINUED | OUTPATIENT
Start: 2025-01-23 | End: 2025-01-23 | Stop reason: SDUPTHER

## 2025-01-23 RX ORDER — LIDOCAINE HYDROCHLORIDE 20 MG/ML
INJECTION, SOLUTION INTRAVENOUS
Status: DISCONTINUED | OUTPATIENT
Start: 2025-01-23 | End: 2025-01-23 | Stop reason: SDUPTHER

## 2025-01-23 RX ORDER — SODIUM CHLORIDE 9 MG/ML
INJECTION, SOLUTION INTRAVENOUS
Status: DISCONTINUED | OUTPATIENT
Start: 2025-01-23 | End: 2025-01-23 | Stop reason: SDUPTHER

## 2025-01-23 RX ORDER — ONDANSETRON 2 MG/ML
INJECTION INTRAMUSCULAR; INTRAVENOUS
Status: DISCONTINUED | OUTPATIENT
Start: 2025-01-23 | End: 2025-01-23 | Stop reason: SDUPTHER

## 2025-01-23 RX ADMIN — PROPOFOL 120 MG: 10 INJECTION, EMULSION INTRAVENOUS at 13:45

## 2025-01-23 RX ADMIN — FENTANYL CITRATE 25 MCG: 50 INJECTION, SOLUTION INTRAMUSCULAR; INTRAVENOUS at 14:31

## 2025-01-23 RX ADMIN — LIDOCAINE HYDROCHLORIDE 100 MG: 20 INJECTION, SOLUTION INTRAVENOUS at 13:45

## 2025-01-23 RX ADMIN — ROCURONIUM BROMIDE 50 MG: 10 INJECTION, SOLUTION INTRAVENOUS at 14:28

## 2025-01-23 RX ADMIN — ONDANSETRON HYDROCHLORIDE 4 MG: 2 SOLUTION INTRAMUSCULAR; INTRAVENOUS at 14:50

## 2025-01-23 RX ADMIN — DEXAMETHASONE SODIUM PHOSPHATE 10 MG: 10 INJECTION INTRAMUSCULAR; INTRAVENOUS at 13:54

## 2025-01-23 RX ADMIN — SODIUM CHLORIDE: 9 INJECTION, SOLUTION INTRAVENOUS at 14:59

## 2025-01-23 RX ADMIN — PROPOFOL 150 MCG/KG/MIN: 10 INJECTION, EMULSION INTRAVENOUS at 13:53

## 2025-01-23 RX ADMIN — FENTANYL CITRATE 25 MCG: 50 INJECTION, SOLUTION INTRAMUSCULAR; INTRAVENOUS at 14:28

## 2025-01-23 RX ADMIN — ROCURONIUM BROMIDE 50 MG: 10 INJECTION, SOLUTION INTRAVENOUS at 13:45

## 2025-01-23 RX ADMIN — FENTANYL CITRATE 100 MCG: 50 INJECTION, SOLUTION INTRAMUSCULAR; INTRAVENOUS at 13:45

## 2025-01-23 RX ADMIN — SUGAMMADEX 200 MG: 100 INJECTION, SOLUTION INTRAVENOUS at 15:14

## 2025-01-23 RX ADMIN — SODIUM CHLORIDE: 9 INJECTION, SOLUTION INTRAVENOUS at 13:39

## 2025-01-23 ASSESSMENT — PAIN - FUNCTIONAL ASSESSMENT
PAIN_FUNCTIONAL_ASSESSMENT: 0-10
PAIN_FUNCTIONAL_ASSESSMENT: ADULT NONVERBAL PAIN SCALE (NPVS)

## 2025-01-23 ASSESSMENT — PAIN SCALES - GENERAL
PAINLEVEL_OUTOF10: 4

## 2025-01-23 ASSESSMENT — ENCOUNTER SYMPTOMS
RHINORRHEA: 0
STRIDOR: 0
SINUS PRESSURE: 0
WHEEZING: 0
TROUBLE SWALLOWING: 1
SHORTNESS OF BREATH: 1
CHOKING: 0
SINUS PAIN: 0
SORE THROAT: 1
COUGH: 0

## 2025-01-23 ASSESSMENT — PAIN DESCRIPTION - LOCATION
LOCATION: THROAT

## 2025-01-23 ASSESSMENT — PAIN DESCRIPTION - DESCRIPTORS
DESCRIPTORS: SORE

## 2025-01-23 ASSESSMENT — LIFESTYLE VARIABLES: SMOKING_STATUS: 1

## 2025-01-23 NOTE — DISCHARGE INSTRUCTIONS
[facility]    POST-BRONCHOSCOPY DISCHARGE INSTRUCTIONS:     GENERAL INFORMATION:  Bronchoscopy is the visual examination of the lungs and air passages, called bronchial tubes. The exam is performed with a bronchoscope, an instrument with a lighted tip. Bronchoscopy is also used to obtain tissue and secretion samples and to wash the tissues with saline. This is a procedure that can help a doctor diagnose cancer and/or an infection. Any tissue taken or saline rinse will be sent to a lab for analysis.  The entire bronchoscopy will take less than one hour. Usually, you don't have to stay in the hospital, but an overnight stay may be necessary in some cases.    Steps to Take Include the Following:    HOME CARE:  (1) If you had a biopsy, try not to cough or clear your throat.  (2) Immediately after the procedure, spit out any saliva until your throat is no longer numb.  (3) If you are a smoker, avoid smoking for 24 hours after your procedure.  (4) You will probably have a hoarse voice and sore neck and throat for a few days after the bronchoscopy.    DIET:  (1) When your throat muscles are working again, start with sips of water and slowly progress to solid foods and then your regular diet.  (2) Avoid alcohol after your procedure, since you will still have sedatives in your system.  (3) Eat and drink when your throat muscles are no longer numb.    ACTIVITY:  (1) No driving, operating machinery, or making important (legal) decisions for 24 hours.   (2) Resume normal activity after 24 hours. You may return to work after 24 hours.    MEDICATIONS:  If you had to stop medicines before the procedure, ask your doctor when you can start again.   Medicines often stopped include:  (1) Anti-inflammatory drugs (eg, aspirin )  (2) Blood thinners like clopidogrel (Plavix) or warfarin (Coumadin)    If your biopsy results show that you have an infection, appropriate antibiotic therapy will be prescribed to you.    IF YOU

## 2025-01-23 NOTE — H&P
[facility]     PULMONARY/CRITICAL CARE CONSULTATION NOTE     Patient: Prasanna Parnell  MRN: [id number]  : 1965     Encounter Date: 1/3/2025  Encounter Time: 11:17 AM       Reason for Consultation: Lung Mass     HPI:   Prasanna Parnell is a 59 y.o. male with past medical history noted for tobacco use that presented to hospital for lung nodule evaluation.     Patient seen ENT (ear pain throat pain and dysphagia, chronic) 2025 after CT Chest/Neck 2024.     Symptoms worse with yawing and opening of mouth. Difficulty chewing. Symptoms worsening over the past 1 year, treated for ear infection for no improvement of symptoms. Pain is significant in nature. Dysphagia to solids and liquids, also pain with talking. Difficulty laying flat due to pressure. Significant headaches. Some mild dyspnea. Denies weight loss or hoarseness.      Smoker daily      Was seen in ED, had ct neck and chest done that show concern for right BOT mass, right lymphadenopathy and lung mass and referred to ent,     Patient had nasolaryngoscopy with ENT 2025.     Patient has complaints of night sweats, fevers, chills on occasion but mostly at night.     Social History: (+) tobacco use, no alcohol use, no drug use   -  PPD x 32 years     Occupational History: on disability  - no occupational exposures to asbestos, beryllium, silica      - Pathology 2025  A.  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  Right base of tongue, biopsy: Human papillomavirus (HPV)   associated squamous cell carcinoma     PAST MEDICAL HISTORY:      Past Medical History        Past Medical History:   Diagnosis Date    Cervicalgia      Neuropathy      Retrolisthesis      Spondylolisthesis              PAST SURGICAL HISTORY:   Past Surgical History         Past Surgical History:   Procedure Laterality Date    LUMBAR LAMINECTOMY                FAMILY HISTORY:   Family History   No  cessation discussed with patient and harms of continued smoking discussed with patient   - patient understood risks of continued smoking and benefits of smoking cessation      6.) Centrilobular Emphysema:  - as above  - smoking cessation discussed  - Albuterol PRN  - PFT     7.) Bronchiectasis:  - as above     8.) Squamous Cell Carcinoma of Tongue:  A.  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  Right base of tongue, biopsy: Human papillomavirus (HPV)   associated squamous cell carcinoma     - patient now s/p PEG tube placement      Thank you very much for allowing me to see this patient in consultation and follow up.     Care reviewed with nursing staff, medical and surgical specialty care, primary care and the patient's family as available. Restraints are ordered when the patient can do harm to him/herself by pulling out devices.    Edward Beebe MD, M.D.

## 2025-01-23 NOTE — OP NOTE
[facility]    Pulmonary/critical care procedure note    Flexible Fiberoptic Bronchoscopy NOTE    Patient: Prasanna Parnell    MRN: [id number]  : 1965    Date: 2025  Time: 3:22 PM     Primary Care Physician:      No primary care provider on file.     None     None    Laboratory Work:  Lab Results   Component Value Date    INR 1.2 2025    INR 1.1 2022    PROTIME 13.0 (H) 2025    PROTIME 11.6 2022     Lab Results   Component Value Date    WBC 9.1 2025    HGB 14.0 2025    HCT 40.2 2025    MCV 92.2 2025     2025    LYMPHOPCT 29 2024    RBC 4.36 2025    MCH 32.1 2025    MCHC 34.8 (H) 2025    RDW 12.4 2025    NEUTOPHILPCT 63 2024    MONOPCT 5 2024    EOSPCT 2 2024    BASOPCT 1 2024    NEUTROABS 4.90 2024    LYMPHSABS 2.24 2024    MONOSABS 0.40 2024    EOSABS 0.13 2024    BASOSABS 0.05 2024     Lab Results   Component Value Date/Time     2024 09:45 AM    K 4.7 2024 09:45 AM     2024 09:45 AM    CO2 26 2024 09:45 AM    BUN 14 2024 09:45 AM    CREATININE 1.0 2024 09:45 AM    GLUCOSE 106 2024 09:45 AM    CALCIUM 10.0 2024 09:45 AM      Attending Physician: Dr. Beebe    Assistant(s): Anesthesia Dept, Endoscopy Dept, Cytology, Pathology, Anthony [facility] Navigation Representatives, Fluoroscopy      Pre-Operative Medications: Per Anesthesia Dept    Intra-Operative Medications: Per Anesthesia Dept     Anesthesia: General Anesthesia    Pre-Procedure Diagnosis: RUL Lung Nodule, Subcarinal Lymphadenopathy     Operation/Procedure:   1.) Flexible Fiberoptic Bronchoscopy  2.) EBUS Bronchoscopy with FNA Biopsy x 3 to Station 7 Subcarina (Cook 22-Gauge Needle)  3.) [email or web address] Robotic Bronchoscopy with the following:  - BAL to RUL  - Transbronchial Biopsy x 3 to RUL (under fluoroscopy)  - FNA 21 Gauge Needle x 3  to RUL (under fluoroscopy)  - Fluoroscopy Time 1:52 (one minute 52 seconds)    Post-Procedure Diagnosis: RUL Lung Nodule, Subcarinal Lymphadenopathy     Consent: Consent was obtained prior to procedure. Indications, risks, benefits were explained at length.    Indications: RUL Lung Nodule, Subcarinal Lymphadenopathy     Purpose: Diagnostic, Therapeutic    Procedure Summary:   A time out was performed to confirm both patient and procedure.   I had worn a gown with hat, mask, gloves prior to initiation of procedure.    The patient was pre-medicated with medications per anesthesia dept.    Intra-operatively, any additional medications were given under instruction of anesthesia dept.    Bronchoscope was introduced via adapter at end of ET tube.  The trachea was inspected and found to be normal.  The main lima was sharp.     - The right bronchial tree was inspected and the following were noted:   [a] Right Upper Lobe contained three segments [apical segmental bronchus, posterior segmental bronchus, and anterior segmental bronchus] and was found to be normal.   [b] Bronchus Intermedius was examined and found to be normal.   [c] Right Middle Lobe had 2 segments [lateral segmental bronchus and medial segmental bronchus] and was found to be normal.   [d] Right Lower Lobe including apical segment and basal segmental branches [anterior basal branch, lateral basal branch, and posterior basal branch] was also inspected to the sub-segmental levels and was found to be normal.   [e] The right bronchial tree was found to be without stenosis, circumferential narrowing, intrinsic compressing mass, extrinsic compressing mass, obstruction in the area of the RUL, RML, RLL.     - The left bronchial tree was inspected and the following were noted:   [a] Left Upper Lobe including the apical posterior segmental bronchus and the anterior segmental bronchus were inspected and found to be normal.  [b] Lingula including the superior, inferior

## 2025-01-23 NOTE — PROGRESS NOTES
Department of Otolaryngology  Office Consult Note      Subjective:        Chief Complaint:  had concerns including Post-Op Check ( POST OP - 1 week post op panendo w bx/ pt conf anthony. pm/).     Patient ID: Prasanna Parnell is a 59 y.o. male.    HPI: Patient presents as  post-op for panendoscopy with biopsy.  Patient is doing okay since surgery.  He has had since PEG placed and has met with oncology and radiation oncology.  He is scheduled for his lung biopsy today.     Cancer summary:   Right oropharyngeal p16+ SCC T2N1Mx (M1 vs M0)   PET CT: 25   Panendo w/ bx: 25  PE25       Review of Systems   Constitutional: Negative.    HENT:  Positive for ear pain, sore throat and trouble swallowing. Negative for congestion, ear discharge, hearing loss, rhinorrhea, sinus pressure, sinus pain and tinnitus.    Respiratory:  Positive for shortness of breath. Negative for cough, choking, wheezing and stridor.    Cardiovascular:  Negative for chest pain.   Skin:  Negative for rash.   Allergic/Immunologic: Negative for environmental allergies and immunocompromised state.   Neurological:  Negative for dizziness, weakness, light-headedness and headaches.   Psychiatric/Behavioral:  Negative for confusion.    All other systems reviewed and are negative.        Past Medical History:   Diagnosis Date    Cancer (HCC)     oral cancer    Cervicalgia     COPD (chronic obstructive pulmonary disease) (HCC)     Neuropathy     Retrolisthesis     Spondylolisthesis      Past Surgical History:   Procedure Laterality Date    FINGER SURGERY Right     amputation first finger    HERNIA REPAIR Bilateral     inguinal    KNEE SURGERY Left     repair tendons d/t chainsaw accidnet    LARYNGOSCOPY N/A 2025    PANENDOSCOPY WITH BIOPSY performed by Jonathan Bowden DO at [facility] OR    LUMBAR LAMINECTOMY      ,    UPPER GASTROINTESTINAL ENDOSCOPY N/A 2025    ESOPHAGOGASTRODUODENOSCOPY PERCUTANEOUS ENDOSCOPIC

## 2025-01-23 NOTE — ANESTHESIA PRE PROCEDURE
Department of Anesthesiology  Preprocedure Note       Name:  Prasanna Parnell   Age:  59 y.o.  :  1965                                          MRN:  [id number]         Date:  2025      Surgeon: Surgeon(s):  Edward Beebe MD    Procedure: Procedure(s):  BRONCHOSCOPY ENDOBRONCHIAL ULTRASOUND  BRONCHOSCOPY ENDOBRONCHIAL ULTRASOUND FINE NEEDLE ASPIRATION ROBOTIC    Medications prior to admission:   Prior to Admission medications    Medication Sig Start Date End Date Taking? Authorizing Provider   albuterol sulfate HFA (VENTOLIN HFA) 108 (90 Base) MCG/ACT inhaler Inhale 2 puffs into the lungs every 6 hours as needed for Wheezing   Yes Provider, MD Radha   lidocaine viscous hcl (XYLOCAINE) 2 % SOLN solution Take 15 mLs by mouth as needed for Irritation 25  Yes Jonathan Bowden DO   oxyCODONE HCl (OXY-IR) 10 MG immediate release tablet Take 1 tablet by mouth every 6 hours as needed for Pain for up to 10 days. Intended supply: 10 days Max Daily Amount: 40 mg 25 Yes Jonathan Bowden DO   ibuprofen (MOTRIN) 40 MG/ML SUSP Take 16.1 mLs by mouth every 6 hours 25 Yes Jonathan Bowden DO   acetaminophen (TYLENOL) 160 MG/5ML suspension Take 15.62 mLs by mouth every 4 hours as needed for Fever 25 Yes Jonathan Bowden DO   ondansetron (ZOFRAN-ODT) 4 MG disintegrating tablet Take 1 tablet by mouth 3 times daily as needed for Nausea or Vomiting  Patient not taking: Reported on 2025   Jonathan Bowden DO   EPINEPHrine (EPIPEN 2-JUAN CARLOS) 0.3 MG/0.3ML SOAJ injection Inject 0.3 mLs into the muscle once as needed (anaphylaxis) Use as directed for allergic reaction 23  Jojo Almeida DO       Current medications:    No current facility-administered medications for this encounter.       Allergies:    Allergies   Allergen Reactions    Bee Venom Anaphylaxis    Keflex [Cephalexin] Swelling       Problem List:    Patient Active Problem List   Diagnosis  smoked on day of surgery.                ROS comment: 1/2 ppd - cigarrettes   Cardiovascular:Negative CV ROS  Exercise tolerance: good (>4 METS)          Rhythm: regular  Rate: normal           Beta Blocker:  Not on Beta Blocker         Neuro/Psych:   (+) neuromuscular disease:             ROS comment: Spondylolisthesis GI/Hepatic/Renal: Neg GI/Hepatic/Renal ROS            Endo/Other:    (+) : arthritis: OA., malignancy/cancer.          Pt had no PAT visit        ROS comment: Oral CA Abdominal:             Vascular: negative vascular ROS.         Other Findings:        EKG 1/9/25  Sinus bradycardia  Otherwise normal ECG  No previous ECGs available  Confirmed by Rigo Lopez ([id number]) on 1/9/2025 9:30:50 PM        Anesthesia Plan      general     ASA 3       Induction: intravenous.  BIS  MIPS: Prophylactic antiemetics administered.  Anesthetic plan and risks discussed with patient.      Plan discussed with CRNA and attending.                    Diana Funez RN   1/23/2025

## 2025-01-27 ENCOUNTER — TELEPHONE (OUTPATIENT)
Dept: ONCOLOGY | Age: 60
End: 2025-01-27

## 2025-01-27 NOTE — ANESTHESIA POSTPROCEDURE EVALUATION
Department of Anesthesiology  Postprocedure Note    Patient: Prasanna Parnell  MRN: [id number]  YOB: 1965  Date of evaluation: 1/27/2025    Procedure Summary       Date: 01/23/25 Room / Location: [place] [address] / [facility]    Anesthesia Start: 1339 Anesthesia Stop: 1531    Procedures:       BRONCHOSCOPY ENDOBRONCHIAL ULTRASOUND FINE NEEDLE ASPIRATION      BRONCHOSCOPY ENDOBRONCHIAL ULTRASOUND FINE NEEDLE ASPIRATION ROBOTIC Diagnosis:       Lung nodule      (Lung nodule [R91.1])    Surgeons: Edward Beebe MD Responsible Provider: Gwen Lopez MD    Anesthesia Type: General ASA Status: 3            Anesthesia Type: General    Helen Phase I: Helen Score: 10    Helen Phase II: Helen Score: 10    Anesthesia Post Evaluation    Patient location during evaluation: PACU  Patient participation: complete - patient participated  Level of consciousness: awake and alert  Airway patency: patent  Nausea & Vomiting: no nausea and no vomiting  Cardiovascular status: blood pressure returned to baseline and hemodynamically stable  Respiratory status: acceptable and spontaneous ventilation  Hydration status: euvolemic  Multimodal analgesia pain management approach  Pain management: adequate    No notable events documented.

## 2025-01-27 NOTE — TELEPHONE ENCOUNTER
1/21/2025: Completed documents forwarded for  for assistance with Medicaid application.      1/27/2025:  Additional attempt to contact pt at request of RD; message left requesting callback.    IBETH Harp, SHANEKAW-S  Oncology Social Worker   [phone]

## 2025-01-28 LAB — NON-GYN CYTOLOGY REPORT: NORMAL

## 2025-01-29 ENCOUNTER — TELEPHONE (OUTPATIENT)
Dept: CASE MANAGEMENT | Age: 60
End: 2025-01-29

## 2025-01-29 NOTE — TELEPHONE ENCOUNTER
Called [facility] regarding status of Dental Clearance. Patient was seen on 1/24/25 and had 2 teeth extracted. He is scheduled for 1 more extraction on 2/3/25. Will follow up to check on clearance once extraction completed and will also update Dr Rodríguez and Radiation RN.

## 2025-01-30 ENCOUNTER — OFFICE VISIT (OUTPATIENT)
Dept: ONCOLOGY | Age: 60
End: 2025-01-30

## 2025-01-30 ENCOUNTER — HOSPITAL ENCOUNTER (OUTPATIENT)
Dept: PULMONOLOGY | Age: 60
Discharge: HOME | End: 2025-01-30
Attending: INTERNAL MEDICINE

## 2025-01-30 ENCOUNTER — PREP FOR PROCEDURE (OUTPATIENT)
Dept: SURGERY | Age: 60
End: 2025-01-30

## 2025-01-30 ENCOUNTER — HOSPITAL ENCOUNTER (OUTPATIENT)
Dept: INFUSION THERAPY | Age: 60
Discharge: HOME | End: 2025-01-30

## 2025-01-30 ENCOUNTER — TELEPHONE (OUTPATIENT)
Dept: CASE MANAGEMENT | Age: 60
End: 2025-01-30

## 2025-01-30 VITALS
HEART RATE: 60 BPM | HEIGHT: 68 IN | SYSTOLIC BLOOD PRESSURE: 132 MMHG | BODY MASS INDEX: 20.99 KG/M2 | OXYGEN SATURATION: 98 % | DIASTOLIC BLOOD PRESSURE: 63 MMHG | WEIGHT: 138.5 LBS | TEMPERATURE: 98.4 F

## 2025-01-30 DIAGNOSIS — G89.3 CANCER ASSOCIATED PAIN: ICD-10-CM

## 2025-01-30 DIAGNOSIS — I42.7 CHEMOTHERAPY INDUCED CARDIOMYOPATHY (HCC): ICD-10-CM

## 2025-01-30 DIAGNOSIS — Z72.0 TOBACCO USE: ICD-10-CM

## 2025-01-30 DIAGNOSIS — T45.1X5A ADVERSE EFFECT OF CISPLATIN, INITIAL ENCOUNTER: ICD-10-CM

## 2025-01-30 DIAGNOSIS — C09.8 MALIGNANT NEOPLASM OF OVERLAPPING SITES OF TONSIL (HCC): Primary | ICD-10-CM

## 2025-01-30 DIAGNOSIS — T45.1X5A CHEMOTHERAPY INDUCED CARDIOMYOPATHY (HCC): ICD-10-CM

## 2025-01-30 LAB — SURGICAL PATHOLOGY REPORT: NORMAL

## 2025-01-30 PROCEDURE — 94060 EVALUATION OF WHEEZING: CPT

## 2025-01-30 PROCEDURE — 94726 PLETHYSMOGRAPHY LUNG VOLUMES: CPT

## 2025-01-30 PROCEDURE — 94729 DIFFUSING CAPACITY: CPT

## 2025-01-30 PROCEDURE — 99213 OFFICE O/P EST LOW 20 MIN: CPT

## 2025-01-30 PROCEDURE — 99213 OFFICE O/P EST LOW 20 MIN: CPT | Performed by: STUDENT IN AN ORGANIZED HEALTH CARE EDUCATION/TRAINING PROGRAM

## 2025-01-30 RX ORDER — HYDROCODONE BITARTRATE AND ACETAMINOPHEN 5; 325 MG/1; MG/1
1 TABLET ORAL EVERY 4 HOURS PRN
Qty: 28 TABLET | Refills: 0 | Status: SHIPPED | OUTPATIENT
Start: 2025-01-30 | End: 2025-02-06

## 2025-01-30 NOTE — PROGRESS NOTES
Patient did NOT stop at check out window, left without AVS.  Patient has MYCHART.    
Wt Readings from Last 3 Encounters:   01/30/25 62.8 kg (138 lb 8 oz)   01/23/25 61.6 kg (135 lb 12.8 oz)   01/21/25 61.6 kg (135 lb 12.8 oz)     Met w/ pt and partner, Mary Anne, during medical oncology visit. Pt w/ confirmed plan for concurrent chemoradiation. He awaits dental clearance. He has been flushing PEG daily, denies any issues w/ PEG at this time. He continues to be pending Medicaid at this time. He reports his appetite has been stable. He has not yet tried ONS samples previously provided. Provided brief review of role of oncology dietitian in pt's care as well as expected nutrition related side effects of treatment. Pt was provided w/ a nutrition for head and neck cancer booklet. All questions answered to pt satisfaction. Will follow as treatment initiated.  Electronically signed by Zaira Pratt, MS, RD, LD on 1/30/2025 at 2:56 PM    
nonenlarged supraclavicular lymph node consistent with metastases. 17 mm right upper lobe pulmonary nodule is hypermetabolic and consistent with a primary or secondary neoplasm.     CT CHEST WO CONTRAST    Result Date: 1/6/2025  EXAMINATION: CT OF THE CHEST WITHOUT CONTRAST 1/6/2025 11:53 am TECHNIQUE: CT of the chest was performed without the administration of intravenous contrast. Multiplanar reformatted images are provided for review. Automated exposure control, iterative reconstruction, and/or weight based adjustment of the mA/kV was utilized to reduce the radiation dose to as low as reasonably achievable. COMPARISON: CT chest December 27, 2024 HISTORY: ORDERING SYSTEM PROVIDED HISTORY: Lung nodule TECHNOLOGIST PROVIDED HISTORY: Reason for exam:->CT Chest per navigation protocol What reading provider will be dictating this exam?->CRC FINDINGS: Mediastinum: Thoracic aorta appears normal in caliber with mild calcification.  Pulmonary trunk appears nondilated.  No pericardial effusion. No lymphadenopathy.  The esophagus is grossly unremarkable. Lungs/pleura: No consolidation pneumothorax or pleural effusion.  Bibasilar atelectasis/scarring.  Mild bronchial wall thickening. 1.8 cm noncalcified pulmonary nodule right upper lobe similar to the prior study now seen on series 303, image 32.  No new or enlarging suspicious pulmonary nodule.  Emphysema. Upper Abdomen: No acute process.  Bilateral nephrolithiasis. Soft Tissues/Bones: No acute process.  Osseous structures demonstrate degenerative changes.  Partially visualized hardware involving the thoracolumbar junction.     1.8 cm noncalcified pulmonary nodule right upper lobe similar to the prior study.  No new or enlarging pulmonary nodules are identified. Partially visualized bilateral nephrolithiasis.     CT CHEST WO CONTRAST    Result Date: 12/27/2024  EXAMINATION: CT OF THE CHEST WITHOUT CONTRAST 12/27/2024 11:35 am TECHNIQUE: CT of the chest was performed without

## 2025-01-30 NOTE — TELEPHONE ENCOUNTER
Met with patient and his significant other during his follow up appointment today.  Provided patient with [employer] Cisplatin/radiation handout and discussed with patient getting ECHO, hearing evaluation and port placement done for treatment start.  Patient states that he is having a tooth pulled next week and one he gets his dental clearance then RT can complete his SIM. Patient had no questions at this time and was encouraged to call if any arise.     Dr. Tomas requested HPV testing on lung pathology collected 1/23/25 path # [id number].  Ordered obtained and faxed to pathology with fax confirmation received.  Called pathology and spoke with Bruna and she verified that they received order.

## 2025-01-31 ENCOUNTER — TELEPHONE (OUTPATIENT)
Dept: ONCOLOGY | Age: 60
End: 2025-01-31

## 2025-01-31 ENCOUNTER — TELEPHONE (OUTPATIENT)
Dept: AUDIOLOGY | Age: 60
End: 2025-01-31

## 2025-01-31 PROBLEM — Z72.0 TOBACCO USE: Status: ACTIVE | Noted: 2025-01-31

## 2025-01-31 NOTE — PROCEDURES
[address] [address]                           PULMONARY FUNCTION      PATIENT NAME: MCKAY BURGOS            : 1965  MED REC NO: [id number]                        ROOM:   ACCOUNT NO: [id number]                       ADMIT DATE: 2025  PROVIDER: Edward Beebe MD      DATE OF PROCEDURE: 2025    SURGEON:  Edward Beebe MD    REFERRING PHYSICIAN:  EDWARD BEEBE    INTERPRETING PHYSICIAN:  Edward Beebe MD    CLINICAL INDICATION:  Dyspnea after exercise, tobacco use.    PURPOSE:  Diagnostic.    MEDICATIONS:  None used.    LUNG MECHANICS:  There appears to be no obstructive or restrictive component of lung disease according to graphic and numeric representation of the patient's lung volumes.  Pattern of flow volume loop supports this.    FEV1/FVC 0.74.    FEV1 is 3.45 L, 100% of predicted, post-bronchodilator equating to a 20 mL difference or 0% change.  Z-score positive 0.01.    FVC 4.66 L ,105% of predicted, post-bronchodilator equating to a 10 mL difference or 0% change.  Z-score positive 0.39.    MVV 88% of predicted.    Inspiratory phase of flow volume loop shows adequate upper airway laryngeal and pharyngeal function.  Expiratory phase of flow volume loop shows adequate chest wall strength.    LUNG VOLUMES:  Show mild reduction at the ERV which is 69%, the RV which is 54%, and the RV/TLC which is 63%.  All other parameters are within normal limits and repeatable to about 2% to 4%.    LUNG DIFFUSION:  DLCO corrected for alveolar volume is mildly decreased at 66%.    CLINICAL IMPRESSION:  Clinically most compatible with a normal PFT with an isolated low DLCO.  This could reflect chronic pulmonary veno-occlusive defect or pulmonary vascular disease.  No significant bronchodilator effect noted.    Clinical correlation advised.    Thank you very much for allowing me to participate in this

## 2025-01-31 NOTE — TELEPHONE ENCOUNTER
01/31/25 Echocardiogram ordered. Patient pending medicaid insurance.   Scheduled with [facility] cardiology for 02/11/25 @ 9:00 AM  Confirmed date and time with patient.

## 2025-01-31 NOTE — TELEPHONE ENCOUNTER
Referral for Hearing Evaluation received from oncology.  Called patient and left a voicemail for the patient to call back for scheduling.    Electronically signed by Lloyd Farrell on 1/31/25 at 12:13 PM EST

## 2025-02-04 ENCOUNTER — TELEPHONE (OUTPATIENT)
Dept: AUDIOLOGY | Age: 60
End: 2025-02-04

## 2025-02-04 ENCOUNTER — OFFICE VISIT (OUTPATIENT)
Dept: PULMONOLOGY | Age: 60
End: 2025-02-04

## 2025-02-04 VITALS
DIASTOLIC BLOOD PRESSURE: 61 MMHG | OXYGEN SATURATION: 99 % | HEART RATE: 61 BPM | SYSTOLIC BLOOD PRESSURE: 120 MMHG | RESPIRATION RATE: 12 BRPM | TEMPERATURE: 97.2 F

## 2025-02-04 DIAGNOSIS — C34.90 SQUAMOUS CELL CARCINOMA OF LUNG, UNSPECIFIED LATERALITY (HCC): Primary | ICD-10-CM

## 2025-02-04 DIAGNOSIS — Z87.768 HISTORY OF DIGITAL CLUBBING: ICD-10-CM

## 2025-02-04 DIAGNOSIS — J43.2 CENTRILOBULAR EMPHYSEMA (HCC): ICD-10-CM

## 2025-02-04 DIAGNOSIS — D00.07 SQUAMOUS CELL CARCINOMA IN SITU (SCCIS) OF TONGUE: ICD-10-CM

## 2025-02-04 DIAGNOSIS — R59.0 CERVICAL LYMPHADENOPATHY: ICD-10-CM

## 2025-02-04 DIAGNOSIS — R59.0 MEDIASTINAL LYMPHADENOPATHY: ICD-10-CM

## 2025-02-04 DIAGNOSIS — J47.9 BRONCHIECTASIS WITHOUT COMPLICATION (HCC): ICD-10-CM

## 2025-02-04 LAB — SURGICAL PATHOLOGY REPORT: NORMAL

## 2025-02-04 PROCEDURE — 99214 OFFICE O/P EST MOD 30 MIN: CPT | Performed by: INTERNAL MEDICINE

## 2025-02-04 PROCEDURE — 99406 BEHAV CHNG SMOKING 3-10 MIN: CPT | Performed by: INTERNAL MEDICINE

## 2025-02-04 NOTE — TELEPHONE ENCOUNTER
Referral for Hearing Evaluation received.  Called patient  and scheduled appointment for 2/12/25 at 8 AM .    Electronically signed by Lloyd Farrell on 2/4/25 at 11:05 AM EST

## 2025-02-04 NOTE — PROGRESS NOTES
Tongue:  A.  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  Right base of tongue, biopsy: Human papillomavirus (HPV)   associated squamous cell carcinoma     - pain to be addressed by oncology, primary care, palliative care, or pain specialist     I reviewed the patients chart including prior labs and images prior to our office visit and we also reviewed them together today.  Reviewed treatment plan and answered all questions to satisfaction..  30 Minutes of which greater than 50% was spent counseling or coordinating care.    My signature verifies the accuracy and relevance of my note, including documentation of information that has been copy pasted/forward, note templates, and Notewriter Macros.  - COVID-19 precautions  - Recommend yearly Influenza and appropriate pneumonia vaccinations.  - diet modifications and weight modifications as directed    Return in about 3 months (around 5/4/2025) for Squamous Cell Lung Cancer.    Thank you very much for allowing me to see this patient in consultation and follow up.    Care reviewed with nursing staff, medical and surgical specialty care, primary care and the patient's family as available. Restraints are ordered when the patient can do harm to him/herself by pulling out devices.    Edward Beebe MD, M.D.

## 2025-02-05 LAB — SURGICAL PATHOLOGY REPORT: NORMAL

## 2025-02-06 ENCOUNTER — HOSPITAL ENCOUNTER (OUTPATIENT)
Dept: RADIATION ONCOLOGY | Age: 60
Discharge: HOME | End: 2025-02-06

## 2025-02-06 ENCOUNTER — TELEPHONE (OUTPATIENT)
Dept: SURGERY | Age: 60
End: 2025-02-06

## 2025-02-06 PROCEDURE — 77334 RADIATION TREATMENT AID(S): CPT | Performed by: SPECIALIST

## 2025-02-06 PROCEDURE — 6360000004 HC RX CONTRAST MEDICATION: Performed by: SPECIALIST

## 2025-02-06 RX ORDER — IOPAMIDOL 755 MG/ML
100 INJECTION, SOLUTION INTRAVASCULAR
Status: COMPLETED | OUTPATIENT
Start: 2025-02-06 | End: 2025-02-06

## 2025-02-06 RX ADMIN — IOPAMIDOL 100 ML: 755 INJECTION, SOLUTION INTRAVENOUS at 13:00

## 2025-02-06 NOTE — TELEPHONE ENCOUNTER
Scheduled patient for mediport insertion on 2/13/25 @ 8:30am at [facility] . MA spoke with Zaira Phillips who states patient is good to proceed with this date. It will not interfere with Dr Toams appointments. Patient needs to report at the front entrance 2 hours before the procedure, NPO after the midnight the night before the procedure. No ASA products for 5 days. Do not stop Aspirin 81mg. Patient's girlfriend Mary Anne verbalized understanding. Instruction letter mailed. Encouraged to call our office if any questions.     Electronically signed by LAURO FRIEDMAN MA on 2/6/2025 at 9:15 AM

## 2025-02-07 ENCOUNTER — ANESTHESIA EVENT (OUTPATIENT)
Dept: OPERATING ROOM | Age: 60
End: 2025-02-07

## 2025-02-07 NOTE — PROGRESS NOTES
[facility] PRE-ADMISSION TESTING INSTRUCTIONS    The Preadmission Testing patient is instructed accordingly using the following criteria (check applicable):    ARRIVAL INSTRUCTIONS:   Arrival Time: 0630    [x] Parking the day of Surgery is located in the Main Entrance lot.  Upon entering through the main entrance (Entrance A) make an immediate right to the surgery reception desk    [x] Bring photo ID and insurance card    [] Bring in a copy of Living will or Durable Power of  papers.    [x] Please be sure to arrange for a responsible adult to provide transportation to and from the hospital    [x] Please arrange for a responsible adult to be with you for the 24 hour period post procedure, due to having anesthesia    [x] Please notify surgeon if you develop any illness between now and time of surgery (cold, cough, sore throat, fever, nausea, vomiting) or any signs of infections  including skin, wounds, and dental.    [x] If you awake am of surgery not feeling well or have temperature >100 please call [phone].    GENERAL INSTRUCTIONS:    [x] No solid foods after midnight, may have up to 8oz of water until 4 hours prior to surgery. No gum, no candy, no mints.  NPO time: 0430       [x] You may brush your teeth    [x] Take medications as instructed     [x] Bring inhalers day of surgery    [x] Stop herbal supplements and vitamins 5 days prior to procedure    [x] Follow preop dosing of blood thinners per physician instructions    [] Take 1/2 dose of evening insulin, but no insulin after midnight    [] No oral diabetic medications after midnight    [] If diabetic and have low blood sugar or feel symptomatic, take 1-2oz apple juice only    [] Bring urine specimen day of surgery    [x] Shower or bath with soap, lather and rinse well, AM of Surgery, no lotion, powders or creams to surgical site    [x] Please do not wear any nail polish, make up, hair products, body spray, aftershave,

## 2025-02-10 ENCOUNTER — TELEPHONE (OUTPATIENT)
Age: 60
End: 2025-02-10

## 2025-02-11 ENCOUNTER — OFFICE VISIT (OUTPATIENT)
Dept: PALLATIVE CARE | Age: 60
End: 2025-02-11

## 2025-02-11 ENCOUNTER — TELEPHONE (OUTPATIENT)
Dept: ONCOLOGY | Age: 60
End: 2025-02-11

## 2025-02-11 ENCOUNTER — HOSPITAL ENCOUNTER (OUTPATIENT)
Age: 60
Discharge: HOME | End: 2025-02-13
Attending: STUDENT IN AN ORGANIZED HEALTH CARE EDUCATION/TRAINING PROGRAM

## 2025-02-11 VITALS
WEIGHT: 130 LBS | OXYGEN SATURATION: 97 % | DIASTOLIC BLOOD PRESSURE: 82 MMHG | HEART RATE: 77 BPM | BODY MASS INDEX: 19.77 KG/M2 | TEMPERATURE: 97 F | SYSTOLIC BLOOD PRESSURE: 123 MMHG

## 2025-02-11 DIAGNOSIS — C76.0 HEAD AND NECK CANCER (HCC): ICD-10-CM

## 2025-02-11 DIAGNOSIS — G89.3 PAIN DUE TO NEOPLASM: ICD-10-CM

## 2025-02-11 DIAGNOSIS — I42.7 CHEMOTHERAPY INDUCED CARDIOMYOPATHY (HCC): ICD-10-CM

## 2025-02-11 DIAGNOSIS — Z79.891 USE OF OPIATES FOR THERAPEUTIC PURPOSES: ICD-10-CM

## 2025-02-11 DIAGNOSIS — T45.1X5A CHEMOTHERAPY INDUCED CARDIOMYOPATHY (HCC): ICD-10-CM

## 2025-02-11 DIAGNOSIS — C09.8 MALIGNANT NEOPLASM OF OVERLAPPING SITES OF TONSIL (HCC): ICD-10-CM

## 2025-02-11 DIAGNOSIS — Z51.5 PALLIATIVE CARE BY SPECIALIST: Primary | ICD-10-CM

## 2025-02-11 DIAGNOSIS — T45.1X5A ADVERSE EFFECT OF CISPLATIN, INITIAL ENCOUNTER: ICD-10-CM

## 2025-02-11 LAB
6-MONOACETYLMORPHINE, URINE: NEGATIVE
ABNORMAL SPECIMEN VALIDITY TEST: NORMAL
ALCOHOL URINE: NOT DETECTED MG/DL
AMPHETAMINE SCREEN URINE: NEGATIVE
BARBITURATE SCREEN URINE: NEGATIVE
BENZODIAZEPINE SCREEN, URINE: NEGATIVE
BUPRENORPHINE URINE: NEGATIVE
CANNABINOID SCREEN URINE: NEGATIVE
COCAINE METABOLITE, URINE: NEGATIVE
ECHO AO ASC DIAM: 3.5 CM
ECHO AO SINUS VALSALVA DIAM: 3.2 CM
ECHO AV AREA PEAK VELOCITY: 2.1 CM2
ECHO AV AREA VTI: 2.2 CM2
ECHO AV CUSP MM: 1.9 CM
ECHO AV MEAN GRADIENT: 4 MMHG
ECHO AV MEAN VELOCITY: 0.9 M/S
ECHO AV PEAK GRADIENT: 8 MMHG
ECHO AV PEAK VELOCITY: 1.5 M/S
ECHO AV VELOCITY RATIO: 0.67
ECHO AV VTI: 30.6 CM
ECHO EST RA PRESSURE: 3 MMHG
ECHO LA DIAMETER: 2.6 CM
ECHO LA VOL A-L A2C: 40 ML (ref 18–58)
ECHO LA VOL A-L A4C: 59 ML (ref 18–58)
ECHO LA VOL BP: 46 ML (ref 18–58)
ECHO LA VOL MOD A2C: 38 ML (ref 18–58)
ECHO LA VOL MOD A4C: 49 ML (ref 18–58)
ECHO LA VOLUME AREA LENGTH: 52 ML
ECHO LV EDV A2C: 58 ML
ECHO LV EDV A4C: 98 ML
ECHO LV EDV BP: 76 ML (ref 67–155)
ECHO LV EJECTION FRACTION A2C: 48 %
ECHO LV EJECTION FRACTION A4C: 53 %
ECHO LV EJECTION FRACTION BIPLANE: 51 % (ref 55–100)
ECHO LV ESV A2C: 30 ML
ECHO LV ESV A4C: 46 ML
ECHO LV ESV BP: 37 ML (ref 22–58)
ECHO LV FRACTIONAL SHORTENING: 26 % (ref 28–44)
ECHO LV GLOBAL LONGITUDINAL STRAIN (GLS): -16.9 %
ECHO LV INTERNAL DIMENSION DIASTOLIC: 5 CM (ref 4.2–5.9)
ECHO LV INTERNAL DIMENSION SYSTOLIC: 3.7 CM
ECHO LV ISOVOLUMETRIC RELAXATION TIME (IVRT): 57.1 MS
ECHO LV IVSD: 0.6 CM (ref 0.6–1)
ECHO LV IVSS: 1.1 CM
ECHO LV MASS 2D: 114.7 G (ref 88–224)
ECHO LV POSTERIOR WALL DIASTOLIC: 0.8 CM (ref 0.6–1)
ECHO LV POSTERIOR WALL SYSTOLIC: 1.2 CM
ECHO LV RELATIVE WALL THICKNESS RATIO: 0.32
ECHO LVOT AREA: 3.1 CM2
ECHO LVOT AV VTI INDEX: 0.68
ECHO LVOT DIAM: 2 CM
ECHO LVOT MEAN GRADIENT: 2 MMHG
ECHO LVOT PEAK GRADIENT: 4 MMHG
ECHO LVOT PEAK VELOCITY: 1 M/S
ECHO LVOT SV: 65.3 ML
ECHO LVOT VTI: 20.8 CM
ECHO MV "A" WAVE DURATION: 102.8 MSEC
ECHO MV A VELOCITY: 0.66 M/S
ECHO MV AREA PHT: 3.7 CM2
ECHO MV AREA VTI: 2.6 CM2
ECHO MV E DECELERATION TIME (DT): 275.1 MS
ECHO MV E VELOCITY: 0.55 M/S
ECHO MV E/A RATIO: 0.83
ECHO MV LVOT VTI INDEX: 1.21
ECHO MV MAX VELOCITY: 0.8 M/S
ECHO MV MEAN GRADIENT: 1 MMHG
ECHO MV MEAN VELOCITY: 0.4 M/S
ECHO MV PEAK GRADIENT: 2 MMHG
ECHO MV PRESSURE HALF TIME (PHT): 59.1 MS
ECHO MV VTI: 25.2 CM
ECHO PV MAX VELOCITY: 0.7 M/S
ECHO PV MEAN GRADIENT: 1 MMHG
ECHO PV MEAN VELOCITY: 0.5 M/S
ECHO PV PEAK GRADIENT: 2 MMHG
ECHO PV VTI: 14.7 CM
ECHO PVEIN A DURATION: 95.2 MS
ECHO PVEIN A VELOCITY: 0.2 M/S
ECHO PVEIN PEAK D VELOCITY: 0.2 M/S
ECHO PVEIN PEAK S VELOCITY: 0.4 M/S
ECHO PVEIN S/D RATIO: 2
ECHO RIGHT VENTRICULAR SYSTOLIC PRESSURE (RVSP): 26 MMHG
ECHO RV INTERNAL DIMENSION: 2.8 CM
ECHO RV LONGITUDINAL DIMENSION: 7.4 CM
ECHO RV MID DIMENSION: 3.3 CM
ECHO RV TAPSE: 2.2 CM (ref 1.7–?)
ECHO TV REGURGITANT MAX VELOCITY: 2.39 M/S
ECHO TV REGURGITANT PEAK GRADIENT: 23 MMHG
FENTANYL URINE: NEGATIVE
INTEGRITY CHECK, CREATININE, URINE: 219.1 MG/DL (ref 22–250)
INTEGRITY CHECK, OXIDANT, URINE: <40 MG/L
INTEGRITY CHECK, PH, URINE: 5.4 (ref 4.5–9)
INTEGRITY CHECK, SPECIFIC GRAVITY, URINE: 1.02 (ref 1–1.03)
METHADONE SCREEN, URINE: NEGATIVE
OPIATES, URINE: NEGATIVE
OXYCODONE SCREEN URINE: NEGATIVE
PCP,URINE: NEGATIVE
TEST INFORMATION: NORMAL
TRAMADOL, URINE: NEGATIVE

## 2025-02-11 PROCEDURE — 93306 TTE W/DOPPLER COMPLETE: CPT

## 2025-02-11 PROCEDURE — 99204 OFFICE O/P NEW MOD 45 MIN: CPT | Performed by: NURSE PRACTITIONER

## 2025-02-11 RX ORDER — OXYCODONE HYDROCHLORIDE 10 MG/1
10 TABLET ORAL EVERY 4 HOURS PRN
Qty: 90 TABLET | Refills: 0 | Status: SHIPPED | OUTPATIENT
Start: 2025-02-11 | End: 2025-02-26

## 2025-02-11 RX ORDER — SUCRALFATE 1 G/1
1 TABLET ORAL 4 TIMES DAILY
Qty: 120 TABLET | Refills: 3 | Status: SHIPPED | OUTPATIENT
Start: 2025-02-11

## 2025-02-11 NOTE — PROGRESS NOTES
Palliative Care Department  Provider: FADI Colin - CNP    Referring Provider:  Dr. Bowden    Location of Service:   [facility]    Chief Complaint: Prasanna Parnell is a 59 y.o. 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 Tomas, Dr. Rodríguez, Dr. Bowden,    -Unclear if 2 separate primaries, planned to proceed with definitive treatment   -Awaiting start on concurrent chemoradiation therapy    Pain related to Neoplasm/Odynophagia:   -oxycodone 10 mg Q 4 PRN   -He has been using tylenol 4 tabs at a time, instructed to reduce use   -He has also been using a lot of advil   -Encouraged to stop smoking    Weight Loss/Odynophagia:   -Dietician providing support   -PEG placed recently   -encouraged to continue to continue with oral intake as much as able    Nausea:   -Carafate    Follow Up:  2 weeks.  They were encouraged to call with any questions, concerns, needs, or changes in symptoms.    Subjective:     HPI:  Prasanna Parnell is a 59 y.o. male with squamous cell carcinoma of the tongue, as well as known right upper lobe lung nodule, status post biopsy which also test positive for squamous cell carcinoma.  He is known to Dr. Tomas, Dr. Rodríguez, as well as Dr. Bowden.  He was referred to [facility] symptomatic support.    Subjective/Events/Discussions:  Prasanna presents today unaccompanied  He is alert and able to voice his needs and concerns well  I introduced the role of palliative medicine in his care, we discussed symptomatic concerns  His primary complaint is that of pain, mostly within his throat and neck, but also with pain and pressure to his right ear  Pain is significantly worse with swallowing, as well as some pain with speaking  Pain is making it very difficult to sleep, oftentimes an comfortable when he lays due to pressure in his head  He has been

## 2025-02-11 NOTE — TELEPHONE ENCOUNTER
Prasanna Parnell  2/11/2025  Ht Readings from Last 1 Encounters:   01/30/25 1.727 m (5' 8\")     Wt Readings from Last 10 Encounters:   02/11/25 59 kg (130 lb)   01/30/25 62.8 kg (138 lb 8 oz)   01/23/25 61.6 kg (135 lb 12.8 oz)   01/21/25 61.6 kg (135 lb 12.8 oz)   01/16/25 67.1 kg (148 lb)   01/16/25 67.1 kg (148 lb)   01/14/25 64.3 kg (141 lb 12.8 oz)   01/02/25 67.4 kg (148 lb 8 oz)   12/27/24 63 kg (139 lb)   12/26/24 65.8 kg (145 lb)     BMI: 19.77    Assessment: Met w/ pt during PM visit. He has lost 8# over last 2 weeks. He did have several dental extractions which have affected overall PO intake. Pt provided w/ resources regarding soft food choices that remain high calorie/high protein. He admits he dislikes ONS options, finding they cause abd distress. He was provided w/ samples of [employer] Breakfast Essentials for trial. Pt reports his partner makes him milkshakes at home, however his diet recall includes minimal protein. Again stressed importance of following high calorie/high protein diet. Will continue to follow.      Zaira Pratt, MS, RD, LD

## 2025-02-12 ENCOUNTER — TELEPHONE (OUTPATIENT)
Dept: ONCOLOGY | Age: 60
End: 2025-02-12

## 2025-02-12 ENCOUNTER — HOSPITAL ENCOUNTER (OUTPATIENT)
Dept: INFUSION THERAPY | Age: 60
End: 2025-02-12

## 2025-02-12 ENCOUNTER — PROCEDURE VISIT (OUTPATIENT)
Dept: AUDIOLOGY | Age: 60
End: 2025-02-12

## 2025-02-12 DIAGNOSIS — H90.3 SENSORINEURAL HEARING LOSS, BILATERAL: Primary | ICD-10-CM

## 2025-02-12 DIAGNOSIS — H69.91 DYSFUNCTION OF RIGHT EUSTACHIAN TUBE: ICD-10-CM

## 2025-02-12 LAB
6-MAM, QUANTITATIVE, URINE: <10 NG/ML
7-AMINOCLONAZEPAM, QUANTITATIVE, URINE: <50 NG/ML
ALPHA-HYDROXYALPRAZOLAM, QUANTITATIVE, URINE: <50 NG/ML
ALPHA-HYDROXYMIDAZOLAM, QUANTITATIVE, URINE: <50 NG/ML
ALPHA-HYDROXYTRIAZOLAM, QUANTITATIVE, URINE: <50 NG/ML
ALPRAZOLAM URINE QUANT: <50 NG/ML
CHLORDIAZEPOXIDE, QUANTITATIVE, URINE: <50 NG/ML
CLONAZEPAM, QUANTITATIVE, URINE: <50 NG/ML
CODEINE, QUANTITATIVE, URINE: <50 NG/ML
COMPLIANCE DRUG ANALYSIS, URINE: NORMAL
DIAZEPAM URINE QUANT: <50 NG/ML
FLUNITRAZEPAM, QUANTITATIVE, URINE: <50 NG/ML
FLURAZEPAM, QUANTITATIVE, URINE: <50 NG/ML
HYDROCODONE, QUANTITATIVE, URINE: <50 NG/ML
HYDROMORPHONE, QUANTITATIVE, URINE: <50 NG/ML
LORAZEPAM URINE QUANT: <50 NG/ML
MIDAZOLAM URINE QUANT: <50 NG/ML
MORPHINE, QUANTITATIVE, URINE: <50 NG/ML
NORDIAZEPAM URINE QUANT: <50 NG/ML
NORHYDROCODONE, QUANTITATIVE, URINE: <50 NG/ML
NOROXYCODONE, QUANTITATIVE, URINE: <50 NG/ML
OXAZEPAM URINE QUANT: <50 NG/ML
OXYCODONE URINE, QUANTITATIVE: <50 NG/ML
OXYMORPHONE, QUANTITATIVE, URINE: <50 NG/ML
TEMAZEPAM, QUANTITATIVE, URINE: <50 NG/ML

## 2025-02-12 PROCEDURE — 92567 TYMPANOMETRY: CPT

## 2025-02-12 PROCEDURE — 92557 COMPREHENSIVE HEARING TEST: CPT

## 2025-02-12 PROCEDURE — 92588 EVOKED AUDITORY TST COMPLETE: CPT

## 2025-02-12 NOTE — PROGRESS NOTES
This patient was referred for Ototoxic Monitoring by Roberto Tomas MD. Therefore, baselines were established for audiometric thresholds. In order to monitor hearing changes in a timely manner, testing included high frequency audiometry (9-20 KHz), per [employer] Guidelines. Testing was accomplished using a [employer] AudioStar Pro Clinical Audiometer, and utilizing high frequency calibrated ear phones. Extra time to test high frequencies was 10 minutes.     The patient reported  right ear pressure and fullness, hearing loss worse in the right ear, and loud noise exposure.  The patient denied any tinnitus, dizziness, ear drainage, ear infections, history of ear infections, ear surgery, and family history of hearing loss.  The patient is scheduled to start chemotherapy on February 20, 2025.    Audiometry using pure tone air and bone conduction testing revealed an essentially mild-to-severe  sensorineural hearing loss, bilaterally. High frequency testing revealed test results in the severe hearing loss range with no response after 16,000 Hz.  Reliability was good. Speech reception thresholds were in good agreement with the pure tone averages, bilaterally. Speech discrimination scores were excellent, bilaterally.    Distortion product otoacoustic emissions (DPOAE) testing was conducted bilaterally and revealed present cochlear responses at 4540-9843 Hz in the right ear and present cochlear responses at 6998-2782 Hz in the left ear.    Tympanometry revealed shallow tympanograms in the right ear and revealed normal middle ear peak pressure and compliance in the left ear.    Repeat testing is suggested with any of the following symptoms: tinnitus, aural fullness, decreased hearing or vertigo. This was discussed with the patient.      If I can be of further assistance or provide additional information, please do not hesitate to contact this office.      Thank you for the

## 2025-02-13 ENCOUNTER — APPOINTMENT (OUTPATIENT)
Dept: GENERAL RADIOLOGY | Age: 60
End: 2025-02-13
Attending: STUDENT IN AN ORGANIZED HEALTH CARE EDUCATION/TRAINING PROGRAM

## 2025-02-13 ENCOUNTER — HOSPITAL ENCOUNTER (OUTPATIENT)
Dept: INFUSION THERAPY | Age: 60
End: 2025-02-13

## 2025-02-13 ENCOUNTER — HOSPITAL ENCOUNTER (OUTPATIENT)
Dept: RADIATION ONCOLOGY | Age: 60
Discharge: HOME | End: 2025-02-13

## 2025-02-13 ENCOUNTER — HOSPITAL ENCOUNTER (OUTPATIENT)
Age: 60
Setting detail: OUTPATIENT SURGERY
Discharge: HOME | End: 2025-02-13
Attending: STUDENT IN AN ORGANIZED HEALTH CARE EDUCATION/TRAINING PROGRAM | Admitting: STUDENT IN AN ORGANIZED HEALTH CARE EDUCATION/TRAINING PROGRAM

## 2025-02-13 ENCOUNTER — ANESTHESIA (OUTPATIENT)
Dept: OPERATING ROOM | Age: 60
End: 2025-02-13

## 2025-02-13 VITALS
SYSTOLIC BLOOD PRESSURE: 118 MMHG | TEMPERATURE: 96.5 F | WEIGHT: 125 LBS | HEIGHT: 68 IN | RESPIRATION RATE: 14 BRPM | OXYGEN SATURATION: 98 % | HEART RATE: 54 BPM | DIASTOLIC BLOOD PRESSURE: 61 MMHG | BODY MASS INDEX: 18.94 KG/M2

## 2025-02-13 PROCEDURE — 6360000002 HC RX W HCPCS

## 2025-02-13 PROCEDURE — 3700000001 HC ADD 15 MINUTES (ANESTHESIA): Performed by: STUDENT IN AN ORGANIZED HEALTH CARE EDUCATION/TRAINING PROGRAM

## 2025-02-13 PROCEDURE — 71045 X-RAY EXAM CHEST 1 VIEW: CPT

## 2025-02-13 PROCEDURE — 77301 RADIOTHERAPY DOSE PLAN IMRT: CPT | Performed by: SPECIALIST

## 2025-02-13 PROCEDURE — 6360000002 HC RX W HCPCS: Performed by: SURGERY

## 2025-02-13 PROCEDURE — C1788 PORT, INDWELLING, IMP: HCPCS | Performed by: STUDENT IN AN ORGANIZED HEALTH CARE EDUCATION/TRAINING PROGRAM

## 2025-02-13 PROCEDURE — 77338 DESIGN MLC DEVICE FOR IMRT: CPT | Performed by: SPECIALIST

## 2025-02-13 PROCEDURE — 6360000002 HC RX W HCPCS: Performed by: STUDENT IN AN ORGANIZED HEALTH CARE EDUCATION/TRAINING PROGRAM

## 2025-02-13 PROCEDURE — 77001 FLUOROGUIDE FOR VEIN DEVICE: CPT | Performed by: STUDENT IN AN ORGANIZED HEALTH CARE EDUCATION/TRAINING PROGRAM

## 2025-02-13 PROCEDURE — 2709999900 HC NON-CHARGEABLE SUPPLY: Performed by: STUDENT IN AN ORGANIZED HEALTH CARE EDUCATION/TRAINING PROGRAM

## 2025-02-13 PROCEDURE — 7100000011 HC PHASE II RECOVERY - ADDTL 15 MIN: Performed by: STUDENT IN AN ORGANIZED HEALTH CARE EDUCATION/TRAINING PROGRAM

## 2025-02-13 PROCEDURE — 7100000010 HC PHASE II RECOVERY - FIRST 15 MIN: Performed by: STUDENT IN AN ORGANIZED HEALTH CARE EDUCATION/TRAINING PROGRAM

## 2025-02-13 PROCEDURE — 36561 INSERT TUNNELED CV CATH: CPT | Performed by: STUDENT IN AN ORGANIZED HEALTH CARE EDUCATION/TRAINING PROGRAM

## 2025-02-13 PROCEDURE — 77300 RADIATION THERAPY DOSE PLAN: CPT | Performed by: SPECIALIST

## 2025-02-13 PROCEDURE — 2580000003 HC RX 258

## 2025-02-13 PROCEDURE — 3600000003 HC SURGERY LEVEL 3 BASE: Performed by: STUDENT IN AN ORGANIZED HEALTH CARE EDUCATION/TRAINING PROGRAM

## 2025-02-13 PROCEDURE — 3600000013 HC SURGERY LEVEL 3 ADDTL 15MIN: Performed by: STUDENT IN AN ORGANIZED HEALTH CARE EDUCATION/TRAINING PROGRAM

## 2025-02-13 PROCEDURE — 3700000000 HC ANESTHESIA ATTENDED CARE: Performed by: STUDENT IN AN ORGANIZED HEALTH CARE EDUCATION/TRAINING PROGRAM

## 2025-02-13 PROCEDURE — 2500000003 HC RX 250 WO HCPCS: Performed by: STUDENT IN AN ORGANIZED HEALTH CARE EDUCATION/TRAINING PROGRAM

## 2025-02-13 DEVICE — VACCESS CT LOW-PROFILE POWER-INJECTABLE IMPLANTABLE PORT (AIR GUARD) (8F) (WITH SUTURE PLUGS)
Type: IMPLANTABLE DEVICE | Site: CHEST | Status: FUNCTIONAL
Brand: VACCESS

## 2025-02-13 RX ORDER — FENTANYL CITRATE 50 UG/ML
INJECTION, SOLUTION INTRAMUSCULAR; INTRAVENOUS
Status: DISCONTINUED | OUTPATIENT
Start: 2025-02-13 | End: 2025-02-13 | Stop reason: SDUPTHER

## 2025-02-13 RX ORDER — HEPARIN 100 UNIT/ML
SYRINGE INTRAVENOUS PRN
Status: DISCONTINUED | OUTPATIENT
Start: 2025-02-13 | End: 2025-02-13 | Stop reason: ALTCHOICE

## 2025-02-13 RX ORDER — SODIUM CHLORIDE, SODIUM LACTATE, POTASSIUM CHLORIDE, CALCIUM CHLORIDE 600; 310; 30; 20 MG/100ML; MG/100ML; MG/100ML; MG/100ML
INJECTION, SOLUTION INTRAVENOUS CONTINUOUS
Status: DISCONTINUED | OUTPATIENT
Start: 2025-02-13 | End: 2025-02-13 | Stop reason: HOSPADM

## 2025-02-13 RX ORDER — LIDOCAINE HYDROCHLORIDE 10 MG/ML
INJECTION, SOLUTION EPIDURAL; INFILTRATION; INTRACAUDAL; PERINEURAL
Status: DISCONTINUED | OUTPATIENT
Start: 2025-02-13 | End: 2025-02-13 | Stop reason: SDUPTHER

## 2025-02-13 RX ORDER — BUPIVACAINE HYDROCHLORIDE AND EPINEPHRINE 2.5; 5 MG/ML; UG/ML
INJECTION, SOLUTION EPIDURAL; INFILTRATION; INTRACAUDAL; PERINEURAL PRN
Status: DISCONTINUED | OUTPATIENT
Start: 2025-02-13 | End: 2025-02-13 | Stop reason: ALTCHOICE

## 2025-02-13 RX ORDER — SODIUM CHLORIDE 0.9 % (FLUSH) 0.9 %
5-40 SYRINGE (ML) INJECTION PRN
Status: DISCONTINUED | OUTPATIENT
Start: 2025-02-13 | End: 2025-02-13 | Stop reason: HOSPADM

## 2025-02-13 RX ORDER — ONDANSETRON 2 MG/ML
INJECTION INTRAMUSCULAR; INTRAVENOUS
Status: DISCONTINUED | OUTPATIENT
Start: 2025-02-13 | End: 2025-02-13 | Stop reason: SDUPTHER

## 2025-02-13 RX ORDER — PROPOFOL 10 MG/ML
INJECTION, EMULSION INTRAVENOUS
Status: DISCONTINUED | OUTPATIENT
Start: 2025-02-13 | End: 2025-02-13 | Stop reason: SDUPTHER

## 2025-02-13 RX ORDER — MIDAZOLAM HYDROCHLORIDE 1 MG/ML
INJECTION, SOLUTION INTRAMUSCULAR; INTRAVENOUS
Status: DISCONTINUED | OUTPATIENT
Start: 2025-02-13 | End: 2025-02-13 | Stop reason: SDUPTHER

## 2025-02-13 RX ORDER — DEXAMETHASONE SODIUM PHOSPHATE 4 MG/ML
INJECTION, SOLUTION INTRA-ARTICULAR; INTRALESIONAL; INTRAMUSCULAR; INTRAVENOUS; SOFT TISSUE
Status: DISCONTINUED | OUTPATIENT
Start: 2025-02-13 | End: 2025-02-13 | Stop reason: SDUPTHER

## 2025-02-13 RX ORDER — GLYCOPYRROLATE 0.2 MG/ML
INJECTION INTRAMUSCULAR; INTRAVENOUS
Status: DISCONTINUED | OUTPATIENT
Start: 2025-02-13 | End: 2025-02-13 | Stop reason: SDUPTHER

## 2025-02-13 RX ORDER — HEPARIN SODIUM 1000 [USP'U]/ML
INJECTION, SOLUTION INTRAVENOUS; SUBCUTANEOUS PRN
Status: DISCONTINUED | OUTPATIENT
Start: 2025-02-13 | End: 2025-02-13 | Stop reason: ALTCHOICE

## 2025-02-13 RX ORDER — SODIUM CHLORIDE 9 MG/ML
INJECTION, SOLUTION INTRAVENOUS PRN
Status: DISCONTINUED | OUTPATIENT
Start: 2025-02-13 | End: 2025-02-13 | Stop reason: HOSPADM

## 2025-02-13 RX ORDER — VANCOMYCIN 1 G/200ML
1000 INJECTION, SOLUTION INTRAVENOUS
Status: COMPLETED | OUTPATIENT
Start: 2025-02-13 | End: 2025-02-13

## 2025-02-13 RX ORDER — SODIUM CHLORIDE 9 MG/ML
INJECTION, SOLUTION INTRAVENOUS
Status: DISCONTINUED | OUTPATIENT
Start: 2025-02-13 | End: 2025-02-13 | Stop reason: SDUPTHER

## 2025-02-13 RX ORDER — SODIUM CHLORIDE 0.9 % (FLUSH) 0.9 %
5-40 SYRINGE (ML) INJECTION EVERY 12 HOURS SCHEDULED
Status: DISCONTINUED | OUTPATIENT
Start: 2025-02-13 | End: 2025-02-13 | Stop reason: HOSPADM

## 2025-02-13 RX ADMIN — GLYCOPYRROLATE 0.2 MG: 0.2 INJECTION, SOLUTION INTRAMUSCULAR; INTRAVENOUS at 08:29

## 2025-02-13 RX ADMIN — HYDROMORPHONE HYDROCHLORIDE 0.5 MG: 1 INJECTION, SOLUTION INTRAMUSCULAR; INTRAVENOUS; SUBCUTANEOUS at 09:25

## 2025-02-13 RX ADMIN — FENTANYL CITRATE 25 MCG: 50 INJECTION, SOLUTION INTRAMUSCULAR; INTRAVENOUS at 08:47

## 2025-02-13 RX ADMIN — VANCOMYCIN 1000 MG: 1 INJECTION, SOLUTION INTRAVENOUS at 07:07

## 2025-02-13 RX ADMIN — ONDANSETRON 4 MG: 2 INJECTION INTRAMUSCULAR; INTRAVENOUS at 08:34

## 2025-02-13 RX ADMIN — DEXAMETHASONE SODIUM PHOSPHATE 8 MG: 4 INJECTION, SOLUTION INTRAMUSCULAR; INTRAVENOUS at 08:34

## 2025-02-13 RX ADMIN — PROPOFOL 100 MCG/KG/MIN: 10 INJECTION, EMULSION INTRAVENOUS at 08:28

## 2025-02-13 RX ADMIN — LIDOCAINE HYDROCHLORIDE 20 MG: 10 INJECTION, SOLUTION EPIDURAL; INFILTRATION; INTRACAUDAL; PERINEURAL at 08:28

## 2025-02-13 RX ADMIN — SODIUM CHLORIDE: 9 INJECTION, SOLUTION INTRAVENOUS at 08:21

## 2025-02-13 RX ADMIN — FENTANYL CITRATE 25 MCG: 50 INJECTION, SOLUTION INTRAMUSCULAR; INTRAVENOUS at 08:40

## 2025-02-13 RX ADMIN — FENTANYL CITRATE 50 MCG: 50 INJECTION, SOLUTION INTRAMUSCULAR; INTRAVENOUS at 08:29

## 2025-02-13 RX ADMIN — MIDAZOLAM 1 MG: 1 INJECTION INTRAMUSCULAR; INTRAVENOUS at 08:21

## 2025-02-13 ASSESSMENT — PAIN DESCRIPTION - ONSET
ONSET: ON-GOING
ONSET: ON-GOING

## 2025-02-13 ASSESSMENT — PAIN - FUNCTIONAL ASSESSMENT
PAIN_FUNCTIONAL_ASSESSMENT: ACTIVITIES ARE NOT PREVENTED
PAIN_FUNCTIONAL_ASSESSMENT: 0-10

## 2025-02-13 ASSESSMENT — PAIN SCALES - GENERAL
PAINLEVEL_OUTOF10: 3
PAINLEVEL_OUTOF10: 4
PAINLEVEL_OUTOF10: 0
PAINLEVEL_OUTOF10: 7

## 2025-02-13 ASSESSMENT — PAIN DESCRIPTION - LOCATION
LOCATION: JAW;HEAD
LOCATION: JAW;HEAD

## 2025-02-13 ASSESSMENT — LIFESTYLE VARIABLES: SMOKING_STATUS: 1

## 2025-02-13 ASSESSMENT — PAIN DESCRIPTION - DESCRIPTORS: DESCRIPTORS: DISCOMFORT

## 2025-02-13 ASSESSMENT — PAIN DESCRIPTION - ORIENTATION: ORIENTATION: RIGHT

## 2025-02-13 NOTE — ANESTHESIA POSTPROCEDURE EVALUATION
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    Helen Phase I: Helen Score: 10    Helen Phase II:      Anesthesia Post Evaluation    Patient location during evaluation: PACU  Patient participation: complete - patient participated  Level of consciousness: awake  Pain score: 0  Airway patency: patent  Nausea & Vomiting: no vomiting and no nausea  Cardiovascular status: hemodynamically stable  Respiratory status: acceptable  Hydration status: stable  Pain management: adequate        No notable events documented.

## 2025-02-13 NOTE — ANESTHESIA PRE PROCEDURE
Department of Anesthesiology  Preprocedure Note       Name:  Prasanna Parnell   Age:  59 y.o.  :  1965                                          MRN:  [id number]         Date:  2025      Surgeon: Surgeon(s):  Delvis Frank DO    Procedure: Procedure(s):  MEDIPORT INSERTION    Medications prior to admission:   Prior to Admission medications    Medication Sig Start Date End Date Taking? Authorizing Provider   albuterol sulfate HFA (VENTOLIN HFA) 108 (90 Base) MCG/ACT inhaler Inhale 2 puffs into the lungs every 6 hours as needed for Wheezing   Yes Provider, MD Radha   oxyCODONE HCl (OXY-IR) 10 MG immediate release tablet Take 1 tablet by mouth every 4 hours as needed for Pain for up to 15 days. Intended supply: 30 days Max Daily Amount: 60 mg 25  Steven Kincaid, APRN - CNP   sucralfate (CARAFATE) 1 GM tablet Take 1 tablet by mouth 4 times daily 25   Steven Kincaid APRN - CNP   lidocaine viscous hcl (XYLOCAINE) 2 % SOLN solution Take 15 mLs by mouth as needed for Irritation  Patient not taking: Reported on 2025   Jonathan Bowden DO   ondansetron (ZOFRAN-ODT) 4 MG disintegrating tablet Take 1 tablet by mouth 3 times daily as needed for Nausea or Vomiting 25   Jonathan Bowden DO   ibuprofen (MOTRIN) 40 MG/ML SUSP Take 16.1 mLs by mouth every 6 hours  Patient not taking: Reported on 2025  Jonathan Bowden DO   acetaminophen (TYLENOL) 160 MG/5ML suspension Take 15.62 mLs by mouth every 4 hours as needed for Fever  Patient taking differently: Take 500 mg by mouth every 4 hours as needed for Fever or Pain 25  Jonathan Bowden DO   EPINEPHrine (EPIPEN 2-JUAN CARLOS) 0.3 MG/0.3ML SOAJ injection Inject 0.3 mLs into the muscle once as needed (anaphylaxis) Use as directed for allergic reaction  Patient not taking: Reported on 202525  Jojo Almeida DO       Current medications:    Current Facility-Administered Medications  Right Ventricle: Right ventricle size is normal. Normal systolic function.  ·  Aortic Valve: No stenosis.  ·  Tricuspid Valve: Normal RVSP. The estimated RVSP is 26 mmHg.  ·  Pericardium: Small (<1 cm) localized pericardial effusion present around the right ventricle.  ·  Image quality is good.  Read by Dr. Lama 2/11/25         Neuro/Psych:                ROS comment: Severe neck/jaw/ear pain-ringing in the ear on the right side GI/Hepatic/Renal:   (+) GERD: well controlled          Endo/Other:    (+) : arthritis: OA., malignancy/cancer.                 Abdominal:             Vascular:          Other Findings:             Anesthesia Plan      MAC     ASA 3       Induction: intravenous.      Anesthetic plan and risks discussed with patient.    Use of blood products discussed with patient whom consented to blood products.              NPO for food since 2/12/25 at 2030  NPO for water since 2/11/25 at 0500        Thomas Guevara RN   2/13/2025

## 2025-02-13 NOTE — OP NOTE
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 Item Serial No.  Lot No. LRB No. Used Action   PORT INFUS 8FR L45CM TI LO PROF PWR INJ W/ ATTCH POLYUR RICHIE - [id number]  PORT INFUS 8FR L45CM TI LO PROF PWR INJ W/ ATTCH POLYUR RICHIE  Extend [employer]-WD [id number] Left 1 Implanted         Drains:   Gastrostomy/Enterostomy/Jejunostomy Tube Percutaneous Endoscopic Gastrostomy (PEG) LUQ 20 fr (Active)   Site Description Clean, dry & intact 02/13/25 0723   Dressing Type Open to air 01/20/25 1215       Findings:  Infection Present At Time Of Surgery (PATOS) (choose all levels that have infection present):  No infection present  Other Findings: as expected    Detailed Description of Procedure:    1 grams of vancomycin was given. The patient was placed in the supine position with a towel roll placed in between the patients scapulae. The right and left anterior chest wall and neck were appropriately prepped and draped with sterile OR towels and sterile OR drape A marking pen was used to joshua the desired point of cannulation using the manubrium and the angle of the clavicle as my landmarks. The surrounding skin, periosteum and desired location of the port were anesthetized with 1% lidocaine with epinephrine. A cook needle was then used to cannulate the left subclavian vein. Venous blood amira back freely. A wire passed through the needle. It was noted to pass with ease and no ectopy was seen on the heart monitor.

## 2025-02-13 NOTE — DISCHARGE INSTRUCTIONS
Dr. Frank's Discharge Instuctions    Discharge Home.     Call office to schedule follow-up appointment in 2 weeks    Diet: Advance your diet as tolerated    Activity: Increase activity gradually. No heavy lifting more than 15lbs for 2 weeks.     Shower/Bathing: Okay to shower in 24 hours. No tub bathing, swimming or soaking for 2 weeks    Dressings/Splint: You have a clean dressing applied. The glue will fall off on its own    Medications: Take tylenol as needed for pain

## 2025-02-13 NOTE — ANESTHESIA PRE PROCEDURE
Department of Anesthesiology  Preprocedure Note       Name:  Prasanna Parnell   Age:  59 y.o.  :  1965                                          MRN:  [id number]          Genoveva Parnell, APRN - CRNA  Certified Registered Nurse Anesthetist  Specialty: Certified Registered Nurse Anesthetist     Anesthesia Pre Procedure     Incomplete     Date of Service: 2025  6:42 AM     Incomplete       Expand All Collapse All  Department of Anesthesiology  Preprocedure Note                                        Name:  Prasanna Parnell     Age:  59 y.o.  :  1965                                                 MRN:  [id number]                                                                        Date:  2025              Surgeon: Surgeon(s):  Delvis Frank DO     Procedure: Procedure(s):  MEDIPORT INSERTION     Medications prior to admission:   Home Medications           Prior to Admission medications    Medication Sig Start Date End Date Taking? Authorizing Provider   albuterol sulfate HFA (VENTOLIN HFA) 108 (90 Base) MCG/ACT inhaler Inhale 2 puffs into the lungs every 6 hours as needed for Wheezing     Yes Provider, MD Radha   oxyCODONE HCl (OXY-IR) 10 MG immediate release tablet Take 1 tablet by mouth every 4 hours as needed for Pain for up to 15 days. Intended supply: 30 days Max Daily Amount: 60 mg 25   Steven Kincaid APRN - CNP   sucralfate (CARAFATE) 1 GM tablet Take 1 tablet by mouth 4 times daily 25     Steven Kincaid APRN - CNP   lidocaine viscous hcl (XYLOCAINE) 2 % SOLN solution Take 15 mLs by mouth as needed for Irritation  Patient not taking: Reported on 2025     Jonathan Bowden DO   ondansetron (ZOFRAN-ODT) 4 MG disintegrating tablet Take 1 tablet by mouth 3 times daily as needed for Nausea or Vomiting 25     Jonathan Bowden DO   ibuprofen (MOTRIN) 40 MG/ML SUSP Take 16.1 mLs by mouth every 6 hours  Patient not taking: Reported on 2025  (138 lb 8 oz)     Body mass index is 19.01 kg/m².    CBC:   Lab Results   Component Value Date/Time    WBC 9.1 01/23/2025 12:00 PM    RBC 4.36 01/23/2025 12:00 PM    HGB 14.0 01/23/2025 12:00 PM    HCT 40.2 01/23/2025 12:00 PM    MCV 92.2 01/23/2025 12:00 PM    RDW 12.4 01/23/2025 12:00 PM     01/23/2025 12:00 PM       CMP:   Lab Results   Component Value Date/Time     12/27/2024 09:45 AM    K 4.7 12/27/2024 09:45 AM     12/27/2024 09:45 AM    CO2 26 12/27/2024 09:45 AM    BUN 14 12/27/2024 09:45 AM    CREATININE 1.0 12/27/2024 09:45 AM    LABGLOM 87 12/27/2024 09:45 AM    GLUCOSE 106 12/27/2024 09:45 AM    CALCIUM 10.0 12/27/2024 09:45 AM    BILITOT 0.5 12/27/2024 09:45 AM    ALKPHOS 111 12/27/2024 09:45 AM    AST 22 12/27/2024 09:45 AM    ALT 19 12/27/2024 09:45 AM       POC Tests: No results for input(s): \"POCGLU\", \"POCNA\", \"POCK\", \"POCCL\", \"POCBUN\", \"POCHEMO\", \"POCHCT\" in the last 72 hours.    Coags:   Lab Results   Component Value Date/Time    PROTIME 13.0 01/23/2025 12:00 PM    INR 1.2 01/23/2025 12:00 PM       HCG (If Applicable): No results found for: \"PREGTESTUR\", \"PREGSERUM\", \"HCG\", \"HCGQUANT\"     ABGs: No results found for: \"PHART\", \"PO2ART\", \"PDV8JYC\", \"IUV6ORX\", \"BEART\", \"Y4WNSGGB\"     Type & Screen (If Applicable):  No results found for: \"ABORH\", \"LABANTI\"    Drug/Infectious Status (If Applicable):  No results found for: \"HIV\", \"HEPCAB\"    COVID-19 Screening (If Applicable):   Lab Results   Component Value Date/Time    COVID19 Not Detected 12/27/2024 09:45 AM           Anesthesia Evaluation  Patient summary reviewed and Nursing notes reviewed   no history of anesthetic complications:   Airway: Mallampati: IV  TM distance: >3 FB   Neck ROM: full  Mouth opening: < 3 FB   Dental:      Comment: intact    Pulmonary: breath sounds clear to auscultation  (+)  COPD:          current smoker          Patient smoked on day of surgery.                ROS comment: 1/2 ppd - cigarrettes

## 2025-02-13 NOTE — H&P
[facility] General Surgery Clinic Note     No chief complaint on file.        PCP: No primary care provider on file.     Prasanna Parnell 59 y.o. male   History of Present Illness  Choco presents for elective port placement. He has base of tongue mass concern for malignancy. He is doing well with his PEG and there is plan to start chemo and he is in need of access        Past Medical History        Past Medical History:   Diagnosis Date    Cancer (HCC)       oral cancer    Cervicalgia      COPD (chronic obstructive pulmonary disease) (HCC)      Neuropathy      Retrolisthesis      Spondylolisthesis              Past Surgical History         Past Surgical History:   Procedure Laterality Date    FINGER SURGERY Right       amputation first finger    HERNIA REPAIR Bilateral       inguinal    KNEE SURGERY Left       repair tendons d/t chainsaw accidnet    LARYNGOSCOPY N/A 01/14/2025     PANENDOSCOPY WITH BIOPSY performed by Jonathan Bowden DO at [facility] OR    LUMBAR LAMINECTOMY         1998,2010    WRIST SURGERY Right       tendon repair            Home Medications           Prior to Admission medications    Medication Sig Start Date End Date Taking? Authorizing Provider   albuterol sulfate HFA (VENTOLIN HFA) 108 (90 Base) MCG/ACT inhaler Inhale 2 puffs into the lungs every 6 hours as needed for Wheezing     Yes Provider, MD Radha   oxyCODONE HCl (OXY-IR) 10 MG immediate release tablet Take 1 tablet by mouth every 6 hours as needed for Pain for up to 10 days. Intended supply: 10 days Max Daily Amount: 40 mg 1/14/25 1/24/25 Yes Jonathan Bowden DO   acetaminophen (TYLENOL) 160 MG/5ML suspension Take 15.62 mLs by mouth every 4 hours as needed for Fever 1/14/25 2/13/25 Yes Jonathan Bowden DO   lidocaine viscous hcl (XYLOCAINE) 2 % SOLN solution Take 15 mLs by mouth as needed for Irritation 1/14/25     Jonathan Bowden DO   ondansetron (ZOFRAN-ODT) 4 MG disintegrating tablet Take 1 tablet by mouth 3 times daily as needed  Connections: Low Risk (7/24/2023)     [facility])     Social Connections      How often do you feel that you lack companionship?: Hardly ever      How often do you feel left out?: Hardly Ever      How often do you feel isolated from others?: Hardly ever   Housing Stability: Unknown (8/6/2024)     Housing Stability Vital Sign      Unstable Housing in the Last Year: No            Family History         Family History   Problem Relation Age of Onset    Cancer Mother 65         leukemia    Alcohol Abuse Father      High Blood Pressure Father      Heart Attack Father      Stroke Father      Breast Cancer Maternal Grandmother      Cancer Paternal Grandfather           prostate                     Objective:  Vitals:    02/13/25 0645   BP: (!) 137/59   Pulse: 63   Resp: 16   Temp: 97.9 °F (36.6 °C)   SpO2: 98%       Objective  Physical Exam  Constitutional:       General: He is not in acute distress.     Appearance: Normal appearance. He is normal weight. He is ill-appearing. He is not toxic-appearing or diaphoretic.   HENT:      Head: Normocephalic and atraumatic.      Mouth/Throat:      Mouth: Mucous membranes are moist.      Pharynx: Oropharynx is clear.   Eyes:      General: No scleral icterus.     Conjunctiva/sclera: Conjunctivae normal.      Pupils: Pupils are equal, round, and reactive to light.   Cardiovascular:      Rate and Rhythm: Normal rate and regular rhythm.      Pulses: Normal pulses.   Pulmonary:      Effort: Pulmonary effort is normal. No respiratory distress.   Abdominal:      General: Abdomen is flat. There is no distension.      Palpations: Abdomen is soft. There is no mass.      Tenderness: There is no abdominal tenderness. There is no guarding or rebound.      Hernia: No hernia is present.   Musculoskeletal:         General: Normal range of motion.   Skin:     General: Skin is warm and dry.      Capillary Refill: Capillary refill takes

## 2025-02-17 ENCOUNTER — TELEPHONE (OUTPATIENT)
Dept: ONCOLOGY | Age: 60
End: 2025-02-17

## 2025-02-17 NOTE — TELEPHONE ENCOUNTER
Message received from Marcin RAMIRES indicating that it does appear that pt's Financial Assistance Application was denied.  Spoke with Financial Navigator who provided detail for denial citing that there was no pt signature on application.    Contacted billing to discuss.  Spoke with Benjamin and advised that application had been submitted by local PBD specialist.  Benjamin confirmed all necessary information received; case to be escalated for review.    IBETH Harp, LISW-S  Oncology Social Worker   [phone]

## 2025-02-19 ENCOUNTER — HOSPITAL ENCOUNTER (OUTPATIENT)
Dept: INFUSION THERAPY | Age: 60
Discharge: HOME | End: 2025-02-19

## 2025-02-19 DIAGNOSIS — C09.8 MALIGNANT NEOPLASM OF OVERLAPPING SITES OF TONSIL (HCC): Primary | ICD-10-CM

## 2025-02-19 LAB
ALBUMIN SERPL-MCNC: 4.1 G/DL (ref 3.5–5.2)
ALP SERPL-CCNC: 80 U/L (ref 40–129)
ALT SERPL-CCNC: 13 U/L (ref 0–40)
ANION GAP SERPL CALCULATED.3IONS-SCNC: 10 MMOL/L (ref 7–16)
AST SERPL-CCNC: 16 U/L (ref 0–39)
BASOPHILS # BLD: 0.03 K/UL (ref 0–0.2)
BASOPHILS NFR BLD: 0 % (ref 0–2)
BILIRUB SERPL-MCNC: 0.4 MG/DL (ref 0–1.2)
BUN SERPL-MCNC: 10 MG/DL (ref 6–20)
CALCIUM SERPL-MCNC: 9.2 MG/DL (ref 8.6–10.2)
CHLORIDE SERPL-SCNC: 105 MMOL/L (ref 98–107)
CO2 SERPL-SCNC: 28 MMOL/L (ref 22–29)
CREAT SERPL-MCNC: 0.6 MG/DL (ref 0.7–1.2)
EOSINOPHIL # BLD: 0.12 K/UL (ref 0.05–0.5)
EOSINOPHILS RELATIVE PERCENT: 2 % (ref 0–6)
ERYTHROCYTE [DISTWIDTH] IN BLOOD BY AUTOMATED COUNT: 13.2 % (ref 11.5–15)
GFR, ESTIMATED: >90 ML/MIN/1.73M2
GLUCOSE SERPL-MCNC: 87 MG/DL (ref 74–99)
HCT VFR BLD AUTO: 35.8 % (ref 37–54)
HGB BLD-MCNC: 12.5 G/DL (ref 12.5–16.5)
IMM GRANULOCYTES # BLD AUTO: 0.03 K/UL (ref 0–0.58)
IMM GRANULOCYTES NFR BLD: 0 % (ref 0–5)
LYMPHOCYTES NFR BLD: 2.25 K/UL (ref 1.5–4)
LYMPHOCYTES RELATIVE PERCENT: 33 % (ref 20–42)
MAGNESIUM SERPL-MCNC: 1.7 MG/DL (ref 1.6–2.6)
MCH RBC QN AUTO: 31.9 PG (ref 26–35)
MCHC RBC AUTO-ENTMCNC: 34.9 G/DL (ref 32–34.5)
MCV RBC AUTO: 91.3 FL (ref 80–99.9)
MONOCYTES NFR BLD: 0.42 K/UL (ref 0.1–0.95)
MONOCYTES NFR BLD: 6 % (ref 2–12)
NEUTROPHILS NFR BLD: 59 % (ref 43–80)
NEUTS SEG NFR BLD: 4.08 K/UL (ref 1.8–7.3)
PHOSPHATE SERPL-MCNC: 2.6 MG/DL (ref 2.5–4.5)
PLATELET # BLD AUTO: 290 K/UL (ref 130–450)
PMV BLD AUTO: 9.6 FL (ref 7–12)
POTASSIUM SERPL-SCNC: 3.5 MMOL/L (ref 3.5–5)
PROT SERPL-MCNC: 6.5 G/DL (ref 6.4–8.3)
RBC # BLD AUTO: 3.92 M/UL (ref 3.8–5.8)
SODIUM SERPL-SCNC: 143 MMOL/L (ref 132–146)
WBC OTHER # BLD: 6.9 K/UL (ref 4.5–11.5)

## 2025-02-19 PROCEDURE — 83735 ASSAY OF MAGNESIUM: CPT

## 2025-02-19 PROCEDURE — 2500000003 HC RX 250 WO HCPCS: Performed by: STUDENT IN AN ORGANIZED HEALTH CARE EDUCATION/TRAINING PROGRAM

## 2025-02-19 PROCEDURE — 6360000002 HC RX W HCPCS: Performed by: STUDENT IN AN ORGANIZED HEALTH CARE EDUCATION/TRAINING PROGRAM

## 2025-02-19 PROCEDURE — 99214 OFFICE O/P EST MOD 30 MIN: CPT

## 2025-02-19 PROCEDURE — 84100 ASSAY OF PHOSPHORUS: CPT

## 2025-02-19 PROCEDURE — 80053 COMPREHEN METABOLIC PANEL: CPT

## 2025-02-19 PROCEDURE — 85025 COMPLETE CBC W/AUTO DIFF WBC: CPT

## 2025-02-19 PROCEDURE — 36591 DRAW BLOOD OFF VENOUS DEVICE: CPT

## 2025-02-19 RX ORDER — PROCHLORPERAZINE MALEATE 10 MG
10 TABLET ORAL EVERY 6 HOURS PRN
Qty: 30 TABLET | Refills: 0 | Status: SHIPPED | OUTPATIENT
Start: 2025-02-19

## 2025-02-19 RX ORDER — HEPARIN 100 UNIT/ML
500 SYRINGE INTRAVENOUS PRN
Status: DISCONTINUED | OUTPATIENT
Start: 2025-02-19 | End: 2025-02-20 | Stop reason: HOSPADM

## 2025-02-19 RX ORDER — HEPARIN 100 UNIT/ML
500 SYRINGE INTRAVENOUS PRN
Status: CANCELLED | OUTPATIENT
Start: 2025-02-19

## 2025-02-19 RX ORDER — ALBUTEROL SULFATE 90 UG/1
4 INHALANT RESPIRATORY (INHALATION) PRN
Status: CANCELLED | OUTPATIENT
Start: 2025-02-19

## 2025-02-19 RX ORDER — ONDANSETRON 2 MG/ML
8 INJECTION INTRAMUSCULAR; INTRAVENOUS
Status: CANCELLED | OUTPATIENT
Start: 2025-02-19

## 2025-02-19 RX ORDER — ONDANSETRON 8 MG/1
8 TABLET, ORALLY DISINTEGRATING ORAL EVERY 8 HOURS PRN
Qty: 28 TABLET | Refills: 1 | Status: SHIPPED | OUTPATIENT
Start: 2025-02-19

## 2025-02-19 RX ORDER — SODIUM CHLORIDE 0.9 % (FLUSH) 0.9 %
5-40 SYRINGE (ML) INJECTION PRN
Status: CANCELLED | OUTPATIENT
Start: 2025-02-19

## 2025-02-19 RX ORDER — SODIUM CHLORIDE 9 MG/ML
25 INJECTION, SOLUTION INTRAVENOUS PRN
Status: CANCELLED | OUTPATIENT
Start: 2025-02-19

## 2025-02-19 RX ORDER — HYDROCORTISONE SODIUM SUCCINATE 100 MG/2ML
100 INJECTION INTRAMUSCULAR; INTRAVENOUS
Status: CANCELLED | OUTPATIENT
Start: 2025-02-19

## 2025-02-19 RX ORDER — ACETAMINOPHEN 325 MG/1
650 TABLET ORAL
Status: CANCELLED | OUTPATIENT
Start: 2025-02-19

## 2025-02-19 RX ORDER — DIPHENHYDRAMINE HYDROCHLORIDE 50 MG/ML
50 INJECTION INTRAMUSCULAR; INTRAVENOUS
Status: CANCELLED | OUTPATIENT
Start: 2025-02-19

## 2025-02-19 RX ORDER — EPINEPHRINE 1 MG/ML
0.3 INJECTION, SOLUTION, CONCENTRATE INTRAVENOUS PRN
Status: CANCELLED | OUTPATIENT
Start: 2025-02-19

## 2025-02-19 RX ORDER — SODIUM CHLORIDE 9 MG/ML
INJECTION, SOLUTION INTRAVENOUS CONTINUOUS
Status: CANCELLED | OUTPATIENT
Start: 2025-02-19

## 2025-02-19 RX ORDER — SODIUM CHLORIDE 0.9 % (FLUSH) 0.9 %
5-40 SYRINGE (ML) INJECTION PRN
Status: DISCONTINUED | OUTPATIENT
Start: 2025-02-19 | End: 2025-02-20 | Stop reason: HOSPADM

## 2025-02-19 RX ADMIN — SODIUM CHLORIDE, PRESERVATIVE FREE 10 ML: 5 INJECTION INTRAVENOUS at 13:37

## 2025-02-19 RX ADMIN — HEPARIN 500 UNITS: 100 SYRINGE at 13:37

## 2025-02-19 RX ADMIN — SODIUM CHLORIDE, PRESERVATIVE FREE 10 ML: 5 INJECTION INTRAVENOUS at 13:36

## 2025-02-19 NOTE — PROGRESS NOTES
Patient was seen today for chemotherapy education for Cisplatin + Radiation for head and neck cancer. Patient was by himself. Mechanism of action, schedule, administration, and potential side effects of the prescribed therapy were discussed in detaill. Some of the side effects discussed include, but are not limited to, bone marrow suppression, nausea/vomiting, fatigue, diarrhea, constipation, nephrotoxicity, mucositis, electrolyte imbalance, infection, and ototoxicity. Patient demonstrated understanding throughout the education session. A packet containing the information discussed was provided to the patient.    Medication list was reviewed for potential drug interactions.    Prescriptions for Zofran and Compazine sent.    All questions asked were answered or deferred to the oncology team. Patient encouraged to reach out to their treatment team with any other additional questions or concerns that they may have.    Johan Henley, PharmD, BCOP  Clinical Pharmacist, Hematology/Oncology  [facility]

## 2025-02-19 NOTE — PROGRESS NOTES
Pt here for chemo teach and labs. Pt tolerated lab draw via port well. Chemo basics video watched. Educated pt on possible side effects, what foods to avoid and not to avoid, importance of taking temp bid and watch for signs of infections, and what to do if these signs occur. Also instructed on what to expect on day of treatment, as well as importance of getting labs before treatment. Pt verbalized understanding. All questions answered. Pt d/c in stable condition.

## 2025-02-20 ENCOUNTER — HOSPITAL ENCOUNTER (OUTPATIENT)
Dept: RADIATION ONCOLOGY | Age: 60
Discharge: HOME | End: 2025-02-20

## 2025-02-20 ENCOUNTER — HOSPITAL ENCOUNTER (OUTPATIENT)
Dept: INFUSION THERAPY | Age: 60
Discharge: HOME | End: 2025-02-20

## 2025-02-20 ENCOUNTER — OFFICE VISIT (OUTPATIENT)
Dept: ONCOLOGY | Age: 60
End: 2025-02-20

## 2025-02-20 ENCOUNTER — TELEPHONE (OUTPATIENT)
Dept: CASE MANAGEMENT | Age: 60
End: 2025-02-20

## 2025-02-20 VITALS
OXYGEN SATURATION: 96 % | HEART RATE: 51 BPM | RESPIRATION RATE: 14 BRPM | TEMPERATURE: 97.9 F | SYSTOLIC BLOOD PRESSURE: 116 MMHG | DIASTOLIC BLOOD PRESSURE: 56 MMHG

## 2025-02-20 VITALS
WEIGHT: 130 LBS | HEIGHT: 68 IN | HEART RATE: 60 BPM | DIASTOLIC BLOOD PRESSURE: 61 MMHG | TEMPERATURE: 97.9 F | BODY MASS INDEX: 19.7 KG/M2 | SYSTOLIC BLOOD PRESSURE: 128 MMHG | OXYGEN SATURATION: 99 %

## 2025-02-20 VITALS
DIASTOLIC BLOOD PRESSURE: 61 MMHG | HEART RATE: 67 BPM | BODY MASS INDEX: 20.21 KG/M2 | WEIGHT: 132.9 LBS | SYSTOLIC BLOOD PRESSURE: 119 MMHG

## 2025-02-20 DIAGNOSIS — C09.8 MALIGNANT NEOPLASM OF OVERLAPPING SITES OF TONSIL (HCC): Primary | ICD-10-CM

## 2025-02-20 PROCEDURE — 6360000002 HC RX W HCPCS: Performed by: STUDENT IN AN ORGANIZED HEALTH CARE EDUCATION/TRAINING PROGRAM

## 2025-02-20 PROCEDURE — 96415 CHEMO IV INFUSION ADDL HR: CPT

## 2025-02-20 PROCEDURE — 2580000003 HC RX 258: Performed by: STUDENT IN AN ORGANIZED HEALTH CARE EDUCATION/TRAINING PROGRAM

## 2025-02-20 PROCEDURE — 2500000003 HC RX 250 WO HCPCS: Performed by: STUDENT IN AN ORGANIZED HEALTH CARE EDUCATION/TRAINING PROGRAM

## 2025-02-20 PROCEDURE — 96367 TX/PROPH/DG ADDL SEQ IV INF: CPT

## 2025-02-20 PROCEDURE — 99214 OFFICE O/P EST MOD 30 MIN: CPT | Performed by: STUDENT IN AN ORGANIZED HEALTH CARE EDUCATION/TRAINING PROGRAM

## 2025-02-20 PROCEDURE — 77386 HC NTSTY MODUL RAD TX DLVR CPLX: CPT | Performed by: SPECIALIST

## 2025-02-20 PROCEDURE — 96413 CHEMO IV INFUSION 1 HR: CPT

## 2025-02-20 PROCEDURE — 96375 TX/PRO/DX INJ NEW DRUG ADDON: CPT

## 2025-02-20 RX ORDER — ONDANSETRON 2 MG/ML
8 INJECTION INTRAMUSCULAR; INTRAVENOUS
OUTPATIENT
Start: 2025-03-13

## 2025-02-20 RX ORDER — ONDANSETRON 2 MG/ML
8 INJECTION INTRAMUSCULAR; INTRAVENOUS
OUTPATIENT
Start: 2025-03-06

## 2025-02-20 RX ORDER — EPINEPHRINE 1 MG/ML
0.3 INJECTION, SOLUTION, CONCENTRATE INTRAVENOUS PRN
OUTPATIENT
Start: 2025-03-20

## 2025-02-20 RX ORDER — SODIUM CHLORIDE 9 MG/ML
5-250 INJECTION, SOLUTION INTRAVENOUS PRN
OUTPATIENT
Start: 2025-03-27

## 2025-02-20 RX ORDER — MEPERIDINE HYDROCHLORIDE 50 MG/ML
12.5 INJECTION INTRAMUSCULAR; INTRAVENOUS; SUBCUTANEOUS PRN
OUTPATIENT
Start: 2025-02-27

## 2025-02-20 RX ORDER — ALBUTEROL SULFATE 90 UG/1
4 INHALANT RESPIRATORY (INHALATION) PRN
OUTPATIENT
Start: 2025-03-06

## 2025-02-20 RX ORDER — DEXAMETHASONE SODIUM PHOSPHATE 10 MG/ML
10 INJECTION, SOLUTION INTRAMUSCULAR; INTRAVENOUS ONCE
Status: COMPLETED | OUTPATIENT
Start: 2025-02-20 | End: 2025-02-20

## 2025-02-20 RX ORDER — SODIUM CHLORIDE 9 MG/ML
5-250 INJECTION, SOLUTION INTRAVENOUS PRN
OUTPATIENT
Start: 2025-04-03

## 2025-02-20 RX ORDER — HYDROCORTISONE SODIUM SUCCINATE 100 MG/2ML
100 INJECTION INTRAMUSCULAR; INTRAVENOUS
OUTPATIENT
Start: 2025-04-03

## 2025-02-20 RX ORDER — ACETAMINOPHEN 325 MG/1
650 TABLET ORAL
OUTPATIENT
Start: 2025-02-27

## 2025-02-20 RX ORDER — PROCHLORPERAZINE EDISYLATE 5 MG/ML
5 INJECTION INTRAMUSCULAR; INTRAVENOUS
Status: CANCELLED | OUTPATIENT
Start: 2025-02-20

## 2025-02-20 RX ORDER — ONDANSETRON 2 MG/ML
8 INJECTION INTRAMUSCULAR; INTRAVENOUS
OUTPATIENT
Start: 2025-02-27

## 2025-02-20 RX ORDER — HYDROCORTISONE SODIUM SUCCINATE 100 MG/2ML
100 INJECTION INTRAMUSCULAR; INTRAVENOUS
Status: CANCELLED | OUTPATIENT
Start: 2025-02-20

## 2025-02-20 RX ORDER — DIPHENHYDRAMINE HYDROCHLORIDE 50 MG/ML
50 INJECTION INTRAMUSCULAR; INTRAVENOUS
OUTPATIENT
Start: 2025-04-03

## 2025-02-20 RX ORDER — ACETAMINOPHEN 325 MG/1
650 TABLET ORAL
OUTPATIENT
Start: 2025-03-27

## 2025-02-20 RX ORDER — SODIUM CHLORIDE 9 MG/ML
5-250 INJECTION, SOLUTION INTRAVENOUS PRN
OUTPATIENT
Start: 2025-03-13

## 2025-02-20 RX ORDER — EPINEPHRINE 1 MG/ML
0.3 INJECTION, SOLUTION, CONCENTRATE INTRAVENOUS PRN
OUTPATIENT
Start: 2025-03-27

## 2025-02-20 RX ORDER — ACETAMINOPHEN 325 MG/1
650 TABLET ORAL
OUTPATIENT
Start: 2025-04-03

## 2025-02-20 RX ORDER — SODIUM CHLORIDE 9 MG/ML
INJECTION, SOLUTION INTRAVENOUS CONTINUOUS
OUTPATIENT
Start: 2025-04-03

## 2025-02-20 RX ORDER — ACETAMINOPHEN 325 MG/1
650 TABLET ORAL
OUTPATIENT
Start: 2025-03-20

## 2025-02-20 RX ORDER — MEPERIDINE HYDROCHLORIDE 50 MG/ML
12.5 INJECTION INTRAMUSCULAR; INTRAVENOUS; SUBCUTANEOUS PRN
OUTPATIENT
Start: 2025-03-06

## 2025-02-20 RX ORDER — HEPARIN SODIUM (PORCINE) LOCK FLUSH IV SOLN 100 UNIT/ML 100 UNIT/ML
500 SOLUTION INTRAVENOUS PRN
OUTPATIENT
Start: 2025-04-03

## 2025-02-20 RX ORDER — SODIUM CHLORIDE 9 MG/ML
INJECTION, SOLUTION INTRAVENOUS CONTINUOUS
OUTPATIENT
Start: 2025-03-13

## 2025-02-20 RX ORDER — MEPERIDINE HYDROCHLORIDE 50 MG/ML
12.5 INJECTION INTRAMUSCULAR; INTRAVENOUS; SUBCUTANEOUS PRN
OUTPATIENT
Start: 2025-03-27

## 2025-02-20 RX ORDER — PROCHLORPERAZINE EDISYLATE 5 MG/ML
5 INJECTION INTRAMUSCULAR; INTRAVENOUS
OUTPATIENT
Start: 2025-04-03

## 2025-02-20 RX ORDER — PALONOSETRON 0.05 MG/ML
0.25 INJECTION, SOLUTION INTRAVENOUS ONCE
OUTPATIENT
Start: 2025-03-20 | End: 2025-03-20

## 2025-02-20 RX ORDER — SODIUM CHLORIDE 9 MG/ML
5-250 INJECTION, SOLUTION INTRAVENOUS PRN
OUTPATIENT
Start: 2025-03-06

## 2025-02-20 RX ORDER — FAMOTIDINE 10 MG/ML
20 INJECTION, SOLUTION INTRAVENOUS
OUTPATIENT
Start: 2025-02-27

## 2025-02-20 RX ORDER — PALONOSETRON 0.05 MG/ML
0.25 INJECTION, SOLUTION INTRAVENOUS ONCE
OUTPATIENT
Start: 2025-02-27 | End: 2025-02-27

## 2025-02-20 RX ORDER — SODIUM CHLORIDE 9 MG/ML
5-250 INJECTION, SOLUTION INTRAVENOUS PRN
OUTPATIENT
Start: 2025-02-27

## 2025-02-20 RX ORDER — SODIUM CHLORIDE 0.9 % (FLUSH) 0.9 %
5-40 SYRINGE (ML) INJECTION PRN
OUTPATIENT
Start: 2025-03-13

## 2025-02-20 RX ORDER — HEPARIN SODIUM (PORCINE) LOCK FLUSH IV SOLN 100 UNIT/ML 100 UNIT/ML
500 SOLUTION INTRAVENOUS PRN
OUTPATIENT
Start: 2025-03-20

## 2025-02-20 RX ORDER — SODIUM CHLORIDE 0.9 % (FLUSH) 0.9 %
5-40 SYRINGE (ML) INJECTION PRN
OUTPATIENT
Start: 2025-03-20

## 2025-02-20 RX ORDER — MEPERIDINE HYDROCHLORIDE 50 MG/ML
12.5 INJECTION INTRAMUSCULAR; INTRAVENOUS; SUBCUTANEOUS PRN
OUTPATIENT
Start: 2025-04-03

## 2025-02-20 RX ORDER — EPINEPHRINE 1 MG/ML
0.3 INJECTION, SOLUTION, CONCENTRATE INTRAVENOUS PRN
OUTPATIENT
Start: 2025-04-03

## 2025-02-20 RX ORDER — DIPHENHYDRAMINE HYDROCHLORIDE 50 MG/ML
50 INJECTION INTRAMUSCULAR; INTRAVENOUS
OUTPATIENT
Start: 2025-03-06

## 2025-02-20 RX ORDER — PROCHLORPERAZINE EDISYLATE 5 MG/ML
5 INJECTION INTRAMUSCULAR; INTRAVENOUS
OUTPATIENT
Start: 2025-03-13

## 2025-02-20 RX ORDER — SODIUM CHLORIDE 0.9 % (FLUSH) 0.9 %
5-40 SYRINGE (ML) INJECTION PRN
Status: CANCELLED | OUTPATIENT
Start: 2025-02-20

## 2025-02-20 RX ORDER — DIPHENHYDRAMINE HYDROCHLORIDE 50 MG/ML
50 INJECTION INTRAMUSCULAR; INTRAVENOUS
OUTPATIENT
Start: 2025-03-20

## 2025-02-20 RX ORDER — PROCHLORPERAZINE EDISYLATE 5 MG/ML
5 INJECTION INTRAMUSCULAR; INTRAVENOUS
OUTPATIENT
Start: 2025-03-06

## 2025-02-20 RX ORDER — HEPARIN SODIUM (PORCINE) LOCK FLUSH IV SOLN 100 UNIT/ML 100 UNIT/ML
500 SOLUTION INTRAVENOUS PRN
OUTPATIENT
Start: 2025-03-27

## 2025-02-20 RX ORDER — HYDROCORTISONE SODIUM SUCCINATE 100 MG/2ML
100 INJECTION INTRAMUSCULAR; INTRAVENOUS
OUTPATIENT
Start: 2025-03-06

## 2025-02-20 RX ORDER — ONDANSETRON 2 MG/ML
8 INJECTION INTRAMUSCULAR; INTRAVENOUS
OUTPATIENT
Start: 2025-03-20

## 2025-02-20 RX ORDER — SODIUM CHLORIDE 9 MG/ML
5-250 INJECTION, SOLUTION INTRAVENOUS PRN
OUTPATIENT
Start: 2025-03-20

## 2025-02-20 RX ORDER — MEPERIDINE HYDROCHLORIDE 50 MG/ML
12.5 INJECTION INTRAMUSCULAR; INTRAVENOUS; SUBCUTANEOUS PRN
OUTPATIENT
Start: 2025-03-20

## 2025-02-20 RX ORDER — PROCHLORPERAZINE EDISYLATE 5 MG/ML
5 INJECTION INTRAMUSCULAR; INTRAVENOUS
OUTPATIENT
Start: 2025-03-20

## 2025-02-20 RX ORDER — SODIUM CHLORIDE 0.9 % (FLUSH) 0.9 %
5-40 SYRINGE (ML) INJECTION PRN
OUTPATIENT
Start: 2025-02-27

## 2025-02-20 RX ORDER — HYDROCORTISONE SODIUM SUCCINATE 100 MG/2ML
100 INJECTION INTRAMUSCULAR; INTRAVENOUS
OUTPATIENT
Start: 2025-03-20

## 2025-02-20 RX ORDER — EPINEPHRINE 1 MG/ML
0.3 INJECTION, SOLUTION, CONCENTRATE INTRAVENOUS PRN
Status: CANCELLED | OUTPATIENT
Start: 2025-02-20

## 2025-02-20 RX ORDER — SODIUM CHLORIDE 9 MG/ML
INJECTION, SOLUTION INTRAVENOUS CONTINUOUS
OUTPATIENT
Start: 2025-03-20

## 2025-02-20 RX ORDER — FAMOTIDINE 10 MG/ML
20 INJECTION, SOLUTION INTRAVENOUS
OUTPATIENT
Start: 2025-03-06

## 2025-02-20 RX ORDER — HEPARIN SODIUM (PORCINE) LOCK FLUSH IV SOLN 100 UNIT/ML 100 UNIT/ML
500 SOLUTION INTRAVENOUS PRN
Status: CANCELLED | OUTPATIENT
Start: 2025-02-20

## 2025-02-20 RX ORDER — ONDANSETRON 2 MG/ML
8 INJECTION INTRAMUSCULAR; INTRAVENOUS
OUTPATIENT
Start: 2025-04-03

## 2025-02-20 RX ORDER — PROCHLORPERAZINE EDISYLATE 5 MG/ML
5 INJECTION INTRAMUSCULAR; INTRAVENOUS
OUTPATIENT
Start: 2025-03-27

## 2025-02-20 RX ORDER — ONDANSETRON 2 MG/ML
8 INJECTION INTRAMUSCULAR; INTRAVENOUS
OUTPATIENT
Start: 2025-03-27

## 2025-02-20 RX ORDER — SODIUM CHLORIDE 9 MG/ML
INJECTION, SOLUTION INTRAVENOUS CONTINUOUS
Status: CANCELLED | OUTPATIENT
Start: 2025-02-20

## 2025-02-20 RX ORDER — SODIUM CHLORIDE 0.9 % (FLUSH) 0.9 %
5-40 SYRINGE (ML) INJECTION PRN
OUTPATIENT
Start: 2025-03-06

## 2025-02-20 RX ORDER — FAMOTIDINE 10 MG/ML
20 INJECTION, SOLUTION INTRAVENOUS
OUTPATIENT
Start: 2025-04-03

## 2025-02-20 RX ORDER — SODIUM CHLORIDE 9 MG/ML
INJECTION, SOLUTION INTRAVENOUS CONTINUOUS
OUTPATIENT
Start: 2025-03-27

## 2025-02-20 RX ORDER — SODIUM CHLORIDE 9 MG/ML
5-250 INJECTION, SOLUTION INTRAVENOUS PRN
Status: CANCELLED | OUTPATIENT
Start: 2025-02-20

## 2025-02-20 RX ORDER — HEPARIN SODIUM (PORCINE) LOCK FLUSH IV SOLN 100 UNIT/ML 100 UNIT/ML
500 SOLUTION INTRAVENOUS PRN
OUTPATIENT
Start: 2025-03-06

## 2025-02-20 RX ORDER — PALONOSETRON 0.05 MG/ML
0.25 INJECTION, SOLUTION INTRAVENOUS ONCE
OUTPATIENT
Start: 2025-03-06 | End: 2025-03-06

## 2025-02-20 RX ORDER — HEPARIN SODIUM (PORCINE) LOCK FLUSH IV SOLN 100 UNIT/ML 100 UNIT/ML
500 SOLUTION INTRAVENOUS PRN
OUTPATIENT
Start: 2025-03-13

## 2025-02-20 RX ORDER — HEPARIN 100 UNIT/ML
500 SYRINGE INTRAVENOUS PRN
Status: DISCONTINUED | OUTPATIENT
Start: 2025-02-20 | End: 2025-02-21 | Stop reason: HOSPADM

## 2025-02-20 RX ORDER — PALONOSETRON 0.05 MG/ML
0.25 INJECTION, SOLUTION INTRAVENOUS ONCE
OUTPATIENT
Start: 2025-04-03 | End: 2025-04-03

## 2025-02-20 RX ORDER — ALBUTEROL SULFATE 90 UG/1
4 INHALANT RESPIRATORY (INHALATION) PRN
OUTPATIENT
Start: 2025-03-13

## 2025-02-20 RX ORDER — EPINEPHRINE 1 MG/ML
0.3 INJECTION, SOLUTION, CONCENTRATE INTRAVENOUS PRN
OUTPATIENT
Start: 2025-03-06

## 2025-02-20 RX ORDER — ONDANSETRON 2 MG/ML
8 INJECTION INTRAMUSCULAR; INTRAVENOUS
Status: CANCELLED | OUTPATIENT
Start: 2025-02-20

## 2025-02-20 RX ORDER — SODIUM CHLORIDE 0.9 % (FLUSH) 0.9 %
5-40 SYRINGE (ML) INJECTION PRN
OUTPATIENT
Start: 2025-03-27

## 2025-02-20 RX ORDER — ALBUTEROL SULFATE 90 UG/1
4 INHALANT RESPIRATORY (INHALATION) PRN
OUTPATIENT
Start: 2025-03-20

## 2025-02-20 RX ORDER — ALBUTEROL SULFATE 90 UG/1
4 INHALANT RESPIRATORY (INHALATION) PRN
OUTPATIENT
Start: 2025-02-27

## 2025-02-20 RX ORDER — ALBUTEROL SULFATE 90 UG/1
4 INHALANT RESPIRATORY (INHALATION) PRN
OUTPATIENT
Start: 2025-04-03

## 2025-02-20 RX ORDER — SODIUM CHLORIDE 0.9 % (FLUSH) 0.9 %
5-40 SYRINGE (ML) INJECTION PRN
OUTPATIENT
Start: 2025-04-03

## 2025-02-20 RX ORDER — HYDROCORTISONE SODIUM SUCCINATE 100 MG/2ML
100 INJECTION INTRAMUSCULAR; INTRAVENOUS
OUTPATIENT
Start: 2025-03-27

## 2025-02-20 RX ORDER — ALBUTEROL SULFATE 90 UG/1
4 INHALANT RESPIRATORY (INHALATION) PRN
OUTPATIENT
Start: 2025-03-27

## 2025-02-20 RX ORDER — FAMOTIDINE 10 MG/ML
20 INJECTION, SOLUTION INTRAVENOUS
Status: CANCELLED | OUTPATIENT
Start: 2025-02-20

## 2025-02-20 RX ORDER — ACETAMINOPHEN 325 MG/1
650 TABLET ORAL
Status: CANCELLED | OUTPATIENT
Start: 2025-02-20

## 2025-02-20 RX ORDER — DIPHENHYDRAMINE HYDROCHLORIDE 50 MG/ML
50 INJECTION INTRAMUSCULAR; INTRAVENOUS
OUTPATIENT
Start: 2025-02-27

## 2025-02-20 RX ORDER — ALBUTEROL SULFATE 90 UG/1
4 INHALANT RESPIRATORY (INHALATION) PRN
Status: CANCELLED | OUTPATIENT
Start: 2025-02-20

## 2025-02-20 RX ORDER — SODIUM CHLORIDE 0.9 % (FLUSH) 0.9 %
5-40 SYRINGE (ML) INJECTION PRN
Status: DISCONTINUED | OUTPATIENT
Start: 2025-02-20 | End: 2025-02-21 | Stop reason: HOSPADM

## 2025-02-20 RX ORDER — ACETAMINOPHEN 325 MG/1
650 TABLET ORAL
OUTPATIENT
Start: 2025-03-06

## 2025-02-20 RX ORDER — SODIUM CHLORIDE 9 MG/ML
INJECTION, SOLUTION INTRAVENOUS CONTINUOUS
OUTPATIENT
Start: 2025-03-06

## 2025-02-20 RX ORDER — MEPERIDINE HYDROCHLORIDE 50 MG/ML
12.5 INJECTION INTRAMUSCULAR; INTRAVENOUS; SUBCUTANEOUS PRN
OUTPATIENT
Start: 2025-03-13

## 2025-02-20 RX ORDER — PROCHLORPERAZINE EDISYLATE 5 MG/ML
5 INJECTION INTRAMUSCULAR; INTRAVENOUS
OUTPATIENT
Start: 2025-02-27

## 2025-02-20 RX ORDER — SODIUM CHLORIDE 9 MG/ML
INJECTION, SOLUTION INTRAVENOUS CONTINUOUS
OUTPATIENT
Start: 2025-02-27

## 2025-02-20 RX ORDER — FAMOTIDINE 10 MG/ML
20 INJECTION, SOLUTION INTRAVENOUS
OUTPATIENT
Start: 2025-03-13

## 2025-02-20 RX ORDER — FAMOTIDINE 10 MG/ML
20 INJECTION, SOLUTION INTRAVENOUS
OUTPATIENT
Start: 2025-03-20

## 2025-02-20 RX ORDER — HYDROCORTISONE SODIUM SUCCINATE 100 MG/2ML
100 INJECTION INTRAMUSCULAR; INTRAVENOUS
OUTPATIENT
Start: 2025-03-13

## 2025-02-20 RX ORDER — ACETAMINOPHEN 325 MG/1
650 TABLET ORAL
OUTPATIENT
Start: 2025-03-13

## 2025-02-20 RX ORDER — DIPHENHYDRAMINE HYDROCHLORIDE 50 MG/ML
50 INJECTION INTRAMUSCULAR; INTRAVENOUS
OUTPATIENT
Start: 2025-03-27

## 2025-02-20 RX ORDER — PALONOSETRON 0.05 MG/ML
0.25 INJECTION, SOLUTION INTRAVENOUS ONCE
Status: CANCELLED | OUTPATIENT
Start: 2025-02-20 | End: 2025-02-20

## 2025-02-20 RX ORDER — MEPERIDINE HYDROCHLORIDE 50 MG/ML
12.5 INJECTION INTRAMUSCULAR; INTRAVENOUS; SUBCUTANEOUS PRN
Status: CANCELLED | OUTPATIENT
Start: 2025-02-20

## 2025-02-20 RX ORDER — EPINEPHRINE 1 MG/ML
0.3 INJECTION, SOLUTION, CONCENTRATE INTRAVENOUS PRN
OUTPATIENT
Start: 2025-03-13

## 2025-02-20 RX ORDER — DIPHENHYDRAMINE HYDROCHLORIDE 50 MG/ML
50 INJECTION INTRAMUSCULAR; INTRAVENOUS
Status: CANCELLED | OUTPATIENT
Start: 2025-02-20

## 2025-02-20 RX ORDER — HEPARIN SODIUM (PORCINE) LOCK FLUSH IV SOLN 100 UNIT/ML 100 UNIT/ML
500 SOLUTION INTRAVENOUS PRN
OUTPATIENT
Start: 2025-02-27

## 2025-02-20 RX ORDER — PALONOSETRON 0.05 MG/ML
0.25 INJECTION, SOLUTION INTRAVENOUS ONCE
OUTPATIENT
Start: 2025-03-13 | End: 2025-03-13

## 2025-02-20 RX ORDER — PALONOSETRON HYDROCHLORIDE 0.05 MG/ML
0.25 INJECTION, SOLUTION INTRAVENOUS ONCE
Status: COMPLETED | OUTPATIENT
Start: 2025-02-20 | End: 2025-02-20

## 2025-02-20 RX ORDER — DIPHENHYDRAMINE HYDROCHLORIDE 50 MG/ML
50 INJECTION INTRAMUSCULAR; INTRAVENOUS
OUTPATIENT
Start: 2025-03-13

## 2025-02-20 RX ORDER — SODIUM CHLORIDE 9 MG/ML
5-250 INJECTION, SOLUTION INTRAVENOUS PRN
Status: DISCONTINUED | OUTPATIENT
Start: 2025-02-20 | End: 2025-02-21 | Stop reason: HOSPADM

## 2025-02-20 RX ORDER — FAMOTIDINE 10 MG/ML
20 INJECTION, SOLUTION INTRAVENOUS
OUTPATIENT
Start: 2025-03-27

## 2025-02-20 RX ORDER — PALONOSETRON 0.05 MG/ML
0.25 INJECTION, SOLUTION INTRAVENOUS ONCE
OUTPATIENT
Start: 2025-03-27 | End: 2025-03-27

## 2025-02-20 RX ORDER — HYDROCORTISONE SODIUM SUCCINATE 100 MG/2ML
100 INJECTION INTRAMUSCULAR; INTRAVENOUS
OUTPATIENT
Start: 2025-02-27

## 2025-02-20 RX ORDER — EPINEPHRINE 1 MG/ML
0.3 INJECTION, SOLUTION, CONCENTRATE INTRAVENOUS PRN
OUTPATIENT
Start: 2025-02-27

## 2025-02-20 RX ADMIN — HEPARIN 500 UNITS: 100 SYRINGE at 12:02

## 2025-02-20 RX ADMIN — SODIUM CHLORIDE, PRESERVATIVE FREE 10 ML: 5 INJECTION INTRAVENOUS at 09:15

## 2025-02-20 RX ADMIN — DEXAMETHASONE SODIUM PHOSPHATE 10 MG: 10 INJECTION INTRAMUSCULAR; INTRAVENOUS at 09:15

## 2025-02-20 RX ADMIN — SODIUM CHLORIDE 200 ML/HR: 9 INJECTION, SOLUTION INTRAVENOUS at 09:15

## 2025-02-20 RX ADMIN — POTASSIUM CHLORIDE: 2 INJECTION, SOLUTION, CONCENTRATE INTRAVENOUS at 09:48

## 2025-02-20 RX ADMIN — SODIUM CHLORIDE, PRESERVATIVE FREE 10 ML: 5 INJECTION INTRAVENOUS at 12:02

## 2025-02-20 RX ADMIN — SODIUM CHLORIDE 150 MG: 0.9 INJECTION, SOLUTION INTRAVENOUS at 09:25

## 2025-02-20 RX ADMIN — PALONOSETRON 0.25 MG: 0.25 INJECTION, SOLUTION INTRAVENOUS at 09:22

## 2025-02-20 ASSESSMENT — PAIN SCALES - GENERAL
PAINLEVEL_OUTOF10: 6
PAINLEVEL_OUTOF10: 8
PAINLEVEL_OUTOF10: 4

## 2025-02-20 ASSESSMENT — PAIN DESCRIPTION - FREQUENCY: FREQUENCY: CONTINUOUS

## 2025-02-20 ASSESSMENT — PAIN DESCRIPTION - LOCATION
LOCATION: HEAD;NECK;EAR
LOCATION: HEAD;JAW

## 2025-02-20 ASSESSMENT — PAIN DESCRIPTION - ONSET: ONSET: ON-GOING

## 2025-02-20 NOTE — PROGRESS NOTES
Wt Readings from Last 3 Encounters:   02/20/25 60.3 kg (132 lb 14.4 oz)   02/20/25 59 kg (130 lb)   02/13/25 56.7 kg (125 lb)     Met w/ pt during infusion. Wt is stable since last seen in PM ~1.5 weeks ago. He continues to follow soft diet. He is flushing PEG daily, not yet using for nutrition. He continues to be Medicaid pending. He denies any specific concerns at this time. Reviewed expectations moving forward. Will follow as treatment progresses.  Electronically signed by Zaira Pratt, MS, RD, LD on 2/20/2025 at 3:47 PM

## 2025-02-20 NOTE — PROGRESS NOTES
Patient tolerated treatment well without complications or complaints. Alert and oriented x3. Patient aware of potential side effects and denies questions regarding treatment. Pain assessed, patient denies any new or worsening pain.

## 2025-02-20 NOTE — PROGRESS NOTES
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      Past medical, surgical, social and family histories reviewed and updated as indicated.    PAIN: 2/10    ALLERGIES:  Bee venom and Keflex [cephalexin]         Current Outpatient Medications   Medication Sig Dispense Refill    ondansetron (ZOFRAN-ODT) 8 MG TBDP disintegrating tablet Take 1 tablet by mouth every 8 hours as needed for Nausea or Vomiting 28 tablet 1    prochlorperazine (COMPAZINE) 10 MG tablet Take 1 tablet by mouth every 6 hours as needed (nausea) 30 tablet 0    oxyCODONE HCl (OXY-IR) 10 MG immediate release tablet Take 1 tablet by mouth every 4 hours as needed for Pain for up to 15 days. Intended supply: 30 days Max Daily Amount: 60 mg 90 tablet 0    sucralfate (CARAFATE) 1 GM tablet Take 1 tablet by mouth 4 times daily 120 tablet 3    albuterol sulfate HFA (VENTOLIN HFA) 108 (90 Base) MCG/ACT inhaler Inhale 2 puffs into the lungs every 6 hours as needed for Wheezing      lidocaine viscous hcl (XYLOCAINE) 2 % SOLN solution Take 15 mLs by mouth as needed for Irritation 100 mL 3    ibuprofen (MOTRIN) 40 MG/ML SUSP Take 16.1 mLs by mouth every 6 hours 1932 mL 0    acetaminophen (TYLENOL) 160 MG/5ML suspension Take 15.62 mLs by mouth every 4 hours as needed for Fever (Patient taking differently: Take 500 mg by mouth every 4 hours as needed for Fever or Pain) 240 mL 3    EPINEPHrine (EPIPEN 2-JUAN CARLOS) 0.3 MG/0.3ML SOAJ injection Inject 0.3 mLs into the muscle once as needed (anaphylaxis) Use as directed for allergic reaction (Patient not taking: Reported on 2/7/2025) 1 each 0     No current facility-administered medications

## 2025-02-20 NOTE — PROGRESS NOTES
Prasanna F Parmjit  2/20/2025  Wt Readings from Last 3 Encounters:   02/20/25 60.3 kg (132 lb 14.4 oz)   02/20/25 59 kg (130 lb)   02/13/25 56.7 kg (125 lb)     Body mass index is 20.21 kg/m².        Treatment Area:Head and Neck    Patient was seen today for weekly visit.   Comfort Alteration  KPS:90%  Fatigue: Mild    Mucous Membrane Alteration  Mucositis Due to Radiation: No  Thrush: No  Voice Changes: No    Nutritional Alteration  Anorexia: Yes   Nausea: Yes   Vomiting: No     Skin Alteration   Sensation:na    Radiation Dermatitis:  na    Ventilation Alteration  Cough:No    Emotional  Coping: effective    Injury, potential bleeding or infection: na    Radioprotectant Tolerance  Yes            Lab Results   Component Value Date    WBC 6.9 02/19/2025    HGB 12.5 02/19/2025    HCT 35.8 (L) 02/19/2025     02/19/2025         /61   Pulse 67   Wt 60.3 kg (132 lb 14.4 oz)   BMI 20.21 kg/m²   BP within normal range? yes   -if no, manually recheck in 5-10 min  NEW BP reading:  BP within normal range? yes   -if no, notify attending provider for further instruction      Assessment/Plan:1/200cGy    Neelam Burk RN

## 2025-02-20 NOTE — TELEPHONE ENCOUNTER
Met with patient during his infusion appointment today. Patient states that he was ready to get his treatment started and that he has radiation appointment this afternoon. Encouraged patient to eat and drink well following his treatment and to notify office if he has problems with this.  He verifies that he has nausea medication at home. Patient denies any transportation problems just states that coming back and forth everyday for radiation is going to get expensive as he lives quite a distance away. Provided patient with [employer] gas card. Patient also had questions regarding financial assistance through hospital. He states that he has received both an approval and denial.  Contacted [employer] Financial Navigator and she stated that she will find out more information and will then call and update patient. Patient was also provided with phone number to call regarding financial assistance for future needs. Patient denies any further questions or issues at this time and was encouraged to call if any arise

## 2025-02-20 NOTE — PROGRESS NOTES
Patient provided with discharge instructions, patient has MYCHART.  All questions answered.  Patient understands follow up plan of care.     
care 2/11/25 02/20/2025:   Discussed results of the updates on lung biopsy  Lung lesion is p16 +  Discussed implications of having distant metastatic disease  Discussed survival benefit even with local regional control with chemo RT  Okay to proceed with cycle 1 of cisplatin, and will start radiation later today  Also briefly discussed systemic therapy upon completion of chemo RT, consider immunotherapy  Also discussed briefly role of additional RT against oligometastatic disease depending on response of systemic therapy  Consider repeat chest CT after treatment  Patient also reported certain issues with [employer] financial assistance got approval then denial.  Will reach out to her team to assess  Follow-up with palliative care as directed            DISPO:   Return in about 1 week (around 2/27/2025) for office visit and labs, chemo.      Thank you for allowing us to participate in the care of Prasanna Parnell       Approximately spent 29 minutes on chart review as well as time spent on patient encounter, discussing the laboratory, imaging, and clinical findings; and documentation. I have discussed clinical implications and recommendations on the patient's primary issues. More than 50% of time was spent counseling patient. The patient verbalized understanding.      Roberto Tomas MD  Medical Oncology  [facility]  02/20/2025

## 2025-02-21 ENCOUNTER — HOSPITAL ENCOUNTER (OUTPATIENT)
Dept: RADIATION ONCOLOGY | Age: 60
Discharge: HOME | End: 2025-02-21

## 2025-02-21 PROCEDURE — 77386 HC NTSTY MODUL RAD TX DLVR CPLX: CPT | Performed by: SPECIALIST

## 2025-02-22 PROBLEM — Z01.812 PRE-OPERATIVE LABORATORY EXAMINATION: Status: RESOLVED | Noted: 2025-01-23 | Resolved: 2025-02-22

## 2025-02-24 ENCOUNTER — CLINICAL DOCUMENTATION (OUTPATIENT)
Facility: HOSPITAL | Age: 60
End: 2025-02-24

## 2025-02-24 ENCOUNTER — HOSPITAL ENCOUNTER (OUTPATIENT)
Dept: RADIATION ONCOLOGY | Age: 60
Discharge: HOME | End: 2025-02-24

## 2025-02-24 PROCEDURE — 77014 CHG CT GUIDANCE RADIATION THERAPY FLDS PLACEMENT: CPT | Performed by: SPECIALIST

## 2025-02-24 PROCEDURE — 77386 HC NTSTY MODUL RAD TX DLVR CPLX: CPT | Performed by: SPECIALIST

## 2025-02-24 NOTE — PROGRESS NOTES
Patient Assistance    Pts FAA was not signed by the patient per the FA dept. I will have pt sign another anthony when he is in office.

## 2025-02-25 ENCOUNTER — HOSPITAL ENCOUNTER (OUTPATIENT)
Dept: RADIATION ONCOLOGY | Age: 60
Discharge: HOME | End: 2025-02-25

## 2025-02-25 ENCOUNTER — OFFICE VISIT (OUTPATIENT)
Dept: PALLATIVE CARE | Age: 60
End: 2025-02-25
Payer: MEDICAID

## 2025-02-25 ENCOUNTER — HOSPITAL ENCOUNTER (EMERGENCY)
Age: 60
Discharge: HOME | End: 2025-02-25
Attending: STUDENT IN AN ORGANIZED HEALTH CARE EDUCATION/TRAINING PROGRAM
Payer: MEDICAID

## 2025-02-25 ENCOUNTER — TELEPHONE (OUTPATIENT)
Dept: ONCOLOGY | Age: 60
End: 2025-02-25

## 2025-02-25 ENCOUNTER — APPOINTMENT (OUTPATIENT)
Dept: CT IMAGING | Age: 60
End: 2025-02-25
Payer: MEDICAID

## 2025-02-25 ENCOUNTER — CLINICAL DOCUMENTATION (OUTPATIENT)
Facility: HOSPITAL | Age: 60
End: 2025-02-25

## 2025-02-25 VITALS
BODY MASS INDEX: 18.49 KG/M2 | SYSTOLIC BLOOD PRESSURE: 132 MMHG | HEIGHT: 68 IN | TEMPERATURE: 98.2 F | RESPIRATION RATE: 18 BRPM | HEART RATE: 60 BPM | OXYGEN SATURATION: 98 % | WEIGHT: 122 LBS | DIASTOLIC BLOOD PRESSURE: 60 MMHG

## 2025-02-25 VITALS
DIASTOLIC BLOOD PRESSURE: 62 MMHG | WEIGHT: 122 LBS | HEART RATE: 80 BPM | SYSTOLIC BLOOD PRESSURE: 106 MMHG | BODY MASS INDEX: 18.55 KG/M2 | TEMPERATURE: 98.2 F | OXYGEN SATURATION: 99 %

## 2025-02-25 DIAGNOSIS — C76.0 HEAD AND NECK CANCER (HCC): ICD-10-CM

## 2025-02-25 DIAGNOSIS — E86.0 DEHYDRATION: ICD-10-CM

## 2025-02-25 DIAGNOSIS — G89.3 PAIN DUE TO NEOPLASM: ICD-10-CM

## 2025-02-25 DIAGNOSIS — R11.2 NAUSEA AND VOMITING, UNSPECIFIED VOMITING TYPE: Primary | ICD-10-CM

## 2025-02-25 DIAGNOSIS — Z51.5 PALLIATIVE CARE BY SPECIALIST: Primary | ICD-10-CM

## 2025-02-25 DIAGNOSIS — R11.0 NAUSEA: ICD-10-CM

## 2025-02-25 LAB
ALBUMIN SERPL-MCNC: 4.5 G/DL (ref 3.5–5.2)
ALP SERPL-CCNC: 89 U/L (ref 40–129)
ALT SERPL-CCNC: 17 U/L (ref 0–40)
ANION GAP SERPL CALCULATED.3IONS-SCNC: 15 MMOL/L (ref 7–16)
AST SERPL-CCNC: 16 U/L (ref 0–39)
BASOPHILS # BLD: 0.04 K/UL (ref 0–0.2)
BASOPHILS NFR BLD: 1 % (ref 0–2)
BILIRUB SERPL-MCNC: 0.8 MG/DL (ref 0–1.2)
BILIRUB UR QL STRIP: ABNORMAL
BUN SERPL-MCNC: 15 MG/DL (ref 6–20)
CALCIUM SERPL-MCNC: 9.9 MG/DL (ref 8.6–10.2)
CHLORIDE SERPL-SCNC: 99 MMOL/L (ref 98–107)
CLARITY UR: CLEAR
CO2 SERPL-SCNC: 26 MMOL/L (ref 22–29)
COLOR UR: YELLOW
CREAT SERPL-MCNC: 0.7 MG/DL (ref 0.7–1.2)
EOSINOPHIL # BLD: 0.1 K/UL (ref 0.05–0.5)
EOSINOPHILS RELATIVE PERCENT: 1 % (ref 0–6)
ERYTHROCYTE [DISTWIDTH] IN BLOOD BY AUTOMATED COUNT: 12.4 % (ref 11.5–15)
GFR, ESTIMATED: >90 ML/MIN/1.73M2
GLUCOSE SERPL-MCNC: 91 MG/DL (ref 74–99)
GLUCOSE UR STRIP-MCNC: NEGATIVE MG/DL
HCT VFR BLD AUTO: 38.3 % (ref 37–54)
HGB BLD-MCNC: 13.8 G/DL (ref 12.5–16.5)
HGB UR QL STRIP.AUTO: NEGATIVE
IMM GRANULOCYTES # BLD AUTO: <0.03 K/UL (ref 0–0.58)
IMM GRANULOCYTES NFR BLD: 0 % (ref 0–5)
KETONES UR STRIP-MCNC: >80 MG/DL
LACTATE BLDV-SCNC: 1.1 MMOL/L (ref 0.5–2.2)
LEUKOCYTE ESTERASE UR QL STRIP: NEGATIVE
LIPASE SERPL-CCNC: 14 U/L (ref 13–60)
LYMPHOCYTES NFR BLD: 1.57 K/UL (ref 1.5–4)
LYMPHOCYTES RELATIVE PERCENT: 22 % (ref 20–42)
MCH RBC QN AUTO: 31.9 PG (ref 26–35)
MCHC RBC AUTO-ENTMCNC: 36 G/DL (ref 32–34.5)
MCV RBC AUTO: 88.5 FL (ref 80–99.9)
MONOCYTES NFR BLD: 0.41 K/UL (ref 0.1–0.95)
MONOCYTES NFR BLD: 6 % (ref 2–12)
MUCOUS THREADS URNS QL MICRO: PRESENT
NEUTROPHILS NFR BLD: 70 % (ref 43–80)
NEUTS SEG NFR BLD: 5.03 K/UL (ref 1.8–7.3)
NITRITE UR QL STRIP: NEGATIVE
PH UR STRIP: 6 [PH] (ref 5–8)
PLATELET # BLD AUTO: 223 K/UL (ref 130–450)
PMV BLD AUTO: 10.1 FL (ref 7–12)
POTASSIUM SERPL-SCNC: 3.2 MMOL/L (ref 3.5–5)
PROT SERPL-MCNC: 7.4 G/DL (ref 6.4–8.3)
PROT UR STRIP-MCNC: ABNORMAL MG/DL
RBC # BLD AUTO: 4.33 M/UL (ref 3.8–5.8)
RBC #/AREA URNS HPF: ABNORMAL /HPF
SODIUM SERPL-SCNC: 140 MMOL/L (ref 132–146)
SP GR UR STRIP: 1.02 (ref 1–1.03)
TROPONIN I SERPL HS-MCNC: 14 NG/L (ref 0–11)
TROPONIN I SERPL HS-MCNC: 14 NG/L (ref 0–11)
TSH SERPL DL<=0.05 MIU/L-ACNC: 0.59 UIU/ML (ref 0.27–4.2)
UROBILINOGEN UR STRIP-ACNC: 4 EU/DL (ref 0–1)
WBC #/AREA URNS HPF: ABNORMAL /HPF
WBC OTHER # BLD: 7.2 K/UL (ref 4.5–11.5)

## 2025-02-25 PROCEDURE — 96374 THER/PROPH/DIAG INJ IV PUSH: CPT

## 2025-02-25 PROCEDURE — 81001 URINALYSIS AUTO W/SCOPE: CPT

## 2025-02-25 PROCEDURE — 83605 ASSAY OF LACTIC ACID: CPT

## 2025-02-25 PROCEDURE — 77386 HC NTSTY MODUL RAD TX DLVR CPLX: CPT | Performed by: SPECIALIST

## 2025-02-25 PROCEDURE — 96375 TX/PRO/DX INJ NEW DRUG ADDON: CPT

## 2025-02-25 PROCEDURE — 99285 EMERGENCY DEPT VISIT HI MDM: CPT

## 2025-02-25 PROCEDURE — 84443 ASSAY THYROID STIM HORMONE: CPT

## 2025-02-25 PROCEDURE — 6370000000 HC RX 637 (ALT 250 FOR IP): Performed by: STUDENT IN AN ORGANIZED HEALTH CARE EDUCATION/TRAINING PROGRAM

## 2025-02-25 PROCEDURE — 80053 COMPREHEN METABOLIC PANEL: CPT

## 2025-02-25 PROCEDURE — 99211 OFF/OP EST MAY X REQ PHY/QHP: CPT | Performed by: NURSE PRACTITIONER

## 2025-02-25 PROCEDURE — 2580000003 HC RX 258: Performed by: STUDENT IN AN ORGANIZED HEALTH CARE EDUCATION/TRAINING PROGRAM

## 2025-02-25 PROCEDURE — 93005 ELECTROCARDIOGRAM TRACING: CPT | Performed by: STUDENT IN AN ORGANIZED HEALTH CARE EDUCATION/TRAINING PROGRAM

## 2025-02-25 PROCEDURE — 96361 HYDRATE IV INFUSION ADD-ON: CPT

## 2025-02-25 PROCEDURE — 70450 CT HEAD/BRAIN W/O DYE: CPT

## 2025-02-25 PROCEDURE — 71045 X-RAY EXAM CHEST 1 VIEW: CPT

## 2025-02-25 PROCEDURE — 84484 ASSAY OF TROPONIN QUANT: CPT

## 2025-02-25 PROCEDURE — 6360000002 HC RX W HCPCS: Performed by: STUDENT IN AN ORGANIZED HEALTH CARE EDUCATION/TRAINING PROGRAM

## 2025-02-25 PROCEDURE — 85025 COMPLETE CBC W/AUTO DIFF WBC: CPT

## 2025-02-25 PROCEDURE — 83690 ASSAY OF LIPASE: CPT

## 2025-02-25 RX ORDER — SCOPOLAMINE 1 MG/3D
1 PATCH, EXTENDED RELEASE TRANSDERMAL
Qty: 10 PATCH | Refills: 0 | Status: SHIPPED | OUTPATIENT
Start: 2025-02-25 | End: 2025-03-27

## 2025-02-25 RX ORDER — POTASSIUM CHLORIDE 7.45 MG/ML
10 INJECTION INTRAVENOUS
Status: DISCONTINUED | OUTPATIENT
Start: 2025-02-25 | End: 2025-02-25

## 2025-02-25 RX ORDER — METOCLOPRAMIDE HYDROCHLORIDE 5 MG/ML
10 INJECTION INTRAMUSCULAR; INTRAVENOUS ONCE
Status: COMPLETED | OUTPATIENT
Start: 2025-02-25 | End: 2025-02-25

## 2025-02-25 RX ORDER — DIPHENHYDRAMINE HYDROCHLORIDE 50 MG/ML
25 INJECTION, SOLUTION INTRAMUSCULAR; INTRAVENOUS ONCE
Status: COMPLETED | OUTPATIENT
Start: 2025-02-25 | End: 2025-02-25

## 2025-02-25 RX ORDER — POTASSIUM CHLORIDE 1500 MG/1
40 TABLET, EXTENDED RELEASE ORAL ONCE
Status: COMPLETED | OUTPATIENT
Start: 2025-02-25 | End: 2025-02-25

## 2025-02-25 RX ORDER — 0.9 % SODIUM CHLORIDE 0.9 %
1000 INTRAVENOUS SOLUTION INTRAVENOUS ONCE
Status: COMPLETED | OUTPATIENT
Start: 2025-02-25 | End: 2025-02-25

## 2025-02-25 RX ORDER — ONDANSETRON 2 MG/ML
4 INJECTION INTRAMUSCULAR; INTRAVENOUS ONCE
Status: COMPLETED | OUTPATIENT
Start: 2025-02-25 | End: 2025-02-25

## 2025-02-25 RX ADMIN — SODIUM CHLORIDE 1000 ML: 0.9 INJECTION, SOLUTION INTRAVENOUS at 20:21

## 2025-02-25 RX ADMIN — POTASSIUM CHLORIDE 40 MEQ: 1500 TABLET, EXTENDED RELEASE ORAL at 21:25

## 2025-02-25 RX ADMIN — ONDANSETRON 4 MG: 2 INJECTION, SOLUTION INTRAMUSCULAR; INTRAVENOUS at 21:25

## 2025-02-25 RX ADMIN — DIPHENHYDRAMINE HYDROCHLORIDE 25 MG: 50 INJECTION INTRAMUSCULAR; INTRAVENOUS at 21:56

## 2025-02-25 RX ADMIN — METOCLOPRAMIDE 10 MG: 5 INJECTION, SOLUTION INTRAMUSCULAR; INTRAVENOUS at 21:56

## 2025-02-25 RX ADMIN — SODIUM CHLORIDE 1000 ML: 0.9 INJECTION, SOLUTION INTRAVENOUS at 21:37

## 2025-02-25 ASSESSMENT — PAIN DESCRIPTION - LOCATION: LOCATION: GENERALIZED;HEAD

## 2025-02-25 ASSESSMENT — ENCOUNTER SYMPTOMS
EYE REDNESS: 0
WHEEZING: 0
VOMITING: 0
NAUSEA: 0
DIARRHEA: 0
EYE DISCHARGE: 0
COUGH: 0
SINUS PRESSURE: 0
BACK PAIN: 0
ABDOMINAL PAIN: 0
EYE PAIN: 0
SHORTNESS OF BREATH: 0
SORE THROAT: 0

## 2025-02-25 ASSESSMENT — PAIN DESCRIPTION - DESCRIPTORS: DESCRIPTORS: ACHING;DISCOMFORT;SORE

## 2025-02-25 ASSESSMENT — PAIN DESCRIPTION - ONSET: ONSET: ON-GOING

## 2025-02-25 ASSESSMENT — PAIN - FUNCTIONAL ASSESSMENT
PAIN_FUNCTIONAL_ASSESSMENT: NONE - DENIES PAIN
PAIN_FUNCTIONAL_ASSESSMENT: 0-10

## 2025-02-25 ASSESSMENT — PAIN DESCRIPTION - FREQUENCY: FREQUENCY: CONTINUOUS

## 2025-02-25 ASSESSMENT — PAIN SCALES - GENERAL: PAINLEVEL_OUTOF10: 10

## 2025-02-25 ASSESSMENT — PAIN DESCRIPTION - PAIN TYPE: TYPE: ACUTE PAIN

## 2025-02-25 NOTE — PROGRESS NOTES
Palliative Care Department  Provider: FADI Colin - CNP    Referring Provider:  Dr. Bowden    Location of Service:   [facility]    Chief Complaint: Prasanna Parnell is a 59 y.o. male with chief complaint of pain    Assessment/Plan      Squamous Cell cancer of tongue with metastasis to RUL:   -Following with Dr. Tomas, Dr. Rodríguez, Dr. Bowden,    -Started concurrent chemoradiation therapy 2/20/2025    Pain related to Neoplasm/Odynophagia:   -oxycodone 10 mg Q 4 PRN   -He has been using tylenol 4 tabs at a time, instructed to reduce use   -He has also been using a lot of advil   -Encouraged to stop smoking    Weight Loss/Odynophagia:   -Dietician providing support   -PEG placed recently   -encouraged to continue to continue with oral intake as much as able    Nausea:   -Carafate   -Compazine and Zofran   -Trial Scopolamine patch   -Still reports poorly controlled    Follow Up:  2 weeks.  They were encouraged to call with any questions, concerns, needs, or changes in symptoms.    Subjective:     HPI:  Prasanna Parnell is a 59 y.o. male with squamous cell carcinoma of the tongue, as well as known right upper lobe lung nodule, status post biopsy which also test positive for squamous cell carcinoma.  He is known to Dr. Tomas, Dr. Rodríguez, as well as Dr. Bowden.  He was referred to [facility] symptomatic support.    Subjective/Events/Discussions:  Prasanna presents today unaccompanied  Overall symptomatically has had some improvement  Pain in throat is better with use of oxycodone  He reports he continues to have significant nausea, as well as some right ear and head pain  He is hoping this will improve as he goes through his treatment  He is utilizing Carafate, which I recommend he continue to utilize consistently  He also can utilize Compazine and Zofran, I will add a scopolamine patch  I otherwise do not recommend any other changes to plan of care, recommend he

## 2025-02-25 NOTE — TELEPHONE ENCOUNTER
Case discussed with financial navigator re: AMANDA denial.  Financial Assistance Application signed by pt and forwarded to PBD for review.      Message received from PBD indicating that information received was sufficient.        IBETH Harp, LISW-S  Oncology Social Worker   [phone]

## 2025-02-25 NOTE — PROGRESS NOTES
Patient Assistance    Spoke with our SW (Denice) she is going to grab the signature from patient and scan over to the FA dept.

## 2025-02-26 ENCOUNTER — HOSPITAL ENCOUNTER (OUTPATIENT)
Dept: INFUSION THERAPY | Age: 60
Discharge: HOME | End: 2025-02-26

## 2025-02-26 ENCOUNTER — HOSPITAL ENCOUNTER (OUTPATIENT)
Dept: RADIATION ONCOLOGY | Age: 60
Discharge: HOME | End: 2025-02-26

## 2025-02-26 DIAGNOSIS — C09.8 MALIGNANT NEOPLASM OF OVERLAPPING SITES OF TONSIL (HCC): Primary | ICD-10-CM

## 2025-02-26 LAB
EKG ATRIAL RATE: 54 BPM
EKG P AXIS: 70 DEGREES
EKG P-R INTERVAL: 128 MS
EKG Q-T INTERVAL: 416 MS
EKG QRS DURATION: 102 MS
EKG QTC CALCULATION (BAZETT): 394 MS
EKG R AXIS: 68 DEGREES
EKG T AXIS: 43 DEGREES
EKG VENTRICULAR RATE: 54 BPM

## 2025-02-26 PROCEDURE — 77336 RADIATION PHYSICS CONSULT: CPT | Performed by: SPECIALIST

## 2025-02-26 PROCEDURE — 77386 HC NTSTY MODUL RAD TX DLVR CPLX: CPT | Performed by: SPECIALIST

## 2025-02-26 PROCEDURE — 93010 ELECTROCARDIOGRAM REPORT: CPT | Performed by: INTERNAL MEDICINE

## 2025-02-26 RX ORDER — DIPHENHYDRAMINE HYDROCHLORIDE 50 MG/ML
50 INJECTION INTRAMUSCULAR; INTRAVENOUS
OUTPATIENT
Start: 2025-02-26

## 2025-02-26 RX ORDER — HEPARIN 100 UNIT/ML
500 SYRINGE INTRAVENOUS PRN
Status: DISCONTINUED | OUTPATIENT
Start: 2025-02-26 | End: 2025-02-27 | Stop reason: HOSPADM

## 2025-02-26 RX ORDER — HEPARIN 100 UNIT/ML
500 SYRINGE INTRAVENOUS PRN
Status: CANCELLED | OUTPATIENT
Start: 2025-02-26

## 2025-02-26 RX ORDER — ONDANSETRON 2 MG/ML
8 INJECTION INTRAMUSCULAR; INTRAVENOUS
OUTPATIENT
Start: 2025-02-26

## 2025-02-26 RX ORDER — ALBUTEROL SULFATE 90 UG/1
4 INHALANT RESPIRATORY (INHALATION) PRN
OUTPATIENT
Start: 2025-02-26

## 2025-02-26 RX ORDER — SODIUM CHLORIDE 9 MG/ML
INJECTION, SOLUTION INTRAVENOUS CONTINUOUS
OUTPATIENT
Start: 2025-02-26

## 2025-02-26 RX ORDER — EPINEPHRINE 1 MG/ML
0.3 INJECTION, SOLUTION, CONCENTRATE INTRAVENOUS PRN
OUTPATIENT
Start: 2025-02-26

## 2025-02-26 RX ORDER — SODIUM CHLORIDE 9 MG/ML
25 INJECTION, SOLUTION INTRAVENOUS PRN
OUTPATIENT
Start: 2025-02-26

## 2025-02-26 RX ORDER — ACETAMINOPHEN 325 MG/1
650 TABLET ORAL
OUTPATIENT
Start: 2025-02-26

## 2025-02-26 RX ORDER — HYDROCORTISONE SODIUM SUCCINATE 100 MG/2ML
100 INJECTION INTRAMUSCULAR; INTRAVENOUS
OUTPATIENT
Start: 2025-02-26

## 2025-02-26 RX ORDER — SODIUM CHLORIDE 0.9 % (FLUSH) 0.9 %
5-40 SYRINGE (ML) INJECTION PRN
Status: CANCELLED | OUTPATIENT
Start: 2025-02-26

## 2025-02-26 RX ORDER — SODIUM CHLORIDE 0.9 % (FLUSH) 0.9 %
5-40 SYRINGE (ML) INJECTION PRN
Status: DISCONTINUED | OUTPATIENT
Start: 2025-02-26 | End: 2025-02-27 | Stop reason: HOSPADM

## 2025-02-26 NOTE — ED PROVIDER NOTES
Department of Emergency Medicine   ED  Provider Note  Admit Date/RoomTime: 2/25/2025  7:38 PM  ED Room: 02/02              [facility]     Prasanna Parnell is a 59 y.o. male with a PMHx significant for  head and neck cancer,  who presents for evaluation of nausea, vomiting, dehydration, beginning prior to arrival. The complaint has been persistent, moderate in severity, and worsened by nothing.   The patient states that he was recently diagnosed with neck cancer, was recently started on chemotherapy and radiation therapy. He started his first treatment of each last Thursday 02/20/2025 and had started daily weekday radiation, today was his fourth. He also started to see palliative therapy. Notes that since this started he hasn't been able to eat or drink. Can't keep anything down despite being on three different anti-emetic medications. He has been getting chest pains, and intermittent tingling. Notes as soon as he stands up he feels lightheaded and gets some chest pain.     Review of Systems   Constitutional:  Positive for fatigue. Negative for chills and fever.   HENT:  Negative for ear pain, sinus pressure and sore throat.    Eyes:  Negative for pain, discharge and redness.   Respiratory:  Negative for cough, shortness of breath and wheezing.    Cardiovascular:  Negative for chest pain.   Gastrointestinal:  Negative for abdominal pain, diarrhea, nausea and vomiting.   Genitourinary:  Negative for dysuria and frequency.   Musculoskeletal:  Negative for arthralgias and back pain.   Skin:  Negative for rash and wound.   Neurological:  Positive for light-headedness. Negative for weakness and headaches.   Hematological:  Negative for adenopathy.   All other systems reviewed and are negative.       Physical Exam  Vitals and nursing note reviewed.   Constitutional:       General: He is not in acute distress.     Appearance: Normal appearance. He is well-developed. He is not ill-appearing.   HENT:      Head: Normocephalic and  pneumothorax, port noted to left chest  Discussed with other providers: none  Tests Considered but not ordered: None  Decision making tools/risks stratification / MDM / Independent Interpretation of labs: Prasanna Parnell presents to the ED for evaluation of fatigue, dehydration.  History frompatient / EMR / Family at bedside , with no limitations. Workup in the ED revealed patient who is no acute distress but does appear somewhat uncomfortable.  He also appears dry, there is minimal skin turgor with dry mucous membranes.  Endorses history of head and neck cancer with chemotherapy causing nausea unable to keep anything down.  He does have a port in place.  As well as PEG.  Blood work as well as imaging was obtained CBC showing no leukocytosis, hemoglobin similar compared to prior within normal limits, metabolic panel with slight hypokalemia 3.2 remainder of electrolytes, renal function and liver enzymes within normal limits.  Initial troponin of 14 with repeat of 14, delta of 0.  Likely from renal excretion.  No acute findings on EKG.  Patient was having slight confusion, per significant other at bedside, CT head showing no acute cranial normality.  Chest x-ray showing 2.3 similar right upper lung mass, interval placement of left-sided subclavian chest port with the tip in the lower SVC.  While in department patient given IV fluids, potassium effusion, Zofran, Benadryl and Reglan.  Did have some improvement, ultimately wanted to be discharged home to proceed with outpatient follow-up.  This seems reasonable, patient was given return precautions.  Social Determinants of health impacting treatment or disposition: none  CODE status and Discussions: none    Patient continues to be non-toxic on re-evaluation. Findings were discussed with the patient and reasons to immediately return to the ED were articulated to them. They will follow-up with their PCP.   I am the Primary Clinician of  Eosinophils % 1 0 - 6 %    Basophils % 1 0.0 - 2.0 %    Immature Granulocytes % 0 0.0 - 5.0 %    Neutrophils Absolute 5.03 1.80 - 7.30 k/uL    Lymphocytes Absolute 1.57 1.50 - 4.00 k/uL    Monocytes Absolute 0.41 0.10 - 0.95 k/uL    Eosinophils Absolute 0.10 0.05 - 0.50 k/uL    Basophils Absolute 0.04 0.00 - 0.20 k/uL    Immature Granulocytes Absolute <0.03 0.00 - 0.58 k/uL   CMP   Result Value Ref Range    Sodium 140 132 - 146 mmol/L    Potassium 3.2 (L) 3.5 - 5.0 mmol/L    Chloride 99 98 - 107 mmol/L    CO2 26 22 - 29 mmol/L    Anion Gap 15 7 - 16 mmol/L    Glucose 91 74 - 99 mg/dL    BUN 15 6 - 20 mg/dL    Creatinine 0.7 0.70 - 1.20 mg/dL    Est, Glom Filt Rate >90 >60 mL/min/1.73m2    Calcium 9.9 8.6 - 10.2 mg/dL    Total Protein 7.4 6.4 - 8.3 g/dL    Albumin 4.5 3.5 - 5.2 g/dL    Total Bilirubin 0.8 0.0 - 1.2 mg/dL    Alkaline Phosphatase 89 40 - 129 U/L    ALT 17 0 - 40 U/L    AST 16 0 - 39 U/L   Troponin   Result Value Ref Range    Troponin, High Sensitivity 14 (H) 0 - 11 ng/L   TSH   Result Value Ref Range    TSH 0.59 0.27 - 4.20 uIU/mL   Urinalysis with Microscopic   Result Value Ref Range    Color, UA Yellow Yellow    Turbidity UA Clear Clear    Glucose, Ur NEGATIVE NEGATIVE mg/dL    Bilirubin, Urine SMALL (A) NEGATIVE    Ketones, Urine >80 (A) NEGATIVE mg/dL    Specific Gravity, UA 1.020 1.005 - 1.030    Urine Hgb NEGATIVE NEGATIVE    pH, Urine 6.0 5.0 - 8.0    Protein, UA TRACE (A) NEGATIVE mg/dL    Urobilinogen, Urine 4.0 (H) 0.0 - 1.0 EU/dL    Nitrite, Urine NEGATIVE NEGATIVE    Leukocyte Esterase, Urine NEGATIVE NEGATIVE    WBC, UA 0 TO 5 0 TO 5 /HPF    RBC, UA 0 TO 2 0 TO 2 /HPF    Mucus, UA PRESENT    Lactic Acid   Result Value Ref Range    Lactic Acid 1.1 0.5 - 2.2 mmol/L   Lipase   Result Value Ref Range    Lipase 14 13 - 60 U/L   Troponin   Result Value Ref Range    Troponin, High Sensitivity 14 (H) 0 - 11 ng/L   EKG 12 Lead   Result Value Ref Range    Ventricular Rate 54 BPM    Atrial Rate 54  BPM    P-R Interval 128 ms    QRS Duration 102 ms    Q-T Interval 416 ms    QTc Calculation (Bazett) 394 ms    P Axis 70 degrees    R Axis 68 degrees    T Axis 43 degrees       Radiology:  CT HEAD WO CONTRAST   Final Result      No acute intracranial abnormality noted.         XR CHEST PORTABLE   Final Result      1.  2.3 cm right upper lung mass, as noted on the prior CT scan. No acute   pulmonary disease or significant change noted otherwise.      2.  Interval placement of left-sided subclavian chest port with the tip in   the lower SVC.             ------------------------- NURSING NOTES AND VITALS REVIEWED ---------------------------  Date / Time Roomed:  2/25/2025  7:38 PM  ED Bed Assignment:  02/02    The nursing notes within the ED encounter and vital signs as below have been reviewed.   /60   Pulse 60   Temp 98.2 °F (36.8 °C) (Oral)   Resp 18   Ht 1.727 m (5' 8\")   Wt 55.3 kg (122 lb)   SpO2 98%   BMI 18.55 kg/m²   Oxygen Saturation Interpretation: Normal      ------------------------------------------ PROGRESS NOTES ------------------------------------------  10:45 AM EST  I have spoken with the patient and discussed today’s results, in addition to providing specific details for the plan of care and counseling regarding the diagnosis and prognosis.  Their questions are answered at this time and they are agreeable with the plan. I discussed at length with them reasons for immediate return here for re evaluation. They will followup with their primary care physician by calling their office tomorrow.      --------------------------------- ADDITIONAL PROVIDER NOTES ---------------------------------  At this time the patient is without objective evidence of an acute process requiring hospitalization or inpatient management.  They have remained hemodynamically stable throughout their entire ED visit and are stable for discharge with outpatient follow-up.     The plan has been discussed in detail and  they are aware of the specific conditions for emergent return, as well as the importance of follow-up.      Discharge Medication List as of 2/25/2025 10:52 PM          Diagnosis:  1. Nausea and vomiting, unspecified vomiting type    2. Dehydration        Disposition:  Patient's disposition: Discharge to home  Patient's condition is stable.       Ramsey Lao,   03/04/25 1048

## 2025-02-27 ENCOUNTER — HOSPITAL ENCOUNTER (OUTPATIENT)
Dept: INFUSION THERAPY | Age: 60
Discharge: HOME | End: 2025-02-27

## 2025-02-27 ENCOUNTER — OFFICE VISIT (OUTPATIENT)
Dept: ONCOLOGY | Age: 60
End: 2025-02-27

## 2025-02-27 ENCOUNTER — HOSPITAL ENCOUNTER (OUTPATIENT)
Dept: RADIATION ONCOLOGY | Age: 60
Discharge: HOME | End: 2025-02-27

## 2025-02-27 VITALS
HEART RATE: 58 BPM | OXYGEN SATURATION: 97 % | DIASTOLIC BLOOD PRESSURE: 58 MMHG | TEMPERATURE: 98.3 F | RESPIRATION RATE: 14 BRPM | SYSTOLIC BLOOD PRESSURE: 110 MMHG

## 2025-02-27 VITALS
RESPIRATION RATE: 18 BRPM | DIASTOLIC BLOOD PRESSURE: 60 MMHG | HEART RATE: 68 BPM | BODY MASS INDEX: 19.6 KG/M2 | TEMPERATURE: 97.6 F | OXYGEN SATURATION: 97 % | SYSTOLIC BLOOD PRESSURE: 118 MMHG | WEIGHT: 128.9 LBS

## 2025-02-27 VITALS
TEMPERATURE: 98 F | SYSTOLIC BLOOD PRESSURE: 105 MMHG | HEIGHT: 68 IN | HEART RATE: 60 BPM | WEIGHT: 124.3 LBS | OXYGEN SATURATION: 97 % | DIASTOLIC BLOOD PRESSURE: 56 MMHG | BODY MASS INDEX: 18.84 KG/M2

## 2025-02-27 DIAGNOSIS — B37.0 THRUSH: ICD-10-CM

## 2025-02-27 DIAGNOSIS — C09.8 MALIGNANT NEOPLASM OF OVERLAPPING SITES OF TONSIL (HCC): Primary | ICD-10-CM

## 2025-02-27 DIAGNOSIS — T45.1X5A CHEMOTHERAPY-INDUCED NAUSEA: Primary | ICD-10-CM

## 2025-02-27 DIAGNOSIS — R11.0 CHEMOTHERAPY-INDUCED NAUSEA: Primary | ICD-10-CM

## 2025-02-27 LAB — MAGNESIUM SERPL-MCNC: 1.8 MG/DL (ref 1.6–2.6)

## 2025-02-27 PROCEDURE — 96415 CHEMO IV INFUSION ADDL HR: CPT

## 2025-02-27 PROCEDURE — 6360000002 HC RX W HCPCS: Performed by: STUDENT IN AN ORGANIZED HEALTH CARE EDUCATION/TRAINING PROGRAM

## 2025-02-27 PROCEDURE — 99214 OFFICE O/P EST MOD 30 MIN: CPT | Performed by: STUDENT IN AN ORGANIZED HEALTH CARE EDUCATION/TRAINING PROGRAM

## 2025-02-27 PROCEDURE — 2500000003 HC RX 250 WO HCPCS: Performed by: STUDENT IN AN ORGANIZED HEALTH CARE EDUCATION/TRAINING PROGRAM

## 2025-02-27 PROCEDURE — 2580000003 HC RX 258: Performed by: STUDENT IN AN ORGANIZED HEALTH CARE EDUCATION/TRAINING PROGRAM

## 2025-02-27 PROCEDURE — 96375 TX/PRO/DX INJ NEW DRUG ADDON: CPT

## 2025-02-27 PROCEDURE — 96361 HYDRATE IV INFUSION ADD-ON: CPT

## 2025-02-27 PROCEDURE — 96413 CHEMO IV INFUSION 1 HR: CPT

## 2025-02-27 PROCEDURE — 83735 ASSAY OF MAGNESIUM: CPT

## 2025-02-27 PROCEDURE — 77386 HC NTSTY MODUL RAD TX DLVR CPLX: CPT | Performed by: SPECIALIST

## 2025-02-27 PROCEDURE — 96367 TX/PROPH/DG ADDL SEQ IV INF: CPT

## 2025-02-27 RX ORDER — 0.9 % SODIUM CHLORIDE 0.9 %
1000 INTRAVENOUS SOLUTION INTRAVENOUS ONCE
Status: DISCONTINUED | OUTPATIENT
Start: 2025-02-27 | End: 2025-02-28 | Stop reason: HOSPADM

## 2025-02-27 RX ORDER — EPINEPHRINE 1 MG/ML
0.3 INJECTION, SOLUTION, CONCENTRATE INTRAVENOUS PRN
OUTPATIENT
Start: 2025-02-27

## 2025-02-27 RX ORDER — PALONOSETRON HYDROCHLORIDE 0.05 MG/ML
0.25 INJECTION, SOLUTION INTRAVENOUS ONCE
Status: COMPLETED | OUTPATIENT
Start: 2025-02-27 | End: 2025-02-27

## 2025-02-27 RX ORDER — ACETAMINOPHEN 325 MG/1
650 TABLET ORAL
OUTPATIENT
Start: 2025-02-27

## 2025-02-27 RX ORDER — HEPARIN 100 UNIT/ML
500 SYRINGE INTRAVENOUS PRN
Status: DISCONTINUED | OUTPATIENT
Start: 2025-02-27 | End: 2025-02-28 | Stop reason: HOSPADM

## 2025-02-27 RX ORDER — SODIUM CHLORIDE 0.9 % (FLUSH) 0.9 %
5-40 SYRINGE (ML) INJECTION PRN
OUTPATIENT
Start: 2025-02-27

## 2025-02-27 RX ORDER — PROMETHAZINE HYDROCHLORIDE 25 MG/1
25 TABLET ORAL 4 TIMES DAILY PRN
Qty: 20 TABLET | Refills: 0 | Status: SHIPPED | OUTPATIENT
Start: 2025-02-27 | End: 2025-03-06

## 2025-02-27 RX ORDER — HYDROCORTISONE SODIUM SUCCINATE 100 MG/2ML
100 INJECTION INTRAMUSCULAR; INTRAVENOUS
OUTPATIENT
Start: 2025-02-27

## 2025-02-27 RX ORDER — SODIUM CHLORIDE 9 MG/ML
INJECTION, SOLUTION INTRAVENOUS CONTINUOUS
OUTPATIENT
Start: 2025-02-27

## 2025-02-27 RX ORDER — 0.9 % SODIUM CHLORIDE 0.9 %
1000 INTRAVENOUS SOLUTION INTRAVENOUS ONCE
Status: COMPLETED | OUTPATIENT
Start: 2025-02-27 | End: 2025-02-27

## 2025-02-27 RX ORDER — ONDANSETRON 2 MG/ML
8 INJECTION INTRAMUSCULAR; INTRAVENOUS
OUTPATIENT
Start: 2025-02-27

## 2025-02-27 RX ORDER — SODIUM CHLORIDE 0.9 % (FLUSH) 0.9 %
5-40 SYRINGE (ML) INJECTION PRN
Status: DISCONTINUED | OUTPATIENT
Start: 2025-02-27 | End: 2025-02-28 | Stop reason: HOSPADM

## 2025-02-27 RX ORDER — NYSTATIN 100000 [USP'U]/ML
500000 SUSPENSION ORAL 4 TIMES DAILY
Qty: 200 ML | Refills: 0 | Status: SHIPPED | OUTPATIENT
Start: 2025-02-27 | End: 2025-03-09

## 2025-02-27 RX ORDER — SODIUM CHLORIDE 9 MG/ML
25 INJECTION, SOLUTION INTRAVENOUS PRN
OUTPATIENT
Start: 2025-02-27

## 2025-02-27 RX ORDER — SODIUM CHLORIDE 9 MG/ML
5-250 INJECTION, SOLUTION INTRAVENOUS PRN
Status: DISCONTINUED | OUTPATIENT
Start: 2025-02-27 | End: 2025-02-28 | Stop reason: HOSPADM

## 2025-02-27 RX ORDER — HEPARIN 100 UNIT/ML
500 SYRINGE INTRAVENOUS PRN
OUTPATIENT
Start: 2025-02-27

## 2025-02-27 RX ORDER — ALBUTEROL SULFATE 90 UG/1
4 INHALANT RESPIRATORY (INHALATION) PRN
OUTPATIENT
Start: 2025-02-27

## 2025-02-27 RX ORDER — DEXAMETHASONE SODIUM PHOSPHATE 10 MG/ML
10 INJECTION, SOLUTION INTRAMUSCULAR; INTRAVENOUS ONCE
Status: COMPLETED | OUTPATIENT
Start: 2025-02-27 | End: 2025-02-27

## 2025-02-27 RX ORDER — DIPHENHYDRAMINE HYDROCHLORIDE 50 MG/ML
50 INJECTION INTRAMUSCULAR; INTRAVENOUS
OUTPATIENT
Start: 2025-02-27

## 2025-02-27 RX ADMIN — PALONOSETRON 0.25 MG: 0.25 INJECTION, SOLUTION INTRAVENOUS at 09:52

## 2025-02-27 RX ADMIN — SODIUM CHLORIDE, PRESERVATIVE FREE 10 ML: 5 INJECTION INTRAVENOUS at 08:30

## 2025-02-27 RX ADMIN — SODIUM CHLORIDE, PRESERVATIVE FREE 10 ML: 5 INJECTION INTRAVENOUS at 13:00

## 2025-02-27 RX ADMIN — SODIUM CHLORIDE 150 MG: 0.9 INJECTION, SOLUTION INTRAVENOUS at 09:59

## 2025-02-27 RX ADMIN — HEPARIN 500 UNITS: 100 SYRINGE at 13:01

## 2025-02-27 RX ADMIN — SODIUM CHLORIDE 1000 ML: 9 INJECTION, SOLUTION INTRAVENOUS at 09:12

## 2025-02-27 RX ADMIN — DEXAMETHASONE SODIUM PHOSPHATE 10 MG: 10 INJECTION INTRAMUSCULAR; INTRAVENOUS at 09:54

## 2025-02-27 RX ADMIN — POTASSIUM CHLORIDE: 2 INJECTION, SOLUTION, CONCENTRATE INTRAVENOUS at 10:23

## 2025-02-27 ASSESSMENT — PAIN SCALES - GENERAL: PAINLEVEL_OUTOF10: 6

## 2025-02-27 ASSESSMENT — PAIN DESCRIPTION - LOCATION: LOCATION: FACE;NECK

## 2025-02-27 NOTE — PROGRESS NOTES
Prasanna F Parmjit  2/27/2025  Wt Readings from Last 3 Encounters:   02/27/25 58.5 kg (128 lb 14.4 oz)   02/27/25 56.4 kg (124 lb 4.8 oz)   02/25/25 55.3 kg (122 lb)     Body mass index is 19.6 kg/m².        Treatment Area:PTV 7200 rt ton bhn     Patient was seen today for weekly visit.   Comfort Alteration  KPS:80%  Fatigue: Mild    Mucous Membrane Alteration  Mucositis Due to Radiation: No  Thrush: No  Voice Changes: No    Nutritional Alteration  Anorexia: Yes   Nausea: Yes   Vomiting: Yes     Skin Alteration   Sensation:no    Radiation Dermatitis:  no    Ventilation Alteration  Cough:No    Emotional  Coping: effective    Injury, potential bleeding or infection: no    Radioprotectant Tolerance  Yes            Lab Results   Component Value Date    WBC 7.2 02/25/2025    HGB 13.8 02/25/2025    HCT 38.3 02/25/2025     02/25/2025         /60   Pulse 68   Temp 97.6 °F (36.4 °C) (Temporal)   Resp 18   Wt 58.5 kg (128 lb 14.4 oz)   SpO2 97%   BMI 19.60 kg/m²   BP within normal range? yes       Assessment/Plan: Pt completed 6/36fx and 1200/7200cGy.    Carol Sosa RN

## 2025-02-27 NOTE — PROGRESS NOTES
Patient provided with discharge instructions, patient has MYCHART. All questions answered. Patient understands follow up plan of care.   
The liver, spleen, pancreas and adrenals are free of focal hypermetabolic lesion.  No definite hypermetabolic abdominal or pelvic lymph node is identified.  No focal bowel hypermetabolic activity is seen. BONES/SOFT TISSUE: There is no evidence of osseous hypermetabolic lesion. INCIDENTAL CT FINDINGS: Nonobstructing small bilateral renal calculi are present.  Prostate gland is mildly enlarged.     Right palatine tonsillar/ej pharyngeal soft tissue mass is hypermetabolic and consistent with the primary carcinoma. There are multiple hypermetabolic right cervical lymph nodes and a nonenlarged supraclavicular lymph node consistent with metastases. 17 mm right upper lobe pulmonary nodule is hypermetabolic and consistent with a primary or secondary neoplasm.     CT CHEST WO CONTRAST    Result Date: 1/6/2025  EXAMINATION: CT OF THE CHEST WITHOUT CONTRAST 1/6/2025 11:53 am TECHNIQUE: CT of the chest was performed without the administration of intravenous contrast. Multiplanar reformatted images are provided for review. Automated exposure control, iterative reconstruction, and/or weight based adjustment of the mA/kV was utilized to reduce the radiation dose to as low as reasonably achievable. COMPARISON: CT chest December 27, 2024 HISTORY: ORDERING SYSTEM PROVIDED HISTORY: Lung nodule TECHNOLOGIST PROVIDED HISTORY: Reason for exam:->CT Chest per navigation protocol What reading provider will be dictating this exam?->CRC FINDINGS: Mediastinum: Thoracic aorta appears normal in caliber with mild calcification.  Pulmonary trunk appears nondilated.  No pericardial effusion. No lymphadenopathy.  The esophagus is grossly unremarkable. Lungs/pleura: No consolidation pneumothorax or pleural effusion.  Bibasilar atelectasis/scarring.  Mild bronchial wall thickening. 1.8 cm noncalcified pulmonary nodule right upper lobe similar to the prior study now seen on series 303, image 32.  No new or enlarging suspicious pulmonary nodule.

## 2025-02-27 NOTE — PROGRESS NOTES
Prasanna Parnell  2/27/2025  Ht Readings from Last 1 Encounters:   02/27/25 1.727 m (5' 8\")     Wt Readings from Last 10 Encounters:   02/27/25 58.5 kg (128 lb 14.4 oz)   02/27/25 56.4 kg (124 lb 4.8 oz)   02/25/25 55.3 kg (122 lb)   02/25/25 55.3 kg (122 lb)   02/20/25 60.3 kg (132 lb 14.4 oz)   02/20/25 59 kg (130 lb)   02/13/25 56.7 kg (125 lb)   02/11/25 59 kg (130 lb)   01/30/25 62.8 kg (138 lb 8 oz)   01/23/25 61.6 kg (135 lb 12.8 oz)     BMI: 19.60    Assessment: Met w/ pt during infusion. He has lost 6# this week per medical oncology scale (4.6%). Pt reports uncontrolled N/V since initial chemotherapy last week. He reports 4 episodes of emesis this morning prior to coming to appointment. He is currently taking 2 antiemetics + scopolamine patch, also taking carafate. Today, he was ordered phenergan. Pt reports he is unable to keep any food or liquids down, even water. Offered to provide pt w/ EN formula for trial, noting that bypassing esophagus and providing himself w/ small volume bolus feedings may be more tolerable at this time. Pt refuses EN formula at this time, noting that \"that doesn't even make sense\" to him. Reviewed plan w/ medical oncologist. Radiation oncologist requests twice weekly IV hydration, which has been ordered. Will follow.    Weight change: -6# x1 week  Appetite: poor  Nutritional Side Effects: N/V  Calculated Needs: 32-35 kcal/kg CBW =  kcal, 1.3-1.5 gm/kg CBW = 75-90 gm pro, 1 ml/kcal =  ml fluids  Malnutrition Status: Severe  Nutrition Diagnosis: Inadequate Oral Intake related to Altered GI function as evidenced by Nausea and Vomiting     Recommendations: Pt to attempt small frequent meals w/ bland foods, to initiate twice weekly IV hydration    Zaira Pratt, MS, RD, LD

## 2025-02-27 NOTE — PROGRESS NOTES
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      Past medical, surgical, social and family histories reviewed and updated as indicated.    PAIN: 2/10    ALLERGIES:  Bee venom and Keflex [cephalexin]         Current Outpatient Medications   Medication Sig Dispense Refill    promethazine (PHENERGAN) 25 MG tablet Take 1 tablet by mouth 4 times daily as needed for Nausea 20 tablet 0    nystatin (MYCOSTATIN) 063536 UNIT/ML suspension Take 5 mLs by mouth 4 times daily for 10 days Retain in mouth as long as possible 200 mL 0    scopolamine (TRANSDERM-SCOP) transdermal patch Place 1 patch onto the skin every 72 hours 10 patch 0    ondansetron (ZOFRAN-ODT) 8 MG TBDP disintegrating tablet Take 1 tablet by mouth every 8 hours as needed for Nausea or Vomiting 28 tablet 1    prochlorperazine (COMPAZINE) 10 MG tablet Take 1 tablet by mouth every 6 hours as needed (nausea) 30 tablet 0    sucralfate (CARAFATE) 1 GM tablet Take 1 tablet by mouth 4 times daily 120 tablet 3    albuterol sulfate HFA (VENTOLIN HFA) 108 (90 Base) MCG/ACT inhaler Inhale 2 puffs into the lungs every 6 hours as needed for Wheezing      lidocaine viscous hcl (XYLOCAINE) 2 % SOLN solution Take 15 mLs by mouth as needed for Irritation 100 mL 3    acetaminophen (TYLENOL) 160 MG/5ML suspension Take 15.62 mLs by mouth every 4 hours as needed for Fever (Patient taking differently: Take 500 mg by mouth every 4 hours as needed for Fever or Pain) 240 mL 3    EPINEPHrine (EPIPEN 2-JUAN CARLOS) 0.3 MG/0.3ML SOAJ injection Inject 0.3 mLs into the muscle once as needed (anaphylaxis) Use as directed for allergic reaction (Patient not

## 2025-02-28 ENCOUNTER — HOSPITAL ENCOUNTER (OUTPATIENT)
Dept: RADIATION ONCOLOGY | Age: 60
Discharge: HOME | End: 2025-02-28

## 2025-02-28 ENCOUNTER — HOSPITAL ENCOUNTER (OUTPATIENT)
Dept: INFUSION THERAPY | Age: 60
Discharge: HOME | End: 2025-02-28

## 2025-02-28 VITALS
RESPIRATION RATE: 16 BRPM | HEART RATE: 52 BPM | SYSTOLIC BLOOD PRESSURE: 107 MMHG | TEMPERATURE: 97.6 F | OXYGEN SATURATION: 99 % | DIASTOLIC BLOOD PRESSURE: 51 MMHG

## 2025-02-28 DIAGNOSIS — C09.8 MALIGNANT NEOPLASM OF OVERLAPPING SITES OF TONSIL (HCC): ICD-10-CM

## 2025-02-28 DIAGNOSIS — C76.0 HEAD AND NECK CANCER (HCC): ICD-10-CM

## 2025-02-28 DIAGNOSIS — C06.9 CANCER OF ORAL CAVITY (HCC): Primary | ICD-10-CM

## 2025-02-28 PROCEDURE — 77386 HC NTSTY MODUL RAD TX DLVR CPLX: CPT | Performed by: SPECIALIST

## 2025-02-28 PROCEDURE — 6360000002 HC RX W HCPCS: Performed by: STUDENT IN AN ORGANIZED HEALTH CARE EDUCATION/TRAINING PROGRAM

## 2025-02-28 PROCEDURE — 2500000003 HC RX 250 WO HCPCS: Performed by: STUDENT IN AN ORGANIZED HEALTH CARE EDUCATION/TRAINING PROGRAM

## 2025-02-28 PROCEDURE — 96360 HYDRATION IV INFUSION INIT: CPT

## 2025-02-28 PROCEDURE — 2580000003 HC RX 258: Performed by: STUDENT IN AN ORGANIZED HEALTH CARE EDUCATION/TRAINING PROGRAM

## 2025-02-28 RX ORDER — 0.9 % SODIUM CHLORIDE 0.9 %
1000 INTRAVENOUS SOLUTION INTRAVENOUS ONCE
Status: COMPLETED | OUTPATIENT
Start: 2025-02-28 | End: 2025-02-28

## 2025-02-28 RX ORDER — HYDROCORTISONE SODIUM SUCCINATE 100 MG/2ML
100 INJECTION INTRAMUSCULAR; INTRAVENOUS
Status: CANCELLED | OUTPATIENT
Start: 2025-02-28

## 2025-02-28 RX ORDER — HEPARIN 100 UNIT/ML
500 SYRINGE INTRAVENOUS PRN
OUTPATIENT
Start: 2025-02-28

## 2025-02-28 RX ORDER — 0.9 % SODIUM CHLORIDE 0.9 %
1000 INTRAVENOUS SOLUTION INTRAVENOUS ONCE
OUTPATIENT
Start: 2025-02-28 | End: 2025-02-28

## 2025-02-28 RX ORDER — HEPARIN 100 UNIT/ML
500 SYRINGE INTRAVENOUS PRN
Status: DISCONTINUED | OUTPATIENT
Start: 2025-02-28 | End: 2025-03-01 | Stop reason: HOSPADM

## 2025-02-28 RX ORDER — DIPHENHYDRAMINE HYDROCHLORIDE 50 MG/ML
50 INJECTION INTRAMUSCULAR; INTRAVENOUS
Status: CANCELLED | OUTPATIENT
Start: 2025-02-28

## 2025-02-28 RX ORDER — ACETAMINOPHEN 325 MG/1
650 TABLET ORAL
OUTPATIENT
Start: 2025-02-28

## 2025-02-28 RX ORDER — SODIUM CHLORIDE 0.9 % (FLUSH) 0.9 %
5-40 SYRINGE (ML) INJECTION PRN
Status: CANCELLED | OUTPATIENT
Start: 2025-02-28

## 2025-02-28 RX ORDER — SODIUM CHLORIDE 9 MG/ML
5-250 INJECTION, SOLUTION INTRAVENOUS PRN
Status: CANCELLED | OUTPATIENT
Start: 2025-02-28

## 2025-02-28 RX ORDER — ALBUTEROL SULFATE 90 UG/1
4 INHALANT RESPIRATORY (INHALATION) PRN
Status: CANCELLED | OUTPATIENT
Start: 2025-02-28

## 2025-02-28 RX ORDER — EPINEPHRINE 1 MG/ML
0.3 INJECTION, SOLUTION, CONCENTRATE INTRAVENOUS PRN
OUTPATIENT
Start: 2025-02-28

## 2025-02-28 RX ORDER — EPINEPHRINE 1 MG/ML
0.3 INJECTION, SOLUTION, CONCENTRATE INTRAVENOUS PRN
Status: CANCELLED | OUTPATIENT
Start: 2025-02-28

## 2025-02-28 RX ORDER — ONDANSETRON 2 MG/ML
8 INJECTION INTRAMUSCULAR; INTRAVENOUS
OUTPATIENT
Start: 2025-02-28

## 2025-02-28 RX ORDER — 0.9 % SODIUM CHLORIDE 0.9 %
1000 INTRAVENOUS SOLUTION INTRAVENOUS ONCE
Status: CANCELLED | OUTPATIENT
Start: 2025-02-28 | End: 2025-02-28

## 2025-02-28 RX ORDER — ACETAMINOPHEN 325 MG/1
650 TABLET ORAL
Status: CANCELLED | OUTPATIENT
Start: 2025-02-28

## 2025-02-28 RX ORDER — SODIUM CHLORIDE 9 MG/ML
INJECTION, SOLUTION INTRAVENOUS CONTINUOUS
Status: CANCELLED | OUTPATIENT
Start: 2025-02-28

## 2025-02-28 RX ORDER — ALBUTEROL SULFATE 90 UG/1
4 INHALANT RESPIRATORY (INHALATION) PRN
OUTPATIENT
Start: 2025-02-28

## 2025-02-28 RX ORDER — HEPARIN 100 UNIT/ML
500 SYRINGE INTRAVENOUS PRN
Status: CANCELLED | OUTPATIENT
Start: 2025-02-28

## 2025-02-28 RX ORDER — HYDROCORTISONE SODIUM SUCCINATE 100 MG/2ML
100 INJECTION INTRAMUSCULAR; INTRAVENOUS
OUTPATIENT
Start: 2025-02-28

## 2025-02-28 RX ORDER — DIPHENHYDRAMINE HYDROCHLORIDE 50 MG/ML
50 INJECTION INTRAMUSCULAR; INTRAVENOUS
OUTPATIENT
Start: 2025-02-28

## 2025-02-28 RX ORDER — SODIUM CHLORIDE 0.9 % (FLUSH) 0.9 %
5-40 SYRINGE (ML) INJECTION PRN
OUTPATIENT
Start: 2025-02-28

## 2025-02-28 RX ORDER — SODIUM CHLORIDE 0.9 % (FLUSH) 0.9 %
5-40 SYRINGE (ML) INJECTION PRN
Status: DISCONTINUED | OUTPATIENT
Start: 2025-02-28 | End: 2025-03-01 | Stop reason: HOSPADM

## 2025-02-28 RX ORDER — ONDANSETRON 2 MG/ML
8 INJECTION INTRAMUSCULAR; INTRAVENOUS
Status: CANCELLED | OUTPATIENT
Start: 2025-02-28

## 2025-02-28 RX ORDER — SODIUM CHLORIDE 9 MG/ML
INJECTION, SOLUTION INTRAVENOUS CONTINUOUS
OUTPATIENT
Start: 2025-02-28

## 2025-02-28 RX ORDER — SODIUM CHLORIDE 9 MG/ML
5-250 INJECTION, SOLUTION INTRAVENOUS PRN
OUTPATIENT
Start: 2025-02-28

## 2025-02-28 RX ADMIN — SODIUM CHLORIDE, PRESERVATIVE FREE 10 ML: 5 INJECTION INTRAVENOUS at 14:10

## 2025-02-28 RX ADMIN — HEPARIN 500 UNITS: 100 SYRINGE at 15:23

## 2025-02-28 RX ADMIN — SODIUM CHLORIDE, PRESERVATIVE FREE 10 ML: 5 INJECTION INTRAVENOUS at 15:23

## 2025-02-28 RX ADMIN — SODIUM CHLORIDE 1000 ML: 0.9 INJECTION, SOLUTION INTRAVENOUS at 14:12

## 2025-02-28 ASSESSMENT — PAIN DESCRIPTION - LOCATION: LOCATION: FACE;NECK

## 2025-02-28 ASSESSMENT — PAIN DESCRIPTION - DESCRIPTORS: DESCRIPTORS: ACHING

## 2025-02-28 ASSESSMENT — PAIN SCALES - GENERAL: PAINLEVEL_OUTOF10: 6

## 2025-02-28 NOTE — PROGRESS NOTES
Met w/ pt during IV hydration. Pt picked up his phenergan, does not feel this is helping. Encouraged pt to give this medication time. Provided pt w/ resources to help w/ nausea management, food choices. Discussed natural remedies like gurjit chews or Sea Bands. Pt receptive. Will continue to follow.  Electronically signed by Zaira Pratt, MS, RD, LD on 2/28/2025 at 3:15 PM

## 2025-02-28 NOTE — PROGRESS NOTES
Patient tolerated iv hydration treatment well without issues. Patient alert and oriented x 3. No distress noted.  Denies any questions/complaints related to treatment.  Denies any new or worsening pain. All questions answered.

## 2025-03-03 ENCOUNTER — TELEPHONE (OUTPATIENT)
Dept: ONCOLOGY | Age: 60
End: 2025-03-03

## 2025-03-03 ENCOUNTER — HOSPITAL ENCOUNTER (INPATIENT)
Age: 60
LOS: 18 days | Discharge: HOME | DRG: 146 | End: 2025-03-21
Attending: EMERGENCY MEDICINE | Admitting: INTERNAL MEDICINE
Payer: MEDICARE

## 2025-03-03 ENCOUNTER — HOSPITAL ENCOUNTER (OUTPATIENT)
Dept: INFUSION THERAPY | Age: 60
End: 2025-03-03

## 2025-03-03 DIAGNOSIS — C76.0 MALIGNANT NEOPLASM OF HEAD, FACE, AND NECK (HCC): ICD-10-CM

## 2025-03-03 DIAGNOSIS — G89.3 NEOPLASM RELATED PAIN: ICD-10-CM

## 2025-03-03 DIAGNOSIS — Z51.5 PALLIATIVE CARE ENCOUNTER: ICD-10-CM

## 2025-03-03 DIAGNOSIS — R62.7 FAILURE TO THRIVE IN ADULT: Primary | ICD-10-CM

## 2025-03-03 LAB
ALBUMIN SERPL-MCNC: 4.5 G/DL (ref 3.5–5.2)
ALP SERPL-CCNC: 88 U/L (ref 40–129)
ALT SERPL-CCNC: 14 U/L (ref 0–40)
ANION GAP SERPL CALCULATED.3IONS-SCNC: 11 MMOL/L (ref 7–16)
ANION GAP SERPL CALCULATED.3IONS-SCNC: 16 MMOL/L (ref 7–16)
AST SERPL-CCNC: 20 U/L (ref 0–39)
BASOPHILS # BLD: 0.03 K/UL (ref 0–0.2)
BASOPHILS NFR BLD: 1 % (ref 0–2)
BILIRUB SERPL-MCNC: 0.7 MG/DL (ref 0–1.2)
BUN SERPL-MCNC: 17 MG/DL (ref 6–20)
BUN SERPL-MCNC: 18 MG/DL (ref 6–20)
CALCIUM SERPL-MCNC: 9 MG/DL (ref 8.6–10.2)
CALCIUM SERPL-MCNC: 9.4 MG/DL (ref 8.6–10.2)
CHLORIDE SERPL-SCNC: 103 MMOL/L (ref 98–107)
CHLORIDE SERPL-SCNC: 99 MMOL/L (ref 98–107)
CO2 SERPL-SCNC: 24 MMOL/L (ref 22–29)
CO2 SERPL-SCNC: 27 MMOL/L (ref 22–29)
CREAT SERPL-MCNC: 0.7 MG/DL (ref 0.7–1.2)
CREAT SERPL-MCNC: 0.7 MG/DL (ref 0.7–1.2)
EOSINOPHIL # BLD: 0.08 K/UL (ref 0.05–0.5)
EOSINOPHILS RELATIVE PERCENT: 1 % (ref 0–6)
ERYTHROCYTE [DISTWIDTH] IN BLOOD BY AUTOMATED COUNT: 12.7 % (ref 11.5–15)
GFR, ESTIMATED: >90 ML/MIN/1.73M2
GFR, ESTIMATED: >90 ML/MIN/1.73M2
GLUCOSE SERPL-MCNC: 83 MG/DL (ref 74–99)
GLUCOSE SERPL-MCNC: 95 MG/DL (ref 74–99)
HCT VFR BLD AUTO: 36.8 % (ref 37–54)
HGB BLD-MCNC: 13.3 G/DL (ref 12.5–16.5)
IMM GRANULOCYTES # BLD AUTO: <0.03 K/UL (ref 0–0.58)
IMM GRANULOCYTES NFR BLD: 0 % (ref 0–5)
LYMPHOCYTES NFR BLD: 0.81 K/UL (ref 1.5–4)
LYMPHOCYTES RELATIVE PERCENT: 13 % (ref 20–42)
MCH RBC QN AUTO: 32 PG (ref 26–35)
MCHC RBC AUTO-ENTMCNC: 36.1 G/DL (ref 32–34.5)
MCV RBC AUTO: 88.5 FL (ref 80–99.9)
MONOCYTES NFR BLD: 0.43 K/UL (ref 0.1–0.95)
MONOCYTES NFR BLD: 7 % (ref 2–12)
NEUTROPHILS NFR BLD: 78 % (ref 43–80)
NEUTS SEG NFR BLD: 4.93 K/UL (ref 1.8–7.3)
PLATELET # BLD AUTO: 202 K/UL (ref 130–450)
PMV BLD AUTO: 10.9 FL (ref 7–12)
POTASSIUM SERPL-SCNC: 4 MMOL/L (ref 3.5–5)
POTASSIUM SERPL-SCNC: 4.5 MMOL/L (ref 3.5–5)
PROT SERPL-MCNC: 7.1 G/DL (ref 6.4–8.3)
RBC # BLD AUTO: 4.16 M/UL (ref 3.8–5.8)
SODIUM SERPL-SCNC: 139 MMOL/L (ref 132–146)
SODIUM SERPL-SCNC: 141 MMOL/L (ref 132–146)
WBC OTHER # BLD: 6.3 K/UL (ref 4.5–11.5)

## 2025-03-03 PROCEDURE — 96374 THER/PROPH/DIAG INJ IV PUSH: CPT

## 2025-03-03 PROCEDURE — 6360000002 HC RX W HCPCS: Performed by: INTERNAL MEDICINE

## 2025-03-03 PROCEDURE — 99285 EMERGENCY DEPT VISIT HI MDM: CPT

## 2025-03-03 PROCEDURE — 1200000000 HC SEMI PRIVATE

## 2025-03-03 PROCEDURE — 85025 COMPLETE CBC W/AUTO DIFF WBC: CPT

## 2025-03-03 PROCEDURE — 6360000002 HC RX W HCPCS: Performed by: EMERGENCY MEDICINE

## 2025-03-03 PROCEDURE — 6370000000 HC RX 637 (ALT 250 FOR IP): Performed by: INTERNAL MEDICINE

## 2025-03-03 PROCEDURE — 80048 BASIC METABOLIC PNL TOTAL CA: CPT

## 2025-03-03 PROCEDURE — 99223 1ST HOSP IP/OBS HIGH 75: CPT | Performed by: INTERNAL MEDICINE

## 2025-03-03 PROCEDURE — 80053 COMPREHEN METABOLIC PANEL: CPT

## 2025-03-03 PROCEDURE — 2500000003 HC RX 250 WO HCPCS: Performed by: INTERNAL MEDICINE

## 2025-03-03 PROCEDURE — 2580000003 HC RX 258: Performed by: EMERGENCY MEDICINE

## 2025-03-03 RX ORDER — ENOXAPARIN SODIUM 100 MG/ML
40 INJECTION SUBCUTANEOUS DAILY
Status: DISCONTINUED | OUTPATIENT
Start: 2025-03-04 | End: 2025-03-21 | Stop reason: HOSPADM

## 2025-03-03 RX ORDER — ACETAMINOPHEN 325 MG/1
650 TABLET ORAL EVERY 6 HOURS PRN
Status: DISCONTINUED | OUTPATIENT
Start: 2025-03-03 | End: 2025-03-21 | Stop reason: HOSPADM

## 2025-03-03 RX ORDER — BENZONATATE 100 MG/1
100 CAPSULE ORAL 3 TIMES DAILY PRN
Status: DISCONTINUED | OUTPATIENT
Start: 2025-03-03 | End: 2025-03-21 | Stop reason: HOSPADM

## 2025-03-03 RX ORDER — POTASSIUM CHLORIDE 7.45 MG/ML
10 INJECTION INTRAVENOUS PRN
Status: DISCONTINUED | OUTPATIENT
Start: 2025-03-03 | End: 2025-03-21 | Stop reason: HOSPADM

## 2025-03-03 RX ORDER — NYSTATIN 100000 [USP'U]/ML
500000 SUSPENSION ORAL 4 TIMES DAILY
Status: DISCONTINUED | OUTPATIENT
Start: 2025-03-03 | End: 2025-03-21 | Stop reason: HOSPADM

## 2025-03-03 RX ORDER — POTASSIUM CHLORIDE 1500 MG/1
40 TABLET, EXTENDED RELEASE ORAL PRN
Status: DISCONTINUED | OUTPATIENT
Start: 2025-03-03 | End: 2025-03-21 | Stop reason: HOSPADM

## 2025-03-03 RX ORDER — HYDRALAZINE HYDROCHLORIDE 20 MG/ML
10 INJECTION INTRAMUSCULAR; INTRAVENOUS EVERY 6 HOURS PRN
Status: DISCONTINUED | OUTPATIENT
Start: 2025-03-03 | End: 2025-03-21 | Stop reason: HOSPADM

## 2025-03-03 RX ORDER — SODIUM CHLORIDE 0.9 % (FLUSH) 0.9 %
5-40 SYRINGE (ML) INJECTION PRN
Status: DISCONTINUED | OUTPATIENT
Start: 2025-03-03 | End: 2025-03-21 | Stop reason: HOSPADM

## 2025-03-03 RX ORDER — ONDANSETRON 4 MG/1
4 TABLET, ORALLY DISINTEGRATING ORAL EVERY 8 HOURS PRN
Status: DISCONTINUED | OUTPATIENT
Start: 2025-03-03 | End: 2025-03-21 | Stop reason: HOSPADM

## 2025-03-03 RX ORDER — PROCHLORPERAZINE MALEATE 10 MG
10 TABLET ORAL EVERY 6 HOURS PRN
Status: DISCONTINUED | OUTPATIENT
Start: 2025-03-03 | End: 2025-03-04

## 2025-03-03 RX ORDER — SODIUM CHLORIDE 9 MG/ML
INJECTION, SOLUTION INTRAVENOUS PRN
Status: DISCONTINUED | OUTPATIENT
Start: 2025-03-03 | End: 2025-03-21 | Stop reason: HOSPADM

## 2025-03-03 RX ORDER — SCOPOLAMINE 1 MG/3D
1 PATCH, EXTENDED RELEASE TRANSDERMAL
Status: DISCONTINUED | OUTPATIENT
Start: 2025-03-03 | End: 2025-03-21 | Stop reason: HOSPADM

## 2025-03-03 RX ORDER — ALBUTEROL SULFATE 0.83 MG/ML
2.5 SOLUTION RESPIRATORY (INHALATION) EVERY 6 HOURS PRN
Status: DISCONTINUED | OUTPATIENT
Start: 2025-03-03 | End: 2025-03-21 | Stop reason: HOSPADM

## 2025-03-03 RX ORDER — OXYCODONE AND ACETAMINOPHEN 5; 325 MG/1; MG/1
1 TABLET ORAL EVERY 4 HOURS PRN
Status: DISCONTINUED | OUTPATIENT
Start: 2025-03-03 | End: 2025-03-19

## 2025-03-03 RX ORDER — SUCRALFATE 1 G/1
1 TABLET ORAL 4 TIMES DAILY
Status: DISCONTINUED | OUTPATIENT
Start: 2025-03-03 | End: 2025-03-21 | Stop reason: HOSPADM

## 2025-03-03 RX ORDER — ONDANSETRON 2 MG/ML
4 INJECTION INTRAMUSCULAR; INTRAVENOUS ONCE
Status: COMPLETED | OUTPATIENT
Start: 2025-03-03 | End: 2025-03-03

## 2025-03-03 RX ORDER — ACETAMINOPHEN 650 MG/1
650 SUPPOSITORY RECTAL EVERY 6 HOURS PRN
Status: DISCONTINUED | OUTPATIENT
Start: 2025-03-03 | End: 2025-03-21 | Stop reason: HOSPADM

## 2025-03-03 RX ORDER — CALCIUM CARBONATE 500 MG/1
500 TABLET, CHEWABLE ORAL 3 TIMES DAILY PRN
Status: DISCONTINUED | OUTPATIENT
Start: 2025-03-03 | End: 2025-03-21 | Stop reason: HOSPADM

## 2025-03-03 RX ORDER — 0.9 % SODIUM CHLORIDE 0.9 %
1000 INTRAVENOUS SOLUTION INTRAVENOUS ONCE
Status: COMPLETED | OUTPATIENT
Start: 2025-03-03 | End: 2025-03-03

## 2025-03-03 RX ORDER — LIDOCAINE HYDROCHLORIDE 20 MG/ML
15 SOLUTION OROPHARYNGEAL PRN
Status: DISCONTINUED | OUTPATIENT
Start: 2025-03-03 | End: 2025-03-21 | Stop reason: HOSPADM

## 2025-03-03 RX ORDER — OXYCODONE AND ACETAMINOPHEN 5; 325 MG/1; MG/1
2 TABLET ORAL EVERY 4 HOURS PRN
Status: DISCONTINUED | OUTPATIENT
Start: 2025-03-03 | End: 2025-03-19

## 2025-03-03 RX ORDER — ONDANSETRON 2 MG/ML
4 INJECTION INTRAMUSCULAR; INTRAVENOUS EVERY 6 HOURS PRN
Status: DISCONTINUED | OUTPATIENT
Start: 2025-03-03 | End: 2025-03-21 | Stop reason: HOSPADM

## 2025-03-03 RX ORDER — SODIUM CHLORIDE 0.9 % (FLUSH) 0.9 %
5-40 SYRINGE (ML) INJECTION EVERY 12 HOURS SCHEDULED
Status: DISCONTINUED | OUTPATIENT
Start: 2025-03-03 | End: 2025-03-21 | Stop reason: HOSPADM

## 2025-03-03 RX ORDER — POLYETHYLENE GLYCOL 3350 17 G/17G
17 POWDER, FOR SOLUTION ORAL DAILY PRN
Status: DISCONTINUED | OUTPATIENT
Start: 2025-03-03 | End: 2025-03-21 | Stop reason: HOSPADM

## 2025-03-03 RX ORDER — MAGNESIUM SULFATE IN WATER 40 MG/ML
2000 INJECTION, SOLUTION INTRAVENOUS PRN
Status: DISCONTINUED | OUTPATIENT
Start: 2025-03-03 | End: 2025-03-21 | Stop reason: HOSPADM

## 2025-03-03 RX ADMIN — ONDANSETRON HYDROCHLORIDE 4 MG: 2 INJECTION, SOLUTION INTRAMUSCULAR; INTRAVENOUS at 15:29

## 2025-03-03 RX ADMIN — SODIUM CHLORIDE, PRESERVATIVE FREE 10 ML: 5 INJECTION INTRAVENOUS at 22:16

## 2025-03-03 RX ADMIN — ONDANSETRON 4 MG: 2 INJECTION, SOLUTION INTRAMUSCULAR; INTRAVENOUS at 22:15

## 2025-03-03 RX ADMIN — SODIUM CHLORIDE 1000 ML: 9 INJECTION, SOLUTION INTRAVENOUS at 15:29

## 2025-03-03 RX ADMIN — OXYCODONE AND ACETAMINOPHEN 2 TABLET: 325; 5 TABLET ORAL at 22:15

## 2025-03-03 ASSESSMENT — PAIN SCALES - GENERAL
PAINLEVEL_OUTOF10: 6
PAINLEVEL_OUTOF10: 2

## 2025-03-03 ASSESSMENT — PAIN DESCRIPTION - ONSET: ONSET: ON-GOING

## 2025-03-03 ASSESSMENT — PAIN DESCRIPTION - PAIN TYPE: TYPE: CHRONIC PAIN

## 2025-03-03 ASSESSMENT — PAIN DESCRIPTION - FREQUENCY: FREQUENCY: CONTINUOUS

## 2025-03-03 ASSESSMENT — PAIN DESCRIPTION - DESCRIPTORS: DESCRIPTORS: DISCOMFORT;SORE;THROBBING

## 2025-03-03 ASSESSMENT — PAIN DESCRIPTION - ORIENTATION: ORIENTATION: RIGHT

## 2025-03-03 NOTE — CARE COORDINATION
Social Work /Transition of Care:    Pt presents to the ED secondary to poor oral intake and failure to thrive from radiation office.  Per radiation staff, pt reported feeling dizzy while in the waiting room.    Per ED physician, pt has a PEG tube and does not have tube feed.    Pt's chart shows pt does not have any insurance.    Per oncology social worker, pt was applied for medicaid and is medicaid pending.  KURT confirmed this with Lorri from public benefits.      KURT also spoke to dietician at oncology office who states on 2/27 pt was offered a case of Nutren 2.0 and pt refused it stating \"I'm just going to puke it up anyway\".  Pt also refused C.

## 2025-03-03 NOTE — H&P
Inpatient H&P      PCP:  No primary care provider on file.  Admitting Physician:  Tammie Higginbotham DO  Consultants:    Chief Complaint:    Chief Complaint   Patient presents with    Failure To Thrive     Chemo patient not eating or drinking x 1 week, treated for throat and right lung cancer       History of Present Illness  Prasanna Parnell is a 59 y.o. male who presents to [facility] ER complaining of failure to thrive.    Prasanna Parnell has a past medical history that includes SCC of tongue/tonsil, lung mass SCC    Prasanna the ER from radiation office for decreased p.o. intake, nausea, vomiting.  He is being treated for throat cancer and is on chemo and radiation.  He has tried multiple antiemetics without success.  He does have a PEG tube but currently due to insurance issues he does not have any tube feeds.  He has been unable to eat for days. Will be admitted for initiation of tube feeds and if he cannot tolerate these, consideration of TPN.   Discussed patient's case with ED physician.    ER Course  Upon presentation to the ER, routine labwork was performed which revealed no acute abnormalities.  Imaging results are as outlined below in the Imaging section of this note.   Upon arrival to the ER, patient was 107/61.  The patient received zofran, 1L NS in the emergency room and was admitted to [facility].         Last Echocardiogram - I personally reviewed previous echo from 211/25    Left Ventricle: Low normal left ventricular systolic function with a visually estimated EF of 50 - 55%. Left ventricle size is normal. Normal wall thickness. Ventricular mass is normal. Normal wall motion. Global longitudinal strain is reduced with a value of -16.9%. Normal diastolic function. There is a false chord.    Right Ventricle: Right ventricle size is normal. Normal systolic function.    Aortic Valve: No stenosis.    Tricuspid Valve: Normal RVSP. The estimated RVSP is 26 mmHg.    Pericardium: Small (<1 cm)  no slurred speech    Laboratory Data  Recent Results (from the past 24 hour(s))   CBC with Auto Differential    Collection Time: 03/03/25  3:13 PM   Result Value Ref Range    WBC 6.3 4.5 - 11.5 k/uL    RBC 4.16 3.80 - 5.80 m/uL    Hemoglobin 13.3 12.5 - 16.5 g/dL    Hematocrit 36.8 (L) 37.0 - 54.0 %    MCV 88.5 80.0 - 99.9 fL    MCH 32.0 26.0 - 35.0 pg    MCHC 36.1 (H) 32.0 - 34.5 g/dL    RDW 12.7 11.5 - 15.0 %    Platelets 202 130 - 450 k/uL    MPV 10.9 7.0 - 12.0 fL    Neutrophils % 78 43.0 - 80.0 %    Lymphocytes % 13 (L) 20.0 - 42.0 %    Monocytes % 7 2.0 - 12.0 %    Eosinophils % 1 0 - 6 %    Basophils % 1 0.0 - 2.0 %    Immature Granulocytes % 0 0.0 - 5.0 %    Neutrophils Absolute 4.93 1.80 - 7.30 k/uL    Lymphocytes Absolute 0.81 (L) 1.50 - 4.00 k/uL    Monocytes Absolute 0.43 0.10 - 0.95 k/uL    Eosinophils Absolute 0.08 0.05 - 0.50 k/uL    Basophils Absolute 0.03 0.00 - 0.20 k/uL    Immature Granulocytes Absolute <0.03 0.00 - 0.58 k/uL   Comprehensive Metabolic Panel w/ Reflex to MG    Collection Time: 03/03/25  3:13 PM   Result Value Ref Range    Sodium 139 132 - 146 mmol/L    Potassium 4.0 3.5 - 5.0 mmol/L    Chloride 99 98 - 107 mmol/L    CO2 24 22 - 29 mmol/L    Anion Gap 16 7 - 16 mmol/L    Glucose 95 74 - 99 mg/dL    BUN 18 6 - 20 mg/dL    Creatinine 0.7 0.70 - 1.20 mg/dL    Est, Glom Filt Rate >90 >60 mL/min/1.73m2    Calcium 9.4 8.6 - 10.2 mg/dL    Total Protein 7.1 6.4 - 8.3 g/dL    Albumin 4.5 3.5 - 5.2 g/dL    Total Bilirubin 0.7 0.0 - 1.2 mg/dL    Alkaline Phosphatase 88 40 - 129 U/L    ALT 14 0 - 40 U/L    AST 20 0 - 39 U/L   Basic Metabolic Panel    Collection Time: 03/03/25  5:12 PM   Result Value Ref Range    Sodium 141 132 - 146 mmol/L    Potassium 4.5 3.5 - 5.0 mmol/L    Chloride 103 98 - 107 mmol/L    CO2 27 22 - 29 mmol/L    Anion Gap 11 7 - 16 mmol/L    Glucose 83 74 - 99 mg/dL    BUN 17 6 - 20 mg/dL    Creatinine 0.7 0.70 - 1.20 mg/dL    Est, Glom Filt Rate >90 >60 mL/min/1.73m2     Calcium 9.0 8.6 - 10.2 mg/dL            Assessment and Plan  Patient is a 59 y.o. male who presented with decreased po intake.   The active problem list is as follows:    Principal Problem:    Failure to thrive in adult  Resolved Problems:    * No resolved hospital problems. *      FTT, decreased po intake  SCC tongue/tonsil, lung mass SCC  S/p PEG tube  Intractable nausea and vomiting  History tobacco abuse    -Will order tube feeds, dietitian consult  -If he does not tolerate tube feeds, may need to consider TPN  -Oncology, rad onc, palliative consulted  -Antiemetics  -PTOT    Routine labs in the morning.  DVT prophylaxis.  Please see orders for further management and care.      Additional work up or/and treatment plan may be added today or thereafter based on clinical progression. I am managing admission portion of patient care. Some medical issues are handled by other specialists. Additional work up and treatment should be done by my colleague hospitalist who assumes care.       Tammie Higginbotham,     6:33 PM  3/3/2025

## 2025-03-03 NOTE — TELEPHONE ENCOUNTER
Contacted ED  per request regarding pt's EN. Explained that pt had prophylactic PEG placed prior to initiation of treatment. Pt pending Medicaid, preferred to hold off on EN formula delivery until absolutely necessary d/t lack of insurance. Pt was offered case of Nutren 2.0 on 2/27 for trial given persistent N/V w/ PO intake. Pt refused EN formula at that time as well. Social work appreciative of information. Will continue to follow.  Electronically signed by Zaira Pratt, MS, RD, LD on 3/3/2025 at 3:29 PM

## 2025-03-03 NOTE — ED PROVIDER NOTES
[facility]  EMERGENCY DEPARTMENT ENCOUNTER        Pt Name: Prasanna Parnell  MRN: [id number]  Birthdate 1965  Date of evaluation: 3/3/2025  Provider: Raudel Ramirez MD  PCP: No primary care provider on file.  Note Started: 2:55 PM EST 3/3/25    CHIEF COMPLAINT       Chief Complaint   Patient presents with    Failure To Thrive     Chemo patient not eating or drinking x 1 week, treated for throat and right lung cancer       HISTORY OF PRESENT ILLNESS: 1 or more Elements        Prasanna Parnell is a 59 y.o. male who presents for nausea and vomiting.  Patient reports he has not been able to eat or drink or keep anything down for the last week.  He reports he is being treated for throat and lung cancer with chemo and radiation.  He reports that he is trying to eat and drink but nothing stays down.  Multiple episodes of nonbloody, nonbilious emesis.  He denies abdominal pain.  He denies fever or chills.  Denies chest pain or shortness of breath.  Denies diarrhea.  He said they have tried multiple antiemetics without success.  He was sent here from radiation therapy for further evaluation and likely admission.  He denies infectious symptoms.  He does report that every time he stands up he feels lightheaded like he wants to pass out.  Patient does have a feeding tube and not using it secondary to not having tube feeds at home.    Nursing Notes were all reviewed and agreed with or any disagreements were addressed in the HPI.      REVIEW OF EXTERNAL NOTE :       Infusion center notes from 2/28/2025      Chart Review/External Note Review    Last Echo reviewed by Me:  No results found for: \"LVEF\", \"LVEFMODE\"          Controlled Substance Monitoring:    Acute and Chronic Pain Monitoring:   RX Monitoring Periodic Controlled Substance Monitoring   2/25/2025   3:34 PM Possible medication side effects, risk of tolerance/dependence & alternative treatments discussed.;No signs of potential drug abuse or diversion  lightheaded, tingling  Has a \"code stroke\" or \"stroke alert\" been called?->No  Decision Support Exception - unselect if not a suspected or confirmed emergency medical condition->Emergency Medical Condition (MA) TECHNIQUE: Axial computed tomography images of the head/brain without intravenous contrast.  This CT exam was performed using one or more of the following dose reduction techniques:  automated exposure control, adjustment of the mA and/or kV according to patient size, and/or use of iterative reconstruction technique. COMPARISON: No relevant prior studies available. FINDINGS: Brain:  No acute findings.  No hemorrhage.  No significant white matter disease.  No edema. Ventricles:  No acute findings.  No ventriculomegaly. Bones/joints:  No acute findings.  No acute fracture. Soft tissues:  No acute findings. Sinuses:  Mild right maxillary chronic sinusitis. Mastoid air cells:  Unremarkable as visualized.  No mastoid effusion.     No acute intracranial abnormality noted.       No results found.      PAST MEDICAL HISTORY/Chronic Conditions Affecting Care      has a past medical history of Cancer ([facility]), Cervicalgia, COPD (chronic obstructive pulmonary disease) ([facility]), Neuropathy, Retrolisthesis, and Spondylolisthesis.     EMERGENCY DEPARTMENT COURSE    Vitals:    Vitals:    03/03/25 1350 03/03/25 1359 03/03/25 2215 03/03/25 2245   BP:  107/61 105/61    Pulse: 87  67    Resp:  20 20 20   Temp: 98.8 °F (37.1 °C)  99 °F (37.2 °C)    TempSrc: Temporal  Temporal    SpO2: 96%  97%    Weight:    52.2 kg (115 lb)   Height:    1.727 m (5' 8\")       Patient was given the following medications:  Medications   benzocaine-menthol (CEPACOL SORE THROAT) lozenge 1 lozenge (has no administration in time range)   benzonatate (TESSALON) capsule 100 mg (has no administration in time range)   calcium carbonate (TUMS) chewable tablet 500 mg (has no administration in time range)   hydrALAZINE (APRESOLINE) injection 10 mg (has no  2245)   sucralfate (CARAFATE) tablet 1 g (1 g Oral Not Given 3/3/25 2246)   oxyCODONE-acetaminophen (PERCOCET) 5-325 MG per tablet 1 tablet ( Oral See Alternative 3/3/25 2215)     Or   oxyCODONE-acetaminophen (PERCOCET) 5-325 MG per tablet 2 tablet (2 tablets Oral Given 3/3/25 2215)   sodium chloride 0.9 % bolus 1,000 mL (0 mLs IntraVENous Stopped 3/3/25 1718)   ondansetron (ZOFRAN) injection 4 mg (4 mg IntraVENous Given 3/3/25 1529)                    Medical Decision Making/Differential Diagnosis:    CC/HPI Summary, Social Determinants of health, Records Reviewed, DDx, testing done/not done, ED Course, Reassessment, disposition considerations/shared decision making with patient, consults, disposition:       The following labs are interpreted by me:    Recent Results (from the past 24 hour(s))   CBC with Auto Differential    Collection Time: 03/03/25  3:13 PM   Result Value Ref Range    WBC 6.3 4.5 - 11.5 k/uL    RBC 4.16 3.80 - 5.80 m/uL    Hemoglobin 13.3 12.5 - 16.5 g/dL    Hematocrit 36.8 (L) 37.0 - 54.0 %    MCV 88.5 80.0 - 99.9 fL    MCH 32.0 26.0 - 35.0 pg    MCHC 36.1 (H) 32.0 - 34.5 g/dL    RDW 12.7 11.5 - 15.0 %    Platelets 202 130 - 450 k/uL    MPV 10.9 7.0 - 12.0 fL    Neutrophils % 78 43.0 - 80.0 %    Lymphocytes % 13 (L) 20.0 - 42.0 %    Monocytes % 7 2.0 - 12.0 %    Eosinophils % 1 0 - 6 %    Basophils % 1 0.0 - 2.0 %    Immature Granulocytes % 0 0.0 - 5.0 %    Neutrophils Absolute 4.93 1.80 - 7.30 k/uL    Lymphocytes Absolute 0.81 (L) 1.50 - 4.00 k/uL    Monocytes Absolute 0.43 0.10 - 0.95 k/uL    Eosinophils Absolute 0.08 0.05 - 0.50 k/uL    Basophils Absolute 0.03 0.00 - 0.20 k/uL    Immature Granulocytes Absolute <0.03 0.00 - 0.58 k/uL   Comprehensive Metabolic Panel w/ Reflex to MG    Collection Time: 03/03/25  3:13 PM   Result Value Ref Range    Sodium 139 132 - 146 mmol/L    Potassium 4.0 3.5 - 5.0 mmol/L    Chloride 99 98 - 107 mmol/L    CO2 24 22 - 29 mmol/L    Anion Gap 16 7 - 16 mmol/L

## 2025-03-04 ENCOUNTER — HOSPITAL ENCOUNTER (OUTPATIENT)
Dept: RADIATION ONCOLOGY | Age: 60
Discharge: HOME | End: 2025-03-04

## 2025-03-04 PROBLEM — E43 SEVERE PROTEIN-CALORIE MALNUTRITION: Chronic | Status: ACTIVE | Noted: 2025-03-04

## 2025-03-04 PROBLEM — G89.3 NEOPLASM RELATED PAIN: Status: ACTIVE | Noted: 2025-03-04

## 2025-03-04 PROBLEM — Z51.5 PALLIATIVE CARE ENCOUNTER: Status: ACTIVE | Noted: 2025-03-04

## 2025-03-04 PROBLEM — R11.2 INTRACTABLE NAUSEA AND VOMITING: Status: ACTIVE | Noted: 2025-03-04

## 2025-03-04 LAB
ALBUMIN SERPL-MCNC: 3.7 G/DL (ref 3.5–5.2)
ALP SERPL-CCNC: 80 U/L (ref 40–129)
ALT SERPL-CCNC: 11 U/L (ref 0–40)
ANION GAP SERPL CALCULATED.3IONS-SCNC: 9 MMOL/L (ref 7–16)
AST SERPL-CCNC: 12 U/L (ref 0–39)
BILIRUB SERPL-MCNC: 0.5 MG/DL (ref 0–1.2)
BUN SERPL-MCNC: 16 MG/DL (ref 6–20)
CALCIUM SERPL-MCNC: 9.1 MG/DL (ref 8.6–10.2)
CHLORIDE SERPL-SCNC: 103 MMOL/L (ref 98–107)
CHOLEST SERPL-MCNC: 176 MG/DL
CO2 SERPL-SCNC: 28 MMOL/L (ref 22–29)
CREAT SERPL-MCNC: 0.7 MG/DL (ref 0.7–1.2)
ERYTHROCYTE [DISTWIDTH] IN BLOOD BY AUTOMATED COUNT: 12.9 % (ref 11.5–15)
GFR, ESTIMATED: >90 ML/MIN/1.73M2
GLUCOSE SERPL-MCNC: 129 MG/DL (ref 74–99)
HBA1C MFR BLD: 5.6 % (ref 4–5.6)
HCT VFR BLD AUTO: 33.3 % (ref 37–54)
HDLC SERPL-MCNC: 37 MG/DL
HGB BLD-MCNC: 11.9 G/DL (ref 12.5–16.5)
LDLC SERPL CALC-MCNC: 112 MG/DL
MAGNESIUM SERPL-MCNC: 1.9 MG/DL (ref 1.6–2.6)
MCH RBC QN AUTO: 32.4 PG (ref 26–35)
MCHC RBC AUTO-ENTMCNC: 35.7 G/DL (ref 32–34.5)
MCV RBC AUTO: 90.7 FL (ref 80–99.9)
PHOSPHATE SERPL-MCNC: 2.9 MG/DL (ref 2.5–4.5)
PLATELET # BLD AUTO: 147 K/UL (ref 130–450)
PMV BLD AUTO: 10.1 FL (ref 7–12)
POTASSIUM SERPL-SCNC: 3.2 MMOL/L (ref 3.5–5)
PROT SERPL-MCNC: 6.2 G/DL (ref 6.4–8.3)
RBC # BLD AUTO: 3.67 M/UL (ref 3.8–5.8)
SODIUM SERPL-SCNC: 140 MMOL/L (ref 132–146)
TRIGL SERPL-MCNC: 137 MG/DL
TSH SERPL DL<=0.05 MIU/L-ACNC: 0.41 UIU/ML (ref 0.27–4.2)
VLDLC SERPL CALC-MCNC: 27 MG/DL
WBC OTHER # BLD: 5.4 K/UL (ref 4.5–11.5)

## 2025-03-04 PROCEDURE — 77386 HC NTSTY MODUL RAD TX DLVR CPLX: CPT | Performed by: SPECIALIST

## 2025-03-04 PROCEDURE — 36415 COLL VENOUS BLD VENIPUNCTURE: CPT

## 2025-03-04 PROCEDURE — 99232 SBSQ HOSP IP/OBS MODERATE 35: CPT | Performed by: INTERNAL MEDICINE

## 2025-03-04 PROCEDURE — 2500000003 HC RX 250 WO HCPCS: Performed by: INTERNAL MEDICINE

## 2025-03-04 PROCEDURE — 99255 IP/OBS CONSLTJ NEW/EST HI 80: CPT | Performed by: INTERNAL MEDICINE

## 2025-03-04 PROCEDURE — 1200000000 HC SEMI PRIVATE

## 2025-03-04 PROCEDURE — 99223 1ST HOSP IP/OBS HIGH 75: CPT | Performed by: NURSE PRACTITIONER

## 2025-03-04 PROCEDURE — 80053 COMPREHEN METABOLIC PANEL: CPT

## 2025-03-04 PROCEDURE — 83036 HEMOGLOBIN GLYCOSYLATED A1C: CPT

## 2025-03-04 PROCEDURE — 6370000000 HC RX 637 (ALT 250 FOR IP): Performed by: INTERNAL MEDICINE

## 2025-03-04 PROCEDURE — 83735 ASSAY OF MAGNESIUM: CPT

## 2025-03-04 PROCEDURE — 84443 ASSAY THYROID STIM HORMONE: CPT

## 2025-03-04 PROCEDURE — 85027 COMPLETE CBC AUTOMATED: CPT

## 2025-03-04 PROCEDURE — 6370000000 HC RX 637 (ALT 250 FOR IP): Performed by: NURSE PRACTITIONER

## 2025-03-04 PROCEDURE — 6360000002 HC RX W HCPCS: Performed by: INTERNAL MEDICINE

## 2025-03-04 PROCEDURE — 84100 ASSAY OF PHOSPHORUS: CPT

## 2025-03-04 PROCEDURE — 80061 LIPID PANEL: CPT

## 2025-03-04 RX ORDER — OLANZAPINE 5 MG/1
2.5 TABLET, ORALLY DISINTEGRATING ORAL EVERY 6 HOURS PRN
Status: DISCONTINUED | OUTPATIENT
Start: 2025-03-04 | End: 2025-03-21 | Stop reason: HOSPADM

## 2025-03-04 RX ORDER — METHADONE HYDROCHLORIDE 10 MG/1
5 TABLET ORAL DAILY
Status: DISCONTINUED | OUTPATIENT
Start: 2025-03-04 | End: 2025-03-12

## 2025-03-04 RX ADMIN — ONDANSETRON 4 MG: 2 INJECTION, SOLUTION INTRAMUSCULAR; INTRAVENOUS at 21:48

## 2025-03-04 RX ADMIN — NYSTATIN 500000 UNITS: 100000 SUSPENSION ORAL at 12:49

## 2025-03-04 RX ADMIN — SODIUM CHLORIDE, PRESERVATIVE FREE 10 ML: 5 INJECTION INTRAVENOUS at 19:32

## 2025-03-04 RX ADMIN — OXYCODONE AND ACETAMINOPHEN 2 TABLET: 325; 5 TABLET ORAL at 08:42

## 2025-03-04 RX ADMIN — SUCRALFATE 1 G: 1 TABLET ORAL at 19:31

## 2025-03-04 RX ADMIN — SODIUM CHLORIDE, PRESERVATIVE FREE 10 ML: 5 INJECTION INTRAVENOUS at 08:43

## 2025-03-04 RX ADMIN — SUCRALFATE 1 G: 1 TABLET ORAL at 12:50

## 2025-03-04 RX ADMIN — SUCRALFATE 1 G: 1 TABLET ORAL at 08:43

## 2025-03-04 RX ADMIN — NYSTATIN 500000 UNITS: 100000 SUSPENSION ORAL at 08:44

## 2025-03-04 RX ADMIN — OXYCODONE AND ACETAMINOPHEN 2 TABLET: 325; 5 TABLET ORAL at 19:10

## 2025-03-04 RX ADMIN — ONDANSETRON 4 MG: 2 INJECTION, SOLUTION INTRAMUSCULAR; INTRAVENOUS at 08:47

## 2025-03-04 RX ADMIN — ACETAMINOPHEN 650 MG: 325 TABLET ORAL at 19:11

## 2025-03-04 RX ADMIN — POTASSIUM CHLORIDE 40 MEQ: 1500 TABLET, EXTENDED RELEASE ORAL at 10:03

## 2025-03-04 RX ADMIN — OXYCODONE HYDROCHLORIDE AND ACETAMINOPHEN 1 TABLET: 5; 325 TABLET ORAL at 23:10

## 2025-03-04 RX ADMIN — METHADONE HYDROCHLORIDE 5 MG: 10 TABLET ORAL at 12:50

## 2025-03-04 RX ADMIN — SUCRALFATE 1 G: 1 TABLET ORAL at 16:55

## 2025-03-04 ASSESSMENT — PAIN DESCRIPTION - ONSET
ONSET: ON-GOING

## 2025-03-04 ASSESSMENT — PAIN DESCRIPTION - LOCATION
LOCATION: EAR;JAW;THROAT
LOCATION: THROAT
LOCATION: EAR;JAW;THROAT
LOCATION: EAR;JAW
LOCATION: JAW
LOCATION: JAW
LOCATION: EAR;JAW;THROAT

## 2025-03-04 ASSESSMENT — PAIN DESCRIPTION - ORIENTATION
ORIENTATION: RIGHT;LEFT;INNER
ORIENTATION: RIGHT;LEFT;INNER
ORIENTATION: RIGHT
ORIENTATION: RIGHT;LEFT;INNER
ORIENTATION: RIGHT;LEFT;INNER
ORIENTATION: RIGHT
ORIENTATION: RIGHT;LEFT;INNER

## 2025-03-04 ASSESSMENT — PAIN DESCRIPTION - DESCRIPTORS
DESCRIPTORS: ACHING;NAGGING;CRAMPING
DESCRIPTORS: ACHING;DISCOMFORT;SORE;THROBBING
DESCRIPTORS: ACHING;DISCOMFORT;SORE;THROBBING
DESCRIPTORS: ACHING;DISCOMFORT;SORE
DESCRIPTORS: ACHING;NAGGING;CRAMPING
DESCRIPTORS: ACHING;DISCOMFORT;SORE;THROBBING

## 2025-03-04 ASSESSMENT — PAIN - FUNCTIONAL ASSESSMENT

## 2025-03-04 ASSESSMENT — PAIN DESCRIPTION - FREQUENCY
FREQUENCY: CONTINUOUS

## 2025-03-04 ASSESSMENT — PAIN SCALES - GENERAL
PAINLEVEL_OUTOF10: 7
PAINLEVEL_OUTOF10: 5
PAINLEVEL_OUTOF10: 6
PAINLEVEL_OUTOF10: 5
PAINLEVEL_OUTOF10: 5
PAINLEVEL_OUTOF10: 7
PAINLEVEL_OUTOF10: 5

## 2025-03-04 ASSESSMENT — PAIN DESCRIPTION - PAIN TYPE
TYPE: CHRONIC PAIN

## 2025-03-04 NOTE — PROGRESS NOTES
Unable to open oncology consult as charted is being viewed by physician. Will attempt at later time

## 2025-03-04 NOTE — PROGRESS NOTES
Attending messaged via perfect serve about pt requesting Ice chips due to throat being dry, offered prn lozenges, pt refused saying ice chips would soothe throat better. Attending aware of NPO status due to throat/lung CA, attending okay with this.

## 2025-03-04 NOTE — CONSULTS
Comprehensive Nutrition Assessment    Type and Reason for Visit:  Initial, Consult, Nutrition support (TF O&M)    Nutrition Recommendations/Plan:   Continue NPO, start diet as medically feasible/appropriate  Modify TF to better meet pt's est needs; recommend,    Standard w/ Fiber (Jevity 1.5) @ 45ml/hr + 1 pro mod to provide 1080ml TV, 1620kcals, 69g pro (w/ 1 pro mod is 1724kcals, 95g pro), 821ml free water.    Flush rec of 150ml q 4= 900ml water (1721ml total water).     Will monitor. Monitor/replace Lytes PRN (note hypokalemia this adm).     Malnutrition Assessment:  Malnutrition Status:  Severe malnutrition (03/04/25 1142)    Context:  Chronic Illness     Findings of the 6 clinical characteristics of malnutrition:  Energy Intake:  75% or less estimated energy requirements for 1 month or longer (chronic N/V w/ inability to tolerate PO intake ; pt w/ PEG, however was not utilizing this PTA d/t inability to acquire TF formula d/t insurance issues.)  Weight Loss:  Unable to assess (GALI d/t lack of wt hx per EMR; recent measured wts trending down, however unable to assess long term wt loss d/t lack of measured wt hx w/in adequate time frame for assessment.)     Body Fat Loss:  Severe body fat loss Orbital, Triceps, Buccal region   Muscle Mass Loss:  Severe muscle mass loss Temples (temporalis), Clavicles (pectoralis & deltoids), Hand (interosseous), Thigh (quadriceps)  Fluid Accumulation:  No fluid accumulation     Strength:  Not Performed    Nutrition Assessment:    pt adm dt/ FTT; pt w/ throat CA (tonsil/tongue/lung) currently on chemo/XRT; pt had PEG placed 1/20/25, pt reports he has no insurance and has not used the TF prior to this adm d/t inability to acquire TF formula; pt endorses poor PO/appetite PTA - he tells me he tries to eat adequate meals (such as steak) and tries consuming ONS (such as Ensure), however reports intractable N/V w/ oral intake; he also tells  me he has chemo on Thursdays and feels  that this \"makes everything worse\", also reporting that he feels weak and unable to tolerate \"anything\" ; pt reports he was able to chew/swallow PTA, however oral intake difficult r/t N/V;  PMhx of CA, COPD; pt reports overall he just \"doesn't feel good\"; pt currently w/ Jevity 1.5 formula running via PEG, reports no issues w/ this-endorses some nausea that is relieved w/ Zofran; TF rec provided to better meet pt's est needs; pt meets criteria for severe malnutrition; will monitor.    Nutrition Related Findings:    muscle/fat wasting; hypokalemia; Na WNL; sore throad; nausea at times (per pt); active BS; PEG w/ TF; abd WNL Wound Type: None       Current Nutrition Intake & Therapies:    Average Meal Intake: NPO  Average Supplements Intake: NPO  Diet NPO  ADULT TUBE FEEDING; PEG; Standard with Fiber; Continuous; 30; Yes; 5; Q 8 hours; 45; 150; Q 4 hours; Protein; 1 Dose; Daily  Current Tube Feeding (TF) Orders:  Feeding Route: PEG  Formula: Standard with Fiber  Schedule: Continuous  Feeding Regimen: 45ml/hr  Additives/Modulars: Protein (once/day)  Water Flushes: 150ml q 4= 900ml water.  Current TF Provides: 1080ml TV, 1620kcals, 69g pro (w/ 1 pro mod is 1724kcals, 95g pro), 821ml free water, 1721ml total water.    Anthropometric Measures:  Height: 172.7 cm (5' 7.99\")  Ideal Body Weight (IBW): 154 lbs (70 kg)    Admission Body Weight:  (3/5/25-BS/first measured while adm)  Current Body Weight: 53 kg (116 lb 13.5 oz) (3/4/25-BS), 75.9 % IBW. Weight Source: Bed scale  Current BMI (kg/m2): 17.8  Usual Body Weight: 56.4 kg (124 lb 5.4 oz) (2/27/25-oncology note; 2/20/25 wt of 60.3kg noted via oncology note)     % Weight Change (Calculated): -6  Weight Adjustment For: No Adjustment                 BMI Categories: Underweight (BMI less than 18.5)    Estimated Daily Nutrient Needs:  Energy Requirements Based On: Formula  Weight Used for Energy Requirements: Current  Energy (kcal/day): 5400-0226  Weight Used for Protein  Requirements: Current  Protein (g/day): 1.5-1.8g/kgxCBW=80-95g  Method Used for Fluid Requirements: 1 ml/kcal  Fluid (ml/day): 5269-8915    Nutrition Diagnosis:   Severe malnutrition, in context of chronic illness related to catabolic illness as evidenced by criteria as identified in malnutrition assessment    Nutrition Interventions:   Food and/or Nutrient Delivery: Continue NPO, Modify Tube Feeding (Recommend Standard w/ Fiber (Jevity 1.5) @ 45ml/hr + 1 pro mod to provide 1080ml TV, 1620kcals, 69g pro (w/ 1 pro mod is 1724kcals, 95g pro), 821ml free water; flush rec of 150ml q 4= 900ml water (1721ml total water).)  Nutrition Education/Counseling: Education/Counseling initiated (About)  Coordination of Nutrition Care: Continue to monitor while inpatient       Goals:  Goals: Tolerate nutrition support at goal rate  Type of Goal: New goal  Previous Goal Met: New Goal    Nutrition Monitoring and Evaluation:   Behavioral-Environmental Outcomes: None Identified  Food/Nutrient Intake Outcomes: Enteral Nutrition Intake/Tolerance  Physical Signs/Symptoms Outcomes: Biochemical Data, Nutrition Focused Physical Findings, Skin, Chewing or Swallowing, Weight, GI Status, Fluid Status or Edema    Discharge Planning:    Enteral Nutrition (via PEG as able; hx of inability to acquire TF formula PTA)     Robyn Stephens RD, LD  Contact: [phone]

## 2025-03-04 NOTE — PROGRESS NOTES
4 Eyes Skin Assessment     NAME:  Prasanna Parnell  YOB: 1965  MEDICAL RECORD NUMBER:  [id number]    The patient is being assessed for  Admission    I agree that at least one RN has performed a thorough Head to Toe Skin Assessment on the patient. ALL assessment sites listed below have been assessed.      Areas assessed by both nurses:    Head, Face, Ears, Shoulders, Back, Chest, Arms, Elbows, Hands, Sacrum. Buttock, Coccyx, Ischium, Legs. Feet and Heels, and Under Medical Devices         Does the Patient have a Wound? No noted wound(s)       Rahul Prevention initiated by RN: Yes  Wound Care Orders initiated by RN: No    Pressure Injury (Stage 3,4, Unstageable, DTI, NWPT, and Complex wounds) if present, place Wound referral order by RN under : No    New Ostomies, if present place, Ostomy referral order under : No     Nurse 1 eSignature: Electronically signed by Jessica Alonso RN on 3/3/25 at 11:38 PM EST    **SHARE this note so that the co-signing nurse can place an eSignature**    Nurse 2 eSignature: Electronically signed by Alisson Correia RN on 3/4/25 at 1:14 AM EST

## 2025-03-04 NOTE — PROGRESS NOTES
[facility] Hospitalist Progress Note    Admitting Date and Time: 3/3/2025  2:03 PM  Admit Dx: Failure to thrive in adult [R62.7]    Synopsis:    Prasanna Parnell is a 59 y.o. male who presents to [facility] ER complaining of failure to thrive.     Prasanna Parnell has a past medical history that includes stage I SCC of tongue/tonsil, lung mass SCC     Prasanna the ER from radiation office for decreased p.o. intake, nausea, vomiting.  He is being treated for throat cancer and is on chemo and radiation.  He has tried multiple antiemetics without success.  He does have a PEG tube but currently due to insurance issues he does not have any tube feeds.  He has been unable to eat for days. Will be admitted for initiation of tube feeds and if he cannot tolerate these, consideration of TPN.     Upon presentation to the ER, routine labwork was performed which revealed no acute abnormalities.  Imaging results are as outlined below in the Imaging section of this note.   Upon arrival to the ER, patient was 107/61.  The patient received zofran, 1L NS in the emergency room and was admitted to [facility].    Subjective:  Patient is being followed for Failure to thrive in adult [R62.7]     Patient seen and examined at bedside this morning.  States nausea is improved on Zofran.    ROS: denies fever, chills, cp, sob, n/v, HA unless stated above.      sodium chloride flush  5-40 mL IntraVENous 2 times per day    enoxaparin  40 mg SubCUTAneous Daily    nystatin  500,000 Units Oral 4x Daily    scopolamine  1 patch TransDERmal Q3 Days    sucralfate  1 g Oral 4x Daily     benzocaine-menthol, 1 lozenge, Q2H PRN  benzonatate, 100 mg, TID PRN  calcium carbonate, 500 mg, TID PRN  hydrALAZINE, 10 mg, Q6H PRN  melatonin, 3 mg, Nightly PRN  Polyvinyl Alcohol-Povidone PF, 1 drop, PRN  sodium chloride, 1 spray, PRN  sodium chloride flush, 5-40 mL, PRN  sodium chloride, , PRN  potassium chloride, 40 mEq, PRN   Or  potassium alternative  feeds resumed  Continue scopolamine patch, Zofran and nausea  Continue other medications including methadone, nystatin, Carafate  Continue oxycodone as needed for pain  Oncology, radiation oncology, palliative care all following      DC planning: Home pending improvement in symptoms    NOTE: This report was transcribed using voice recognition software. Every effort was made to ensure accuracy; however, inadvertent computerized transcription errors may be present.    Electronically signed by Joon Arzate MD on 3/4/2025 at 12:09 PM

## 2025-03-04 NOTE — CONSULTS
MD  Treatment goal: Curative  Line of treatment: 1st Line             Review of Systems   Constitutional:  Positive for appetite change and fatigue. Negative for fever.   HENT:  Positive for mouth sores and trouble swallowing.    Eyes:  Negative for visual disturbance.   Respiratory:  Negative for cough, shortness of breath and wheezing.    Cardiovascular:  Negative for chest pain, palpitations and leg swelling.   Gastrointestinal:  Positive for nausea. Negative for abdominal pain, blood in stool, constipation, diarrhea and vomiting.   Genitourinary:  Negative for dysuria, hematuria and testicular pain.   Musculoskeletal:  Negative for arthralgias, back pain and myalgias.   Skin:  Negative for color change and rash.   Neurological:  Positive for light-headedness. Negative for dizziness, weakness, numbness and headaches.   Psychiatric/Behavioral:  Negative for agitation and confusion.               Past Medical History:   Diagnosis Date    Cancer (HCC)     oral cancer    Cervicalgia     COPD (chronic obstructive pulmonary disease) (HCC)     Neuropathy     Retrolisthesis     Spondylolisthesis        Past Surgical History:   Procedure Laterality Date    BRONCHOSCOPY N/A 01/23/2025    BRONCHOSCOPY ENDOBRONCHIAL ULTRASOUND FINE NEEDLE ASPIR[place] [facility]    BRONCHOSCOPY N/A 01/23/2025    BRONCHOSCOPY ENDOBRONCHIAL ULTRASOUND FINE NEEDLE ASPIRATION ROBOTIC [place] [facility]    FINGER SURGERY Right     amputation first finger    HERNIA REPAIR Bilateral     inguinal    KNEE SURGERY Left     repair tendons d/t chainsaw accidnet    LARYNGOSCOPY N/A 01/14/2025    PANENDOSCOPY WITH BIOPSY performed by Jonathan Bowden DO at [facility] OR    LUMBAR LAMINECTOMY      1997,1998,2010    PORT SURGERY N/A 2/13/2025    MEDIPORT INSERTION performed by Delvis Frank DO at [facility] OR    UPPER GASTROINTESTINAL ENDOSCOPY N/A 01/20/2025     isolated from others?: Hardly ever   Housing Stability: Low Risk  (3/3/2025)    Housing Stability Vital Sign     Unable to Pay for Housing in the Last Year: No     Number of Times Moved in the Last Year: 0     Homeless in the Last Year: No       Family History   Problem Relation Age of Onset    Cancer Mother 65        leukemia    Alcohol Abuse Father     High Blood Pressure Father     Heart Attack Father     Stroke Father     Breast Cancer Maternal Grandmother     Cancer Paternal Grandfather         prostate         Current Facility-Administered Medications   Medication Dose Route Frequency Provider Last Rate Last Admin    benzocaine-menthol (CEPACOL SORE THROAT) lozenge 1 lozenge  1 lozenge Oral Q2H PRN Tammie Higginbotham, DO        benzonatate (TESSALON) capsule 100 mg  100 mg Oral TID PRN Tammie Higginbotham, DO        calcium carbonate (TUMS) chewable tablet 500 mg  500 mg Oral TID PRN Tammie Higginbotham, DO        hydrALAZINE (APRESOLINE) injection 10 mg  10 mg IntraVENous Q6H PRN Tammie Higginbotham, DO        melatonin tablet 3 mg  3 mg Oral Nightly PRN Tammie Higginbotham, DO        Polyvinyl Alcohol-Povidone PF (REFRESH) 1.4-0.6 % ophthalmic solution 1 drop  1 drop Both Eyes PRN Tammie Higginbotham, DO        sodium chloride (OCEAN, BABY AYR) 0.65 % nasal spray 1 spray  1 spray Each Nostril PRN Tammie Higginbotham, DO        sodium chloride flush 0.9 % injection 5-40 mL  5-40 mL IntraVENous 2 times per day Tammie Higginbotham, DO   10 mL at 03/03/25 2216    sodium chloride flush 0.9 % injection 5-40 mL  5-40 mL IntraVENous PRN Tammie Higginbotham, DO        0.9 % sodium chloride infusion   IntraVENous PRN Tammie Higginbotham, DO        potassium chloride (KLOR-CON M) extended release tablet 40 mEq  40 mEq Oral PRN Tammie Higginbotham, DO        Or    potassium bicarb-citric acid (EFFER-K) effervescent tablet 40 mEq  40 mEq Oral PRN Tammie Higginbotham, DO        Or    potassium chloride 10 mEq/100 mL IVPB (Peripheral  Line)  10 mEq IntraVENous PRN Tammie Higginbotham, DO        magnesium sulfate 2000 mg in 50 mL IVPB premix  2,000 mg IntraVENous PRN Tammie Higginbotham, DO        enoxaparin (LOVENOX) injection 40 mg  40 mg SubCUTAneous Daily Tammie Higginbotham,         ondansetron (ZOFRAN-ODT) disintegrating tablet 4 mg  4 mg Oral Q8H PRN Tammie Higginbotham,         Or    ondansetron (ZOFRAN) injection 4 mg  4 mg IntraVENous Q6H PRN Tammie Higginbotham, DO   4 mg at 03/03/25 2215    polyethylene glycol (GLYCOLAX) packet 17 g  17 g Oral Daily PRN Tammie Higginbotham, DO        acetaminophen (TYLENOL) tablet 650 mg  650 mg Oral Q6H PRN Tammie Higginbotham,         Or    acetaminophen (TYLENOL) suppository 650 mg  650 mg Rectal Q6H PRN Tammie Higginbotham, DO        albuterol (PROVENTIL) (2.5 MG/3ML) 0.083% nebulizer solution 2.5 mg  2.5 mg Nebulization Q6H PRN Tammie Higginbotham, DO        lidocaine viscous hcl (XYLOCAINE) 2 % solution 15 mL  15 mL Mouth/Throat PRN Tammie Higginbotham, DO        nystatin (MYCOSTATIN) 765219 UNIT/ML suspension 500,000 Units  500,000 Units Oral 4x Daily Tammie Higginbotham,         prochlorperazine (COMPAZINE) tablet 10 mg  10 mg Oral Q6H PRN Tammie Higginbotham,         scopolamine (TRANSDERM-SCOP) transdermal patch 1 patch  1 patch TransDERmal Q3 Days Tammie Higginbotham,         sucralfate (CARAFATE) tablet 1 g  1 g Oral 4x Daily Tammie Higginbotham,         oxyCODONE-acetaminophen (PERCOCET) 5-325 MG per tablet 1 tablet  1 tablet Oral Q4H PRN Tammie Higginbotham,         Or    oxyCODONE-acetaminophen (PERCOCET) 5-325 MG per tablet 2 tablet  2 tablet Oral Q4H PRN Tammie Higginbotham, DO   2 tablet at 03/03/25 2215         Allergies   Allergen Reactions    Bee Venom Anaphylaxis    Keflex [Cephalexin] Swelling              VITALS:    /65   Pulse 62   Temp 97.7 °F (36.5 °C) (Temporal)   Resp 20   Ht 1.727 m (5' 8\")   Wt 52.2 kg (115 lb)   SpO2 98%   BMI 17.49 kg/m²   Physical

## 2025-03-04 NOTE — CARE COORDINATION
3/4. Tolerating Jevity tube feedings. The pt lives with his significant other. He will return home when medically stable. PBD has applied for medicaid for the pt and it is still pending. He is HCAP eligible and Eligible for help with meds upon discharge. Jina Ashby RN

## 2025-03-04 NOTE — PROGRESS NOTES
Date: 3/4/2025       Patient Name: Prasanna Parnell  : 1965      MRN: [id number]    PT order received. Chart has been reviewed. PT evaluation will be on hold due to patient off unit . Will continue to follow and complete evaluation at later time.     Raz Ramirez, PT

## 2025-03-04 NOTE — PROGRESS NOTES
Palliative Medicine Social Work     Patient Name: Prasanna Parnell    Age: 59 y.o.    Marital Status: single     Status: no    Emergency Contact: girlfriend Mary Anne Clement    Children: adult daughter in [place] that he speaks to once in a while. Pt does not have her contact information at this time.    Advanced Directives:HCPOA completed today, he appoints Mary Anne as his only agent.    Confirm Code Status: full- to be reviewed    Current Goals of Care: live longer, improve or maintain function/ quality of life, remain at home and continue current management    Mental Health History: denies    Substance Abuse:denies    Indications of Abuse/Neglect: no    Financial Concerns: pt working with Public benefits department to obtain medicaid and financial assistance.     Living Situation: pt resides with his girlfriend Mary Anne. He has a peg tube but states it was only placed as aback up and he was eating orally.      Physical Care Needs Met: yes    Emotional Needs Met: yes    Assessment: 60 yo single male seen at bedside. He has a history of spondylolisthesis, retrolisthesis, neuropathy, COPD, squamous cell carcinoma head and neck, tobacco use who was admitted on 3/3/2025 from radiation office with a CHIEF COMPLAINT of poor oral intake and failure to thrive.  Patient was diagnosed with squamous cell carcinoma of the head and neck in December 2024. He has a peg tube but states he had not needed to use it yet. LSW assisted pt and his girlfriend with completing HPCOA.

## 2025-03-04 NOTE — CONSULTS
Palliative Care Department  [phone]  Palliative Care Initial Consult  Provider Doris Johnson, APRN - CNP    Prasanna Parnell  [id number]  Hospital Day: 2  Date of Initial Consult: 3/3/2025  Referring Provider: Tammie Higginbotham DO  Palliative Medicine was consulted for assistance with: \"SCC\"    HPI:   Prasanna Parnell is a 59 y.o. with a past medical history of spondylolisthesis, retrolisthesis, neuropathy, COPD, squamous cell carcinoma head and neck, tobacco use who was admitted on 3/3/2025 from radiation office with a CHIEF COMPLAINT of poor oral intake and failure to thrive.  Patient was diagnosed with squamous cell carcinoma of the head and neck in December 2024.  Laryngoscopy by ENT 1/14/2025 showed ulcerated masses seen in the area of the right palatine tonsil extending to the base of the tongue into the vallecula approaching midline.  Pathology of the right tonsil biopsy and right base of the tongue biopsy was consistent with HPV associated squamous cell carcinoma.  He had a bronchoscopy with fine-needle aspiration of right upper lobe lesion which was positive for malignancy consistent with squamous cell carcinoma.  Patient was initiated on chemotherapy and is following with Dr. Tomas in medical oncology and radiation therapy concurrently.  He underwent PEG tube placement. Patient is actively following with the palliative medicine clinic for symptom management.    He was being seen and radiation oncology office and was found to have very poor oral intake over a week.  His insurance reportedly denied his tube feedings for his PEG tube.  He has also been experiencing uncontrolled nausea and vomiting.  He was admitted to the hospital for further management.  Oncology and radiation oncology have been consulted.  Palliative care was consulted.    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  He is willing to try this as well.  He has tube feeding currently running and states he is tolerating it well so far.  They have increased him to 30 mL an hour.  Plan is to continue with current treatment including his radiation therapy which she will have today.  He does feel that it is improving his pain.  We will continue to follow along and evaluate symptom management needs.  All questions were answered to their satisfaction.  Plan is for them to complete healthcare power of  paperwork with  today.  Updated RN.    Review of Systems   Constitutional:  Positive for chills, fatigue and fever.   HENT:  Positive for ear pain. Negative for congestion.    Eyes:  Negative for visual disturbance.   Respiratory:  Negative for cough and shortness of breath.    Cardiovascular:  Negative for chest pain and palpitations.   Gastrointestinal:  Positive for nausea and vomiting. Negative for abdominal distention, abdominal pain, constipation and diarrhea.   Musculoskeletal:  Positive for back pain and neck pain. Negative for arthralgias.   Neurological:  Positive for dizziness and headaches. Negative for seizures.   Psychiatric/Behavioral:  The patient is not nervous/anxious.          OBJECTIVE:   Prognosis: Guarded    Physical Exam:  /65   Pulse 62   Temp 97.7 °F (36.5 °C) (Temporal)   Resp 18   Ht 1.727 m (5' 8\")   Wt 52.2 kg (115 lb)   SpO2 98%   BMI 17.49 kg/m²   Physical Exam  Vitals reviewed.   Constitutional:       General: He is awake. He is not in acute distress.     Appearance: He is underweight.   HENT:      Head: Atraumatic.      Comments: Temporal wasting      Mouth/Throat:      Lips: Pink.      Mouth: Mucous membranes are dry.   Neck:      Trachea: No abnormal tracheal secretions.      Comments: Palpable mass right lateral neck   Cardiovascular:      Rate

## 2025-03-04 NOTE — ACP (ADVANCE CARE PLANNING)
Advance Care Planning     Palliative Team Advance Care Planning (ACP) Conversation    Date of Conversation: 03/04/25    Individuals present for the conversation: Patient with decision making capacity and Significant Other Deangelo Dong     ACP documents on file prior to discussion:  -None    Previously completed document/s not on file: Patient / participant reports that there are no previously executed ACP documents.    Healthcare Decision Maker:    Primary Decision Maker: DEANGELO DONG - Domestic Partner - [phone]     Conversation Summary:  LSW met with pt and his significant other Deangelo to complete HCPOA. Copy placed in chart. Pt has not completed a Livign Will but states Deangelo knows his wishes.     Resuscitation Status:   Code Status: Full Code     Documentation Completed:  -Power of  for Healthcare    I spent 25 minutes with the patient and/or surrogate decision maker discussing the patient's wishes and goals.      BRIAN Badillo

## 2025-03-04 NOTE — PROGRESS NOTES
OT SESSION ATTEMPT     Date:3/4/2025  Patient Name: Prasanna Parnell  MRN: [id number]  : 1965  Room: [facility]/5424-A     Occupational therapy orders received/chart review completed and OT session attempted this date:    [] unavailable due to other medical staff currently with pt   [] on hold, await MRI/ neurosurgical recommendations.   [] on hold per nursing staff secondary to lab / radiology results    [] declined Occupational Therapy  this date due to ___.  Benefits of participation in therapy reviewed with pt.    [x] off unit at time of attempt.   [] Other:     Will reattempt OT eval at a later time/date.    Fausto Ferrari OTR/L; [id number]

## 2025-03-04 NOTE — PLAN OF CARE
Problem: Discharge Planning  Goal: Discharge to home or other facility with appropriate resources  3/4/2025 0959 by Claribel Toribio RN  Outcome: Progressing  3/3/2025 2251 by Jessica Alonso RN  Outcome: Progressing     Problem: Skin/Tissue Integrity  Goal: Skin integrity remains intact  Description: 1.  Monitor for areas of redness and/or skin breakdown  2.  Assess vascular access sites hourly  3.  Every 4-6 hours minimum:  Change oxygen saturation probe site  4.  Every 4-6 hours:  If on nasal continuous positive airway pressure, respiratory therapy assess nares and determine need for appliance change or resting period  3/4/2025 0959 by Claribel Toribio RN  Outcome: Progressing  3/3/2025 2251 by Jessica Alonso RN  Outcome: Progressing     Problem: Safety - Adult  Goal: Free from fall injury  3/4/2025 0959 by Claribel Toribio RN  Outcome: Progressing  3/3/2025 2251 by Jessica Alonso RN  Outcome: Progressing     Problem: ABCDS Injury Assessment  Goal: Absence of physical injury  3/4/2025 0959 by Claribel Toribio RN  Outcome: Progressing  3/3/2025 2251 by Jessica Alonso RN  Outcome: Progressing     Problem: Pain  Goal: Verbalizes/displays adequate comfort level or baseline comfort level  3/4/2025 0959 by Claribel Toribio RN  Outcome: Progressing  3/3/2025 2251 by Jessica Alonso RN  Outcome: Progressing

## 2025-03-05 ENCOUNTER — APPOINTMENT (OUTPATIENT)
Dept: GENERAL RADIOLOGY | Age: 60
DRG: 146 | End: 2025-03-05
Payer: MEDICARE

## 2025-03-05 ENCOUNTER — HOSPITAL ENCOUNTER (OUTPATIENT)
Dept: RADIATION ONCOLOGY | Age: 60
Discharge: HOME | End: 2025-03-05

## 2025-03-05 ENCOUNTER — HOSPITAL ENCOUNTER (OUTPATIENT)
Dept: INFUSION THERAPY | Age: 60
Discharge: HOME | End: 2025-03-05

## 2025-03-05 DIAGNOSIS — C09.8 MALIGNANT NEOPLASM OF OVERLAPPING SITES OF TONSIL (HCC): Primary | ICD-10-CM

## 2025-03-05 PROCEDURE — 97535 SELF CARE MNGMENT TRAINING: CPT

## 2025-03-05 PROCEDURE — 74018 RADEX ABDOMEN 1 VIEW: CPT

## 2025-03-05 PROCEDURE — 6360000002 HC RX W HCPCS: Performed by: NURSE PRACTITIONER

## 2025-03-05 PROCEDURE — 99232 SBSQ HOSP IP/OBS MODERATE 35: CPT | Performed by: INTERNAL MEDICINE

## 2025-03-05 PROCEDURE — 2500000003 HC RX 250 WO HCPCS: Performed by: INTERNAL MEDICINE

## 2025-03-05 PROCEDURE — 97165 OT EVAL LOW COMPLEX 30 MIN: CPT

## 2025-03-05 PROCEDURE — 99232 SBSQ HOSP IP/OBS MODERATE 35: CPT | Performed by: CLINICAL NURSE SPECIALIST

## 2025-03-05 PROCEDURE — 99232 SBSQ HOSP IP/OBS MODERATE 35: CPT | Performed by: NURSE PRACTITIONER

## 2025-03-05 PROCEDURE — 2580000003 HC RX 258: Performed by: INTERNAL MEDICINE

## 2025-03-05 PROCEDURE — 6370000000 HC RX 637 (ALT 250 FOR IP): Performed by: INTERNAL MEDICINE

## 2025-03-05 PROCEDURE — 6370000000 HC RX 637 (ALT 250 FOR IP): Performed by: NURSE PRACTITIONER

## 2025-03-05 PROCEDURE — 6360000002 HC RX W HCPCS: Performed by: INTERNAL MEDICINE

## 2025-03-05 PROCEDURE — 1200000000 HC SEMI PRIVATE

## 2025-03-05 PROCEDURE — 77386 HC NTSTY MODUL RAD TX DLVR CPLX: CPT | Performed by: SPECIALIST

## 2025-03-05 RX ORDER — SODIUM CHLORIDE 0.9 % (FLUSH) 0.9 %
5-40 SYRINGE (ML) INJECTION PRN
OUTPATIENT
Start: 2025-03-05

## 2025-03-05 RX ORDER — SODIUM CHLORIDE 9 MG/ML
INJECTION, SOLUTION INTRAVENOUS CONTINUOUS
OUTPATIENT
Start: 2025-03-05

## 2025-03-05 RX ORDER — HEPARIN 100 UNIT/ML
500 SYRINGE INTRAVENOUS PRN
OUTPATIENT
Start: 2025-03-05

## 2025-03-05 RX ORDER — SODIUM CHLORIDE, SODIUM LACTATE, POTASSIUM CHLORIDE, CALCIUM CHLORIDE 600; 310; 30; 20 MG/100ML; MG/100ML; MG/100ML; MG/100ML
INJECTION, SOLUTION INTRAVENOUS CONTINUOUS
Status: DISCONTINUED | OUTPATIENT
Start: 2025-03-05 | End: 2025-03-12

## 2025-03-05 RX ORDER — ONDANSETRON 2 MG/ML
8 INJECTION INTRAMUSCULAR; INTRAVENOUS
OUTPATIENT
Start: 2025-03-05

## 2025-03-05 RX ORDER — OLANZAPINE 5 MG/1
2.5 TABLET, ORALLY DISINTEGRATING ORAL
Status: COMPLETED | OUTPATIENT
Start: 2025-03-05 | End: 2025-03-05

## 2025-03-05 RX ORDER — DIPHENHYDRAMINE HYDROCHLORIDE 50 MG/ML
50 INJECTION INTRAMUSCULAR; INTRAVENOUS
OUTPATIENT
Start: 2025-03-05

## 2025-03-05 RX ORDER — SODIUM CHLORIDE 9 MG/ML
25 INJECTION, SOLUTION INTRAVENOUS PRN
OUTPATIENT
Start: 2025-03-05

## 2025-03-05 RX ORDER — HEPARIN 100 UNIT/ML
500 SYRINGE INTRAVENOUS PRN
Status: DISCONTINUED | OUTPATIENT
Start: 2025-03-05 | End: 2025-03-06 | Stop reason: HOSPADM

## 2025-03-05 RX ORDER — PROCHLORPERAZINE EDISYLATE 5 MG/ML
10 INJECTION INTRAMUSCULAR; INTRAVENOUS EVERY 6 HOURS PRN
Status: DISCONTINUED | OUTPATIENT
Start: 2025-03-05 | End: 2025-03-08

## 2025-03-05 RX ORDER — SODIUM CHLORIDE 0.9 % (FLUSH) 0.9 %
5-40 SYRINGE (ML) INJECTION PRN
Status: DISCONTINUED | OUTPATIENT
Start: 2025-03-05 | End: 2025-03-06 | Stop reason: HOSPADM

## 2025-03-05 RX ORDER — ALBUTEROL SULFATE 90 UG/1
4 INHALANT RESPIRATORY (INHALATION) PRN
OUTPATIENT
Start: 2025-03-05

## 2025-03-05 RX ORDER — HYDROCORTISONE SODIUM SUCCINATE 100 MG/2ML
100 INJECTION INTRAMUSCULAR; INTRAVENOUS
OUTPATIENT
Start: 2025-03-05

## 2025-03-05 RX ORDER — EPINEPHRINE 1 MG/ML
0.3 INJECTION, SOLUTION, CONCENTRATE INTRAVENOUS PRN
OUTPATIENT
Start: 2025-03-05

## 2025-03-05 RX ORDER — ACETAMINOPHEN 325 MG/1
650 TABLET ORAL
OUTPATIENT
Start: 2025-03-05

## 2025-03-05 RX ADMIN — PROCHLORPERAZINE EDISYLATE 10 MG: 5 INJECTION INTRAMUSCULAR; INTRAVENOUS at 18:54

## 2025-03-05 RX ADMIN — SUCRALFATE 1 G: 1 TABLET ORAL at 08:51

## 2025-03-05 RX ADMIN — SODIUM CHLORIDE, PRESERVATIVE FREE 10 ML: 5 INJECTION INTRAVENOUS at 08:51

## 2025-03-05 RX ADMIN — OLANZAPINE 2.5 MG: 5 TABLET, ORALLY DISINTEGRATING ORAL at 10:29

## 2025-03-05 RX ADMIN — SODIUM CHLORIDE, PRESERVATIVE FREE 10 ML: 5 INJECTION INTRAVENOUS at 21:55

## 2025-03-05 RX ADMIN — PROCHLORPERAZINE EDISYLATE 10 MG: 5 INJECTION INTRAMUSCULAR; INTRAVENOUS at 06:40

## 2025-03-05 RX ADMIN — SODIUM CHLORIDE, SODIUM LACTATE, POTASSIUM CHLORIDE, AND CALCIUM CHLORIDE: .6; .31; .03; .02 INJECTION, SOLUTION INTRAVENOUS at 21:42

## 2025-03-05 RX ADMIN — SUCRALFATE 1 G: 1 TABLET ORAL at 17:32

## 2025-03-05 RX ADMIN — OXYCODONE AND ACETAMINOPHEN 2 TABLET: 325; 5 TABLET ORAL at 03:57

## 2025-03-05 RX ADMIN — ONDANSETRON 4 MG: 2 INJECTION, SOLUTION INTRAMUSCULAR; INTRAVENOUS at 03:57

## 2025-03-05 RX ADMIN — METHADONE HYDROCHLORIDE 5 MG: 10 TABLET ORAL at 08:51

## 2025-03-05 RX ADMIN — ONDANSETRON 4 MG: 2 INJECTION, SOLUTION INTRAMUSCULAR; INTRAVENOUS at 13:42

## 2025-03-05 ASSESSMENT — PAIN - FUNCTIONAL ASSESSMENT
PAIN_FUNCTIONAL_ASSESSMENT: PREVENTS OR INTERFERES SOME ACTIVE ACTIVITIES AND ADLS
PAIN_FUNCTIONAL_ASSESSMENT: PREVENTS OR INTERFERES SOME ACTIVE ACTIVITIES AND ADLS

## 2025-03-05 ASSESSMENT — PAIN SCALES - GENERAL
PAINLEVEL_OUTOF10: 0
PAINLEVEL_OUTOF10: 6
PAINLEVEL_OUTOF10: 7

## 2025-03-05 ASSESSMENT — PAIN DESCRIPTION - FREQUENCY
FREQUENCY: CONTINUOUS
FREQUENCY: CONTINUOUS

## 2025-03-05 ASSESSMENT — PAIN DESCRIPTION - LOCATION
LOCATION: JAW
LOCATION: JAW

## 2025-03-05 ASSESSMENT — PAIN DESCRIPTION - DESCRIPTORS
DESCRIPTORS: ACHING;DISCOMFORT;SHARP
DESCRIPTORS: ACHING;DISCOMFORT;SHARP

## 2025-03-05 ASSESSMENT — PAIN DESCRIPTION - ORIENTATION
ORIENTATION: RIGHT
ORIENTATION: RIGHT

## 2025-03-05 ASSESSMENT — PAIN DESCRIPTION - PAIN TYPE
TYPE: CHRONIC PAIN
TYPE: CHRONIC PAIN

## 2025-03-05 ASSESSMENT — PAIN DESCRIPTION - ONSET
ONSET: ON-GOING
ONSET: ON-GOING

## 2025-03-05 NOTE — PROGRESS NOTES
PRN  hydrALAZINE, 10 mg, Q6H PRN  melatonin, 3 mg, Nightly PRN  Polyvinyl Alcohol-Povidone PF, 1 drop, PRN  sodium chloride, 1 spray, PRN  sodium chloride flush, 5-40 mL, PRN  sodium chloride, , PRN  potassium chloride, 40 mEq, PRN   Or  potassium alternative oral replacement, 40 mEq, PRN   Or  potassium chloride, 10 mEq, PRN  magnesium sulfate, 2,000 mg, PRN  ondansetron, 4 mg, Q8H PRN   Or  ondansetron, 4 mg, Q6H PRN  polyethylene glycol, 17 g, Daily PRN  acetaminophen, 650 mg, Q6H PRN   Or  acetaminophen, 650 mg, Q6H PRN  albuterol, 2.5 mg, Q6H PRN  lidocaine viscous hcl, 15 mL, PRN  oxyCODONE-acetaminophen, 1 tablet, Q4H PRN   Or  oxyCODONE-acetaminophen, 2 tablet, Q4H PRN         Objective:    /65   Pulse 64   Temp 97.5 °F (36.4 °C) (Temporal)   Resp 16   Ht 1.727 m (5' 7.99\")   Wt 53 kg (116 lb 13.5 oz)   SpO2 98%   BMI 17.77 kg/m²     General Appearance: alert and oriented to person, place and time and in no acute distress  Skin: warm and dry  Head: normocephalic and atraumatic  Eyes: pupils equal, round, and reactive to light, extraocular eye movements intact, conjunctivae normal  Neck: neck supple and non tender without mass   Pulmonary/Chest: clear to auscultation bilaterally- no wheezes, rales or rhonchi, normal air movement, no respiratory distress  Cardiovascular: normal rate, normal S1 and S2 and no carotid bruits  Abdomen: soft, non-tender, non-distended, normal bowel sounds, no masses or organomegaly  Extremities: no cyanosis, no clubbing and no edema  Neurologic: no cranial nerve deficit and speech normal        Recent Labs     03/03/25  1513 03/03/25  1712 03/04/25  0514    141 140   K 4.0 4.5 3.2*   CL 99 103 103   CO2 24 27 28   BUN 18 17 16   CREATININE 0.7 0.7 0.7   GLUCOSE 95 83 129*   CALCIUM 9.4 9.0 9.1       Recent Labs     03/03/25  1513 03/04/25  0514   WBC 6.3 5.4   RBC 4.16 3.67*   HGB 13.3 11.9*   HCT 36.8* 33.3*   MCV 88.5 90.7   MCH 32.0 32.4   MCHC 36.1* 35.7*    RDW 12.7 12.9    147   MPV 10.9 10.1         Assessment:    Intractable nausea/vomiting likely 2/2 chemotherapy  Stage I SCC tongue/tonsil and stage I SCC of lung  S/p PEG tube  History of tobacco abuse      Plan:  Hold tube feeds for now given significant nausea this morning  Continue scopolamine patch, Zofran and nausea  Continue other medications including methadone, nystatin, Carafate  Continue oxycodone as needed for pain  Oncology, radiation oncology, palliative care all following      DC planning: Home pending improvement in symptoms    NOTE: This report was transcribed using voice recognition software. Every effort was made to ensure accuracy; however, inadvertent computerized transcription errors may be present.    Electronically signed by Joon Arzate MD on 3/5/2025 at 12:33 PM

## 2025-03-05 NOTE — PLAN OF CARE
Problem: Discharge Planning  Goal: Discharge to home or other facility with appropriate resources  3/4/2025 2305 by April Lees RN  Outcome: Progressing  3/4/2025 0959 by Claribel Toribio RN  Outcome: Progressing     Problem: Skin/Tissue Integrity  Goal: Skin integrity remains intact  Description: 1.  Monitor for areas of redness and/or skin breakdown  2.  Assess vascular access sites hourly  3.  Every 4-6 hours minimum:  Change oxygen saturation probe site  4.  Every 4-6 hours:  If on nasal continuous positive airway pressure, respiratory therapy assess nares and determine need for appliance change or resting period  3/4/2025 2305 by April Lees RN  Outcome: Progressing  3/4/2025 0959 by Claribel Toribio RN  Outcome: Progressing     Problem: Safety - Adult  Goal: Free from fall injury  3/4/2025 2305 by April Lees RN  Outcome: Progressing  3/4/2025 0959 by Claribel Toribio RN  Outcome: Progressing     Problem: ABCDS Injury Assessment  Goal: Absence of physical injury  3/4/2025 2305 by April Lees RN  Outcome: Progressing  3/4/2025 0959 by Claribel Toribio RN  Outcome: Progressing     Problem: Pain  Goal: Verbalizes/displays adequate comfort level or baseline comfort level  3/4/2025 2305 by April Lees RN  Outcome: Progressing  3/4/2025 0959 by Claribel Toribio RN  Outcome: Progressing     Problem: Nutrition Deficit:  Goal: Optimize nutritional status  Outcome: Progressing

## 2025-03-05 NOTE — PROGRESS NOTES
Physician Progress Note      PATIENT:               MCKAY BURGOS  CSN #:                  [id number]  :                       1965  ADMIT DATE:       3/3/2025 2:03 PM  DISCH DATE:  RESPONDING  PROVIDER #:        Joon Arzate MD          QUERY TEXT:    Patient admitted with nausea, vomiting and failure to thrive. Patient Stage I   SCC tongue/tonsil and stage I SCC of lung currently on chemotherapy. Noted to   have \"Severe malnutrition\" in Dietician Consult Note 3/4. If possible, please   document in progress notes and discharge summary if you are evaluating and /or   treating any of the following:    The medical record reflects the following:  Risk Factors:  Stage I SCC tongue/tonsil and stage I SCC of lung currently on   chemotherapy  Clinical Indicators: Dietician Consult Note 3/4 \"...Malnutrition Assessment:   Malnutrition Status: Severe malnutrition (25 1142)...Context: Chronic   Illness...Findings of the 6 clinical characteristics of malnutrition: Energy   Intake:  75% or less estimated energy requirements for 1 month or longer   (chronic N/V w/ inability to tolerate PO intake ; pt w/ PEG, however was not   utilizing this PTA d/t inability to acquire TF formula d/t insurance   issues.)...Weight Loss: Unable to assess (GALI d/t lack of wt hx per EMR;   recent measured wts trending down, however unable to assess  Treatment: 1L NS Bolus, PRN Zofran, Consults: Dietician, Oncology and   Palliative Care, Tube Feeds    ASPEN Criteria:    https://aspenjournals.onlinelibrary.sanchez.com/doi/full/10.1177/[email or web address]  5      Thank you,  EMMANUEL ByrneN, RN, CRCR  Clinical Documentation Integrity  [phone]  Options provided:  -- Protein calorie malnutrition severe  -- Other - I will add my own diagnosis  -- Disagree - Not applicable / Not valid  -- Disagree - Clinically unable to determine / Unknown  -- Refer to Clinical Documentation Reviewer    PROVIDER RESPONSE TEXT:    This patient has  .

## 2025-03-05 NOTE — PROGRESS NOTES
Palliative Care Department  [phone]  Palliative Care Progress Note  Provider Doris Johnson, APRN - CNP    Prasanna Parnell  [id number]  Hospital Day: 3  Date of Initial Consult: 3/3/2025  Referring Provider: Tammie Higginbotham DO  Palliative Medicine was consulted for assistance with: \"SCC\"    HPI:   Prasanna Parnell is a 59 y.o. with a past medical history of spondylolisthesis, retrolisthesis, neuropathy, COPD, squamous cell carcinoma head and neck, tobacco use who was admitted on 3/3/2025 from radiation office with a CHIEF COMPLAINT of poor oral intake and failure to thrive.  Patient was diagnosed with squamous cell carcinoma of the head and neck in December 2024.  Laryngoscopy by ENT 1/14/2025 showed ulcerated masses seen in the area of the right palatine tonsil extending to the base of the tongue into the vallecula approaching midline.  Pathology of the right tonsil biopsy and right base of the tongue biopsy was consistent with HPV associated squamous cell carcinoma.  He had a bronchoscopy with fine-needle aspiration of right upper lobe lesion which was positive for malignancy consistent with squamous cell carcinoma.  Patient was initiated on chemotherapy and is following with Dr. Tomas in medical oncology and radiation therapy concurrently.  He underwent PEG tube placement. Patient is actively following with the palliative medicine clinic for symptom management.    He was being seen and radiation oncology office and was found to have very poor oral intake over a week.  His insurance reportedly denied his tube feedings for his PEG tube.  He has also been experiencing uncontrolled nausea and vomiting.  He was admitted to the hospital for further management.  Oncology and radiation oncology have been consulted.  Palliative care was consulted.    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

## 2025-03-05 NOTE — PROGRESS NOTES
OCCUPATIONAL THERAPY INITIAL EVALUATION    [facility]  [address], [place]        Date:3/5/2025                                                  Patient Name: Prasanna Parnell    MRN: [id number]    : 1965    Room: [facility]      Evaluating OT: Fausto Ferrari OTR/L; [id number]       Referring Provider: Tammie Higginbotham DO     Specific Provider Orders/Date: OT Eval and Treat 25 184       Diagnosis: Failure to thrive in adult;  Severe protein-calorie malnutrition (Chronic); Palliative care encounter; Neoplasm related pain; Intractable nausea and vomiting.    Surgery: None this admission.     Pertinent Medical History:  has a past medical history of Cancer (HCC), Cervicalgia, COPD (chronic obstructive pulmonary disease) (HCC), Neuropathy, Retrolisthesis, and Spondylolisthesis.     Recommended Adaptive Equipment: TBD     Precautions:  Fall Risk, +alarms, PEG, NPO, active w/ chemo (h/o squamous cell carcinoma head & neck)     Assessment of current deficits    [x] Functional mobility  [x]ADLs  [x] Strength               []Cognition    [x] Functional transfers   [x] IADLs         [x] Safety Awareness   [x]Endurance    [] Fine Coordination              [x] Balance      [] Vision/perception   []Sensation     []Gross Motor Coordination  [] ROM  [] Delirium                   [] Motor Control     OT PLAN OF CARE   OT POC based on physician orders, patient diagnosis and results of clinical assessment    Frequency/Duration 1-3 days/wk for 2 weeks PRN   Specific OT Treatment Interventions to include:   * Instruction/training on adapted ADL techniques and AE recommendations to increase functional independence within precautions       * Training on energy conservation strategies, correct breathing pattern and techniques to improve independence/tolerance for self-care routine  * Functional transfer/mobility training/DME recommendations for  increased independence, safety, and fall prevention  * Patient/Family education to increase follow through with safety techniques and functional independence  * Recommendation of environmental modifications for increased safety with functional transfers/mobility and ADLs  * Therapeutic exercise to improve motor endurance, ROM, and functional strength for ADLs/functional transfers  * Therapeutic activities to facilitate/challenge dynamic balance, stand tolerance for increased safety and independence with ADLs  * Positioning to improve skin integrity, interaction with environment and functional independence    Home Living: Pt lives with significant other  in a 1 story home with 3 step(s) to enter and 2 rail(s); bed/bath on main level.  Bathroom setup: Tub/shower  Equipment owned: None    Prior Level of Function: IND with ADLs;  IND with IADLs. No use of Ad for functional mobility.   Driving: Yes  Occupation: None reported    Pain Level: Pt c/o R-sided facial/neck pain this session; therapist facilitated repositioning techniques.  Cognition: A&O: 4/4; Follows multi step directions. Pt pleasant & cooperative throughout session.   Memory:  good    Sequencing:  good    Problem solving:  good   Judgement/safety: fair +     Functional Assessment:  AM-PAC Daily Activity Raw Score: 18/24   Initial Eval Status  Date: 3/5/25 Treatment Status  Date: STGs = LTGs  Time frame: 10-14 days   Feeding NPO/PEG   Independent when appropriate   Grooming Supervision  Independent   UB Dressing Stand by Assist   To tie gown posteriorly seated EOB  Independent   LB Dressing Stand by Assist   To don/doff sock seated EOB  Independent   Bathing Supervision  Simulated seated EOB  Independent   Toileting Supervision   Simulated seated on commode  Independent   Bed Mobility  Log Roll: Independent   Supine to sit: Independent   Sit to supine: Independent      Functional Transfers Sit to stand:Supervision   Stand to sit:Supervision   Stand pivot:

## 2025-03-05 NOTE — PLAN OF CARE
Problem: Discharge Planning  Goal: Discharge to home or other facility with appropriate resources  Outcome: Progressing     Problem: Skin/Tissue Integrity  Goal: Skin integrity remains intact  Description: 1.  Monitor for areas of redness and/or skin breakdown  2.  Assess vascular access sites hourly  3.  Every 4-6 hours minimum:  Change oxygen saturation probe site  4.  Every 4-6 hours:  If on nasal continuous positive airway pressure, respiratory therapy assess nares and determine need for appliance change or resting period  Outcome: Progressing     Problem: Safety - Adult  Goal: Free from fall injury  Outcome: Progressing     Problem: ABCDS Injury Assessment  Goal: Absence of physical injury  Outcome: Progressing     Problem: Pain  Goal: Verbalizes/displays adequate comfort level or baseline comfort level  Outcome: Progressing     Problem: Nutrition Deficit:  Goal: Optimize nutritional status  Outcome: Progressing

## 2025-03-05 NOTE — PROGRESS NOTES
[facility]  [facility]  [address]  [facility] [address]  Dept: [phone]  Loc: [phone]  Jayde Fernandez MD   ·   MD Adriana Lucero APRN  ·  FADI Bermeo  Inpatient Medical Oncology/Hematology progress Note    Patient Name: Prasanna Parnell  YOB: 1965    D    Room: [address] [place] [address]A      CHIEF COMPLAINT:  Failure to thrive     Interim health:  Patient laying in bed he reports tube feeds are off he started vomiting this morning tube feeding.  He has returned from radiation today he is able to swallow denies any pain of the throat or mouth.    HISTORY OF PRESENT ILLNESS (3/4/2025):   Patient is a 59 y.o. 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 Since starting treatment, patient experiencing nausea and vomiting despite taking several antiemetics. Completed day 8, cycle 1 of cisplatin on 2/27/2025 with intravenous hydration. Next chemotherapy scheduled for 3/6/2025. PEG tube in place but due to not having insurance he is unable to supplement with tube feeding. Patient was sent to ED from radiation oncology clinic for evaluation of dizziness, low BP, and decrease oral intake. Work up in ED showing all labs normal except hematocrit 36.8, MCHC 36.1, absolute lymphocytes 0.81, and lymphocytes 13%.     Patient seen and examined in room. Tube feeding initiated, tolerating. Denies nausea, vomiting, diarrhea, abdominal pain, or fever this morning. Admits to oral soreness, fatigue, and lightheadedness.          Oncology History   Malignant neoplasm of overlapping sites of tonsil (HCC)   2/20/2025 -  Chemotherapy    OP  CISplatin 40 mg/m2 Q7D  Plan Provider: Roberto Tomas MD  Treatment goal: Curative  Line of treatment: 1st Line             Review of Systems   Constitutional:  Positive for appetite change and fatigue. Negative for fever.   HENT:  Positive for mouth sores. Negative for trouble swallowing.    Eyes:  Negative for visual disturbance.   Respiratory:  Negative for cough, shortness of breath and wheezing.    Cardiovascular:  Negative for chest pain, palpitations and leg swelling.   Gastrointestinal:  Positive for nausea and vomiting. Negative for abdominal pain, blood in stool, constipation and diarrhea.   Genitourinary:  Negative for dysuria, hematuria and testicular pain.   Musculoskeletal:  Negative for arthralgias, back pain and myalgias.   Skin:  Negative for color change and rash.   Neurological:  Negative for dizziness, weakness, numbness and headaches.   Psychiatric/Behavioral:  Negative for agitation and confusion.               VITALS:    /65   Pulse 64   Temp 97.5 °F (36.4 °C) (Temporal)   Resp 16   Ht 1.727 m (5' 7.99\")   Wt 53 kg (116 lb 13.5 oz)   SpO2 98%   BMI 17.77 kg/m²   Physical Exam  Vitals reviewed.   Constitutional:       Appearance: Normal appearance.   HENT:      Head: Normocephalic and atraumatic.      Nose: Nose normal.      Mouth/Throat:      Pharynx: Posterior oropharyngeal erythema present.      Comments: White patches   Eyes:      Extraocular Movements: Extraocular movements intact.      Pupils: Pupils are equal, round, and reactive to light.   Cardiovascular:      Rate and Rhythm: Normal rate and regular rhythm.      Heart sounds: No murmur heard.  Pulmonary:      Effort: Pulmonary effort is normal.      Breath sounds: Normal breath sounds.   Abdominal:      General: Bowel sounds are normal. There is no distension.      Palpations: Abdomen is soft.      Tenderness: There is no abdominal tenderness.      Comments: PEG intact   Musculoskeletal:         General: Normal range of  2/27/2025 with intravenous hydration. Next chemotherapy scheduled for 3/6/2025. PEG tube in place but due to not having insurance he is unable to supplement with tube feeding. Patient was sent to ED from radiation oncology clinic for evaluation of dizziness, low BP, and decrease oral intake. Work up in ED showing all labs normal except hematocrit 36.8, MCHC 36.1, absolute lymphocytes 0.81, and lymphocytes 13%.       PLAN  Hold chemotherapy while inpatient  Dietitian on board, tolerating tube feeding, continue nutritional support via PEG tube  Continue antiemetics  Palliative care consulted for symptom management  Continue oral nystatin , Magic mouthwash  Not tolerating tube feeds vomiting TF this am  -Will check KUB today       FADI Alicea,ACNS-BC,AGACNP-BC  HEMATOLOGY/MEDICAL ONCOLOGY  [facility] PHYSICIANS [place] SPECIALTY CARE [facility] MED ONCOLOGY  [address] [place] [id number]  Dept: [phone]  Loc: [phone]         [facility] Office  P: [phone]  F: [phone]    [facility] [place] [facility] Office  P: [phone]  F: [phone]

## 2025-03-06 ENCOUNTER — HOSPITAL ENCOUNTER (OUTPATIENT)
Dept: RADIATION ONCOLOGY | Age: 60
Discharge: HOME | End: 2025-03-06

## 2025-03-06 ENCOUNTER — HOSPITAL ENCOUNTER (OUTPATIENT)
Dept: INFUSION THERAPY | Age: 60
End: 2025-03-06

## 2025-03-06 ENCOUNTER — TELEPHONE (OUTPATIENT)
Dept: PULMONOLOGY | Age: 60
End: 2025-03-06

## 2025-03-06 LAB
ANION GAP SERPL CALCULATED.3IONS-SCNC: 12 MMOL/L (ref 7–16)
BASOPHILS # BLD: 0.04 K/UL (ref 0–0.2)
BASOPHILS NFR BLD: 1 % (ref 0–2)
BUN SERPL-MCNC: 13 MG/DL (ref 6–20)
CALCIUM SERPL-MCNC: 9 MG/DL (ref 8.6–10.2)
CHLORIDE SERPL-SCNC: 101 MMOL/L (ref 98–107)
CO2 SERPL-SCNC: 28 MMOL/L (ref 22–29)
CREAT SERPL-MCNC: 0.8 MG/DL (ref 0.7–1.2)
EOSINOPHIL # BLD: 0.19 K/UL (ref 0.05–0.5)
EOSINOPHILS RELATIVE PERCENT: 3 % (ref 0–6)
ERYTHROCYTE [DISTWIDTH] IN BLOOD BY AUTOMATED COUNT: 12.9 % (ref 11.5–15)
GFR, ESTIMATED: >90 ML/MIN/1.73M2
GLUCOSE SERPL-MCNC: 94 MG/DL (ref 74–99)
HCT VFR BLD AUTO: 33.4 % (ref 37–54)
HGB BLD-MCNC: 11.6 G/DL (ref 12.5–16.5)
IMM GRANULOCYTES # BLD AUTO: 0.03 K/UL (ref 0–0.58)
IMM GRANULOCYTES NFR BLD: 0 % (ref 0–5)
LYMPHOCYTES NFR BLD: 0.6 K/UL (ref 1.5–4)
LYMPHOCYTES RELATIVE PERCENT: 8 % (ref 20–42)
MCH RBC QN AUTO: 32 PG (ref 26–35)
MCHC RBC AUTO-ENTMCNC: 34.7 G/DL (ref 32–34.5)
MCV RBC AUTO: 92 FL (ref 80–99.9)
MONOCYTES NFR BLD: 0.47 K/UL (ref 0.1–0.95)
MONOCYTES NFR BLD: 6 % (ref 2–12)
NEUTROPHILS NFR BLD: 82 % (ref 43–80)
NEUTS SEG NFR BLD: 6.16 K/UL (ref 1.8–7.3)
PLATELET # BLD AUTO: 145 K/UL (ref 130–450)
PMV BLD AUTO: 10.3 FL (ref 7–12)
POTASSIUM SERPL-SCNC: 3.6 MMOL/L (ref 3.5–5)
RBC # BLD AUTO: 3.63 M/UL (ref 3.8–5.8)
SODIUM SERPL-SCNC: 141 MMOL/L (ref 132–146)
WBC OTHER # BLD: 7.5 K/UL (ref 4.5–11.5)

## 2025-03-06 PROCEDURE — 77386 HC NTSTY MODUL RAD TX DLVR CPLX: CPT | Performed by: SPECIALIST

## 2025-03-06 PROCEDURE — 2580000003 HC RX 258: Performed by: INTERNAL MEDICINE

## 2025-03-06 PROCEDURE — 99232 SBSQ HOSP IP/OBS MODERATE 35: CPT | Performed by: STUDENT IN AN ORGANIZED HEALTH CARE EDUCATION/TRAINING PROGRAM

## 2025-03-06 PROCEDURE — 99232 SBSQ HOSP IP/OBS MODERATE 35: CPT | Performed by: INTERNAL MEDICINE

## 2025-03-06 PROCEDURE — 1200000000 HC SEMI PRIVATE

## 2025-03-06 PROCEDURE — 77336 RADIATION PHYSICS CONSULT: CPT | Performed by: SPECIALIST

## 2025-03-06 PROCEDURE — 6360000002 HC RX W HCPCS: Performed by: INTERNAL MEDICINE

## 2025-03-06 PROCEDURE — 6360000002 HC RX W HCPCS: Performed by: NURSE PRACTITIONER

## 2025-03-06 PROCEDURE — 6370000000 HC RX 637 (ALT 250 FOR IP): Performed by: INTERNAL MEDICINE

## 2025-03-06 PROCEDURE — 2500000003 HC RX 250 WO HCPCS: Performed by: INTERNAL MEDICINE

## 2025-03-06 PROCEDURE — 36415 COLL VENOUS BLD VENIPUNCTURE: CPT

## 2025-03-06 PROCEDURE — 80048 BASIC METABOLIC PNL TOTAL CA: CPT

## 2025-03-06 PROCEDURE — 85025 COMPLETE CBC W/AUTO DIFF WBC: CPT

## 2025-03-06 RX ADMIN — HYDROMORPHONE HYDROCHLORIDE 0.5 MG: 1 INJECTION, SOLUTION INTRAMUSCULAR; INTRAVENOUS; SUBCUTANEOUS at 16:44

## 2025-03-06 RX ADMIN — ONDANSETRON 4 MG: 2 INJECTION, SOLUTION INTRAMUSCULAR; INTRAVENOUS at 16:44

## 2025-03-06 RX ADMIN — HYDROMORPHONE HYDROCHLORIDE 0.5 MG: 1 INJECTION, SOLUTION INTRAMUSCULAR; INTRAVENOUS; SUBCUTANEOUS at 23:51

## 2025-03-06 RX ADMIN — SODIUM CHLORIDE, PRESERVATIVE FREE 10 ML: 5 INJECTION INTRAVENOUS at 23:51

## 2025-03-06 RX ADMIN — PANTOPRAZOLE SODIUM 40 MG: 40 INJECTION, POWDER, FOR SOLUTION INTRAVENOUS at 09:48

## 2025-03-06 RX ADMIN — SODIUM CHLORIDE, SODIUM LACTATE, POTASSIUM CHLORIDE, AND CALCIUM CHLORIDE: .6; .31; .03; .02 INJECTION, SOLUTION INTRAVENOUS at 21:54

## 2025-03-06 ASSESSMENT — PAIN DESCRIPTION - LOCATION
LOCATION: JAW
LOCATION: FACE

## 2025-03-06 ASSESSMENT — PAIN SCALES - GENERAL
PAINLEVEL_OUTOF10: 8
PAINLEVEL_OUTOF10: 5
PAINLEVEL_OUTOF10: 8
PAINLEVEL_OUTOF10: 3

## 2025-03-06 ASSESSMENT — PAIN DESCRIPTION - ORIENTATION
ORIENTATION: RIGHT
ORIENTATION: RIGHT

## 2025-03-06 ASSESSMENT — PAIN DESCRIPTION - DESCRIPTORS
DESCRIPTORS: ACHING;DISCOMFORT;THROBBING
DESCRIPTORS: ACHING;DISCOMFORT;NAGGING

## 2025-03-06 NOTE — PROGRESS NOTES
[facility]    Admitting Date and Time: 3/3/2025  2:03 PM  Admit Dx: Failure to thrive in adult [R62.7]    Synopsis:    Prasanna Parnell is a 59 y.o. male who presents to [facility] ER complaining of failure to thrive.     Prasanna Parnell has a past medical history that includes stage I SCC of tongue/tonsil, lung mass SCC     Prasanna the ER from radiation office for decreased p.o. intake, nausea, vomiting.  He is being treated for throat cancer and is on chemo and radiation.  He has tried multiple antiemetics without success.  He does have a PEG tube but currently due to insurance issues he does not have any tube feeds.  He has been unable to eat for days. Will be admitted for initiation of tube feeds and if he cannot tolerate these, consideration of TPN.     Upon presentation to the ER, routine labwork was performed which revealed no acute abnormalities.  Imaging results are as outlined below in the Imaging section of this note.   Upon arrival to the ER, patient was 107/61.  The patient received zofran, 1L NS in the emergency room and was admitted to [facility].    3/5: Patient symptomatic with nausea this morning.  Tube feeds held for now.    Subjective:  Patient is being followed for Failure to thrive in adult [R62.7]     Patient seen and examined at bedside this morning.  Complains of significant nausea today and still unable to tolerate tube feeds.    ROS: denies fever, chills, cp, sob, n/v, HA unless stated above.      pantoprazole (PROTONIX) 40 mg in sodium chloride (PF) 0.9 % 10 mL injection  40 mg IntraVENous Daily    methadone  5 mg Oral Daily    sodium chloride flush  5-40 mL IntraVENous 2 times per day    enoxaparin  40 mg SubCUTAneous Daily    nystatin  500,000 Units Oral 4x Daily    scopolamine  1 patch TransDERmal Q3 Days    sucralfate  1 g Oral 4x Daily     prochlorperazine, 10 mg, Q6H PRN  HYDROmorphone, 0.5 mg, Q4H PRN  OLANZapine zydis, 2.5 mg, Q6H PRN  magic  (miracle) mouthwash, 15 mL, 4x Daily PRN  benzocaine-menthol, 1 lozenge, Q2H PRN  benzonatate, 100 mg, TID PRN  calcium carbonate, 500 mg, TID PRN  hydrALAZINE, 10 mg, Q6H PRN  melatonin, 3 mg, Nightly PRN  Polyvinyl Alcohol-Povidone PF, 1 drop, PRN  sodium chloride, 1 spray, PRN  sodium chloride flush, 5-40 mL, PRN  sodium chloride, , PRN  potassium chloride, 40 mEq, PRN   Or  potassium alternative oral replacement, 40 mEq, PRN   Or  potassium chloride, 10 mEq, PRN  magnesium sulfate, 2,000 mg, PRN  ondansetron, 4 mg, Q8H PRN   Or  ondansetron, 4 mg, Q6H PRN  polyethylene glycol, 17 g, Daily PRN  acetaminophen, 650 mg, Q6H PRN   Or  acetaminophen, 650 mg, Q6H PRN  albuterol, 2.5 mg, Q6H PRN  lidocaine viscous hcl, 15 mL, PRN  oxyCODONE-acetaminophen, 1 tablet, Q4H PRN   Or  oxyCODONE-acetaminophen, 2 tablet, Q4H PRN         Objective:    /68   Pulse 63   Temp (!) 96.7 °F (35.9 °C) (Temporal)   Resp 17   Ht 1.727 m (5' 7.99\")   Wt 53 kg (116 lb 13.5 oz)   SpO2 97%   BMI 17.77 kg/m²     General Appearance: alert and oriented to person, place and time and in no acute distress  Skin: warm and dry  Head: normocephalic and atraumatic  Eyes: pupils equal, round, and reactive to light, extraocular eye movements intact, conjunctivae normal  Neck: neck supple and non tender without mass   Pulmonary/Chest: clear to auscultation bilaterally- no wheezes, rales or rhonchi, normal air movement, no respiratory distress  Cardiovascular: normal rate, normal S1 and S2 and no carotid bruits  Abdomen: soft, non-tender, non-distended, normal bowel sounds, no masses or organomegaly  Extremities: no cyanosis, no clubbing and no edema  Neurologic: no cranial nerve deficit and speech normal        Recent Labs     03/03/25  1712 03/04/25  0514 03/06/25  0947    140 141   K 4.5 3.2* 3.6    103 101   CO2 27 28 28   BUN 17 16 13   CREATININE 0.7 0.7 0.8   GLUCOSE 83 129* 94   CALCIUM 9.0 9.1 9.0       Recent Labs

## 2025-03-06 NOTE — PROGRESS NOTES
Date: 3/6/2025       Patient Name: Prasanna Parnell  : 1965      MRN: [id number]    PT order received. Chart has been reviewed. PT evaluation attempted this date. Patient had just returned from his radiation treatment session. Pleasantly declined participation stating he was Ind in room for mobility. . Will continue to follow and complete evaluation at later time if required for D/C planning. .     Raz Ramirez, PT

## 2025-03-06 NOTE — PROGRESS NOTES
[facility]  [facility]  [address]  [facility] [address] [address]  Dept: [phone]  Loc: [phone]  Jayde Fernandez MD   ·   MD Adriana Lucero APRN  ·  FADI Bermeo  Inpatient Medical Oncology/Hematology progress Note    Patient Name: Prasanna Parnell  YOB: 1965    DATE OF ADMISSION: 3/3/2025  DATE OF CONSULTATION: 3/3/2025  CONSULTING PROVIDER: Tammie Higginbotham DO  REASON FOR CONSULTATION: SCC  PCP: No primary care provider on file.    Room: [address] [place] [address]      CHIEF COMPLAINT:  Failure to thrive     Interim health:  Patient not tolerating tube feeding. Continues to have nausea and dry heaves. Denies abdominal pain, diarrhea, or bleeding. Receiving radiation treatment while inpatient.     HISTORY OF PRESENT ILLNESS (3/4/2025):   Patient is a 59 y.o. 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 Since starting treatment, patient experiencing nausea and vomiting despite taking several antiemetics. Completed day 8, cycle 1 of cisplatin on 2/27/2025 with intravenous hydration. Next chemotherapy scheduled for 3/6/2025. PEG tube in place but due to not having insurance he is unable to supplement with tube feeding. Patient was sent to ED from radiation oncology clinic for evaluation of dizziness, low BP, and decrease oral intake. Work up in ED showing all labs normal except hematocrit 36.8, MCHC 36.1, absolute lymphocytes 0.81, and lymphocytes 13%.     Patient seen and examined in room. Tube feeding initiated, tolerating. Denies nausea, vomiting, diarrhea, abdominal pain, or fever this morning. Admits to oral soreness,  starting treatment, patient experiencing nausea and vomiting despite taking several antiemetics. Completed day 8, cycle 1 of cisplatin on 2/27/2025 with intravenous hydration. Next chemotherapy scheduled for 3/6/2025. PEG tube in place but due to not having insurance he is unable to supplement with tube feeding. Patient was sent to ED from radiation oncology clinic for evaluation of dizziness, low BP, and decrease oral intake. Work up in ED showing all labs normal except hematocrit 36.8, MCHC 36.1, absolute lymphocytes 0.81, and lymphocytes 13%.       PLAN  Hold chemotherapy while inpatient  Dietitian on board, tolerating tube feeding, continue nutritional support via PEG tube  Continue antiemetics  Palliative care consulted for symptom management  Continue oral nystatin, Magic mouthwash  PT/OT  KUB negative   Follow-up with Dr. Tomas after DC from the hospital       Nicole Devi, FADI - MAURICIO   HEMATOLOGY/MEDICAL ONCOLOGY  [facility] PHYSICIANS [place] SPECIALTY CARE [facility] MED ONCOLOGY  [facility] [address]  Dept: [phone]  Loc: [phone]         [facility] Office  P: [phone]  F: [phone]    [facility]) Office  P: [phone]  F: [phone]

## 2025-03-06 NOTE — PROGRESS NOTES
Spiritual Health History and Assessment/Progress Note  [facility]    (P) Initial Encounter, Spiritual/Emotional Needs, Palliative Care,  ,  ,      Name: Prasanna Parnell MRN: [id number]    Age: 59 y.o.     Sex: male   Language: English   Zoroastrian: Islam   Failure to thrive in adult     Date: 3/6/2025                           Spiritual Assessment began in [facility]        Referral/Consult From: (P) Rounding, Palliative Care   Encounter Overview/Reason: (P) Initial Encounter, Spiritual/Emotional Needs, Palliative Care  Service Provided For: (P) Patient    April, Belief, Meaning:   Patient identifies as spiritual, has beliefs or practices that help with coping during difficult times, and Other: Mr. Parnell and I spoke about his Islam beliefs and how he would describe himself as non-Zoroastrianism.   Family/Friends No family/friends present      Importance and Influence:  Patient has spiritual/personal beliefs that influence decisions regarding their health and Other: We talked about how he can cry out to God.  He stated that he has had some \"arguments\" with the Lord as a result of his health.   Family/Friends No family/friends present    Community:  Patient feels well-supported. Support system includes: Spouse/Partner and Extended family and Other: We talked about his girlfriend Mary Anne and how they have been together for the past four years.  He told me how important she is to him.  He states that he also has family in [place] and [place].    Family/Friends No family/friends present    Assessment and Plan of Care:     Patient Interventions include: Facilitated expression of thoughts and feelings, Explored spiritual coping/struggle/distress, Engaged in theological reflection, Affirmed coping skills/support systems, and Other: We discussed how he is original from [place] and moved to the area for work.  We talked about his throat cancer and how he is in the hospital because he cannot  keep food down. .   Family/Friends Interventions include: No family/friends present    Patient Plan of Care: Spiritual Care available upon further referral  Family/Friends Plan of Care: Spiritual Care available upon further referral    Electronically signed by JITENDRA Alba on 3/6/2025 at 11:02 AM

## 2025-03-06 NOTE — PROGRESS NOTES
DEPARTMENT OF RADIATION ONCOLOGY   ON TREATMENT VISIT       3/6/2025      NAME:  Prasanna Parnell    YOB: 1965    DIAGNOSIS: [id number] vs M1 squamous cell carcinoma of the right tonsil with extension to the base of tongue, p16-positive     SUBJECTIVE:   Prasanna Parnell has now received 2000 cGy in 10/37 fractions directed to the right Tonsil and bilateral erik-neck.      Past medical, surgical, social and family histories reviewed and updated as indicated.    PAIN: 2/10    ALLERGIES:  Bee venom and Keflex [cephalexin]         No current facility-administered medications for this encounter.     No current outpatient medications on file.     Facility-Administered Medications Ordered in Other Encounters   Medication Dose Route Frequency Provider Last Rate Last Admin    pantoprazole (PROTONIX) 40 mg in sodium chloride (PF) 0.9 % 10 mL injection  40 mg IntraVENous Daily Joon Barrera MD   40 mg at 03/06/25 0948    prochlorperazine (COMPAZINE) injection 10 mg  10 mg IntraVENous Q6H PRN Fernanda Fernandez APRN - CNP   10 mg at 03/05/25 1854    HYDROmorphone (DILAUDID) injection 0.5 mg  0.5 mg IntraVENous Q4H PRN Doris Johnson APRN - CNP        lactated ringers infusion   IntraVENous Continuous Joon Barrera  mL/hr at 03/05/25 2142 New Bag at 03/05/25 2142    methadone (DOLOPHINE) tablet 5 mg  5 mg Oral Daily Doris Johnson APRN - CNP   5 mg at 03/05/25 0851    OLANZapine zydis (ZYPREXA) disintegrating tablet 2.5 mg  2.5 mg Oral Q6H PRN Doris Johnson APRN - CNP        magic (miracle) mouthwash  15 mL Swish & Spit 4x Daily PRN Jayde Hernandez MD        benzocaine-menthol (CEPACOL SORE THROAT) lozenge 1 lozenge  1 lozenge Oral Q2H PRN Tammie Higginbotham,         benzonatate (TESSALON) capsule 100 mg  100 mg Oral TID PRN Tammie Higginbotham, DO        calcium carbonate (TUMS) chewable tablet 500 mg  500 mg Oral TID PRN Tammie Higginbotham, DO        hydrALAZINE (APRESOLINE) injection

## 2025-03-06 NOTE — TELEPHONE ENCOUNTER
Letter sent in the mail to patient to inform of appointment change with Dr. Beebe due to the most recent ICU/Consult schedules.

## 2025-03-07 ENCOUNTER — APPOINTMENT (OUTPATIENT)
Dept: CT IMAGING | Age: 60
DRG: 146 | End: 2025-03-07
Attending: INTERNAL MEDICINE
Payer: MEDICARE

## 2025-03-07 ENCOUNTER — HOSPITAL ENCOUNTER (OUTPATIENT)
Dept: RADIATION ONCOLOGY | Age: 60
Discharge: HOME | End: 2025-03-07

## 2025-03-07 ENCOUNTER — HOSPITAL ENCOUNTER (OUTPATIENT)
Dept: INFUSION THERAPY | Age: 60
End: 2025-03-07

## 2025-03-07 LAB
ANION GAP SERPL CALCULATED.3IONS-SCNC: 15 MMOL/L (ref 7–16)
BASOPHILS # BLD: 0.04 K/UL (ref 0–0.2)
BASOPHILS NFR BLD: 1 % (ref 0–2)
BUN SERPL-MCNC: 15 MG/DL (ref 6–20)
CALCIUM SERPL-MCNC: 8.9 MG/DL (ref 8.6–10.2)
CHLORIDE SERPL-SCNC: 100 MMOL/L (ref 98–107)
CO2 SERPL-SCNC: 21 MMOL/L (ref 22–29)
CREAT SERPL-MCNC: 0.6 MG/DL (ref 0.7–1.2)
EOSINOPHIL # BLD: 0.18 K/UL (ref 0.05–0.5)
EOSINOPHILS RELATIVE PERCENT: 3 % (ref 0–6)
ERYTHROCYTE [DISTWIDTH] IN BLOOD BY AUTOMATED COUNT: 12.8 % (ref 11.5–15)
GFR, ESTIMATED: >90 ML/MIN/1.73M2
GLUCOSE BLD-MCNC: 80 MG/DL (ref 74–99)
GLUCOSE SERPL-MCNC: 68 MG/DL (ref 74–99)
HCT VFR BLD AUTO: 33.5 % (ref 37–54)
HGB BLD-MCNC: 11.5 G/DL (ref 12.5–16.5)
IMM GRANULOCYTES # BLD AUTO: <0.03 K/UL (ref 0–0.58)
IMM GRANULOCYTES NFR BLD: 0 % (ref 0–5)
LYMPHOCYTES NFR BLD: 0.66 K/UL (ref 1.5–4)
LYMPHOCYTES RELATIVE PERCENT: 12 % (ref 20–42)
MCH RBC QN AUTO: 32.2 PG (ref 26–35)
MCHC RBC AUTO-ENTMCNC: 34.3 G/DL (ref 32–34.5)
MCV RBC AUTO: 93.8 FL (ref 80–99.9)
MONOCYTES NFR BLD: 0.41 K/UL (ref 0.1–0.95)
MONOCYTES NFR BLD: 7 % (ref 2–12)
NEUTROPHILS NFR BLD: 77 % (ref 43–80)
NEUTS SEG NFR BLD: 4.26 K/UL (ref 1.8–7.3)
PLATELET # BLD AUTO: 140 K/UL (ref 130–450)
PMV BLD AUTO: 10.2 FL (ref 7–12)
POTASSIUM SERPL-SCNC: 3.9 MMOL/L (ref 3.5–5)
RBC # BLD AUTO: 3.57 M/UL (ref 3.8–5.8)
SODIUM SERPL-SCNC: 136 MMOL/L (ref 132–146)
WBC OTHER # BLD: 5.6 K/UL (ref 4.5–11.5)

## 2025-03-07 PROCEDURE — 74176 CT ABD & PELVIS W/O CONTRAST: CPT

## 2025-03-07 PROCEDURE — 1200000000 HC SEMI PRIVATE

## 2025-03-07 PROCEDURE — 36415 COLL VENOUS BLD VENIPUNCTURE: CPT

## 2025-03-07 PROCEDURE — 6360000002 HC RX W HCPCS: Performed by: INTERNAL MEDICINE

## 2025-03-07 PROCEDURE — 6360000002 HC RX W HCPCS: Performed by: NURSE PRACTITIONER

## 2025-03-07 PROCEDURE — 2580000003 HC RX 258: Performed by: INTERNAL MEDICINE

## 2025-03-07 PROCEDURE — 80048 BASIC METABOLIC PNL TOTAL CA: CPT

## 2025-03-07 PROCEDURE — 99231 SBSQ HOSP IP/OBS SF/LOW 25: CPT

## 2025-03-07 PROCEDURE — 99232 SBSQ HOSP IP/OBS MODERATE 35: CPT | Performed by: INTERNAL MEDICINE

## 2025-03-07 PROCEDURE — 99222 1ST HOSP IP/OBS MODERATE 55: CPT | Performed by: STUDENT IN AN ORGANIZED HEALTH CARE EDUCATION/TRAINING PROGRAM

## 2025-03-07 PROCEDURE — 85025 COMPLETE CBC W/AUTO DIFF WBC: CPT

## 2025-03-07 PROCEDURE — 82962 GLUCOSE BLOOD TEST: CPT

## 2025-03-07 PROCEDURE — 2500000003 HC RX 250 WO HCPCS: Performed by: INTERNAL MEDICINE

## 2025-03-07 RX ADMIN — ONDANSETRON 4 MG: 2 INJECTION, SOLUTION INTRAMUSCULAR; INTRAVENOUS at 23:57

## 2025-03-07 RX ADMIN — PANTOPRAZOLE SODIUM 40 MG: 40 INJECTION, POWDER, FOR SOLUTION INTRAVENOUS at 09:12

## 2025-03-07 RX ADMIN — PROCHLORPERAZINE EDISYLATE 10 MG: 5 INJECTION INTRAMUSCULAR; INTRAVENOUS at 10:55

## 2025-03-07 RX ADMIN — ONDANSETRON 4 MG: 2 INJECTION, SOLUTION INTRAMUSCULAR; INTRAVENOUS at 12:43

## 2025-03-07 RX ADMIN — HYDROMORPHONE HYDROCHLORIDE 0.5 MG: 1 INJECTION, SOLUTION INTRAMUSCULAR; INTRAVENOUS; SUBCUTANEOUS at 05:49

## 2025-03-07 RX ADMIN — HYDROMORPHONE HYDROCHLORIDE 0.5 MG: 1 INJECTION, SOLUTION INTRAMUSCULAR; INTRAVENOUS; SUBCUTANEOUS at 10:55

## 2025-03-07 RX ADMIN — SODIUM CHLORIDE, PRESERVATIVE FREE 10 ML: 5 INJECTION INTRAVENOUS at 19:59

## 2025-03-07 RX ADMIN — PROCHLORPERAZINE EDISYLATE 10 MG: 5 INJECTION INTRAMUSCULAR; INTRAVENOUS at 19:58

## 2025-03-07 RX ADMIN — SODIUM CHLORIDE, SODIUM LACTATE, POTASSIUM CHLORIDE, AND CALCIUM CHLORIDE: .6; .31; .03; .02 INJECTION, SOLUTION INTRAVENOUS at 11:02

## 2025-03-07 RX ADMIN — HYDROMORPHONE HYDROCHLORIDE 0.5 MG: 1 INJECTION, SOLUTION INTRAMUSCULAR; INTRAVENOUS; SUBCUTANEOUS at 23:57

## 2025-03-07 RX ADMIN — ONDANSETRON 4 MG: 2 INJECTION, SOLUTION INTRAMUSCULAR; INTRAVENOUS at 05:49

## 2025-03-07 RX ADMIN — HYDROMORPHONE HYDROCHLORIDE 0.5 MG: 1 INJECTION, SOLUTION INTRAMUSCULAR; INTRAVENOUS; SUBCUTANEOUS at 15:12

## 2025-03-07 RX ADMIN — HYDROMORPHONE HYDROCHLORIDE 0.5 MG: 1 INJECTION, SOLUTION INTRAMUSCULAR; INTRAVENOUS; SUBCUTANEOUS at 19:58

## 2025-03-07 ASSESSMENT — PAIN DESCRIPTION - LOCATION
LOCATION: JAW;BACK
LOCATION: NECK
LOCATION: JAW
LOCATION: JAW
LOCATION: NECK
LOCATION: JAW;BACK
LOCATION: NECK

## 2025-03-07 ASSESSMENT — PAIN DESCRIPTION - DESCRIPTORS
DESCRIPTORS: ACHING;DULL;DISCOMFORT
DESCRIPTORS: ACHING;DISCOMFORT;SHARP;SHOOTING
DESCRIPTORS: ACHING;DISCOMFORT;NAGGING
DESCRIPTORS: ACHING;DISCOMFORT;NAGGING
DESCRIPTORS: ACHING;DISCOMFORT
DESCRIPTORS: ACHING;DISCOMFORT
DESCRIPTORS: ACHING;DISCOMFORT;NAGGING

## 2025-03-07 ASSESSMENT — PAIN DESCRIPTION - ORIENTATION
ORIENTATION: RIGHT;MID
ORIENTATION: RIGHT
ORIENTATION: RIGHT;MID
ORIENTATION: RIGHT

## 2025-03-07 ASSESSMENT — PAIN DESCRIPTION - PAIN TYPE
TYPE: CHRONIC PAIN

## 2025-03-07 ASSESSMENT — PAIN - FUNCTIONAL ASSESSMENT
PAIN_FUNCTIONAL_ASSESSMENT: PREVENTS OR INTERFERES SOME ACTIVE ACTIVITIES AND ADLS
PAIN_FUNCTIONAL_ASSESSMENT: ACTIVITIES ARE NOT PREVENTED
PAIN_FUNCTIONAL_ASSESSMENT: ACTIVITIES ARE NOT PREVENTED
PAIN_FUNCTIONAL_ASSESSMENT: PREVENTS OR INTERFERES SOME ACTIVE ACTIVITIES AND ADLS
PAIN_FUNCTIONAL_ASSESSMENT: PREVENTS OR INTERFERES SOME ACTIVE ACTIVITIES AND ADLS

## 2025-03-07 ASSESSMENT — PAIN SCALES - GENERAL
PAINLEVEL_OUTOF10: 7
PAINLEVEL_OUTOF10: 5
PAINLEVEL_OUTOF10: 7
PAINLEVEL_OUTOF10: 7
PAINLEVEL_OUTOF10: 6
PAINLEVEL_OUTOF10: 3
PAINLEVEL_OUTOF10: 3
PAINLEVEL_OUTOF10: 8
PAINLEVEL_OUTOF10: 6
PAINLEVEL_OUTOF10: 6
PAINLEVEL_OUTOF10: 4
PAINLEVEL_OUTOF10: 7
PAINLEVEL_OUTOF10: 3

## 2025-03-07 ASSESSMENT — PAIN DESCRIPTION - ONSET
ONSET: ON-GOING
ONSET: GRADUAL
ONSET: ON-GOING
ONSET: ON-GOING

## 2025-03-07 ASSESSMENT — PAIN DESCRIPTION - FREQUENCY
FREQUENCY: CONTINUOUS

## 2025-03-07 NOTE — PROGRESS NOTES
related pain  Failure to thrive      Palliative Care Encounter / Counseling Regarding Goals of Care  Please see detailed goals of care discussion as below  At this time, Prasanna Parnell, Does have capacity for medical decision-making.  Capacity is time limited and situation/question specific  During encounter a surrogate medical decision-maker was not needed   Outcome of goals of care meeting:   Continue current care  Symptom management as below.   Code status Full Code  Advanced Directives: HC POA completed by palliative  3/4/25  Surrogate/Legal NOK:  Mary Anne Clement (parter/HC-POA): [phone]      Symptom management:    Neoplasm related pain  Patient follows with palliative care clinic and has been on oxycodone 10 mg as needed.  Given significant neuropathic component to his pain related to the cancer as well as his longstanding history of chronic back pain with sciatica, methadone would be a great option for him for long-acting control.  Continue methadone 5 mg by mouth daily.  Reevaluate once steady state after 3 days (3/7/25)  **Patient has been unable to take methadone d/t severe nausea and inability for meds/TF  Continue oxycodone-APAP 5-25 1 to 2 tablets every 4 hours as needed for pain.  Given significant nausea and inability to tolerate oral medications, will start hydromorphone as needed.   Continue hydromorphone 0.5 mg IV every 4 hours as needed for pain or dyspnea. --Patient tells me he is well-controlled on this dose alone.    Intractable nausea and vomiting  Patient has been experiencing uncontrolled nausea and vomiting despite multiple antiemetics following treatment.  He was prescribed sucralfate, prochlorperazine, ondansetron, and trialed scopolamine patch and palliative care clinic.  He received ondansetron 4 mg IV x 3  and prochlorperazine X1.   He has not had any as needed doses of olanzapine that was initiated yesterday.  Prochlorperazine IV was restarted by primary.  Give  olanzapine 2.5 mg sublingual now.   Continue olanzapine 2.5 mg sublingual every 6 hours as needed for nausea or vomiting.  Continue ondansetron as needed.  Continue scopolamine patch for now.    PDMP reviewed and appropriate.    Spiritual assessment: no spiritual distress identified  Bereavement and grief: to be determined  Referrals to: none today  SUBJECTIVE:     Current medical issues leading to Palliative Medicine involvement include   Active Hospital Problems    Diagnosis Date Noted    Severe protein-calorie malnutrition [E43] 03/04/2025    Palliative care encounter [Z51.5] 03/04/2025    Neoplasm related pain [G89.3] 03/04/2025    Intractable nausea and vomiting [R11.2] 03/04/2025    Failure to thrive in adult [R62.7] 03/03/2025       Details of Conversation:    Chart reviewed.  Patient was seen and examined at the bedside with wife present.  He is lying in bed alert and oriented x 4.  He appears more comfortable today, but does not wish dry heaving continue tube feeding has been on hold.  He is not getting much relief with nausea/vomiting regimen, GI was consulted as oncology does not believe this is chemotherapy induced nausea.  He tells me he is not got any p.o. pain medications, but IV Dilaudid 0.5 mg every 4 hours is keeping his pain well-controlled, will continue.  Palliative medicine will continue to follow.    Review of Systems   Constitutional:  Positive for chills, fatigue and fever.   HENT:  Positive for ear pain. Negative for congestion.    Eyes:  Negative for visual disturbance.   Respiratory:  Negative for cough and shortness of breath.    Cardiovascular:  Negative for chest pain and palpitations.   Gastrointestinal:  Positive for nausea and vomiting. Negative for abdominal distention, abdominal pain, constipation and diarrhea.   Musculoskeletal:  Positive for back pain and neck pain. Negative for arthralgias.   Neurological:  Positive for dizziness and headaches. Negative for seizures.  members: Palliative Medicine IDT Team, Floor Nurse, Patient, and Family    Time/Communication  Greater than 50% of time spent, total 25 minutes in counseling and coordination of care at the bedside regarding goals of care, symptom management, diagnosis and prognosis, and see above.    Thank you for allowing Palliative Medicine to participate in the care of Prasanna Parnell.    Note: This report was completed using [employer] voiced recognition software.  Every effort has been made to ensure accuracy; however, inadvertent computerized transcription errors may be present.

## 2025-03-07 NOTE — PROGRESS NOTES
Comprehensive Nutrition Assessment    Type and Reason for Visit:  Reassess, Nutrition support    Nutrition Recommendations/Plan:   Continue NPO  Inability to tolerate TF formula (Jevity 1.5) noted this adm, recommend modifying TF formula to promote GI tolerance; recommend,    Peptide Based (Vital AF 1.2) @ 60ml/hr to provide 1440ml TV, 1728kcals, 108g pro, 1168ml free water.    Flush rec of 95ml q 4= 570ml water (1738ml total water).     Monitor/replace Lytes PRN.    Will monitor; TF ordered to reflect TF rec above, this meets 100% of pt's est needs.     Malnutrition Assessment:  Malnutrition Status:  Severe malnutrition (03/04/25 1142)    Context:  Chronic Illness     Findings of the 6 clinical characteristics of malnutrition:  Energy Intake:  75% or less estimated energy requirements for 1 month or longer (chronic N/V w/ inability to tolerate PO intake ; pt w/ PEG, however was not utilizing this PTA d/t inability to acquire TF formula d/t insurance issues.)  Weight Loss:  Unable to assess (GALI d/t lack of wt hx per EMR; recent measured wts trending down, however unable to assess long term wt loss d/t lack of measured wt hx w/in adequate time frame for assessment.)     Body Fat Loss:  Severe body fat loss Orbital, Triceps, Buccal region   Muscle Mass Loss:  Severe muscle mass loss Temples (temporalis), Clavicles (pectoralis & deltoids), Hand (interosseous), Thigh (quadriceps)  Fluid Accumulation:  No fluid accumulation     Strength:  Not Performed    Nutrition Assessment:    pt adm dt/ FTT; pt w/ throat CA (tonsil/tongue/lung) currently on chemo/XRT; pt had PEG placed 1/20/25, however pt not utilizing TF PTA d/t inability to acquire TF formula/insurance issues; pt endorses poor PO/appetite PTA - he tells me he tries to eat adequate meals (such as steak) and tries consuming ONS (such as Ensure), however reports intractable N/V w/ oral intake; he also tells  me he has chemo on Thursdays and feels that this \"makes  everything worse\", also reporting that he feels weak and unable to tolerate \"anything\" ; pt reports he was able to chew/swallow PTA, however oral intake difficult r/t N/V;  PMhx of CA, COPD; pt reports overall he just \"doesn't feel good\"; pt meets criteria for severe malnutrition; pt c/o N/V w/ current Jevity TF formula- KUB negative, however inability to tolerate TF noted via EMR documentation; possible gastroparesis noted; no distention noted; recommend modifying TF formula to peptide based to promote GI tolerance; updated TF rec provided, will continue to monitor.    Nutrition Related Findings:    muscle/fat wasting; A&Ox4; active BS; PEG (inability to tolerate TF per EMR documentation); no edema Wound Type: None       Current Nutrition Intake & Therapies:    Average Meal Intake: NPO  Average Supplements Intake: NPO  Diet NPO  ADULT TUBE FEEDING; PEG; Peptide Based; Continuous; 10; Yes; 5; Q 8 hours; 60; 95; Q 4 hours  Current Tube Feeding (TF) Orders:  Feeding Route: PEG  Formula: Peptide Based  Schedule: Continuous  Feeding Regimen: 60ml/hr  Additives/Modulars: None  Water Flushes: 95ml q 4= 570ml water.  Current TF Provides: 1440ml TV, 1728kcals, 108g pro, 1168ml free water, 1738ml total water.    Anthropometric Measures:  Height: 172.7 cm (5' 7.99\")  Ideal Body Weight (IBW): 154 lbs (70 kg)    Admission Body Weight: 53 kg (116 lb 13.5 oz) (3/4/25-BS/first measured this adm)  Current Body Weight: 52.9 kg (116 lb 10 oz) (3/7-BS), 75.7 % IBW. Weight Source: Bed scale  Current BMI (kg/m2): 17.7  Usual Body Weight: 56.4 kg (124 lb 5.4 oz) (2/27/25-oncology note; 2/20/25 wt of 60.3kg noted via oncology note)     % Weight Change (Calculated): -6  Weight Adjustment For: No Adjustment                 BMI Categories: Underweight (BMI less than 18.5)    Estimated Daily Nutrient Needs:  Energy Requirements Based On: Formula  Weight Used for Energy Requirements: Current  Energy (kcal/day): MSJ x 1.3 SF= 1728:

## 2025-03-07 NOTE — CARE COORDINATION
3/7. Continues to c/o nausea. KUB - no distension noted. Not tolerating tube feeding. Pt is HCAP eligible and eligible for help with meds upon discharge. Plan is home when medically stable. Jina Ashby RN

## 2025-03-07 NOTE — CONSULTS
Gastroenterology, Hepatology, &  Advanced Endoscopy    Consult Note      Reason for Consult: FTT     HPI:   Prasanna Parnell is a 59 y.o. male w/ PMH of  has a past medical history of Cancer (HCC), Cervicalgia, COPD (chronic obstructive pulmonary disease) (HCC), Neuropathy, Retrolisthesis, and Spondylolisthesis. who presents to the ED with persistent nausea and decreased PO intake. The patient has SCC of the tongue and lungs and is currently following with Oncology and Radiation Oncology. He is s/p PEG tube placement. He is admitted due to decreased PO intake and so currently reports that chemotherapy is on hold. He reports that he has been unable to tolerate much by mouth or through the PEG tube without having immediate nausea and vomiting. He reports that he is regular with his bowel movements.       No results for input(s): \"INR\", \"ALT\", \"AST\", \"ALKPHOS\", \"BILITOT\", \"GLOB\", \"GGT\", \"LABPROT\", \"LABALBU\", \"BILIDIR\" in the last 72 hours.  Lab Results   Component Value Date    WBC 7.5 03/06/2025    HGB 11.6 (L) 03/06/2025    HCT 33.4 (L) 03/06/2025     03/06/2025     03/06/2025    K 3.6 03/06/2025     03/06/2025    CREATININE 0.8 03/06/2025    BUN 13 03/06/2025    CO2 28 03/06/2025    GLUCOSE 94 03/06/2025    INR 1.2 01/23/2025    PROTIME 13.0 (H) 01/23/2025    TSH 0.41 03/04/2025    LABA1C 5.6 03/04/2025     Lab Results   Component Value Date    INR 1.2 01/23/2025    INR 1.1 08/04/2022    PROTIME 13.0 (H) 01/23/2025    PROTIME 11.6 08/04/2022         Lipase   Date Value Ref Range Status   02/25/2025 14 13 - 60 U/L Final         CT Result (most recent):  CT HEAD WO CONTRAST 02/25/2025    Narrative  EXAM:    CT Head Without Intravenous Contrast    EXAM DATE/TIME:    2/25/2025 8:09 pm    CLINICAL HISTORY:    ORDERING SYSTEM PROVIDED HISTORY: fatigue, lightheaded, tingling  TECHNOLOGIST PROVIDED HISTORY:  Reason for exam:->fatigue, lightheaded,  tingling  Has a \"code stroke\" or \"stroke alert\" been

## 2025-03-07 NOTE — PROGRESS NOTES
[facility]  [facility] ORTHO-TRAUMA  [address]  [facility] [address]  Dept: [phone]  Loc: [phone]  Jayde Fernandez MD   ·   MD Adriana Lucero APRN  ·  FADI [place]  Inpatient Medical Oncology/Hematology progress Note    Patient Name: Prasanna Parnell  YOB: 1965    DATE OF ADMISSION: 3/3/2025  DATE OF CONSULTATION: 3/3/2025  CONSULTING PROVIDER: Tammie Higginbotham DO  REASON FOR CONSULTATION: SCC  PCP: No primary care provider on file.    Room: [address] [place] [address]      CHIEF COMPLAINT:  Failure to thrive     Interim health:  The patient was not able to tolerate tube feeds, still has nausea, receiving RT.    HISTORY OF PRESENT ILLNESS (3/4/2025):   Patient is a 59 y.o. 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 Since starting treatment, patient experiencing nausea and vomiting despite taking several antiemetics. Completed day 8, cycle 1 of cisplatin on 2/27/2025 with intravenous hydration. Next chemotherapy scheduled for 3/6/2025. PEG tube in place but due to not having insurance he is unable to supplement with tube feeding. Patient was sent to ED from radiation oncology clinic for evaluation of dizziness, low BP, and decrease oral intake. Work up in ED showing all labs normal except hematocrit 36.8, MCHC 36.1, absolute lymphocytes 0.81, and lymphocytes 13%.     Patient seen and examined in room. Tube feeding initiated, tolerating. Denies nausea, vomiting, diarrhea, abdominal pain, or fever this morning. Admits to oral soreness, fatigue, and lightheadedness.          Oncology History   Malignant neoplasm of overlapping  antiemetics. Completed day 8, cycle 1 of cisplatin on 2/27/2025 with intravenous hydration. Next chemotherapy scheduled for 3/6/2025. PEG tube in place but due to not having insurance he is unable to supplement with tube feeding. Patient was sent to ED from radiation oncology clinic for evaluation of dizziness, low BP, and decrease oral intake. Work up in ED showing all labs normal except hematocrit 36.8, MCHC 36.1, absolute lymphocytes 0.81, and lymphocytes 13%.       PLAN  Hold chemotherapy while inpatient  TF on hold.  Continue antiemetics  Palliative care consulted for symptom management  Continue oral nystatin, Magic mouthwash  PT/OT  KUB negative   CT abd/pelvis reviewed, no acute process  GI team consulted  Labs reviewed  Follow-up with Dr. Tomas after DC from the hospital       Jayde Fernandez MD   HEMATOLOGY/MEDICAL ONCOLOGY  [facility] SPECIALTY CARE [facility] MED ONCOLOGY  [address] [place] [id number]  Dept: [phone]  Loc: [phone]         [place] ([place]) Office  P: [phone]  F: [phone]    [place] ([place] Office  P: [phone]  F: [phone]

## 2025-03-07 NOTE — PROGRESS NOTES
[facility] Hospitalist Progress Note    Admitting Date and Time: 3/3/2025  2:03 PM  Admit Dx: Failure to thrive in adult [R62.7]    Synopsis:    Prasanna Parnell is a 59 y.o. male who presents to [facility] ER complaining of failure to thrive.     Prasanna Parnell has a past medical history that includes stage I SCC of tongue/tonsil, lung mass SCC     Prasanna the ER from radiation office for decreased p.o. intake, nausea, vomiting.  He is being treated for throat cancer and is on chemo and radiation.  He has tried multiple antiemetics without success.  He does have a PEG tube but currently due to insurance issues he does not have any tube feeds.  He has been unable to eat for days. Will be admitted for initiation of tube feeds and if he cannot tolerate these, consideration of TPN.     Upon presentation to the ER, routine labwork was performed which revealed no acute abnormalities.  Imaging results are as outlined below in the Imaging section of this note.   Upon arrival to the ER, patient was 107/61.  The patient received zofran, 1L NS in the emergency room and was admitted to [facility].    3/5: Patient symptomatic with nausea this morning.  Tube feeds held for now.    3/6: Continues to have nausea.  CT abdomen/pelvis done and is unremarkable.  Oncology has consulted GI as they do not think this is a side effect of cancer treatments.    Subjective:  Patient is being followed for Failure to thrive in adult [R62.7]     Patient seen and examined at bedside this morning.  Complains of significant nausea today and still unable to tolerate tube feeds.    ROS: denies fever, chills, cp, sob, n/v, HA unless stated above.      pantoprazole (PROTONIX) 40 mg in sodium chloride (PF) 0.9 % 10 mL injection  40 mg IntraVENous Daily    methadone  5 mg Oral Daily    sodium chloride flush  5-40 mL IntraVENous 2 times per day    enoxaparin  40 mg SubCUTAneous Daily    nystatin  500,000 Units Oral 4x Daily    scopolamine   3.6 3.9    100   CO2 28 21*   BUN 13 15   CREATININE 0.8 0.6*   GLUCOSE 94 68*   CALCIUM 9.0 8.9       Recent Labs     03/06/25  0947 03/07/25  0414   WBC 7.5 5.6   RBC 3.63* 3.57*   HGB 11.6* 11.5*   HCT 33.4* 33.5*   MCV 92.0 93.8   MCH 32.0 32.2   MCHC 34.7* 34.3   RDW 12.9 12.8    140   MPV 10.3 10.2         Assessment:    Intractable nausea/vomiting likely 2/2 chemotherapy  Stage I SCC tongue/tonsil and stage I SCC of lung  S/p PEG tube  History of tobacco abuse      Plan:  GI consulted by Oncology  Continue  cc/h  Hold tube feeds for now given significant nausea  Continue scopolamine patch, Zofran and nausea  Continue PPI IV daily  Continue other medications including methadone, nystatin, Carafate  Continue oxycodone as needed for pain  Oncology, radiation oncology, palliative care all following      DC planning: Home pending improvement in symptoms    NOTE: This report was transcribed using voice recognition software. Every effort was made to ensure accuracy; however, inadvertent computerized transcription errors may be present.    Electronically signed by Joon Arzate MD on 3/7/2025 at 10:57 AM

## 2025-03-08 PROBLEM — Z93.1 PEG (PERCUTANEOUS ENDOSCOPIC GASTROSTOMY) STATUS (HCC): Status: ACTIVE | Noted: 2025-03-08

## 2025-03-08 LAB
ANION GAP SERPL CALCULATED.3IONS-SCNC: 19 MMOL/L (ref 7–16)
BASOPHILS # BLD: 0.04 K/UL (ref 0–0.2)
BASOPHILS NFR BLD: 1 % (ref 0–2)
BUN SERPL-MCNC: 14 MG/DL (ref 6–20)
CALCIUM SERPL-MCNC: 8.8 MG/DL (ref 8.6–10.2)
CHLORIDE SERPL-SCNC: 98 MMOL/L (ref 98–107)
CO2 SERPL-SCNC: 22 MMOL/L (ref 22–29)
CREAT SERPL-MCNC: 0.6 MG/DL (ref 0.7–1.2)
EOSINOPHIL # BLD: 0.04 K/UL (ref 0.05–0.5)
EOSINOPHILS RELATIVE PERCENT: 1 % (ref 0–6)
ERYTHROCYTE [DISTWIDTH] IN BLOOD BY AUTOMATED COUNT: 12.9 % (ref 11.5–15)
GFR, ESTIMATED: >90 ML/MIN/1.73M2
GLUCOSE SERPL-MCNC: 69 MG/DL (ref 74–99)
HCT VFR BLD AUTO: 33.3 % (ref 37–54)
HGB BLD-MCNC: 11.5 G/DL (ref 12.5–16.5)
LYMPHOCYTES NFR BLD: 0.18 K/UL (ref 1.5–4)
LYMPHOCYTES RELATIVE PERCENT: 4 % (ref 20–42)
MCH RBC QN AUTO: 32 PG (ref 26–35)
MCHC RBC AUTO-ENTMCNC: 34.5 G/DL (ref 32–34.5)
MCV RBC AUTO: 92.8 FL (ref 80–99.9)
MONOCYTES NFR BLD: 0.13 K/UL (ref 0.1–0.95)
MONOCYTES NFR BLD: 3 % (ref 2–12)
NEUTROPHILS NFR BLD: 92 % (ref 43–80)
NEUTS SEG NFR BLD: 4.61 K/UL (ref 1.8–7.3)
PLATELET # BLD AUTO: 145 K/UL (ref 130–450)
PMV BLD AUTO: 10.4 FL (ref 7–12)
POTASSIUM SERPL-SCNC: 3.3 MMOL/L (ref 3.5–5)
RBC # BLD AUTO: 3.59 M/UL (ref 3.8–5.8)
RBC # BLD: ABNORMAL 10*6/UL
SODIUM SERPL-SCNC: 139 MMOL/L (ref 132–146)
WBC OTHER # BLD: 5 K/UL (ref 4.5–11.5)

## 2025-03-08 PROCEDURE — 99232 SBSQ HOSP IP/OBS MODERATE 35: CPT | Performed by: INTERNAL MEDICINE

## 2025-03-08 PROCEDURE — 6360000002 HC RX W HCPCS: Performed by: INTERNAL MEDICINE

## 2025-03-08 PROCEDURE — 6360000002 HC RX W HCPCS: Performed by: NURSE PRACTITIONER

## 2025-03-08 PROCEDURE — 2500000003 HC RX 250 WO HCPCS: Performed by: INTERNAL MEDICINE

## 2025-03-08 PROCEDURE — 80048 BASIC METABOLIC PNL TOTAL CA: CPT

## 2025-03-08 PROCEDURE — 85025 COMPLETE CBC W/AUTO DIFF WBC: CPT

## 2025-03-08 PROCEDURE — 6370000000 HC RX 637 (ALT 250 FOR IP): Performed by: STUDENT IN AN ORGANIZED HEALTH CARE EDUCATION/TRAINING PROGRAM

## 2025-03-08 PROCEDURE — 1200000000 HC SEMI PRIVATE

## 2025-03-08 PROCEDURE — 36415 COLL VENOUS BLD VENIPUNCTURE: CPT

## 2025-03-08 PROCEDURE — 2580000003 HC RX 258: Performed by: INTERNAL MEDICINE

## 2025-03-08 PROCEDURE — 6360000002 HC RX W HCPCS: Performed by: STUDENT IN AN ORGANIZED HEALTH CARE EDUCATION/TRAINING PROGRAM

## 2025-03-08 PROCEDURE — 2580000003 HC RX 258: Performed by: STUDENT IN AN ORGANIZED HEALTH CARE EDUCATION/TRAINING PROGRAM

## 2025-03-08 RX ORDER — PROCHLORPERAZINE EDISYLATE 5 MG/ML
10 INJECTION INTRAMUSCULAR; INTRAVENOUS EVERY 6 HOURS
Status: DISCONTINUED | OUTPATIENT
Start: 2025-03-08 | End: 2025-03-21 | Stop reason: HOSPADM

## 2025-03-08 RX ORDER — DIPHENHYDRAMINE HYDROCHLORIDE 50 MG/ML
25 INJECTION, SOLUTION INTRAMUSCULAR; INTRAVENOUS ONCE
Status: COMPLETED | OUTPATIENT
Start: 2025-03-08 | End: 2025-03-08

## 2025-03-08 RX ORDER — NORTRIPTYLINE HYDROCHLORIDE 25 MG/1
25 CAPSULE ORAL NIGHTLY
Status: DISCONTINUED | OUTPATIENT
Start: 2025-03-08 | End: 2025-03-21 | Stop reason: HOSPADM

## 2025-03-08 RX ORDER — POTASSIUM CHLORIDE 7.45 MG/ML
10 INJECTION INTRAVENOUS
Status: COMPLETED | OUTPATIENT
Start: 2025-03-08 | End: 2025-03-08

## 2025-03-08 RX ORDER — LORAZEPAM 2 MG/ML
2 INJECTION INTRAMUSCULAR ONCE
Status: COMPLETED | OUTPATIENT
Start: 2025-03-08 | End: 2025-03-08

## 2025-03-08 RX ADMIN — PROCHLORPERAZINE EDISYLATE 10 MG: 5 INJECTION INTRAMUSCULAR; INTRAVENOUS at 15:24

## 2025-03-08 RX ADMIN — NORTRIPTYLINE HYDROCHLORIDE 25 MG: 25 CAPSULE ORAL at 20:28

## 2025-03-08 RX ADMIN — LORAZEPAM 2 MG: 2 INJECTION INTRAMUSCULAR; INTRAVENOUS at 20:29

## 2025-03-08 RX ADMIN — PROCHLORPERAZINE EDISYLATE 10 MG: 5 INJECTION INTRAMUSCULAR; INTRAVENOUS at 20:29

## 2025-03-08 RX ADMIN — SODIUM CHLORIDE, SODIUM LACTATE, POTASSIUM CHLORIDE, AND CALCIUM CHLORIDE: .6; .31; .03; .02 INJECTION, SOLUTION INTRAVENOUS at 11:10

## 2025-03-08 RX ADMIN — HYDROMORPHONE HYDROCHLORIDE 0.5 MG: 1 INJECTION, SOLUTION INTRAMUSCULAR; INTRAVENOUS; SUBCUTANEOUS at 08:52

## 2025-03-08 RX ADMIN — PANTOPRAZOLE SODIUM 40 MG: 40 INJECTION, POWDER, FOR SOLUTION INTRAVENOUS at 08:52

## 2025-03-08 RX ADMIN — SODIUM CHLORIDE, PRESERVATIVE FREE 10 ML: 5 INJECTION INTRAVENOUS at 20:37

## 2025-03-08 RX ADMIN — HYDROMORPHONE HYDROCHLORIDE 0.5 MG: 1 INJECTION, SOLUTION INTRAMUSCULAR; INTRAVENOUS; SUBCUTANEOUS at 04:28

## 2025-03-08 RX ADMIN — PROCHLORPERAZINE EDISYLATE 10 MG: 5 INJECTION INTRAMUSCULAR; INTRAVENOUS at 04:28

## 2025-03-08 RX ADMIN — SODIUM CHLORIDE, SODIUM LACTATE, POTASSIUM CHLORIDE, AND CALCIUM CHLORIDE: .6; .31; .03; .02 INJECTION, SOLUTION INTRAVENOUS at 20:36

## 2025-03-08 RX ADMIN — DIPHENHYDRAMINE HYDROCHLORIDE 25 MG: 50 INJECTION INTRAMUSCULAR; INTRAVENOUS at 20:28

## 2025-03-08 RX ADMIN — ONDANSETRON 4 MG: 2 INJECTION, SOLUTION INTRAMUSCULAR; INTRAVENOUS at 12:53

## 2025-03-08 RX ADMIN — HYDROMORPHONE HYDROCHLORIDE 0.5 MG: 1 INJECTION, SOLUTION INTRAMUSCULAR; INTRAVENOUS; SUBCUTANEOUS at 12:53

## 2025-03-08 RX ADMIN — POTASSIUM CHLORIDE 10 MEQ: 7.46 INJECTION, SOLUTION INTRAVENOUS at 10:04

## 2025-03-08 RX ADMIN — HYDROMORPHONE HYDROCHLORIDE 0.5 MG: 1 INJECTION, SOLUTION INTRAMUSCULAR; INTRAVENOUS; SUBCUTANEOUS at 17:01

## 2025-03-08 RX ADMIN — ONDANSETRON 4 MG: 2 INJECTION, SOLUTION INTRAMUSCULAR; INTRAVENOUS at 18:52

## 2025-03-08 RX ADMIN — PROCHLORPERAZINE EDISYLATE 10 MG: 5 INJECTION INTRAMUSCULAR; INTRAVENOUS at 08:52

## 2025-03-08 RX ADMIN — PANTOPRAZOLE SODIUM 40 MG: 40 INJECTION, POWDER, FOR SOLUTION INTRAVENOUS at 20:29

## 2025-03-08 RX ADMIN — POTASSIUM CHLORIDE 10 MEQ: 7.46 INJECTION, SOLUTION INTRAVENOUS at 08:59

## 2025-03-08 ASSESSMENT — PAIN SCALES - GENERAL
PAINLEVEL_OUTOF10: 4
PAINLEVEL_OUTOF10: 4
PAINLEVEL_OUTOF10: 2
PAINLEVEL_OUTOF10: 3
PAINLEVEL_OUTOF10: 3
PAINLEVEL_OUTOF10: 9
PAINLEVEL_OUTOF10: 9
PAINLEVEL_OUTOF10: 3
PAINLEVEL_OUTOF10: 10
PAINLEVEL_OUTOF10: 7

## 2025-03-08 ASSESSMENT — PAIN DESCRIPTION - FREQUENCY
FREQUENCY: CONTINUOUS

## 2025-03-08 ASSESSMENT — PAIN - FUNCTIONAL ASSESSMENT
PAIN_FUNCTIONAL_ASSESSMENT: PREVENTS OR INTERFERES SOME ACTIVE ACTIVITIES AND ADLS

## 2025-03-08 ASSESSMENT — PAIN DESCRIPTION - LOCATION
LOCATION: NECK

## 2025-03-08 ASSESSMENT — PAIN DESCRIPTION - ORIENTATION
ORIENTATION: RIGHT

## 2025-03-08 ASSESSMENT — PAIN DESCRIPTION - DESCRIPTORS
DESCRIPTORS: ACHING;DISCOMFORT;SORE
DESCRIPTORS: ACHING;NAGGING
DESCRIPTORS: ACHING;DISCOMFORT;NAGGING
DESCRIPTORS: ACHING;DISCOMFORT;SORE

## 2025-03-08 ASSESSMENT — PAIN DESCRIPTION - PAIN TYPE
TYPE: CHRONIC PAIN

## 2025-03-08 ASSESSMENT — PAIN DESCRIPTION - ONSET
ONSET: ON-GOING

## 2025-03-08 NOTE — PROGRESS NOTES
Pt does not want to take oral medications due to nausea and vomiting. Pt also does not want to start his tube feeding. Discussed with pt the importance of nutrition and medication compliance and pt states that he will let the nurse know when his stomach settles.

## 2025-03-08 NOTE — PLAN OF CARE
Problem: Discharge Planning  Goal: Discharge to home or other facility with appropriate resources  Outcome: Progressing     Problem: Skin/Tissue Integrity  Goal: Skin integrity remains intact  Description: 1.  Monitor for areas of redness and/or skin breakdown  2.  Assess vascular access sites hourly  3.  Every 4-6 hours minimum:  Change oxygen saturation probe site  4.  Every 4-6 hours:  If on nasal continuous positive airway pressure, respiratory therapy assess nares and determine need for appliance change or resting period  Outcome: Progressing     Problem: Safety - Adult  Goal: Free from fall injury  Outcome: Progressing     Problem: ABCDS Injury Assessment  Goal: Absence of physical injury  Outcome: Progressing     Problem: Pain  Goal: Verbalizes/displays adequate comfort level or baseline comfort level  Outcome: Progressing     Problem: Nutrition Deficit:  Goal: Optimize nutritional status  Outcome: Progressing  Flowsheets (Taken 3/7/2025 1030 by Robyn Stephens, RD, LD)  Nutrient intake appropriate for improving, restoring, or maintaining nutritional needs:   Assess nutritional status and recommend course of action   Recommend, monitor, and adjust tube feedings and TPN/PPN based on assessed needs

## 2025-03-08 NOTE — PROGRESS NOTES
[facility]    Admitting Date and Time: 3/3/2025  2:03 PM  Admit Dx: Failure to thrive in adult [R62.7]    Synopsis:    Prasanna Parnell is a 59 y.o. male who presents to [facility] ER complaining of failure to thrive.     Prasanna Parnell has a past medical history that includes stage I SCC of tongue/tonsil, lung mass SCC     Prasanna the ER from radiation office for decreased p.o. intake, nausea, vomiting.  He is being treated for throat cancer and is on chemo and radiation.  He has tried multiple antiemetics without success.  He does have a PEG tube but currently due to insurance issues he does not have any tube feeds.  He has been unable to eat for days. Will be admitted for initiation of tube feeds and if he cannot tolerate these, consideration of TPN.     Upon presentation to the ER, routine labwork was performed which revealed no acute abnormalities.  Imaging results are as outlined below in the Imaging section of this note.   Upon arrival to the ER, patient was 107/61.  The patient received zofran, 1L NS in the emergency room and was admitted to [facility].    3/5: Patient symptomatic with nausea this morning.  Tube feeds held for now.    3/6: Continues to have nausea.  CT abdomen/pelvis done and is unremarkable.  Oncology has consulted GI as they do not think this is a side effect of cancer treatments.    3/8: Per GI, plan is for medication adjustment and possible conversion of PEG tube PEG-J on Tuesday 3/11    Subjective:  Patient is being followed for Failure to thrive in adult [R62.7]     Patient seen and examined at bedside this morning.  Continues to complain of nausea.  States he will tell the nurses if he thinks he can tolerate tube feeding.    ROS: denies fever, chills, cp, sob, n/v, HA unless stated above.      pantoprazole (PROTONIX) 40 mg in sodium chloride (PF) 0.9 % 10 mL injection  40 mg IntraVENous Q12H    nortriptyline  25 mg Oral Nightly     bruits  Abdomen: soft, non-tender, non-distended, normal bowel sounds, no masses or organomegaly  Extremities: no cyanosis, no clubbing and no edema  Neurologic: no cranial nerve deficit and speech normal        Recent Labs     03/06/25  0947 03/07/25 0414 03/08/25  0352    136 139   K 3.6 3.9 3.3*    100 98   CO2 28 21* 22   BUN 13 15 14   CREATININE 0.8 0.6* 0.6*   GLUCOSE 94 68* 69*   CALCIUM 9.0 8.9 8.8       Recent Labs     03/06/25  0947 03/07/25  0414 03/08/25  0352   WBC 7.5 5.6 5.0   RBC 3.63* 3.57* 3.59*   HGB 11.6* 11.5* 11.5*   HCT 33.4* 33.5* 33.3*   MCV 92.0 93.8 92.8   MCH 32.0 32.2 32.0   MCHC 34.7* 34.3 34.5   RDW 12.9 12.8 12.9    140 145   MPV 10.3 10.2 10.4         Assessment:    Intractable nausea/vomiting likely 2/2 chemotherapy  Stage I SCC tongue/tonsil and stage I SCC of lung  S/p PEG tube  History of tobacco abuse      Plan:  GI following:  Stated they will review medications and make adjustments as needed  If medication adjustment does not afford relief of nausea, they plan to do PEG tube PEG-J conversion on 3/11  Continue  cc/h  Hold tube feeds for now given significant nausea, resume when patient thinks she can tolerate it  Continue scopolamine patch, Zofran and nausea  Continue PPI IV daily  Continue other medications including methadone, nystatin, Carafate  Continue oxycodone as needed for pain  Oncology, radiation oncology, palliative care all following      DC planning: Home pending improvement in symptoms    NOTE: This report was transcribed using voice recognition software. Every effort was made to ensure accuracy; however, inadvertent computerized transcription errors may be present.    Electronically signed by Joon Arzate MD on 3/8/2025 at 10:07 AM

## 2025-03-08 NOTE — PROGRESS NOTES
Pt requesting oral food as he does not want to start the tube feeding but is having less nausea. Doctor made aware

## 2025-03-08 NOTE — PROGRESS NOTES
[facility]  [facility] 5WE ORTHO-TRAUMA  [address]  [facility] [address]  Dept: [phone]  Loc: [phone]  Jayde Fernandez MD   ·   MD Adriana Lucero APRN  ·  FADI [place]  Inpatient Medical Oncology/Hematology progress Note    Patient Name: Prasanna Parnell  YOB: 1965    DATE OF ADMISSION: 3/3/2025  DATE OF CONSULTATION: 3/3/2025  CONSULTING PROVIDER: Tammie Higginbotham DO  REASON FOR CONSULTATION: SCC  PCP: No primary care provider on file.    Room: [address] [place] [address]      CHIEF COMPLAINT:  Failure to thrive     Subjective:  The patient is complaining of dry heaves, received anti-emetics earlier.    HISTORY OF PRESENT ILLNESS (3/4/2025):   Patient is a 59 y.o. 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 Since starting treatment, patient experiencing nausea and vomiting despite taking several antiemetics. Completed day 8, cycle 1 of cisplatin on 2/27/2025 with intravenous hydration. Next chemotherapy scheduled for 3/6/2025. PEG tube in place but due to not having insurance he is unable to supplement with tube feeding. Patient was sent to ED from radiation oncology clinic for evaluation of dizziness, low BP, and decrease oral intake. Work up in ED showing all labs normal except hematocrit 36.8, MCHC 36.1, absolute lymphocytes 0.81, and lymphocytes 13%.     Patient seen and examined in room. Tube feeding initiated, tolerating. Denies nausea, vomiting, diarrhea, abdominal pain, or fever this morning. Admits to oral soreness, fatigue, and lightheadedness.          Oncology History   Malignant neoplasm of overlapping sites of tonsil  PEG-J.  Labs reviewed  Follow-up with Dr. Tomas after DC from the hospital       Jayde Fernandez MD   HEMATOLOGY/MEDICAL ONCOLOGY  [facility] MED ONCOLOG[facility]  [address] [place] [id number]  Dept: [phone]  Loc: [phone]         [facility] Office  P: [phone]  F: [phone]    [facility]) Office  P: [phone]  F: [phone]

## 2025-03-09 PROCEDURE — 1200000000 HC SEMI PRIVATE

## 2025-03-09 PROCEDURE — 2500000003 HC RX 250 WO HCPCS: Performed by: INTERNAL MEDICINE

## 2025-03-09 PROCEDURE — 6360000002 HC RX W HCPCS: Performed by: INTERNAL MEDICINE

## 2025-03-09 PROCEDURE — 99232 SBSQ HOSP IP/OBS MODERATE 35: CPT | Performed by: INTERNAL MEDICINE

## 2025-03-09 PROCEDURE — 2580000003 HC RX 258: Performed by: STUDENT IN AN ORGANIZED HEALTH CARE EDUCATION/TRAINING PROGRAM

## 2025-03-09 PROCEDURE — 6370000000 HC RX 637 (ALT 250 FOR IP): Performed by: INTERNAL MEDICINE

## 2025-03-09 PROCEDURE — 6370000000 HC RX 637 (ALT 250 FOR IP): Performed by: STUDENT IN AN ORGANIZED HEALTH CARE EDUCATION/TRAINING PROGRAM

## 2025-03-09 PROCEDURE — 6360000002 HC RX W HCPCS: Performed by: NURSE PRACTITIONER

## 2025-03-09 PROCEDURE — 6360000002 HC RX W HCPCS: Performed by: STUDENT IN AN ORGANIZED HEALTH CARE EDUCATION/TRAINING PROGRAM

## 2025-03-09 RX ADMIN — HYDROMORPHONE HYDROCHLORIDE 0.5 MG: 1 INJECTION, SOLUTION INTRAMUSCULAR; INTRAVENOUS; SUBCUTANEOUS at 23:46

## 2025-03-09 RX ADMIN — PROCHLORPERAZINE EDISYLATE 10 MG: 5 INJECTION INTRAMUSCULAR; INTRAVENOUS at 15:43

## 2025-03-09 RX ADMIN — PROCHLORPERAZINE EDISYLATE 10 MG: 5 INJECTION INTRAMUSCULAR; INTRAVENOUS at 19:32

## 2025-03-09 RX ADMIN — PROCHLORPERAZINE EDISYLATE 10 MG: 5 INJECTION INTRAMUSCULAR; INTRAVENOUS at 07:48

## 2025-03-09 RX ADMIN — HYDROMORPHONE HYDROCHLORIDE 0.5 MG: 1 INJECTION, SOLUTION INTRAMUSCULAR; INTRAVENOUS; SUBCUTANEOUS at 09:33

## 2025-03-09 RX ADMIN — OXYCODONE AND ACETAMINOPHEN 2 TABLET: 325; 5 TABLET ORAL at 04:05

## 2025-03-09 RX ADMIN — PANTOPRAZOLE SODIUM 40 MG: 40 INJECTION, POWDER, FOR SOLUTION INTRAVENOUS at 19:32

## 2025-03-09 RX ADMIN — SODIUM CHLORIDE, PRESERVATIVE FREE 10 ML: 5 INJECTION INTRAVENOUS at 07:48

## 2025-03-09 RX ADMIN — NORTRIPTYLINE HYDROCHLORIDE 25 MG: 25 CAPSULE ORAL at 19:32

## 2025-03-09 RX ADMIN — OXYCODONE AND ACETAMINOPHEN 2 TABLET: 325; 5 TABLET ORAL at 19:33

## 2025-03-09 RX ADMIN — ONDANSETRON 4 MG: 2 INJECTION, SOLUTION INTRAMUSCULAR; INTRAVENOUS at 13:40

## 2025-03-09 RX ADMIN — HYDROMORPHONE HYDROCHLORIDE 0.5 MG: 1 INJECTION, SOLUTION INTRAMUSCULAR; INTRAVENOUS; SUBCUTANEOUS at 15:46

## 2025-03-09 RX ADMIN — OXYCODONE AND ACETAMINOPHEN 2 TABLET: 325; 5 TABLET ORAL at 13:40

## 2025-03-09 RX ADMIN — SODIUM CHLORIDE, PRESERVATIVE FREE 10 ML: 5 INJECTION INTRAVENOUS at 19:32

## 2025-03-09 RX ADMIN — PROCHLORPERAZINE EDISYLATE 10 MG: 5 INJECTION INTRAMUSCULAR; INTRAVENOUS at 03:44

## 2025-03-09 RX ADMIN — PANTOPRAZOLE SODIUM 40 MG: 40 INJECTION, POWDER, FOR SOLUTION INTRAVENOUS at 07:48

## 2025-03-09 ASSESSMENT — PAIN SCALES - GENERAL
PAINLEVEL_OUTOF10: 8
PAINLEVEL_OUTOF10: 5
PAINLEVEL_OUTOF10: 4
PAINLEVEL_OUTOF10: 8
PAINLEVEL_OUTOF10: 10
PAINLEVEL_OUTOF10: 3
PAINLEVEL_OUTOF10: 7
PAINLEVEL_OUTOF10: 4
PAINLEVEL_OUTOF10: 7
PAINLEVEL_OUTOF10: 3
PAINLEVEL_OUTOF10: 7

## 2025-03-09 ASSESSMENT — PAIN DESCRIPTION - ONSET
ONSET: ON-GOING

## 2025-03-09 ASSESSMENT — PAIN DESCRIPTION - FREQUENCY
FREQUENCY: CONTINUOUS

## 2025-03-09 ASSESSMENT — PAIN DESCRIPTION - LOCATION
LOCATION: NECK
LOCATION: NECK;JAW
LOCATION: NECK
LOCATION: NECK

## 2025-03-09 ASSESSMENT — PAIN DESCRIPTION - ORIENTATION
ORIENTATION: RIGHT
ORIENTATION: RIGHT
ORIENTATION: RIGHT;LEFT
ORIENTATION: RIGHT
ORIENTATION: RIGHT;LEFT;LOWER
ORIENTATION: RIGHT

## 2025-03-09 ASSESSMENT — PAIN DESCRIPTION - DESCRIPTORS
DESCRIPTORS: ACHING;DISCOMFORT;TIGHTNESS
DESCRIPTORS: ACHING;DISCOMFORT;SORE
DESCRIPTORS: THROBBING;TENDER;DISCOMFORT
DESCRIPTORS: ACHING;DISCOMFORT;TENDER;SORE
DESCRIPTORS: ACHING;DISCOMFORT;SORE

## 2025-03-09 ASSESSMENT — PAIN DESCRIPTION - PAIN TYPE
TYPE: CHRONIC PAIN

## 2025-03-09 NOTE — PROGRESS NOTES
[facility]    Admitting Date and Time: 3/3/2025  2:03 PM  Admit Dx: Failure to thrive in adult [R62.7]    Synopsis:    Prasanna Parnell is a 59 y.o. male who presents to [facility] ER complaining of failure to thrive.     Prasanna Parnell has a past medical history that includes stage I SCC of tongue/tonsil, lung mass SCC     Prasanna the ER from radiation office for decreased p.o. intake, nausea, vomiting.  He is being treated for throat cancer and is on chemo and radiation.  He has tried multiple antiemetics without success.  He does have a PEG tube but currently due to insurance issues he does not have any tube feeds.  He has been unable to eat for days. Will be admitted for initiation of tube feeds and if he cannot tolerate these, consideration of TPN.     Upon presentation to the ER, routine labwork was performed which revealed no acute abnormalities.  Imaging results are as outlined below in the Imaging section of this note.   Upon arrival to the ER, patient was 107/61.  The patient received zofran, 1L NS in the emergency room and was admitted to [facility].    3/5: Patient symptomatic with nausea this morning.  Tube feeds held for now.    3/6: Continues to have nausea.  CT abdomen/pelvis done and is unremarkable.  Oncology has consulted GI as they do not think this is a side effect of cancer treatments.    3/8: Patient received Benadryl IV x 1, Ativan IV x 1, and started on nortriptyline nightly and PPI IV twice daily by GI.  If symptoms do not improve with these plan is possible conversion of PEG tube PEG-J on Tuesday 3/11    Subjective:  Patient is being followed for Failure to thrive in adult [R62.7]     Patient seen and examined at bedside this morning.  States symptoms mildly improved with medications prescribed by GI.  He was able to tolerate small amount of his GI bland diet yesterday.    ROS: denies fever, chills, cp, sob, n/v, HA unless stated above.       distress  Cardiovascular: normal rate, normal S1 and S2 and no carotid bruits  Abdomen: soft, non-tender, non-distended, normal bowel sounds, no masses or organomegaly  Extremities: no cyanosis, no clubbing and no edema  Neurologic: no cranial nerve deficit and speech normal        Recent Labs     03/07/25 0414 03/08/25  0352    139   K 3.9 3.3*    98   CO2 21* 22   BUN 15 14   CREATININE 0.6* 0.6*   GLUCOSE 68* 69*   CALCIUM 8.9 8.8       Recent Labs     03/07/25  0414 03/08/25  0352   WBC 5.6 5.0   RBC 3.57* 3.59*   HGB 11.5* 11.5*   HCT 33.5* 33.3*   MCV 93.8 92.8   MCH 32.2 32.0   MCHC 34.3 34.5   RDW 12.8 12.9    145   MPV 10.2 10.4         Assessment:    Intractable nausea/vomiting likely 2/2 chemotherapy  Stage I SCC tongue/tonsil and stage I SCC of lung  S/p PEG tube  History of tobacco abuse      Plan:  GI following:  S/p Benadryl and Ativan IV x 1 on 3/8  Started on PPI IV twice daily and nortriptyline nightly  Patient has had mild symptom improvement with above medications and was able to tolerate small amount of bland GI diet on 3/8  If medication adjustment does not afford relief of nausea, they plan to do PEG tube PEG-J conversion on 3/11  Continue  cc/h  Hold tube feeds for now given significant nausea, resume when patient thinks she can tolerate it  Continue scopolamine patch, Zofran and nausea  Continue PPI IV daily  Continue other medications including methadone, nystatin, Carafate  Continue oxycodone as needed for pain  Oncology, radiation oncology, palliative care all following      DC planning: Home pending improvement in symptoms    NOTE: This report was transcribed using voice recognition software. Every effort was made to ensure accuracy; however, inadvertent computerized transcription errors may be present.    Electronically signed by Joon Arzate MD on 3/9/2025 at 11:29 AM

## 2025-03-09 NOTE — PLAN OF CARE
Problem: Discharge Planning  Goal: Discharge to home or other facility with appropriate resources  3/8/2025 2332 by Jovita Krishnan RN  Outcome: Progressing  3/8/2025 1031 by Roya Aragon RN  Outcome: Progressing     Problem: Skin/Tissue Integrity  Goal: Skin integrity remains intact  Description: 1.  Monitor for areas of redness and/or skin breakdown  2.  Assess vascular access sites hourly  3.  Every 4-6 hours minimum:  Change oxygen saturation probe site  4.  Every 4-6 hours:  If on nasal continuous positive airway pressure, respiratory therapy assess nares and determine need for appliance change or resting period  3/8/2025 2332 by Jovita Krishnan RN  Outcome: Progressing  Flowsheets (Taken 3/8/2025 2000)  Skin Integrity Remains Intact: Monitor for areas of redness and/or skin breakdown  3/8/2025 1031 by Roya Aragon, RN  Outcome: Progressing     Problem: Safety - Adult  Goal: Free from fall injury  3/8/2025 2332 by Jovita Krishnan RN  Outcome: Progressing  3/8/2025 1031 by Roya Aragon RN  Outcome: Progressing

## 2025-03-09 NOTE — PROGRESS NOTES
[facility] [place]  [facility] ORTHO-TRAUMA  [address]  [facility] [address]  Dept: [phone]  Loc: [phone]  Jayde Fernandez MD   ·   MD Adriana Lucero APRN  ·  FADI [place]  Inpatient Medical Oncology/Hematology progress Note    Patient Name: Prasanna Parnell  YOB: 1965    DATE OF ADMISSION: 3/3/2025  DATE OF CONSULTATION: 3/3/2025  CONSULTING PROVIDER: Tammie Higginbotham DO  REASON FOR CONSULTATION: SCC  PCP: No primary care provider on file.    Room: [address] [place] [address]      CHIEF COMPLAINT:  Failure to thrive     Subjective:  The patient was seen and examined, the nausea is better, no vomiting, he is sleepy.    HISTORY OF PRESENT ILLNESS (3/4/2025):   Patient is a 59 y.o. 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 Since starting treatment, patient experiencing nausea and vomiting despite taking several antiemetics. Completed day 8, cycle 1 of cisplatin on 2/27/2025 with intravenous hydration. Next chemotherapy scheduled for 3/6/2025. PEG tube in place but due to not having insurance he is unable to supplement with tube feeding. Patient was sent to ED from radiation oncology clinic for evaluation of dizziness, low BP, and decrease oral intake. Work up in ED showing all labs normal except hematocrit 36.8, MCHC 36.1, absolute lymphocytes 0.81, and lymphocytes 13%.               Oncology History   Malignant neoplasm of overlapping sites of tonsil (HCC)   2/20/2025 -  Chemotherapy    OP CISplatin 40 mg/m2 Q7D  Plan Provider: Roberto Tomas MD  Treatment goal: Curative  Line of treatment: 1st Line                 VITALS:    /61    [facility]  [facility]  [address] [facility] [address]  Dept: [phone]  Loc: [phone]         [facility] Office  P: [phone]  F: [phone]    [facility] Office  P: [phone]  F: [phone]

## 2025-03-09 NOTE — PROGRESS NOTES
Pt able to tolerate small amounts of food and was able to handle the norco this morning. However, wants to wait to see if he's able to handle anything else orally. Pt will notify the nurse if he is ready to try medications

## 2025-03-10 ENCOUNTER — HOSPITAL ENCOUNTER (OUTPATIENT)
Dept: INFUSION THERAPY | Age: 60
Discharge: HOME | End: 2025-03-10

## 2025-03-10 LAB
ANION GAP SERPL CALCULATED.3IONS-SCNC: 17 MMOL/L (ref 7–16)
BASOPHILS # BLD: 0 K/UL (ref 0–0.2)
BASOPHILS NFR BLD: 0 % (ref 0–2)
BUN SERPL-MCNC: 8 MG/DL (ref 6–20)
CALCIUM SERPL-MCNC: 9.1 MG/DL (ref 8.6–10.2)
CHLORIDE SERPL-SCNC: 100 MMOL/L (ref 98–107)
CO2 SERPL-SCNC: 24 MMOL/L (ref 22–29)
CREAT SERPL-MCNC: 0.6 MG/DL (ref 0.7–1.2)
EOSINOPHIL # BLD: 0.18 K/UL (ref 0.05–0.5)
EOSINOPHILS RELATIVE PERCENT: 3 % (ref 0–6)
ERYTHROCYTE [DISTWIDTH] IN BLOOD BY AUTOMATED COUNT: 13.3 % (ref 11.5–15)
GFR, ESTIMATED: >90 ML/MIN/1.73M2
GLUCOSE SERPL-MCNC: 95 MG/DL (ref 74–99)
HCT VFR BLD AUTO: 32.4 % (ref 37–54)
HGB BLD-MCNC: 11.4 G/DL (ref 12.5–16.5)
LYMPHOCYTES NFR BLD: 0.49 K/UL (ref 1.5–4)
LYMPHOCYTES RELATIVE PERCENT: 7 % (ref 20–42)
MCH RBC QN AUTO: 32.2 PG (ref 26–35)
MCHC RBC AUTO-ENTMCNC: 35.2 G/DL (ref 32–34.5)
MCV RBC AUTO: 91.5 FL (ref 80–99.9)
MONOCYTES NFR BLD: 0.31 K/UL (ref 0.1–0.95)
MONOCYTES NFR BLD: 4 % (ref 2–12)
NEUTROPHILS NFR BLD: 86 % (ref 43–80)
NEUTS SEG NFR BLD: 6.02 K/UL (ref 1.8–7.3)
PLATELET # BLD AUTO: 171 K/UL (ref 130–450)
PMV BLD AUTO: 10 FL (ref 7–12)
POTASSIUM SERPL-SCNC: 3.3 MMOL/L (ref 3.5–5)
RBC # BLD AUTO: 3.54 M/UL (ref 3.8–5.8)
RBC # BLD: ABNORMAL 10*6/UL
SODIUM SERPL-SCNC: 141 MMOL/L (ref 132–146)
WBC OTHER # BLD: 7 K/UL (ref 4.5–11.5)

## 2025-03-10 PROCEDURE — 2580000003 HC RX 258: Performed by: STUDENT IN AN ORGANIZED HEALTH CARE EDUCATION/TRAINING PROGRAM

## 2025-03-10 PROCEDURE — 6370000000 HC RX 637 (ALT 250 FOR IP): Performed by: NURSE PRACTITIONER

## 2025-03-10 PROCEDURE — 1200000000 HC SEMI PRIVATE

## 2025-03-10 PROCEDURE — 99232 SBSQ HOSP IP/OBS MODERATE 35: CPT | Performed by: INTERNAL MEDICINE

## 2025-03-10 PROCEDURE — 6360000002 HC RX W HCPCS: Performed by: NURSE PRACTITIONER

## 2025-03-10 PROCEDURE — 6360000002 HC RX W HCPCS: Performed by: INTERNAL MEDICINE

## 2025-03-10 PROCEDURE — 85025 COMPLETE CBC W/AUTO DIFF WBC: CPT

## 2025-03-10 PROCEDURE — 36415 COLL VENOUS BLD VENIPUNCTURE: CPT

## 2025-03-10 PROCEDURE — 99232 SBSQ HOSP IP/OBS MODERATE 35: CPT | Performed by: STUDENT IN AN ORGANIZED HEALTH CARE EDUCATION/TRAINING PROGRAM

## 2025-03-10 PROCEDURE — 6360000002 HC RX W HCPCS: Performed by: STUDENT IN AN ORGANIZED HEALTH CARE EDUCATION/TRAINING PROGRAM

## 2025-03-10 PROCEDURE — 2500000003 HC RX 250 WO HCPCS: Performed by: INTERNAL MEDICINE

## 2025-03-10 PROCEDURE — 6370000000 HC RX 637 (ALT 250 FOR IP): Performed by: STUDENT IN AN ORGANIZED HEALTH CARE EDUCATION/TRAINING PROGRAM

## 2025-03-10 PROCEDURE — 80048 BASIC METABOLIC PNL TOTAL CA: CPT

## 2025-03-10 PROCEDURE — 6370000000 HC RX 637 (ALT 250 FOR IP): Performed by: INTERNAL MEDICINE

## 2025-03-10 RX ORDER — POTASSIUM CHLORIDE 7.45 MG/ML
10 INJECTION INTRAVENOUS
Status: COMPLETED | OUTPATIENT
Start: 2025-03-10 | End: 2025-03-10

## 2025-03-10 RX ADMIN — ENOXAPARIN SODIUM 40 MG: 100 INJECTION SUBCUTANEOUS at 08:19

## 2025-03-10 RX ADMIN — HYDROMORPHONE HYDROCHLORIDE 0.5 MG: 1 INJECTION, SOLUTION INTRAMUSCULAR; INTRAVENOUS; SUBCUTANEOUS at 08:27

## 2025-03-10 RX ADMIN — POTASSIUM CHLORIDE 10 MEQ: 7.46 INJECTION, SOLUTION INTRAVENOUS at 10:00

## 2025-03-10 RX ADMIN — SODIUM CHLORIDE, PRESERVATIVE FREE 10 ML: 5 INJECTION INTRAVENOUS at 20:01

## 2025-03-10 RX ADMIN — PROCHLORPERAZINE EDISYLATE 10 MG: 5 INJECTION INTRAMUSCULAR; INTRAVENOUS at 04:00

## 2025-03-10 RX ADMIN — SUCRALFATE 1 G: 1 TABLET ORAL at 08:19

## 2025-03-10 RX ADMIN — PROCHLORPERAZINE EDISYLATE 10 MG: 5 INJECTION INTRAMUSCULAR; INTRAVENOUS at 14:59

## 2025-03-10 RX ADMIN — SODIUM CHLORIDE, PRESERVATIVE FREE 10 ML: 5 INJECTION INTRAVENOUS at 08:24

## 2025-03-10 RX ADMIN — METHADONE HYDROCHLORIDE 5 MG: 10 TABLET ORAL at 08:18

## 2025-03-10 RX ADMIN — POTASSIUM CHLORIDE 10 MEQ: 7.46 INJECTION, SOLUTION INTRAVENOUS at 12:00

## 2025-03-10 RX ADMIN — SUCRALFATE 1 G: 1 TABLET ORAL at 12:13

## 2025-03-10 RX ADMIN — HYDROMORPHONE HYDROCHLORIDE 0.5 MG: 1 INJECTION, SOLUTION INTRAMUSCULAR; INTRAVENOUS; SUBCUTANEOUS at 04:00

## 2025-03-10 RX ADMIN — POTASSIUM CHLORIDE 10 MEQ: 7.46 INJECTION, SOLUTION INTRAVENOUS at 09:16

## 2025-03-10 RX ADMIN — SUCRALFATE 1 G: 1 TABLET ORAL at 16:34

## 2025-03-10 RX ADMIN — PANTOPRAZOLE SODIUM 40 MG: 40 INJECTION, POWDER, FOR SOLUTION INTRAVENOUS at 08:21

## 2025-03-10 RX ADMIN — HYDROMORPHONE HYDROCHLORIDE 0.5 MG: 1 INJECTION, SOLUTION INTRAMUSCULAR; INTRAVENOUS; SUBCUTANEOUS at 14:59

## 2025-03-10 RX ADMIN — NYSTATIN 500000 UNITS: 100000 SUSPENSION ORAL at 08:20

## 2025-03-10 RX ADMIN — PROCHLORPERAZINE EDISYLATE 10 MG: 5 INJECTION INTRAMUSCULAR; INTRAVENOUS at 08:21

## 2025-03-10 ASSESSMENT — PAIN SCALES - GENERAL
PAINLEVEL_OUTOF10: 5
PAINLEVEL_OUTOF10: 5
PAINLEVEL_OUTOF10: 7
PAINLEVEL_OUTOF10: 8
PAINLEVEL_OUTOF10: 4
PAINLEVEL_OUTOF10: 6
PAINLEVEL_OUTOF10: 8
PAINLEVEL_OUTOF10: 5

## 2025-03-10 ASSESSMENT — PAIN DESCRIPTION - FREQUENCY
FREQUENCY: CONTINUOUS

## 2025-03-10 ASSESSMENT — PAIN SCALES - WONG BAKER
WONGBAKER_NUMERICALRESPONSE: NO HURT

## 2025-03-10 ASSESSMENT — PAIN - FUNCTIONAL ASSESSMENT
PAIN_FUNCTIONAL_ASSESSMENT: ACTIVITIES ARE NOT PREVENTED
PAIN_FUNCTIONAL_ASSESSMENT: PREVENTS OR INTERFERES SOME ACTIVE ACTIVITIES AND ADLS

## 2025-03-10 ASSESSMENT — PAIN DESCRIPTION - DESCRIPTORS
DESCRIPTORS: ACHING;DISCOMFORT;SORE
DESCRIPTORS: ACHING;SORE;TENDER
DESCRIPTORS: DISCOMFORT;TENDER;THROBBING
DESCRIPTORS: SORE;TENDER
DESCRIPTORS: ACHING;SORE;TENDER

## 2025-03-10 ASSESSMENT — PAIN DESCRIPTION - LOCATION
LOCATION: NECK;JAW
LOCATION: JAW;NECK
LOCATION: JAW;NECK
LOCATION: EAR;JAW

## 2025-03-10 ASSESSMENT — PAIN DESCRIPTION - ONSET
ONSET: ON-GOING

## 2025-03-10 ASSESSMENT — PAIN DESCRIPTION - ORIENTATION
ORIENTATION: RIGHT
ORIENTATION: RIGHT

## 2025-03-10 ASSESSMENT — PAIN DESCRIPTION - PAIN TYPE
TYPE: CHRONIC PAIN

## 2025-03-10 NOTE — PLAN OF CARE
Problem: Discharge Planning  Goal: Discharge to home or other facility with appropriate resources  3/9/2025 2219 by Jovita Krishnan RN  Outcome: Progressing  3/9/2025 1044 by Roya Aragon RN  Outcome: Progressing     Problem: Skin/Tissue Integrity  Goal: Skin integrity remains intact  Description: 1.  Monitor for areas of redness and/or skin breakdown  2.  Assess vascular access sites hourly  3.  Every 4-6 hours minimum:  Change oxygen saturation probe site  4.  Every 4-6 hours:  If on nasal continuous positive airway pressure, respiratory therapy assess nares and determine need for appliance change or resting period  3/9/2025 2219 by Jovita Krishnan, RN  Outcome: Progressing  3/9/2025 1044 by Roya Aragon RN  Outcome: Progressing     Problem: Safety - Adult  Goal: Free from fall injury  3/9/2025 2219 by Jovita Krishnan, RN  Outcome: Progressing  3/9/2025 1044 by Roya Aragon, RN  Outcome: Progressing

## 2025-03-10 NOTE — CARE COORDINATION
3/10/2025social work transition of care planning  Sw followed up with pt at bedside. Pt plan remains for home,once medically stable. Pt will call for a ride at [facility].Pt [facility] eligible for help with meds at [facility].  Electronically signed by BRIAN Jay on 3/10/2025 at 3:51 PM

## 2025-03-10 NOTE — PROGRESS NOTES
[facility]    Admitting Date and Time: 3/3/2025  2:03 PM  Admit Dx: Failure to thrive in adult [R62.7]    Synopsis:    Prasanna Parnell is a 59 y.o. male who presents to [facility] ER complaining of failure to thrive.     Prasanna Parnell has a past medical history that includes stage I SCC of tongue/tonsil, lung mass SCC     Prasanna the ER from radiation office for decreased p.o. intake, nausea, vomiting.  He is being treated for throat cancer and is on chemo and radiation.  He has tried multiple antiemetics without success.  He does have a PEG tube but currently due to insurance issues he does not have any tube feeds.  He has been unable to eat for days. Will be admitted for initiation of tube feeds and if he cannot tolerate these, consideration of TPN.     Upon presentation to the ER, routine labwork was performed which revealed no acute abnormalities.  Imaging results are as outlined below in the Imaging section of this note.   Upon arrival to the ER, patient was 107/61.  The patient received zofran, 1L NS in the emergency room and was admitted to [facility].    3/5: Patient symptomatic with nausea this morning.  Tube feeds held for now.    3/6: Continues to have nausea.  CT abdomen/pelvis done and is unremarkable.  Oncology has consulted GI as they do not think this is a side effect of cancer treatments.    3/8: Patient received Benadryl IV x 1, Ativan IV x 1, and started on nortriptyline nightly and PPI IV twice daily by GI.  If symptoms do not improve with these plan is possible conversion of PEG tube PEG-J on Tuesday 3/11.    3/10: Still significant nausea unable to tolerate tube feeds.  Await GI recs.    Subjective:  Patient is being followed for Failure to thrive in adult [R62.7]     Patient seen and examined at bedside this morning.  Still has poor tolerance with diet and tube feeds.    ROS: denies fever, chills, cp, sob, n/v, HA unless stated above.      potassium  movement, no respiratory distress  Cardiovascular: normal rate, normal S1 and S2 and no carotid bruits  Abdomen: soft, non-tender, non-distended, normal bowel sounds, no masses or organomegaly  Extremities: no cyanosis, no clubbing and no edema  Neurologic: no cranial nerve deficit and speech normal        Recent Labs     03/08/25  0352 03/10/25  0422    141   K 3.3* 3.3*   CL 98 100   CO2 22 24   BUN 14 8   CREATININE 0.6* 0.6*   GLUCOSE 69* 95   CALCIUM 8.8 9.1       Recent Labs     03/08/25  0352 03/10/25  0422   WBC 5.0 7.0   RBC 3.59* 3.54*   HGB 11.5* 11.4*   HCT 33.3* 32.4*   MCV 92.8 91.5   MCH 32.0 32.2   MCHC 34.5 35.2*   RDW 12.9 13.3    171   MPV 10.4 10.0         Assessment:    Intractable nausea/vomiting likely 2/2 chemotherapy  Stage I SCC tongue/tonsil and stage I SCC of lung  S/p PEG tube  History of tobacco abuse      Plan:  GI following:  S/p Benadryl and Ativan IV x 1 on 3/8  Started on PPI IV twice daily and nortriptyline nightly  Patient has had mild symptom improvement with above medications and was able to tolerate small amount of bland GI diet on 3/8 but continues to have significant nausea today  If medication adjustment does not afford relief of nausea, they plan to do PEG tube PEG-J conversion on 3/11  Continue  cc/h  Hold tube feeds for now given significant nausea, resume when patient thinks she can tolerate it  Continue scopolamine patch, Zofran and nausea  Continue PPI IV daily  Continue other medications including methadone, nystatin, Carafate  Continue oxycodone as needed for pain  Oncology, radiation oncology, palliative care all following      DC planning: Home pending improvement in symptoms    NOTE: This report was transcribed using voice recognition software. Every effort was made to ensure accuracy; however, inadvertent computerized transcription errors may be present.    Electronically signed by Joon Arzate MD on 3/10/2025 at 12:08 PM

## 2025-03-10 NOTE — PROGRESS NOTES
Duplicate message. Please see other message   or bruising.  Skin: No rashes or jaundice  Neuro: A&O x 3, CN grossly intact, non-focal exam    ASSESSMENT:     59y/M w/ history of SCC of tongue and lungs undergoing chemoradiation who presents to the ED with nausea/vomiting and inability to tolerate PO intake.     PLAN:   - Continue all medications.  - Will plan for PEG exchange to PEG-J tomorrow. NPO at MN and hold anticoagulation.   - Continue compazine, nortriptyline, scopolamine.   - Consider scheduled Ativan.  - Palliative Care.     We will follow.    Patient seen and examined separately and discussed w/ LAURA Valles. Agree w/ history, physical exam, and assessment/plan as described above. Note changes reflected above.    Thank you for including us in the care of this patient. Please do not hesitate to contact us with any additional questions or concerns.    Haile Wiseman MD  Gastroenterology/Hepatology  Advanced Endoscopy

## 2025-03-11 ENCOUNTER — ANESTHESIA EVENT (OUTPATIENT)
Dept: ENDOSCOPY | Age: 60
End: 2025-03-11
Payer: COMMERCIAL

## 2025-03-11 ENCOUNTER — ANESTHESIA (OUTPATIENT)
Dept: ENDOSCOPY | Age: 60
End: 2025-03-11
Payer: COMMERCIAL

## 2025-03-11 PROBLEM — Z71.89 GOALS OF CARE, COUNSELING/DISCUSSION: Status: ACTIVE | Noted: 2025-03-11

## 2025-03-11 PROBLEM — R11.2 NAUSEA & VOMITING: Status: ACTIVE | Noted: 2025-03-03

## 2025-03-11 LAB
ANION GAP SERPL CALCULATED.3IONS-SCNC: 15 MMOL/L (ref 7–16)
BASOPHILS # BLD: 0.04 K/UL (ref 0–0.2)
BASOPHILS NFR BLD: 1 % (ref 0–2)
BUN SERPL-MCNC: 11 MG/DL (ref 6–20)
CALCIUM SERPL-MCNC: 9.2 MG/DL (ref 8.6–10.2)
CHLORIDE SERPL-SCNC: 98 MMOL/L (ref 98–107)
CO2 SERPL-SCNC: 25 MMOL/L (ref 22–29)
CREAT SERPL-MCNC: 0.7 MG/DL (ref 0.7–1.2)
EOSINOPHIL # BLD: 0.17 K/UL (ref 0.05–0.5)
EOSINOPHILS RELATIVE PERCENT: 3 % (ref 0–6)
ERYTHROCYTE [DISTWIDTH] IN BLOOD BY AUTOMATED COUNT: 13.5 % (ref 11.5–15)
GFR, ESTIMATED: >90 ML/MIN/1.73M2
GLUCOSE SERPL-MCNC: 105 MG/DL (ref 74–99)
HCT VFR BLD AUTO: 32.3 % (ref 37–54)
HGB BLD-MCNC: 11.6 G/DL (ref 12.5–16.5)
IMM GRANULOCYTES # BLD AUTO: 0.05 K/UL (ref 0–0.58)
IMM GRANULOCYTES NFR BLD: 1 % (ref 0–5)
LYMPHOCYTES NFR BLD: 0.53 K/UL (ref 1.5–4)
LYMPHOCYTES RELATIVE PERCENT: 8 % (ref 20–42)
MCH RBC QN AUTO: 32.7 PG (ref 26–35)
MCHC RBC AUTO-ENTMCNC: 35.9 G/DL (ref 32–34.5)
MCV RBC AUTO: 91 FL (ref 80–99.9)
MONOCYTES NFR BLD: 0.62 K/UL (ref 0.1–0.95)
MONOCYTES NFR BLD: 9 % (ref 2–12)
NEUTROPHILS NFR BLD: 79 % (ref 43–80)
NEUTS SEG NFR BLD: 5.35 K/UL (ref 1.8–7.3)
PLATELET # BLD AUTO: 170 K/UL (ref 130–450)
PMV BLD AUTO: 9.6 FL (ref 7–12)
POTASSIUM SERPL-SCNC: 3.7 MMOL/L (ref 3.5–5)
RBC # BLD AUTO: 3.55 M/UL (ref 3.8–5.8)
RBC # BLD: ABNORMAL 10*6/UL
SODIUM SERPL-SCNC: 138 MMOL/L (ref 132–146)
WBC OTHER # BLD: 6.8 K/UL (ref 4.5–11.5)

## 2025-03-11 PROCEDURE — 6360000002 HC RX W HCPCS: Performed by: STUDENT IN AN ORGANIZED HEALTH CARE EDUCATION/TRAINING PROGRAM

## 2025-03-11 PROCEDURE — 6370000000 HC RX 637 (ALT 250 FOR IP): Performed by: INTERNAL MEDICINE

## 2025-03-11 PROCEDURE — 7100000001 HC PACU RECOVERY - ADDTL 15 MIN: Performed by: STUDENT IN AN ORGANIZED HEALTH CARE EDUCATION/TRAINING PROGRAM

## 2025-03-11 PROCEDURE — 2580000003 HC RX 258: Performed by: STUDENT IN AN ORGANIZED HEALTH CARE EDUCATION/TRAINING PROGRAM

## 2025-03-11 PROCEDURE — 85025 COMPLETE CBC W/AUTO DIFF WBC: CPT

## 2025-03-11 PROCEDURE — 99232 SBSQ HOSP IP/OBS MODERATE 35: CPT | Performed by: INTERNAL MEDICINE

## 2025-03-11 PROCEDURE — 36415 COLL VENOUS BLD VENIPUNCTURE: CPT

## 2025-03-11 PROCEDURE — 2709999900 HC NON-CHARGEABLE SUPPLY: Performed by: STUDENT IN AN ORGANIZED HEALTH CARE EDUCATION/TRAINING PROGRAM

## 2025-03-11 PROCEDURE — 2580000003 HC RX 258: Performed by: INTERNAL MEDICINE

## 2025-03-11 PROCEDURE — 3700000001 HC ADD 15 MINUTES (ANESTHESIA): Performed by: STUDENT IN AN ORGANIZED HEALTH CARE EDUCATION/TRAINING PROGRAM

## 2025-03-11 PROCEDURE — 44373 SMALL BOWEL ENDOSCOPY: CPT | Performed by: STUDENT IN AN ORGANIZED HEALTH CARE EDUCATION/TRAINING PROGRAM

## 2025-03-11 PROCEDURE — 7100000000 HC PACU RECOVERY - FIRST 15 MIN: Performed by: STUDENT IN AN ORGANIZED HEALTH CARE EDUCATION/TRAINING PROGRAM

## 2025-03-11 PROCEDURE — 0DHA8UZ INSERTION OF FEEDING DEVICE INTO JEJUNUM, VIA NATURAL OR ARTIFICIAL OPENING ENDOSCOPIC: ICD-10-PCS | Performed by: STUDENT IN AN ORGANIZED HEALTH CARE EDUCATION/TRAINING PROGRAM

## 2025-03-11 PROCEDURE — 80048 BASIC METABOLIC PNL TOTAL CA: CPT

## 2025-03-11 PROCEDURE — 6370000000 HC RX 637 (ALT 250 FOR IP): Performed by: NURSE PRACTITIONER

## 2025-03-11 PROCEDURE — 1200000000 HC SEMI PRIVATE

## 2025-03-11 PROCEDURE — 2500000003 HC RX 250 WO HCPCS: Performed by: INTERNAL MEDICINE

## 2025-03-11 PROCEDURE — 6360000002 HC RX W HCPCS: Performed by: NURSE PRACTITIONER

## 2025-03-11 PROCEDURE — 3609013300 HC EGD TUBE PLACEMENT: Performed by: STUDENT IN AN ORGANIZED HEALTH CARE EDUCATION/TRAINING PROGRAM

## 2025-03-11 PROCEDURE — 2580000003 HC RX 258

## 2025-03-11 PROCEDURE — 6360000002 HC RX W HCPCS

## 2025-03-11 PROCEDURE — 3700000000 HC ANESTHESIA ATTENDED CARE: Performed by: STUDENT IN AN ORGANIZED HEALTH CARE EDUCATION/TRAINING PROGRAM

## 2025-03-11 PROCEDURE — 99231 SBSQ HOSP IP/OBS SF/LOW 25: CPT | Performed by: PHYSICIAN ASSISTANT

## 2025-03-11 RX ORDER — LIDOCAINE HYDROCHLORIDE 20 MG/ML
INJECTION, SOLUTION INFILTRATION; PERINEURAL
Status: DISCONTINUED | OUTPATIENT
Start: 2025-03-11 | End: 2025-03-11 | Stop reason: SDUPTHER

## 2025-03-11 RX ORDER — PROPOFOL 10 MG/ML
INJECTION, EMULSION INTRAVENOUS
Status: DISCONTINUED | OUTPATIENT
Start: 2025-03-11 | End: 2025-03-11 | Stop reason: SDUPTHER

## 2025-03-11 RX ORDER — SODIUM CHLORIDE 9 MG/ML
INJECTION, SOLUTION INTRAVENOUS
Status: DISCONTINUED | OUTPATIENT
Start: 2025-03-11 | End: 2025-03-11 | Stop reason: SDUPTHER

## 2025-03-11 RX ADMIN — SUCRALFATE 1 G: 1 TABLET ORAL at 08:29

## 2025-03-11 RX ADMIN — PROPOFOL 250 MG: 10 INJECTION, EMULSION INTRAVENOUS at 11:15

## 2025-03-11 RX ADMIN — SODIUM CHLORIDE, SODIUM LACTATE, POTASSIUM CHLORIDE, AND CALCIUM CHLORIDE: .6; .31; .03; .02 INJECTION, SOLUTION INTRAVENOUS at 12:57

## 2025-03-11 RX ADMIN — SODIUM CHLORIDE: 9 INJECTION, SOLUTION INTRAVENOUS at 11:15

## 2025-03-11 RX ADMIN — PROCHLORPERAZINE EDISYLATE 10 MG: 5 INJECTION INTRAMUSCULAR; INTRAVENOUS at 08:30

## 2025-03-11 RX ADMIN — PANTOPRAZOLE SODIUM 40 MG: 40 INJECTION, POWDER, FOR SOLUTION INTRAVENOUS at 08:30

## 2025-03-11 RX ADMIN — METHADONE HYDROCHLORIDE 5 MG: 10 TABLET ORAL at 08:29

## 2025-03-11 RX ADMIN — HYDROMORPHONE HYDROCHLORIDE 0.5 MG: 1 INJECTION, SOLUTION INTRAMUSCULAR; INTRAVENOUS; SUBCUTANEOUS at 08:07

## 2025-03-11 RX ADMIN — PROCHLORPERAZINE EDISYLATE 10 MG: 5 INJECTION INTRAMUSCULAR; INTRAVENOUS at 14:43

## 2025-03-11 RX ADMIN — HYDROMORPHONE HYDROCHLORIDE 0.5 MG: 1 INJECTION, SOLUTION INTRAMUSCULAR; INTRAVENOUS; SUBCUTANEOUS at 12:52

## 2025-03-11 RX ADMIN — HYDROMORPHONE HYDROCHLORIDE 0.5 MG: 1 INJECTION, SOLUTION INTRAMUSCULAR; INTRAVENOUS; SUBCUTANEOUS at 16:52

## 2025-03-11 RX ADMIN — LIDOCAINE HYDROCHLORIDE 50 MG: 20 INJECTION, SOLUTION INFILTRATION; PERINEURAL at 11:15

## 2025-03-11 RX ADMIN — SODIUM CHLORIDE, PRESERVATIVE FREE 10 ML: 5 INJECTION INTRAVENOUS at 08:11

## 2025-03-11 RX ADMIN — SODIUM CHLORIDE, PRESERVATIVE FREE 10 ML: 5 INJECTION INTRAVENOUS at 19:45

## 2025-03-11 ASSESSMENT — PAIN DESCRIPTION - ONSET
ONSET: ON-GOING
ONSET: ON-GOING

## 2025-03-11 ASSESSMENT — PAIN DESCRIPTION - DESCRIPTORS
DESCRIPTORS: SHARP;THROBBING
DESCRIPTORS: ACHING;DISCOMFORT;SORE
DESCRIPTORS: ACHING;DISCOMFORT;SORE
DESCRIPTORS: DISCOMFORT;SORE;TENDER
DESCRIPTORS: ACHING;DISCOMFORT;SORE

## 2025-03-11 ASSESSMENT — PAIN SCALES - GENERAL
PAINLEVEL_OUTOF10: 7
PAINLEVEL_OUTOF10: 6
PAINLEVEL_OUTOF10: 7
PAINLEVEL_OUTOF10: 8
PAINLEVEL_OUTOF10: 7
PAINLEVEL_OUTOF10: 6
PAINLEVEL_OUTOF10: 0

## 2025-03-11 ASSESSMENT — PAIN - FUNCTIONAL ASSESSMENT
PAIN_FUNCTIONAL_ASSESSMENT: ACTIVITIES ARE NOT PREVENTED

## 2025-03-11 ASSESSMENT — PAIN DESCRIPTION - PAIN TYPE
TYPE: CHRONIC PAIN
TYPE: CHRONIC PAIN

## 2025-03-11 ASSESSMENT — PAIN DESCRIPTION - FREQUENCY
FREQUENCY: CONTINUOUS
FREQUENCY: CONTINUOUS

## 2025-03-11 ASSESSMENT — PAIN DESCRIPTION - LOCATION
LOCATION: ABDOMEN
LOCATION: JAW;HEAD
LOCATION: ABDOMEN

## 2025-03-11 ASSESSMENT — PAIN DESCRIPTION - ORIENTATION
ORIENTATION: MID
ORIENTATION: MID

## 2025-03-11 ASSESSMENT — PAIN SCALES - WONG BAKER
WONGBAKER_NUMERICALRESPONSE: NO HURT

## 2025-03-11 ASSESSMENT — COPD QUESTIONNAIRES: CAT_SEVERITY: MODERATE

## 2025-03-11 NOTE — PROGRESS NOTES
Patient currently refusing all PO meds at this time due to nausea and unable to give IV meds due to loss of IV access.

## 2025-03-11 NOTE — PROGRESS NOTES
Comprehensive Nutrition Assessment    Type and Reason for Visit:  Reassess    Nutrition Recommendations/Plan:   Continue NPO; anticipating PEG exchange to PEG-J today 3/11  TF rec provided; recommend peptide formula to promote GI tolerance, hx of ongoing nausea/intolerance noted; recommend,    Peptide Based (Vital AF 1.2) @ 60ml/hr to provide 1440ml TV, 1728kcals, 108g pro, 1168ml free water.    Flush rec if needed; recommend, of 95ml q 4= 570ml water (1738ml total water).     Start at trickle rate (10cc/hr) and adv slowly to goal- monitor for refeeding syndrome. Monitor/replace Lytes PRN.    Will continue to monitor. Start TF as medically feasible/appropriate.      Malnutrition Assessment:  Malnutrition Status:  Severe malnutrition (03/04/25 1142)    Context:  Chronic Illness     Findings of the 6 clinical characteristics of malnutrition:  Energy Intake:  75% or less estimated energy requirements for 1 month or longer (chronic N/V w/ inability to tolerate PO intake ; pt w/ PEG, however was not utilizing this PTA d/t inability to acquire TF formula d/t insurance issues.)  Weight Loss:  Unable to assess (GALI d/t lack of wt hx per EMR; recent measured wts trending down, however unable to assess long term wt loss d/t lack of measured wt hx w/in adequate time frame for assessment.)     Body Fat Loss:  Severe body fat loss Orbital, Triceps, Buccal region   Muscle Mass Loss:  Severe muscle mass loss Temples (temporalis), Clavicles (pectoralis & deltoids), Hand (interosseous), Thigh (quadriceps)  Fluid Accumulation:  No fluid accumulation     Strength:  Not Performed    Nutrition Assessment:    pt adm dt/ FTT; pt w/ throat CA (tonsil/tongue/lung) currently on chemo/XRT; pt had PEG placed 1/20/25, however pt not utilizing TF PTA d/t inability to acquire TF formula/insurance issues; pt endorses poor PO/appetite PTA - he tells me he tries to eat adequate meals (such as steak) and tries consuming ONS (such as Ensure),  Current  Protein (g/day): 1.6-1.9g/kgxCBW=85-100g  Method Used for Fluid Requirements: 1 ml/kcal  Fluid (ml/day): 9466-7815    Nutrition Diagnosis:   Severe malnutrition, in context of chronic illness related to catabolic illness as evidenced by criteria as identified in malnutrition assessment    Nutrition Interventions:   Food and/or Nutrient Delivery: Continue NPO (Recommend Peptide Based (Vital AF 1.2) @ 60ml/hr to provide 1440ml TV, 1728kcals, 108g pro, 1168ml free water; flush rec of 95ml q 4= 570ml water (1738ml total water). Start at trickle rate (10cc/hr) and adv slowly to goal- monitor for refeeding syndrome)  Nutrition Education/Counseling: Education/Counseling initiated (About)  Coordination of Nutrition Care: Continue to monitor while inpatient       Goals:  Goals: Initiate nutrition support, within 2 days  Type of Goal: New goal  Previous Goal Met: No Progress toward Goal(s)    Nutrition Monitoring and Evaluation:   Behavioral-Environmental Outcomes: None Identified  Food/Nutrient Intake Outcomes: Progression of Nutrition  Physical Signs/Symptoms Outcomes: Biochemical Data, Nutrition Focused Physical Findings, Skin, Chewing or Swallowing, Weight, GI Status, Fluid Status or Edema, Hemodynamic Status    Discharge Planning:    Too soon to determine (anticipate PEG-J this adm; hx of inability to acquire TF formula PTA)     Robyn Stephens RD, LD  Contact: [phone]

## 2025-03-11 NOTE — PLAN OF CARE
Problem: Discharge Planning  Goal: Discharge to home or other facility with appropriate resources  3/11/2025 0026 by April Lees RN  Outcome: Progressing  3/10/2025 1032 by Jennifer Presley RN  Outcome: Progressing     Problem: Skin/Tissue Integrity  Goal: Skin integrity remains intact  Description: 1.  Monitor for areas of redness and/or skin breakdown  2.  Assess vascular access sites hourly  3.  Every 4-6 hours minimum:  Change oxygen saturation probe site  4.  Every 4-6 hours:  If on nasal continuous positive airway pressure, respiratory therapy assess nares and determine need for appliance change or resting period  3/11/2025 0026 by April Lees RN  Outcome: Progressing  3/10/2025 1032 by Jennifer Presley RN  Outcome: Progressing     Problem: Safety - Adult  Goal: Free from fall injury  3/11/2025 0026 by April Lees RN  Outcome: Progressing  3/10/2025 1032 by Jennifer Presley RN  Outcome: Progressing     Problem: ABCDS Injury Assessment  Goal: Absence of physical injury  3/11/2025 0026 by April Lees RN  Outcome: Progressing  3/10/2025 1032 by Jennifer Presley RN  Outcome: Progressing     Problem: Pain  Goal: Verbalizes/displays adequate comfort level or baseline comfort level  3/11/2025 0026 by April Lees RN  Outcome: Progressing  3/10/2025 1032 by Jennifer Presley RN  Outcome: Progressing     Problem: Nutrition Deficit:  Goal: Optimize nutritional status  3/11/2025 0026 by April Lees RN  Outcome: Progressing  3/10/2025 1032 by Jennifer Presley RN  Outcome: Progressing

## 2025-03-11 NOTE — PROGRESS NOTES
[facility]    SYNOPSIS: Patient admitted on 3/3/2025 for Failure to thrive in adult    Prasanna Parnell is a 59 y.o. male who presents to [facility] ER complaining of failure to thrive.     Prasanna Parnell has a past medical history that includes stage I SCC of tongue/tonsil, lung mass SCC     Prasanna the ER from radiation office for decreased p.o. intake, nausea, vomiting.  He is being treated for throat cancer and is on chemo and radiation.  He has tried multiple antiemetics without success.  He does have a PEG tube but currently due to insurance issues he does not have any tube feeds.  He has been unable to eat for days. Will be admitted for initiation of tube feeds and if he cannot tolerate these, consideration of TPN.      Upon presentation to the ER, routine labwork was performed which revealed no acute abnormalities.  Imaging results are as outlined below in the Imaging section of this note.   Upon arrival to the ER, patient was 107/61.  The patient received zofran, 1L NS in the emergency room and was admitted to [facility].     3/5: Patient symptomatic with nausea this morning.  Tube feeds held for now.     3/6: Continues to have nausea.  CT abdomen/pelvis done and is unremarkable.  Oncology has consulted GI as they do not think this is a side effect of cancer treatments.     3/8: Patient received Benadryl IV x 1, Ativan IV x 1, and started on nortriptyline nightly and PPI IV twice daily by GI.  If symptoms do not improve with these plan is possible conversion of PEG tube PEG-J on Tuesday 3/11.     3/10: Still significant nausea unable to tolerate tube feeds.        3/11-s/p PEG J tube placement; gastric pport to be plced to gravity; and flush gastic port Q3hrly  Jejunal port for tube feeds only no meds  SUBJECTIVE:  Stable overnight. No other overnight issues reported.   Patient seen and examined-resting in bed -drowsy due to post anesthesia effect ; denies abdominal     sucralfate  1 g Oral 4x Daily     PRN Meds: HYDROmorphone, OLANZapine zydis, magic (miracle) mouthwash, benzocaine-menthol, benzonatate, calcium carbonate, hydrALAZINE, melatonin, Polyvinyl Alcohol-Povidone PF, sodium chloride, sodium chloride flush, sodium chloride, potassium chloride **OR** potassium alternative oral replacement **OR** potassium chloride, magnesium sulfate, ondansetron **OR** ondansetron, polyethylene glycol, acetaminophen **OR** acetaminophen, albuterol, lidocaine viscous hcl, oxyCODONE-acetaminophen **OR** oxyCODONE-acetaminophen    Labs:     Recent Labs     03/10/25  0422 03/11/25  0529   WBC 7.0 6.8   HGB 11.4* 11.6*   HCT 32.4* 32.3*    170       Recent Labs     03/10/25  0422 03/11/25  0529    138   K 3.3* 3.7    98   CO2 24 25   BUN 8 11   CREATININE 0.6* 0.7   CALCIUM 9.1 9.2       No results for input(s): \"ALKPHOS\", \"ALT\", \"AST\", \"BILITOT\", \"AMYLASE\", \"LIPASE\" in the last 72 hours.    Invalid input(s): \"PROT\", \"ALB\"    No results for input(s): \"INR\" in the last 72 hours.    No results for input(s): \"CKTOTAL\", \"TROPONINI\" in the last 72 hours.    Chronic labs:    Lab Results   Component Value Date    CHOL 176 03/04/2025    TRIG 137 03/04/2025    HDL 37 (L) 03/04/2025    TSH 0.41 03/04/2025    INR 1.2 01/23/2025    LABA1C 5.6 03/04/2025       Radiology: REVIEWED DAILY    +++++++++++++++++++++++++++++++++++++++++++++++++  Emy Vallejo MD  [facility]ist  [facility] [place], [place]  +++++++++++++++++++++++++++++++++++++++++++++++++  NOTE: This report was transcribed using voice recognition software. Every effort was made to ensure accuracy; however, inadvertent computerized transcription errors may be present.

## 2025-03-11 NOTE — PROGRESS NOTES
Palliative Care Department  [phone]  Palliative Care Progress Note  Provider Mamie Melgoza PA-C    Prasanna Parnell  [id number]  Hospital Day: 9  Date of Initial Consult: 3/3/2025  Referring Provider: Tammie Higginbotham DO  Palliative Medicine was consulted for assistance with: \"SCC\"    HPI:   Prasanna Parnell is a 59 y.o. with a past medical history of spondylolisthesis, retrolisthesis, neuropathy, COPD, squamous cell carcinoma head and neck, tobacco use who was admitted on 3/3/2025 from radiation office with a CHIEF COMPLAINT of poor oral intake and failure to thrive.  Patient was diagnosed with squamous cell carcinoma of the head and neck in December 2024.  Laryngoscopy by ENT 1/14/2025 showed ulcerated masses seen in the area of the right palatine tonsil extending to the base of the tongue into the vallecula approaching midline.  Pathology of the right tonsil biopsy and right base of the tongue biopsy was consistent with HPV associated squamous cell carcinoma.  He had a bronchoscopy with fine-needle aspiration of right upper lobe lesion which was positive for malignancy consistent with squamous cell carcinoma.  Patient was initiated on chemotherapy and is following with Dr. Tomas in medical oncology and radiation therapy concurrently.  He underwent PEG tube placement. Patient is actively following with the palliative medicine clinic for symptom management.    He was being seen and radiation oncology office and was found to have very poor oral intake over a week.  His insurance reportedly denied his tube feedings for his PEG tube.  He has also been experiencing uncontrolled nausea and vomiting.  He was admitted to the hospital for further management.  Oncology and radiation oncology have been consulted.  Palliative care was consulted.      ASSESSMENT/PLAN:     Pertinent Hospital Diagnoses     Metastatic squamous cell carcinoma right base of the tongue/tonsil  Intractable nausea and vomiting  Neoplasm related    Skin:     General: Skin is warm and dry.   Neurological:      General: No focal deficit present.      Mental Status: He is alert and oriented to person, place, and time.      Comments: Grossly nonfocal exam    Psychiatric:         Attention and Perception: Attention normal.         Mood and Affect: Mood and affect normal.         Speech: Speech normal.         Behavior: Behavior normal. Behavior is cooperative.         Cognition and Memory: Cognition normal.         Objective data reviewed: labs, images, records, medication use, vitals, and chart    Discussed patient and the plan of care with the other IDT members: Palliative Medicine IDT Team, Floor Nurse, Patient, and Family    Time/Communication  Greater than 50% of time spent, total 25 minutes in counseling and coordination of care at the bedside regarding goals of care, symptom management, diagnosis and prognosis, and see above.    Thank you for allowing Palliative Medicine to participate in the care of Prasanna Parnell.    Note: This report was completed using [employer] voiced recognition software.  Every effort has been made to ensure accuracy; however, inadvertent computerized transcription errors may be present.

## 2025-03-11 NOTE — PROGRESS NOTES
Successful PEG to PEG-J exchange.       PEG-J TUBE INSTRUCTIONS:    GASTRIC PORT TO BE PLACED TO GRAVITY. ALL MEDICATIONS TO BE GIVEN THROUGH GASTRIC PORT WITH THE TUBE CLAMPED FOR ONE HOUR FOLLOWING ADMINISTRATION. FLUSH GASTRIC PORT EVERY 3 HOURS WITH 50CC WATER.     JEJUNAL PORT IS FOR TUBE FEEDS ONLY. ABSOLUTELY NO MEDICATION, LIQUID OR SOLID, IS TO BE GIVEN DOWN JEJUNAL PORT. FLUSH JEJUNAL PORT WITH 300CC WATER EVERY 3 HOURS.

## 2025-03-11 NOTE — ANESTHESIA POSTPROCEDURE EVALUATION
Department of Anesthesiology  Postprocedure Note    Patient: Prasanna Parnell  MRN: [id number]  YOB: 1965  Date of evaluation: 3/11/2025    Procedure Summary       Date: 03/11/25 Room / Location: [place] [address] / [facility]    Anesthesia Start: 1106 Anesthesia Stop: 1144    Procedure: ESOPHAGOGASTRODUODENOSCOPY JEJUNOSTOMY TUBE PLACEMENT Diagnosis:       Nausea & vomiting      (Nausea & vomiting [R11.2])    Surgeons: Haile Wiseman MD Responsible Provider: Gwen Lopez MD    Anesthesia Type: MAC ASA Status: 3            Anesthesia Type: MAC    Helen Phase I: Helen Score: 10    Helen Phase II:      Anesthesia Post Evaluation    Patient location during evaluation: PACU  Patient participation: complete - patient participated  Level of consciousness: awake and alert  Airway patency: patent  Nausea & Vomiting: no nausea and no vomiting  Cardiovascular status: blood pressure returned to baseline and hemodynamically stable  Respiratory status: acceptable and spontaneous ventilation  Hydration status: euvolemic  Multimodal analgesia pain management approach  Pain management: adequate    No notable events documented.

## 2025-03-11 NOTE — PROGRESS NOTES
[facility]  [facility] ORTHO-TRAUMA  [address]  [facility] [address]  Dept: [phone]  Loc: [phone]  Jayde Fernandez MD   ·   MD Adriana Lucero APRN  ·  FADI Bermeo  Inpatient Medical Oncology/Hematology progress Note    Patient Name: Prasanna Parnell  YOB: 1965    DATE OF ADMISSION: 3/3/2025  DATE OF CONSULTATION: 3/3/2025  CONSULTING PROVIDER: Tammie Higginbotham DO  REASON FOR CONSULTATION: SCC  PCP: No primary care provider on file.    Room: [address] [place] [address]      CHIEF COMPLAINT:  Failure to thrive     Subjective:  The patient is sleepy following the procedure, He had nausea and vomiting in the morning.    HISTORY OF PRESENT ILLNESS (3/4/2025):   Patient is a 59 y.o. 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 Since starting treatment, patient experiencing nausea and vomiting despite taking several antiemetics. Completed day 8, cycle 1 of cisplatin on 2/27/2025 with intravenous hydration. Next chemotherapy scheduled for 3/6/2025. PEG tube in place but due to not having insurance he is unable to supplement with tube feeding. Patient was sent to ED from radiation oncology clinic for evaluation of dizziness, low BP, and decrease oral intake. Work up in ED showing all labs normal except hematocrit 36.8, MCHC 36.1, absolute lymphocytes 0.81, and lymphocytes 13%.               Oncology History   Malignant neoplasm of overlapping sites of tonsil (HCC)   2/20/2025 -  Chemotherapy    OP CISplatin 40 mg/m2 Q7D  Plan Provider: Roberto Tomas MD  Treatment goal: Curative  Line of treatment: 1st Line                 VITALS:    BP

## 2025-03-12 ENCOUNTER — HOSPITAL ENCOUNTER (OUTPATIENT)
Dept: RADIATION ONCOLOGY | Age: 60
Discharge: HOME | End: 2025-03-12

## 2025-03-12 PROBLEM — C76.0 MALIGNANT NEOPLASM OF HEAD, FACE, AND NECK (HCC): Status: ACTIVE | Noted: 2025-03-12

## 2025-03-12 LAB
ANION GAP SERPL CALCULATED.3IONS-SCNC: 17 MMOL/L (ref 7–16)
BASOPHILS # BLD: 0.03 K/UL (ref 0–0.2)
BASOPHILS NFR BLD: 1 % (ref 0–2)
BUN SERPL-MCNC: 13 MG/DL (ref 6–20)
CALCIUM SERPL-MCNC: 9.2 MG/DL (ref 8.6–10.2)
CHLORIDE SERPL-SCNC: 100 MMOL/L (ref 98–107)
CO2 SERPL-SCNC: 22 MMOL/L (ref 22–29)
CREAT SERPL-MCNC: 0.6 MG/DL (ref 0.7–1.2)
EOSINOPHIL # BLD: 0.18 K/UL (ref 0.05–0.5)
EOSINOPHILS RELATIVE PERCENT: 3 % (ref 0–6)
ERYTHROCYTE [DISTWIDTH] IN BLOOD BY AUTOMATED COUNT: 13.7 % (ref 11.5–15)
GFR, ESTIMATED: >90 ML/MIN/1.73M2
GLUCOSE SERPL-MCNC: 79 MG/DL (ref 74–99)
HCT VFR BLD AUTO: 33.4 % (ref 37–54)
HGB BLD-MCNC: 11.6 G/DL (ref 12.5–16.5)
IMM GRANULOCYTES # BLD AUTO: <0.03 K/UL (ref 0–0.58)
IMM GRANULOCYTES NFR BLD: 0 % (ref 0–5)
LYMPHOCYTES NFR BLD: 0.46 K/UL (ref 1.5–4)
LYMPHOCYTES RELATIVE PERCENT: 8 % (ref 20–42)
MCH RBC QN AUTO: 32 PG (ref 26–35)
MCHC RBC AUTO-ENTMCNC: 34.7 G/DL (ref 32–34.5)
MCV RBC AUTO: 92.3 FL (ref 80–99.9)
MONOCYTES NFR BLD: 0.48 K/UL (ref 0.1–0.95)
MONOCYTES NFR BLD: 9 % (ref 2–12)
NEUTROPHILS NFR BLD: 79 % (ref 43–80)
NEUTS SEG NFR BLD: 4.51 K/UL (ref 1.8–7.3)
PLATELET # BLD AUTO: 196 K/UL (ref 130–450)
PMV BLD AUTO: 10.5 FL (ref 7–12)
POTASSIUM SERPL-SCNC: 4.1 MMOL/L (ref 3.5–5)
RBC # BLD AUTO: 3.62 M/UL (ref 3.8–5.8)
RBC # BLD: ABNORMAL 10*6/UL
SODIUM SERPL-SCNC: 139 MMOL/L (ref 132–146)
WBC OTHER # BLD: 5.7 K/UL (ref 4.5–11.5)

## 2025-03-12 PROCEDURE — 1200000000 HC SEMI PRIVATE

## 2025-03-12 PROCEDURE — 2580000003 HC RX 258: Performed by: INTERNAL MEDICINE

## 2025-03-12 PROCEDURE — 85025 COMPLETE CBC W/AUTO DIFF WBC: CPT

## 2025-03-12 PROCEDURE — 99233 SBSQ HOSP IP/OBS HIGH 50: CPT | Performed by: PHYSICIAN ASSISTANT

## 2025-03-12 PROCEDURE — 6370000000 HC RX 637 (ALT 250 FOR IP): Performed by: PHYSICIAN ASSISTANT

## 2025-03-12 PROCEDURE — 80048 BASIC METABOLIC PNL TOTAL CA: CPT

## 2025-03-12 PROCEDURE — 2500000003 HC RX 250 WO HCPCS: Performed by: INTERNAL MEDICINE

## 2025-03-12 PROCEDURE — 6370000000 HC RX 637 (ALT 250 FOR IP): Performed by: NURSE PRACTITIONER

## 2025-03-12 PROCEDURE — 99232 SBSQ HOSP IP/OBS MODERATE 35: CPT | Performed by: NURSE PRACTITIONER

## 2025-03-12 PROCEDURE — 77386 HC NTSTY MODUL RAD TX DLVR CPLX: CPT | Performed by: SPECIALIST

## 2025-03-12 PROCEDURE — 99232 SBSQ HOSP IP/OBS MODERATE 35: CPT | Performed by: STUDENT IN AN ORGANIZED HEALTH CARE EDUCATION/TRAINING PROGRAM

## 2025-03-12 PROCEDURE — 2580000003 HC RX 258: Performed by: STUDENT IN AN ORGANIZED HEALTH CARE EDUCATION/TRAINING PROGRAM

## 2025-03-12 PROCEDURE — 6360000002 HC RX W HCPCS: Performed by: STUDENT IN AN ORGANIZED HEALTH CARE EDUCATION/TRAINING PROGRAM

## 2025-03-12 PROCEDURE — 6370000000 HC RX 637 (ALT 250 FOR IP): Performed by: INTERNAL MEDICINE

## 2025-03-12 PROCEDURE — 36415 COLL VENOUS BLD VENIPUNCTURE: CPT

## 2025-03-12 PROCEDURE — 6360000002 HC RX W HCPCS: Performed by: NURSE PRACTITIONER

## 2025-03-12 RX ORDER — DEXTROSE MONOHYDRATE AND SODIUM CHLORIDE 5; .9 G/100ML; G/100ML
INJECTION, SOLUTION INTRAVENOUS CONTINUOUS
Status: DISCONTINUED | OUTPATIENT
Start: 2025-03-12 | End: 2025-03-14

## 2025-03-12 RX ORDER — FENTANYL 12.5 UG/1
1 PATCH TRANSDERMAL
Refills: 0 | Status: DISCONTINUED | OUTPATIENT
Start: 2025-03-12 | End: 2025-03-19

## 2025-03-12 RX ADMIN — PROCHLORPERAZINE EDISYLATE 10 MG: 5 INJECTION INTRAMUSCULAR; INTRAVENOUS at 21:06

## 2025-03-12 RX ADMIN — SODIUM CHLORIDE, PRESERVATIVE FREE 10 ML: 5 INJECTION INTRAVENOUS at 21:08

## 2025-03-12 RX ADMIN — DEXTROSE AND SODIUM CHLORIDE: 5; 900 INJECTION, SOLUTION INTRAVENOUS at 11:37

## 2025-03-12 RX ADMIN — HYDROMORPHONE HYDROCHLORIDE 0.5 MG: 1 INJECTION, SOLUTION INTRAMUSCULAR; INTRAVENOUS; SUBCUTANEOUS at 12:11

## 2025-03-12 RX ADMIN — HYDROMORPHONE HYDROCHLORIDE 0.5 MG: 1 INJECTION, SOLUTION INTRAMUSCULAR; INTRAVENOUS; SUBCUTANEOUS at 00:11

## 2025-03-12 RX ADMIN — HYDROMORPHONE HYDROCHLORIDE 0.5 MG: 1 INJECTION, SOLUTION INTRAMUSCULAR; INTRAVENOUS; SUBCUTANEOUS at 07:38

## 2025-03-12 RX ADMIN — SODIUM CHLORIDE, SODIUM LACTATE, POTASSIUM CHLORIDE, AND CALCIUM CHLORIDE: .6; .31; .03; .02 INJECTION, SOLUTION INTRAVENOUS at 00:16

## 2025-03-12 RX ADMIN — SODIUM CHLORIDE, PRESERVATIVE FREE 10 ML: 5 INJECTION INTRAVENOUS at 07:37

## 2025-03-12 RX ADMIN — METHADONE HYDROCHLORIDE 5 MG: 10 TABLET ORAL at 07:38

## 2025-03-12 RX ADMIN — PROCHLORPERAZINE EDISYLATE 10 MG: 5 INJECTION INTRAMUSCULAR; INTRAVENOUS at 03:09

## 2025-03-12 RX ADMIN — SODIUM CHLORIDE, SODIUM LACTATE, POTASSIUM CHLORIDE, AND CALCIUM CHLORIDE: .6; .31; .03; .02 INJECTION, SOLUTION INTRAVENOUS at 06:35

## 2025-03-12 RX ADMIN — PROCHLORPERAZINE EDISYLATE 10 MG: 5 INJECTION INTRAMUSCULAR; INTRAVENOUS at 14:03

## 2025-03-12 RX ADMIN — ONDANSETRON 4 MG: 4 TABLET, ORALLY DISINTEGRATING ORAL at 11:37

## 2025-03-12 RX ADMIN — HYDROMORPHONE HYDROCHLORIDE 0.5 MG: 1 INJECTION, SOLUTION INTRAMUSCULAR; INTRAVENOUS; SUBCUTANEOUS at 21:15

## 2025-03-12 RX ADMIN — PANTOPRAZOLE SODIUM 40 MG: 40 INJECTION, POWDER, FOR SOLUTION INTRAVENOUS at 07:38

## 2025-03-12 RX ADMIN — PROCHLORPERAZINE EDISYLATE 10 MG: 5 INJECTION INTRAMUSCULAR; INTRAVENOUS at 07:38

## 2025-03-12 RX ADMIN — PANTOPRAZOLE SODIUM 40 MG: 40 INJECTION, POWDER, FOR SOLUTION INTRAVENOUS at 21:06

## 2025-03-12 RX ADMIN — HYDROMORPHONE HYDROCHLORIDE 0.5 MG: 1 INJECTION, SOLUTION INTRAMUSCULAR; INTRAVENOUS; SUBCUTANEOUS at 16:53

## 2025-03-12 ASSESSMENT — PAIN DESCRIPTION - LOCATION
LOCATION: JAW;THROAT
LOCATION: FACE;HEAD
LOCATION: JAW;NECK
LOCATION: JAW;THROAT

## 2025-03-12 ASSESSMENT — PAIN DESCRIPTION - FREQUENCY
FREQUENCY: CONTINUOUS

## 2025-03-12 ASSESSMENT — PAIN DESCRIPTION - ONSET
ONSET: ON-GOING

## 2025-03-12 ASSESSMENT — PAIN - FUNCTIONAL ASSESSMENT
PAIN_FUNCTIONAL_ASSESSMENT: ACTIVITIES ARE NOT PREVENTED
PAIN_FUNCTIONAL_ASSESSMENT: PREVENTS OR INTERFERES SOME ACTIVE ACTIVITIES AND ADLS
PAIN_FUNCTIONAL_ASSESSMENT: ACTIVITIES ARE NOT PREVENTED

## 2025-03-12 ASSESSMENT — PAIN DESCRIPTION - ORIENTATION
ORIENTATION: RIGHT

## 2025-03-12 ASSESSMENT — PAIN SCALES - GENERAL
PAINLEVEL_OUTOF10: 6
PAINLEVEL_OUTOF10: 6
PAINLEVEL_OUTOF10: 4
PAINLEVEL_OUTOF10: 5
PAINLEVEL_OUTOF10: 7
PAINLEVEL_OUTOF10: 6
PAINLEVEL_OUTOF10: 8
PAINLEVEL_OUTOF10: 6
PAINLEVEL_OUTOF10: 5
PAINLEVEL_OUTOF10: 6

## 2025-03-12 ASSESSMENT — PAIN DESCRIPTION - DESCRIPTORS
DESCRIPTORS: ACHING;CRUSHING;SHARP
DESCRIPTORS: ACHING;CRUSHING;SHARP
DESCRIPTORS: ACHING;DISCOMFORT;SORE
DESCRIPTORS: DISCOMFORT;SORE;TENDER
DESCRIPTORS: SHARP;SHOOTING;SORE
DESCRIPTORS: ACHING;DISCOMFORT;SORE

## 2025-03-12 ASSESSMENT — PAIN SCALES - WONG BAKER
WONGBAKER_NUMERICALRESPONSE: NO HURT

## 2025-03-12 ASSESSMENT — PAIN DESCRIPTION - PAIN TYPE
TYPE: CHRONIC PAIN

## 2025-03-12 NOTE — PROGRESS NOTES
Palliative Care Department  [phone]  Palliative Care Progress Note  Provider Mamie Melgoza PA-C    Prasanna Parnell  [id number]  Hospital Day: 10  Date of Initial Consult: 3/3/2025  Referring Provider: Tammie Higginbotham DO  Palliative Medicine was consulted for assistance with: \"SCC\"    HPI:   Prasanna Parnell is a 59 y.o. with a past medical history of spondylolisthesis, retrolisthesis, neuropathy, COPD, squamous cell carcinoma head and neck, tobacco use who was admitted on 3/3/2025 from radiation office with a CHIEF COMPLAINT of poor oral intake and failure to thrive.  Patient was diagnosed with squamous cell carcinoma of the head and neck in December 2024.  Laryngoscopy by ENT 1/14/2025 showed ulcerated masses seen in the area of the right palatine tonsil extending to the base of the tongue into the vallecula approaching midline.  Pathology of the right tonsil biopsy and right base of the tongue biopsy was consistent with HPV associated squamous cell carcinoma.  He had a bronchoscopy with fine-needle aspiration of right upper lobe lesion which was positive for malignancy consistent with squamous cell carcinoma.  Patient was initiated on chemotherapy and is following with Dr. Tomas in medical oncology and radiation therapy concurrently.  He underwent PEG tube placement. Patient is actively following with the palliative medicine clinic for symptom management.    He was being seen and radiation oncology office and was found to have very poor oral intake over a week.  His insurance reportedly denied his tube feedings for his PEG tube.  He has also been experiencing uncontrolled nausea and vomiting.  He was admitted to the hospital for further management.  Oncology and radiation oncology have been consulted.  Palliative care was consulted.    3/11: PEG to J tube  ASSESSMENT/PLAN:     Pertinent Hospital Diagnoses     Metastatic squamous cell carcinoma right base of the tongue/tonsil  Intractable nausea and  normal.         Behavior: Behavior normal. Behavior is cooperative.         Cognition and Memory: Cognition normal.         Objective data reviewed: labs, images, records, medication use, vitals, and chart    Discussed patient and the plan of care with the other IDT members: Palliative Medicine IDT Team, Floor Nurse, Patient, and Family    Time/Communication  Greater than 50% of time spent, total 50 minutes in counseling and coordination of care at the bedside regarding goals of care, symptom management, diagnosis and prognosis, and see above.    Thank you for allowing Palliative Medicine to participate in the care of Prasanna Parnell.    Note: This report was completed using [employer] voiced recognition software.  Every effort has been made to ensure accuracy; however, inadvertent computerized transcription errors may be present.

## 2025-03-12 NOTE — PROGRESS NOTES
[facility]ist Progress Note    SYNOPSIS: Patient admitted on 3/3/2025 for Failure to thrive in adult    Prasanna Parnell is a 59 y.o. male who presents to [facility] ER complaining of failure to thrive.     Prasanna Parnell has a past medical history that includes stage I SCC of tongue/tonsil, lung mass SCC     Prasanna the ER from radiation office for decreased p.o. intake, nausea, vomiting.  He is being treated for throat cancer and is on chemo and radiation.  He has tried multiple antiemetics without success.  He does have a PEG tube but currently due to insurance issues he does not have any tube feeds.  He has been unable to eat for days. Will be admitted for initiation of tube feeds and if he cannot tolerate these, consideration of TPN.      Upon presentation to the ER, routine labwork was performed which revealed no acute abnormalities.  Imaging results are as outlined below in the Imaging section of this note.   Upon arrival to the ER, patient was 107/61.  The patient received zofran, 1L NS in the emergency room and was admitted to [facility].     3/5: Patient symptomatic with nausea this morning.  Tube feeds held for now.     3/6: Continues to have nausea.  CT abdomen/pelvis done and is unremarkable.  Oncology has consulted GI as they do not think this is a side effect of cancer treatments.     3/8: Patient received Benadryl IV x 1, Ativan IV x 1, and started on nortriptyline nightly and PPI IV twice daily by GI.  If symptoms do not improve with these plan is possible conversion of PEG tube PEG-J on Tuesday 3/11.     3/10: Still significant nausea unable to tolerate tube feeds.        3/11-s/p PEG J tube placement; gastric pport to be plced to gravity; and flush gastic port Q3hrly  Jejunal port for tube feeds only no meds    3/12-persistent nausea; palliative care added fentanyl patch; oncology plans to obtain mri brain to rule out metastasis to brain    SUBJECTIVE:  Stable overnight. No  nondistended  Extremities:  peripheral pulses present; no peripheral edema; no ulcers  Musculoskeletal: No clubbing, cyanosis, no bilateral lower extremity edema. Brisk capillary refill.   Skin:  No rashes  on visible skin  Neurologic: awake, alert and following commands     ASSESSMENT and PLAN:    Intractable nausea/vomiting likely 2/2 chemotherapy  Stage I SCC tongue/tonsil and stage I SCC of lung  S/p PEG tube  History of tobacco abuse        Plan:  GI following:  S/p Benadryl and Ativan IV x 1 on 3/8  Started on PPI IV twice daily and nortriptyline nightly  Patient continues to have significant nausea today  Added fentanyl patch  As medication adjustment did not afford relief of nausea,  PEG tube PEG-J conversion on 3/11  gastric port to be plced to gravity; and flush gastic port Q3hrly  Jejunal port for tube feeds only no meds  MRI brain to rule out metastasis of the brain  Continue D5/ cc/h-blood sugar noted to be low normal    Continue scopolamine patch, Zofran   Continue other medications including methadone, nystatin, Carafate  Continue oxycodone as needed for pain  Oncology, radiation oncology, palliative care all following           DVT Prophylaxis [] Lovenox, []  Heparin, [] SCDs, [] Ambulation   GI Prophylaxis [] PPI,  [] H2 Blocker,  [] Carafate,  [] Diet/Tube Feeds   Disposition Patient requires continued admission due to pending GI clearance; pending symptomatic improvement; pending mri of brain   MDM [] Low, [] Moderate,[]  High       Medications:  REVIEWED DAILY    Infusion Medications    dextrose 5 % and 0.9 % NaCl 100 mL/hr at 03/12/25 1137    sodium chloride       Scheduled Medications    fentaNYL  1 patch TransDERmal Q72H    pantoprazole (PROTONIX) 40 mg in sodium chloride (PF) 0.9 % 10 mL injection  40 mg IntraVENous Q12H    nortriptyline  25 mg Oral Nightly    prochlorperazine  10 mg IntraVENous Q6H    sodium chloride flush  5-40 mL IntraVENous 2 times per day    enoxaparin  40 mg

## 2025-03-12 NOTE — CARE COORDINATION
3/12/2025social work transition of care planning  Sw followed up with pt at bedside. Plan remains for home at [place]. Sw discussed referral to [facility],due to TF. Pt agreeable for now.  Electronically signed by BRIAN Jay on 3/12/2025 at 10:30 AM

## 2025-03-12 NOTE — PROGRESS NOTES
[facility]  [facility] ORTHO-TRAUMA  [address]  [facility] [address]  Dept: [phone]  Loc: [phone]  Jayde Fernandez MD   ·   MD Adriana Lucero APRN  ·  FADI Bermeo  Inpatient Medical Oncology/Hematology progress Note    Patient Name: Prasanna Parnell  YOB: 1965    DATE OF ADMISSION: 3/3/2025  DATE OF CONSULTATION: 3/3/2025  CONSULTING PROVIDER: Tammie Higginbotham DO  REASON FOR CONSULTATION: SCC  PCP: No primary care provider on file.    Room: [facility]      CHIEF COMPLAINT:  Failure to thrive     Subjective:  Patient seen and examined in room. Continues to have episodes of dry heaving and nausea. Denies abdominal pain, or vomiting. Afebrile. Admits to back pain.     HISTORY OF PRESENT ILLNESS (3/4/2025):   Patient is a 59 y.o. 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 Since starting treatment, patient experiencing nausea and vomiting despite taking several antiemetics. Completed day 8, cycle 1 of cisplatin on 2/27/2025 with intravenous hydration. Next chemotherapy scheduled for 3/6/2025. PEG tube in place but due to not having insurance he is unable to supplement with tube feeding. Patient was sent to ED from radiation oncology clinic for evaluation of dizziness, low BP, and decrease oral intake. Work up in ED showing all labs normal except hematocrit 36.8, MCHC 36.1, absolute lymphocytes 0.81, and lymphocytes 13%.               Oncology History   Malignant neoplasm of overlapping sites of tonsil (HCC)   2/20/2025 -  Chemotherapy    OP CISplatin 40 mg/m2 Q7D  Plan Provider: Roberto Tomas MD  Treatment goal:  03/04/2025    ALKPHOS 80 03/04/2025    BILITOT 0.5 03/04/2025     Lab Results   Component Value Date    CREATININE 0.6 (L) 03/12/2025    BUN 13 03/12/2025     03/12/2025    K 4.1 03/12/2025     03/12/2025    CO2 22 03/12/2025             ASSESSMENT:    Squamous cell carcinoma of the right base of the tongue/tonsil, stage cT1 N1 M1, PD-L1 0, metastatic disease to right upper lung  Failure to thrive  Nausea and vomiting   Oral candidiasis      The patient is a 59 y.o. 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 Since starting treatment, patient experiencing nausea and vomiting despite taking several antiemetics. Completed day 8, cycle 1 of cisplatin on 2/27/2025 with intravenous hydration. Next chemotherapy scheduled for 3/6/2025. PEG tube in place but due to not having insurance he is unable to supplement with tube feeding. Patient was sent to ED from radiation oncology clinic for evaluation of dizziness, low BP, and decrease oral intake. Work up in ED showing all labs normal except hematocrit 36.8, MCHC 36.1, absolute lymphocytes 0.81, and lymphocytes 13%.       PLAN  Hold chemotherapy while inpatient  Continue antiemetics  Palliative care consulted for symptom management  Continue oral nystatin, Magic mouthwash  PT/OT  Pain control  KUB negative   CT abd/pelvis from 3/7 reviewed, no acute process  GI team consulted, input appreciated, recommendations reviewed, he is s/p exchange PEG with PEG-J.  Labs reviewed, hemoglobin 11.6, creatinine 0.6.  Follow-up with Dr. Tomas after DC from the hospital   Continues to have dry heaving and nausea. Palliative care note  reviewed. Will order MRI brain to rule out metastatic disease.       FADI Castro - CNP   HEMATOLOGY/MEDICAL ONCOLOGY  [facility] MED ONCOLOGY  [address] [place] [id number]  Dept: [phone]  Loc: [phone]         [facility]  P: [phone]  F: [phone]    [facility] Office  P: [phone]  F: [phone]

## 2025-03-12 NOTE — PROGRESS NOTES
Gastroenterology, Hepatology, &  Advanced Endoscopy    Progress Note    Subjective: Continues to have dry heaving and nausea today. Denies emesis.    Reason for Consult: FTT     HPI:   Prasanna Parnell is a 59 y.o. male w/ PMH of  has a past medical history of Cancer (HCC), Cervicalgia, COPD (chronic obstructive pulmonary disease) (HCC), Neuropathy, Retrolisthesis, and Spondylolisthesis. who presents to the ED with persistent nausea and decreased PO intake. The patient has SCC of the tongue and lungs and is currently following with Oncology and Radiation Oncology. He is s/p PEG tube placement. He is admitted due to decreased PO intake and so currently reports that chemotherapy is on hold. He reports that he has been unable to tolerate much by mouth or through the PEG tube without having immediate nausea and vomiting. He reports that he is regular with his bowel movements.       No results for input(s): \"INR\", \"ALT\", \"AST\", \"ALKPHOS\", \"BILITOT\", \"GLOB\", \"GGT\", \"LABPROT\", \"LABALBU\", \"BILIDIR\" in the last 72 hours.  Lab Results   Component Value Date    WBC 5.7 03/12/2025    HGB 11.6 (L) 03/12/2025    HCT 33.4 (L) 03/12/2025     03/12/2025     03/12/2025    K 4.1 03/12/2025     03/12/2025    CREATININE 0.6 (L) 03/12/2025    BUN 13 03/12/2025    CO2 22 03/12/2025    GLUCOSE 79 03/12/2025    INR 1.2 01/23/2025    PROTIME 13.0 (H) 01/23/2025    TSH 0.41 03/04/2025    LABA1C 5.6 03/04/2025     Lab Results   Component Value Date    INR 1.2 01/23/2025    INR 1.1 08/04/2022    PROTIME 13.0 (H) 01/23/2025    PROTIME 11.6 08/04/2022         Lipase   Date Value Ref Range Status   02/25/2025 14 13 - 60 U/L Final         CT Result (most recent):  CT ABDOMEN PELVIS WO CONTRAST 03/07/2025    Narrative  EXAMINATION:  CT OF THE ABDOMEN AND PELVIS WITHOUT CONTRAST3/7/2025 7:23 am    TECHNIQUE:  CT of the abdomen and pelvis was performed without the administration of  intravenous contrast. Multiplanar reformatted  IntraVENous Q12H Haile Wiseman MD   40 mg at 03/12/25 0738    nortriptyline (PAMELOR) capsule 25 mg  25 mg Oral Nightly Haile Wiseman MD   25 mg at 03/09/25 1932    prochlorperazine (COMPAZINE) injection 10 mg  10 mg IntraVENous Q6H Haile Wiseman MD   10 mg at 03/12/25 0738    HYDROmorphone (DILAUDID) injection 0.5 mg  0.5 mg IntraVENous Q4H PRN Doris Johnson, APRN - CNP   0.5 mg at 03/12/25 0738    lactated ringers infusion   IntraVENous Continuous BruceJoon Oleary  mL/hr at 03/12/25 0635 New Bag at 03/12/25 0635    methadone (DOLOPHINE) tablet 5 mg  5 mg Oral Daily Doris Johnson, APRN - CNP   5 mg at 03/12/25 0738    OLANZapine zydis (ZYPREXA) disintegrating tablet 2.5 mg  2.5 mg Oral Q6H PRN Doris Johnson, APRN - CNP        magic (miracle) mouthwash  15 mL Swish & Spit 4x Daily PRN Jayde Hernandez MD        benzocaine-menthol (CEPACOL SORE THROAT) lozenge 1 lozenge  1 lozenge Oral Q2H PRN Tammie Higginbotham, DO        benzonatate (TESSALON) capsule 100 mg  100 mg Oral TID PRN Tammie Higginbotham, DO        calcium carbonate (TUMS) chewable tablet 500 mg  500 mg Oral TID PRN Tammie Higginbotham, DO        hydrALAZINE (APRESOLINE) injection 10 mg  10 mg IntraVENous Q6H PRN Tammie Higginbotham, DO        melatonin tablet 3 mg  3 mg Oral Nightly PRN Tammie Higginbotham, DO        Polyvinyl Alcohol-Povidone PF (REFRESH) 1.4-0.6 % ophthalmic solution 1 drop  1 drop Both Eyes PRN Tammie Higginbotham, DO        sodium chloride (OCEAN, BABY AYR) 0.65 % nasal spray 1 spray  1 spray Each Nostril PRN Tammie Higginbotham, DO        sodium chloride flush 0.9 % injection 5-40 mL  5-40 mL IntraVENous 2 times per day Tammie Higginbotham, DO   10 mL at 03/12/25 0737    sodium chloride flush 0.9 % injection 5-40 mL  5-40 mL IntraVENous PRN Tammie Higginbotham,         0.9 % sodium chloride infusion   IntraVENous PRN Tammie Higginbotham, DO        potassium chloride (KLOR-CON M) extended release tablet 40 mEq  40 mEq Oral  PRN Tammie Higginbotham, DO   40 mEq at 03/04/25 1003    Or    potassium bicarb-citric acid (EFFER-K) effervescent tablet 40 mEq  40 mEq Oral PRN Tammie Higginbotham, DO        Or    potassium chloride 10 mEq/100 mL IVPB (Peripheral Line)  10 mEq IntraVENous PRN Tammie Higginbotham,  mL/hr at 03/10/25 0916 10 mEq at 03/10/25 0916    magnesium sulfate 2000 mg in 50 mL IVPB premix  2,000 mg IntraVENous PRN Tammie Higginbotham, DO        enoxaparin (LOVENOX) injection 40 mg  40 mg SubCUTAneous Daily Tammie Higginbotham, DO   40 mg at 03/10/25 0819    ondansetron (ZOFRAN-ODT) disintegrating tablet 4 mg  4 mg Oral Q8H PRN Tammie Higginbotham, DO        Or    ondansetron (ZOFRAN) injection 4 mg  4 mg IntraVENous Q6H PRN Tammie Higginbotham, DO   4 mg at 03/09/25 1340    polyethylene glycol (GLYCOLAX) packet 17 g  17 g Oral Daily PRN Tammei Higginbotham, DO        acetaminophen (TYLENOL) tablet 650 mg  650 mg Oral Q6H PRN Tammie Higginbotham DO   650 mg at 03/04/25 1911    Or    acetaminophen (TYLENOL) suppository 650 mg  650 mg Rectal Q6H PRN Tammie Higginbotham, DO        albuterol (PROVENTIL) (2.5 MG/3ML) 0.083% nebulizer solution 2.5 mg  2.5 mg Nebulization Q6H PRN Tammie Higginbotham, DO        lidocaine viscous hcl (XYLOCAINE) 2 % solution 15 mL  15 mL Mouth/Throat PRN Tammie Higginbotham, DO        nystatin (MYCOSTATIN) 190997 UNIT/ML suspension 500,000 Units  500,000 Units Oral 4x Daily Tammie Higginbotham, DO   500,000 Units at 03/10/25 0820    scopolamine (TRANSDERM-SCOP) transdermal patch 1 patch  1 patch TransDERmal Q3 Days Tammie Higginbotham DO   1 patch at 03/09/25 1933    sucralfate (CARAFATE) tablet 1 g  1 g Oral 4x Daily Tammie Higginbotham DO   1 g at 03/11/25 0829    oxyCODONE-acetaminophen (PERCOCET) 5-325 MG per tablet 1 tablet  1 tablet Oral Q4H PRN Tammie Higginbotham DO   1 tablet at 03/04/25 2310    Or    oxyCODONE-acetaminophen (PERCOCET) 5-325 MG per tablet 2 tablet  2 tablet Oral Q4H PRN Neftaly

## 2025-03-12 NOTE — PROGRESS NOTES
Gastric tube flushed with 50ml of water, J-Port flushed with 100ml of water. Patient unable to tolerate flush, patient asked this nurse  \"stop I'm going to be sick.\" Patient hovered over and bile emesis noted.

## 2025-03-12 NOTE — PROGRESS NOTES
Patient refusing PO meds, educated and encouraged but still refuses, patient unable to tolerate po meds experiencing dry heaves and bile spit up noted.

## 2025-03-12 NOTE — PLAN OF CARE
Problem: Discharge Planning  Goal: Discharge to home or other facility with appropriate resources  3/11/2025 2039 by April Lees RN  Outcome: Progressing  3/11/2025 0925 by Jennifer Presley RN  Outcome: Progressing     Problem: Skin/Tissue Integrity  Goal: Skin integrity remains intact  Description: 1.  Monitor for areas of redness and/or skin breakdown  2.  Assess vascular access sites hourly  3.  Every 4-6 hours minimum:  Change oxygen saturation probe site  4.  Every 4-6 hours:  If on nasal continuous positive airway pressure, respiratory therapy assess nares and determine need for appliance change or resting period  3/11/2025 2039 by April Lees RN  Outcome: Progressing  3/11/2025 0925 by Jennifer Presley RN  Outcome: Progressing     Problem: Safety - Adult  Goal: Free from fall injury  3/11/2025 2039 by April Lees RN  Outcome: Progressing  3/11/2025 0925 by Jennifer Presley RN  Outcome: Progressing     Problem: ABCDS Injury Assessment  Goal: Absence of physical injury  3/11/2025 2039 by April Lees RN  Outcome: Progressing  3/11/2025 0925 by Jennifer Presley RN  Outcome: Progressing     Problem: Pain  Goal: Verbalizes/displays adequate comfort level or baseline comfort level  3/11/2025 2039 by April Lees RN  Outcome: Progressing  3/11/2025 0925 by Jennifer Presley RN  Outcome: Progressing     Problem: Nutrition Deficit:  Goal: Optimize nutritional status  3/11/2025 2039 by April Lees RN  Outcome: Progressing  Flowsheets (Taken 3/11/2025 1001 by Robyn Stephens, RD, LD)  Nutrient intake appropriate for improving, restoring, or maintaining nutritional needs:   Assess nutritional status and recommend course of action   Recommend, monitor, and adjust tube feedings and TPN/PPN based on assessed needs  3/11/2025 0925 by Jennifer Presley RN  Outcome: Progressing

## 2025-03-13 ENCOUNTER — TELEPHONE (OUTPATIENT)
Dept: ONCOLOGY | Age: 60
End: 2025-03-13

## 2025-03-13 ENCOUNTER — HOSPITAL ENCOUNTER (OUTPATIENT)
Dept: RADIATION ONCOLOGY | Age: 60
Discharge: HOME | End: 2025-03-13

## 2025-03-13 PROBLEM — D64.9 NORMOCYTIC ANEMIA: Status: ACTIVE | Noted: 2025-03-13

## 2025-03-13 LAB
ANION GAP SERPL CALCULATED.3IONS-SCNC: 12 MMOL/L (ref 7–16)
BASOPHILS # BLD: 0 K/UL (ref 0–0.2)
BASOPHILS NFR BLD: 0 % (ref 0–2)
BUN SERPL-MCNC: 10 MG/DL (ref 6–20)
CALCIUM SERPL-MCNC: 8.6 MG/DL (ref 8.6–10.2)
CHLORIDE SERPL-SCNC: 101 MMOL/L (ref 98–107)
CO2 SERPL-SCNC: 25 MMOL/L (ref 22–29)
CREAT SERPL-MCNC: 0.5 MG/DL (ref 0.7–1.2)
EOSINOPHIL # BLD: 0.13 K/UL (ref 0.05–0.5)
EOSINOPHILS RELATIVE PERCENT: 3 % (ref 0–6)
ERYTHROCYTE [DISTWIDTH] IN BLOOD BY AUTOMATED COUNT: 13.9 % (ref 11.5–15)
GFR, ESTIMATED: >90 ML/MIN/1.73M2
GLUCOSE SERPL-MCNC: 149 MG/DL (ref 74–99)
HCT VFR BLD AUTO: 28.3 % (ref 37–54)
HGB BLD-MCNC: 9.9 G/DL (ref 12.5–16.5)
LYMPHOCYTES NFR BLD: 0.42 K/UL (ref 1.5–4)
LYMPHOCYTES RELATIVE PERCENT: 9 % (ref 20–42)
MCH RBC QN AUTO: 32.1 PG (ref 26–35)
MCHC RBC AUTO-ENTMCNC: 35 G/DL (ref 32–34.5)
MCV RBC AUTO: 91.9 FL (ref 80–99.9)
MONOCYTES NFR BLD: 0.25 K/UL (ref 0.1–0.95)
MONOCYTES NFR BLD: 5 % (ref 2–12)
NEUTROPHILS NFR BLD: 83 % (ref 43–80)
NEUTS SEG NFR BLD: 4 K/UL (ref 1.8–7.3)
PLATELET # BLD AUTO: 219 K/UL (ref 130–450)
PMV BLD AUTO: 9.6 FL (ref 7–12)
POTASSIUM SERPL-SCNC: 3.2 MMOL/L (ref 3.5–5)
RBC # BLD AUTO: 3.08 M/UL (ref 3.8–5.8)
RBC # BLD: ABNORMAL 10*6/UL
SODIUM SERPL-SCNC: 138 MMOL/L (ref 132–146)
WBC OTHER # BLD: 4.8 K/UL (ref 4.5–11.5)

## 2025-03-13 PROCEDURE — 99232 SBSQ HOSP IP/OBS MODERATE 35: CPT | Performed by: NURSE PRACTITIONER

## 2025-03-13 PROCEDURE — 80048 BASIC METABOLIC PNL TOTAL CA: CPT

## 2025-03-13 PROCEDURE — 2580000003 HC RX 258: Performed by: STUDENT IN AN ORGANIZED HEALTH CARE EDUCATION/TRAINING PROGRAM

## 2025-03-13 PROCEDURE — 85025 COMPLETE CBC W/AUTO DIFF WBC: CPT

## 2025-03-13 PROCEDURE — 1200000000 HC SEMI PRIVATE

## 2025-03-13 PROCEDURE — 36415 COLL VENOUS BLD VENIPUNCTURE: CPT

## 2025-03-13 PROCEDURE — 6360000002 HC RX W HCPCS: Performed by: STUDENT IN AN ORGANIZED HEALTH CARE EDUCATION/TRAINING PROGRAM

## 2025-03-13 PROCEDURE — 6370000000 HC RX 637 (ALT 250 FOR IP): Performed by: STUDENT IN AN ORGANIZED HEALTH CARE EDUCATION/TRAINING PROGRAM

## 2025-03-13 PROCEDURE — 99231 SBSQ HOSP IP/OBS SF/LOW 25: CPT | Performed by: PHYSICIAN ASSISTANT

## 2025-03-13 PROCEDURE — 2500000003 HC RX 250 WO HCPCS: Performed by: INTERNAL MEDICINE

## 2025-03-13 PROCEDURE — 6360000002 HC RX W HCPCS: Performed by: NURSE PRACTITIONER

## 2025-03-13 PROCEDURE — 99233 SBSQ HOSP IP/OBS HIGH 50: CPT | Performed by: STUDENT IN AN ORGANIZED HEALTH CARE EDUCATION/TRAINING PROGRAM

## 2025-03-13 PROCEDURE — 77386 HC NTSTY MODUL RAD TX DLVR CPLX: CPT | Performed by: SPECIALIST

## 2025-03-13 PROCEDURE — 6370000000 HC RX 637 (ALT 250 FOR IP): Performed by: INTERNAL MEDICINE

## 2025-03-13 RX ORDER — POTASSIUM CHLORIDE 7.45 MG/ML
10 INJECTION INTRAVENOUS
Status: COMPLETED | OUTPATIENT
Start: 2025-03-13 | End: 2025-03-13

## 2025-03-13 RX ADMIN — PROCHLORPERAZINE EDISYLATE 10 MG: 5 INJECTION INTRAMUSCULAR; INTRAVENOUS at 20:20

## 2025-03-13 RX ADMIN — PANTOPRAZOLE SODIUM 40 MG: 40 INJECTION, POWDER, FOR SOLUTION INTRAVENOUS at 20:19

## 2025-03-13 RX ADMIN — PANTOPRAZOLE SODIUM 40 MG: 40 INJECTION, POWDER, FOR SOLUTION INTRAVENOUS at 09:33

## 2025-03-13 RX ADMIN — NORTRIPTYLINE HYDROCHLORIDE 25 MG: 25 CAPSULE ORAL at 20:20

## 2025-03-13 RX ADMIN — POTASSIUM CHLORIDE 10 MEQ: 7.46 INJECTION, SOLUTION INTRAVENOUS at 10:34

## 2025-03-13 RX ADMIN — POTASSIUM CHLORIDE 10 MEQ: 7.46 INJECTION, SOLUTION INTRAVENOUS at 11:41

## 2025-03-13 RX ADMIN — PROCHLORPERAZINE EDISYLATE 10 MG: 5 INJECTION INTRAMUSCULAR; INTRAVENOUS at 09:33

## 2025-03-13 RX ADMIN — HYDROMORPHONE HYDROCHLORIDE 0.5 MG: 1 INJECTION, SOLUTION INTRAMUSCULAR; INTRAVENOUS; SUBCUTANEOUS at 06:06

## 2025-03-13 RX ADMIN — DEXTROSE AND SODIUM CHLORIDE: 5; 900 INJECTION, SOLUTION INTRAVENOUS at 23:28

## 2025-03-13 RX ADMIN — PROCHLORPERAZINE EDISYLATE 10 MG: 5 INJECTION INTRAMUSCULAR; INTRAVENOUS at 16:24

## 2025-03-13 RX ADMIN — POTASSIUM CHLORIDE 10 MEQ: 7.46 INJECTION, SOLUTION INTRAVENOUS at 12:52

## 2025-03-13 RX ADMIN — HYDROMORPHONE HYDROCHLORIDE 0.5 MG: 1 INJECTION, SOLUTION INTRAMUSCULAR; INTRAVENOUS; SUBCUTANEOUS at 02:16

## 2025-03-13 RX ADMIN — SODIUM CHLORIDE, PRESERVATIVE FREE 10 ML: 5 INJECTION INTRAVENOUS at 09:37

## 2025-03-13 RX ADMIN — HYDROMORPHONE HYDROCHLORIDE 0.5 MG: 1 INJECTION, SOLUTION INTRAMUSCULAR; INTRAVENOUS; SUBCUTANEOUS at 10:31

## 2025-03-13 RX ADMIN — PROCHLORPERAZINE EDISYLATE 10 MG: 5 INJECTION INTRAMUSCULAR; INTRAVENOUS at 02:17

## 2025-03-13 RX ADMIN — HYDROMORPHONE HYDROCHLORIDE 0.5 MG: 1 INJECTION, SOLUTION INTRAMUSCULAR; INTRAVENOUS; SUBCUTANEOUS at 20:20

## 2025-03-13 RX ADMIN — DEXTROSE AND SODIUM CHLORIDE: 5; 900 INJECTION, SOLUTION INTRAVENOUS at 09:43

## 2025-03-13 RX ADMIN — HYDROMORPHONE HYDROCHLORIDE 0.5 MG: 1 INJECTION, SOLUTION INTRAMUSCULAR; INTRAVENOUS; SUBCUTANEOUS at 16:24

## 2025-03-13 RX ADMIN — POTASSIUM CHLORIDE 10 MEQ: 7.46 INJECTION, SOLUTION INTRAVENOUS at 09:37

## 2025-03-13 ASSESSMENT — PAIN SCALES - GENERAL
PAINLEVEL_OUTOF10: 5
PAINLEVEL_OUTOF10: 5
PAINLEVEL_OUTOF10: 7
PAINLEVEL_OUTOF10: 7
PAINLEVEL_OUTOF10: 2
PAINLEVEL_OUTOF10: 6

## 2025-03-13 ASSESSMENT — PAIN DESCRIPTION - PAIN TYPE
TYPE: CHRONIC PAIN
TYPE: ACUTE PAIN

## 2025-03-13 ASSESSMENT — PAIN DESCRIPTION - ORIENTATION
ORIENTATION: MID;LOWER
ORIENTATION: RIGHT

## 2025-03-13 ASSESSMENT — PAIN DESCRIPTION - ONSET
ONSET: ON-GOING
ONSET: ON-GOING

## 2025-03-13 ASSESSMENT — PAIN DESCRIPTION - LOCATION
LOCATION: JAW;MOUTH;NECK
LOCATION: JAW;NECK
LOCATION: JAW;NECK

## 2025-03-13 ASSESSMENT — PAIN DESCRIPTION - FREQUENCY
FREQUENCY: CONTINUOUS
FREQUENCY: CONTINUOUS

## 2025-03-13 ASSESSMENT — PAIN - FUNCTIONAL ASSESSMENT
PAIN_FUNCTIONAL_ASSESSMENT: ACTIVITIES ARE NOT PREVENTED
PAIN_FUNCTIONAL_ASSESSMENT: PREVENTS OR INTERFERES SOME ACTIVE ACTIVITIES AND ADLS
PAIN_FUNCTIONAL_ASSESSMENT: PREVENTS OR INTERFERES SOME ACTIVE ACTIVITIES AND ADLS

## 2025-03-13 ASSESSMENT — PAIN DESCRIPTION - DESCRIPTORS
DESCRIPTORS: SHARP;SHOOTING;ACHING
DESCRIPTORS: SORE;SHOOTING;TENDER

## 2025-03-13 NOTE — PROGRESS NOTES
Patient arrived on the unit and was complaining his left arm, where the IV was was hurting severly. The IV was discontinued due to being infiltrated and the floor 5WE a Radha was notified. His left arm is red and swollen at the IV site and very painful to touch.     He received his treatment and is stable, seeing Dr. Mascorro..

## 2025-03-13 NOTE — PROGRESS NOTES
DEPARTMENT OF RADIATION ONCOLOGY ON TREATMENT VISIT         3/13/2025      NAME:  Prasanna Parnell    YOB: 1965    Diagnosis: Head neck cancer    SUBJECTIVE:   Prasanna Parnell has now received 2600 cGy in 13 fractions directed to the head neck cancer region.    No sore throat.  Has J-tube now which he is tolerating.  No chemo for 2 weeks.  Had G-tube and tolerated poorly.  No major sore throat.  No oral cavity Candida.  IV left arm removed by nurse because of infiltration.  He is in house.    Past medical, surgical, social and family histories reviewed and updated as indicated.    Pain: Nothing significant    ALLERGIES:  Bee venom and Keflex [cephalexin]         No current facility-administered medications for this encounter.     No current outpatient medications on file.     Facility-Administered Medications Ordered in Other Encounters   Medication Dose Route Frequency Provider Last Rate Last Admin    dextrose 5 % and 0.9 % sodium chloride infusion   IntraVENous Continuous Emy Vallejo  mL/hr at 03/13/25 0943 New Bag at 03/13/25 0943    fentaNYL (DURAGESIC) 12 MCG/HR 1 patch  1 patch TransDERmal Q72H Mamie Melgoza PA-C   1 patch at 03/12/25 1222    pantoprazole (PROTONIX) 40 mg in sodium chloride (PF) 0.9 % 10 mL injection  40 mg IntraVENous Q12H Haile Wiseman MD   40 mg at 03/13/25 0933    nortriptyline (PAMELOR) capsule 25 mg  25 mg Oral Nightly Haile Wiseman MD   25 mg at 03/09/25 1932    prochlorperazine (COMPAZINE) injection 10 mg  10 mg IntraVENous Q6H Haile Wiseman MD   10 mg at 03/13/25 0933    HYDROmorphone (DILAUDID) injection 0.5 mg  0.5 mg IntraVENous Q4H PRN Doris Johnson APRN - CNP   0.5 mg at 03/13/25 1031    OLANZapine zydis (ZYPREXA) disintegrating tablet 2.5 mg  2.5 mg Oral Q6H PRN Doris Johnson APRN - CNP        magic (miracle) mouthwash  15 mL Swish & Spit 4x Daily PRN Jayde Hernandez MD        benzocaine-menthol (CEPACOL SORE THROAT) lozenge 1

## 2025-03-13 NOTE — PROGRESS NOTES
Gastroenterology, Hepatology, &  Advanced Endoscopy    Progress Note    Subjective: Continues to have dry heaving and nausea today. Denies emesis.    Reason for Consult: FTT     HPI:   Prasanna Parnell is a 59 y.o. male w/ PMH of  has a past medical history of Cancer (HCC), Cervicalgia, COPD (chronic obstructive pulmonary disease) (HCC), Neuropathy, Retrolisthesis, and Spondylolisthesis. who presents to the ED with persistent nausea and decreased PO intake. The patient has SCC of the tongue and lungs and is currently following with Oncology and Radiation Oncology. He is s/p PEG tube placement. He is admitted due to decreased PO intake and so currently reports that chemotherapy is on hold. He reports that he has been unable to tolerate much by mouth or through the PEG tube without having immediate nausea and vomiting. He reports that he is regular with his bowel movements.       No results for input(s): \"INR\", \"ALT\", \"AST\", \"ALKPHOS\", \"BILITOT\", \"GLOB\", \"GGT\", \"LABPROT\", \"LABALBU\", \"BILIDIR\" in the last 72 hours.  Lab Results   Component Value Date    WBC 4.8 03/13/2025    HGB 9.9 (L) 03/13/2025    HCT 28.3 (L) 03/13/2025     03/13/2025     03/13/2025    K 3.2 (L) 03/13/2025     03/13/2025    CREATININE 0.5 (L) 03/13/2025    BUN 10 03/13/2025    CO2 25 03/13/2025    GLUCOSE 149 (H) 03/13/2025    INR 1.2 01/23/2025    PROTIME 13.0 (H) 01/23/2025    TSH 0.41 03/04/2025    LABA1C 5.6 03/04/2025     Lab Results   Component Value Date    INR 1.2 01/23/2025    INR 1.1 08/04/2022    PROTIME 13.0 (H) 01/23/2025    PROTIME 11.6 08/04/2022         Lipase   Date Value Ref Range Status   02/25/2025 14 13 - 60 U/L Final         CT Result (most recent):  CT ABDOMEN PELVIS WO CONTRAST 03/07/2025    Narrative  EXAMINATION:  CT OF THE ABDOMEN AND PELVIS WITHOUT CONTRAST3/7/2025 7:23 am    TECHNIQUE:  CT of the abdomen and pelvis was performed without the administration of  intravenous contrast. Multiplanar  performed by Edward Beebe MD at [facility] ENDOSCOPY    BRONCHOSCOPY N/A 01/23/2025    BRONCHOSCOPY ENDOBRONCHIAL ULTRASOUND FINE NEEDLE ASPIRATION ROBOTIC performed by Edward Beebe MD at [facility] ENDOSCOPY    FINGER SURGERY Right     amputation first finger    HERNIA REPAIR Bilateral     inguinal    KNEE SURGERY Left     repair tendons d/t chainsaw accidnet    LARYNGOSCOPY N/A 01/14/2025    PANENDOSCOPY WITH BIOPSY performed by Jonathan Bowden DO at [facility] OR    LUMBAR LAMINECTOMY      1997,1998,2010    PORT SURGERY N/A 2/13/2025    MEDIPORT INSERTION performed by Delvis Frank DO at [facility] OR    UPPER GASTROINTESTINAL ENDOSCOPY N/A 01/20/2025    ESOPHAGOGASTRODUODENOSCOPY PERCUTANEOUS ENDOSCOPIC GASTROSTOMY TUBE PLACEMENT performed by Delvis Frank DO at [facility] ENDOSCOPY    UPPER GASTROINTESTINAL ENDOSCOPY N/A 3/11/2025    ESOPHAGOGASTRODUODENOSCOPY JEJUNOSTOMY TUBE PLACEMENT performed by Haile Wiseman MD at [facility] ENDOSCOPY    WRIST SURGERY Right     tendon repair        Family History:       Problem Relation Age of Onset    Cancer Mother 65        leukemia    Alcohol Abuse Father     High Blood Pressure Father     Heart Attack Father     Stroke Father     Breast Cancer Maternal Grandmother     Cancer Paternal Grandfather         prostate        Social History:   Social History     Tobacco Use    Smoking status: Every Day     Current packs/day: 0.50     Average packs/day: 0.5 packs/day for 33.2 years (16.6 ttl pk-yrs)     Types: Cigarettes     Start date: 1992    Smokeless tobacco: Never   Vaping Use    Vaping status: Never Used   Substance Use Topics    Alcohol use: No    Drug use: No        ALLERGIES:   Allergies   Allergen Reactions    Bee Venom Anaphylaxis    Keflex [Cephalexin] Swelling       Home Medications:  Current Facility-Administered Medications   Medication Dose Route Frequency Provider Last Rate Last Admin    dextrose 5 % and 0.9 % sodium chloride infusion   IntraVENous Continuous  further.  - Continue scopolamine.   - Exchanged PEG with PEG-J to allow for constant venting of stomach while getting adequate nutrition.   -Palliative care consulted for symptom management     PEG-J TUBE INSTRUCTIONS:     GASTRIC PORT TO BE PLACED TO GRAVITY. ALL MEDICATIONS TO BE GIVEN THROUGH GASTRIC PORT WITH THE TUBE CLAMPED FOR ONE HOUR FOLLOWING ADMINISTRATION. FLUSH GASTRIC PORT EVERY 3 HOURS WITH 50CC WATER.      JEJUNAL PORT IS FOR TUBE FEEDS ONLY. ABSOLUTELY NO MEDICATION, LIQUID OR SOLID, IS TO BE GIVEN DOWN JEJUNAL PORT. FLUSH JEJUNAL PORT WITH 300CC WATER EVERY 3 HOURS.                      We will follow.    Thank you for including us in the care of this patient. Please do not hesitate to contact us with any additional questions or concerns.    Discussed with Haile Wiseman MD  Gastroenterology/Hepatology  Advanced Endoscopy

## 2025-03-13 NOTE — PROGRESS NOTES
Patients G peg flushed with 50cc and clamped for 30 mins before I will reattach to gravity.  J peg flushed with 300cc per order then clamped. Patient tolerated flush with minimal nausea.

## 2025-03-13 NOTE — PROGRESS NOTES
[facility]  [facility] ORTH[facility]  [address]  [facility] [address]  Dept: [phone]  Loc: [phone]  Jayde Fernandez MD   ·   MD Adriana Lucero APRN  ·  [facility]  Inpatient Medical Oncology/Hematology progress Note    Patient Name: Prasanna Parnell  YOB: 1965    DATE OF ADMISSION: 3/3/2025  DATE OF CONSULTATION: 3/3/2025  CONSULTING PROVIDER: Tammie Higginbotham DO  REASON FOR CONSULTATION: SCC  PCP: No primary care provider on file.    Room: [address] [place] [address]      CHIEF COMPLAINT:  Failure to thrive     Subjective:  No vomiting since last night.   On trickle feeds. Still feels largely the same.     HISTORY OF PRESENT ILLNESS (3/4/2025):   Patient is a 59 y.o. 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 Since starting treatment, patient experiencing nausea and vomiting despite taking several antiemetics. Completed day 8, cycle 1 of cisplatin on 2/27/2025 with intravenous hydration. Next chemotherapy scheduled for 3/6/2025. PEG tube in place but due to not having insurance he is unable to supplement with tube feeding. Patient was sent to ED from radiation oncology clinic for evaluation of dizziness, low BP, and decrease oral intake. Work up in ED showing all labs normal except hematocrit 36.8, MCHC 36.1, absolute lymphocytes 0.81, and lymphocytes 13%.               Oncology History   Malignant neoplasm of overlapping sites of tonsil (HCC)   2/20/2025 -  Chemotherapy    OP CISplatin 40 mg/m2 Q7D  Plan Provider: Roberto Tomas MD  Treatment goal: Curative  Line of treatment: 1st Line                 VITALS:    BP (!) 99/54    03/13/2025     03/13/2025     Lab Results   Component Value Date    NEUTROABS 4.00 03/13/2025     Lab Results   Component Value Date    ALT 11 03/04/2025    AST 12 03/04/2025    ALKPHOS 80 03/04/2025    BILITOT 0.5 03/04/2025     Lab Results   Component Value Date    CREATININE 0.5 (L) 03/13/2025    BUN 10 03/13/2025     03/13/2025    K 3.2 (L) 03/13/2025     03/13/2025    CO2 25 03/13/2025             ASSESSMENT:    Squamous cell carcinoma of the right base of the tongue/tonsil, stage cT1 N1 M1, PD-L1 0, metastatic disease to right upper lung  Failure to thrive  Nausea and vomiting   Oral candidiasis      The patient is a 59 y.o. 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 Since starting treatment, patient experiencing nausea and vomiting despite taking several antiemetics. Completed day 8, cycle 1 of cisplatin on 2/27/2025 with intravenous hydration. Next chemotherapy scheduled for 3/6/2025. PEG tube in place but due to not having insurance he is unable to supplement with tube feeding. Patient was sent to ED from radiation oncology clinic for evaluation of dizziness, low BP, and decrease oral intake. Work up in ED showing all labs normal except hematocrit 36.8, MCHC 36.1, absolute lymphocytes 0.81, and lymphocytes 13%.     KUB negative   CT abd/pelvis from 3/7 reviewed, no acute process    PLAN  Hold chemotherapy while inpatient  Continuing radiation  Continue antiemetics  Palliative care consulted for symptom management  Continue oral nystatin, Magic mouthwash  PT/OT  Pain control  GI team following  s/p exchange PEG with PEG-J on 3/11/25  Anival hwang

## 2025-03-13 NOTE — PROGRESS NOTES
Palliative Care Department  [phone]  Palliative Care Progress Note  Provider Mamie Melgoza PA-C    Prasanna Parnell  [id number]  Hospital Day: 11  Date of Initial Consult: 3/3/2025  Referring Provider: Tammie Higginbotham DO  Palliative Medicine was consulted for assistance with: \"SCC\"    HPI:   Prasanna Parnell is a 59 y.o. with a past medical history of spondylolisthesis, retrolisthesis, neuropathy, COPD, squamous cell carcinoma head and neck, tobacco use who was admitted on 3/3/2025 from radiation office with a CHIEF COMPLAINT of poor oral intake and failure to thrive.  Patient was diagnosed with squamous cell carcinoma of the head and neck in December 2024.  Laryngoscopy by ENT 1/14/2025 showed ulcerated masses seen in the area of the right palatine tonsil extending to the base of the tongue into the vallecula approaching midline.  Pathology of the right tonsil biopsy and right base of the tongue biopsy was consistent with HPV associated squamous cell carcinoma.  He had a bronchoscopy with fine-needle aspiration of right upper lobe lesion which was positive for malignancy consistent with squamous cell carcinoma.  Patient was initiated on chemotherapy and is following with Dr. Tomas in medical oncology and radiation therapy concurrently.  He underwent PEG tube placement. Patient is actively following with the palliative medicine clinic for symptom management.    He was being seen and radiation oncology office and was found to have very poor oral intake over a week.  His insurance reportedly denied his tube feedings for his PEG tube.  He has also been experiencing uncontrolled nausea and vomiting.  He was admitted to the hospital for further management.  Oncology and radiation oncology have been consulted.  Palliative care was consulted.    3/11: PEG to J tube  ASSESSMENT/PLAN:     Pertinent Hospital Diagnoses     Metastatic squamous cell carcinoma right base of the tongue/tonsil  Intractable nausea and  appropriate.    Spiritual assessment: no spiritual distress identified  Bereavement and grief: to be determined  Referrals to: none today  SUBJECTIVE:     Current medical issues leading to Palliative Medicine involvement include   Active Hospital Problems    Diagnosis Date Noted    Malignant neoplasm of head, face, and neck (HCC) [C76.0] 03/12/2025    Goals of care, counseling/discussion [Z71.89] 03/11/2025    PEG (percutaneous endoscopic gastrostomy) status (HCC) [Z93.1] 03/08/2025    Severe protein-calorie malnutrition [E43] 03/04/2025    Palliative care encounter [Z51.5] 03/04/2025    Neoplasm related pain [G89.3] 03/04/2025    Intractable nausea and vomiting [R11.2] 03/04/2025    Failure to thrive in adult [R62.7] 03/03/2025       Details of Conversation:      Patient seen at bedside.  He is laying in bed on room air.  Patient feeling some he did have emesis with attempted G-tube flushes.  He did tolerate the flush this morning.  States that he is not vomiting as long as there is not anything in his stomach.  He did take Zofran ODT yesterday for dry heaving and had some relief.  scopolamine patch replaced yesterday.  Pain controlled with fentanyl patch and IV Dilaudid.  Await brain MRI results.  Symptom management as noted above.    Review of Systems   Constitutional:  Positive for chills, fatigue and fever.   HENT:  Positive for ear pain. Negative for congestion.    Eyes:  Negative for visual disturbance.   Respiratory:  Negative for cough and shortness of breath.    Cardiovascular:  Negative for chest pain and palpitations.   Gastrointestinal:  Positive for nausea and vomiting. Negative for abdominal distention, abdominal pain, constipation and diarrhea.   Musculoskeletal:  Positive for back pain and neck pain. Negative for arthralgias.   Neurological:  Positive for dizziness and headaches. Negative for seizures.   Psychiatric/Behavioral:  Positive for sleep disturbance. The patient is not nervous/anxious.

## 2025-03-13 NOTE — PROGRESS NOTES
Patient is refusing any by mouth medications to re taken orally or through J peg at this time. Patient also refusing G & J ports to be flushed at this time. Patient educated on importance of at least attempting flushes and medications.     Maloney bag placed on G port to gravity at this time and bile like fluid immediatly started draining from tube.    Will attempt flushes again shortly

## 2025-03-13 NOTE — CONSULTS
Comprehensive Nutrition Assessment    Type and Reason for Visit:  Reassess, Consult, Nutrition support    Nutrition Recommendations/Plan:   Continue NPO  Modify TF to promote GI tolerance; recommend,    Peptide Based (Vital AF 1.2) @ 60ml/hr to provide 1440ml TV, 1728kcals, 108g pro, 1168ml free water.    Flush rec of 95ml q 4= 570ml water (1738ml total water).     Start at trickle rate (10cc/hr) and adv slowly to goal- monitor for refeeding syndrome; monitor/replace Lytes PRN.    J-tube for TF.    Will continue to monitor.      Malnutrition Assessment:  Malnutrition Status:  Severe malnutrition (03/04/25 1142)    Context:  Chronic Illness     Findings of the 6 clinical characteristics of malnutrition:  Energy Intake:  75% or less estimated energy requirements for 1 month or longer (chronic N/V w/ inability to tolerate PO intake ; pt w/ PEG, however was not utilizing this PTA d/t inability to acquire TF formula d/t insurance issues.)  Weight Loss:  Unable to assess (GALI d/t lack of wt hx per EMR; recent measured wts trending down, however unable to assess long term wt loss d/t lack of measured wt hx w/in adequate time frame for assessment.)     Body Fat Loss:  Severe body fat loss Orbital, Triceps, Buccal region   Muscle Mass Loss:  Severe muscle mass loss Temples (temporalis), Clavicles (pectoralis & deltoids), Hand (interosseous), Thigh (quadriceps)  Fluid Accumulation:  No fluid accumulation     Strength:  Not Performed    Nutrition Assessment:    pt adm dt/ FTT; pt w/ throat CA (tonsil/tongue/lung) currently on chemo/XRT; pt had PEG placed 1/20/25, however pt not utilizing TF PTA d/t inability to acquire TF formula/insurance issues; pt endorses poor PO/appetite PTA - he tells me he tries to eat adequate meals (such as steak) and tries consuming ONS (such as Ensure), however reports intractable N/V w/ oral intake; he also tells  me he has chemo on Thursdays and feels that this \"makes everything worse\", also  5316-2208  Weight Used for Protein Requirements: Current  Protein (g/day): 1.6-1.9g/kgxCBW=85-100g  Method Used for Fluid Requirements: 1 ml/kcal  Fluid (ml/day): 7953-0304    Nutrition Diagnosis:   Severe malnutrition, in context of chronic illness related to catabolic illness as evidenced by criteria as identified in malnutrition assessment    Nutrition Interventions:   Food and/or Nutrient Delivery: Continue NPO, Start Tube Feeding (Recommend Peptide Based (Vital AF 1.2) @ 60ml/hr to provide 1440ml TV, 1728kcals, 108g pro, 1168ml free water; flush rec of 95ml q 4= 570ml water (1738ml total water). Start at trickle rate (10cc/hr) and adv slowly to goal- monitor for refeeding syndrome)  Nutrition Education/Counseling: Education/Counseling initiated (about nutrition support and formulas)  Coordination of Nutrition Care: Continue to monitor while inpatient       Goals:  Goals: Initiate nutrition support (as medically feasible; Rec Peptide Based (Vital AF 1.2)@ 60ml/hr to provide 1440ml TV,1728kcals,108g pro,1168ml free water flush rec of 95ml q 4= 570ml water(1738ml total water).)  Type of Goal: New goal  Previous Goal Met: New Goal (pt now s/p PEG/J, did not tolerate feeds via PEG)    Nutrition Monitoring and Evaluation:   Behavioral-Environmental Outcomes: None Identified  Food/Nutrient Intake Outcomes: Enteral Nutrition Intake/Tolerance  Physical Signs/Symptoms Outcomes: Nutrition Focused Physical Findings, Biochemical Data, Chewing or Swallowing, Skin, Weight, GI Status, Fluid Status or Edema    Discharge Planning:    Too soon to determine (new PEG/J)     Robyn Stephens RD, LD  Contact: [phone]

## 2025-03-13 NOTE — PROGRESS NOTES
[facility] Hospitalist Progress Note    SYNOPSIS: Patient admitted on 3/3/2025 for Failure to thrive in adult    Prasanna Parnell is a 59 y.o. male who presents to [facility] ER complaining of failure to thrive.     Prasanna Parnell has a past medical history that includes stage I SCC of tongue/tonsil, lung mass SCC     Prasanna the ER from radiation office for decreased p.o. intake, nausea, vomiting.  He is being treated for throat cancer and is on chemo and radiation.  He has tried multiple antiemetics without success.  He does have a PEG tube but currently due to insurance issues he does not have any tube feeds.  He has been unable to eat for days. Will be admitted for initiation of tube feeds and if he cannot tolerate these, consideration of TPN.      Upon presentation to the ER, routine labwork was performed which revealed no acute abnormalities.  Imaging results are as outlined below in the Imaging section of this note.   Upon arrival to the ER, patient was 107/61.  The patient received zofran, 1L NS in the emergency room and was admitted to [facility].     3/5: Patient symptomatic with nausea this morning.  Tube feeds held for now.     3/6: Continues to have nausea.  CT abdomen/pelvis done and is unremarkable.  Oncology has consulted GI as they do not think this is a side effect of cancer treatments.     3/8: Patient received Benadryl IV x 1, Ativan IV x 1, and started on nortriptyline nightly and PPI IV twice daily by GI.  If symptoms do not improve with these plan is possible conversion of PEG tube PEG-J on Tuesday 3/11.     3/10: Still significant nausea unable to tolerate tube feeds.        3/11-s/p PEG J tube placement; gastric pport to be plced to gravity; and flush gastic port Q3hrly  Jejunal port for tube feeds only no meds    3/12-persistent nausea; palliative care added fentanyl patch; oncology plans to obtain mri brain to rule out metastasis to brain      3/13-tube feeding started;  [place]  +++++++++++++++++++++++++++++++++++++++++++++++++  NOTE: This report was transcribed using voice recognition software. Every effort was made to ensure accuracy; however, inadvertent computerized transcription errors may be present.

## 2025-03-13 NOTE — CARE COORDINATION
3/13/2025social work transition of care planning  Sw followed up with pt to discuss transition of care planning. Sw discussed tube feed provider. Referral to [facility] pharmacy. Sw faxed service agreement,dietary note and face sheet. [facility] following.  Electronically signed by BRIAN Jay on 3/13/2025 at 12:45 PM    Addendum:Radha notified that pt is now caresource. Radha notified [facility] an pharmacy of coverage.  Electronically signed by BRIAN Jay on 3/13/2025 at 3:00 PM

## 2025-03-13 NOTE — TELEPHONE ENCOUNTER
Returned call to pt's wife, Mary Anne, regarding nutrition concerns. Pt has been inpatient for >1 week, now s/p PEG-J exchange to allow for \"constant venting of stomach\" while receiving post-pyloric feedings. Pt to start trickle feeding w/ Peptide-based formula today. Mary Anne is concerned that pt has been without nutrition for several weeks at this time per report. Explained to Mary Anne that this clinician does not follow patients while they are hospitalized, assuring her that pt does have nutrition plan in place. Supportive listening provided. Mary Anne reports she is planning on coming to the hospital today and she would like to meet w/  to discuss ongoing issues w/ pending Medicaid. Attempted to contact inpatient  who was off unit at time of call. Message left w/ floor staff. Will continue to follow once pt discharged from hospital.  Electronically signed by Zaira Pratt MS, RD, LD on 3/13/2025 at 12:34 PM

## 2025-03-13 NOTE — PROGRESS NOTES
Patients G peg flushed with 50ml and clamped.  J peg flushed with 300 ml and clamped per order. Patient tolerated flush well. Will reattach to gravity in 30 mins.

## 2025-03-14 ENCOUNTER — HOSPITAL ENCOUNTER (OUTPATIENT)
Dept: RADIATION ONCOLOGY | Age: 60
Discharge: HOME | End: 2025-03-14

## 2025-03-14 ENCOUNTER — APPOINTMENT (OUTPATIENT)
Dept: MRI IMAGING | Age: 60
DRG: 146 | End: 2025-03-14
Payer: MEDICARE

## 2025-03-14 LAB
ANION GAP SERPL CALCULATED.3IONS-SCNC: 12 MMOL/L (ref 7–16)
BASOPHILS # BLD: 0.04 K/UL (ref 0–0.2)
BASOPHILS NFR BLD: 1 % (ref 0–2)
BUN SERPL-MCNC: 6 MG/DL (ref 6–20)
CALCIUM SERPL-MCNC: 8.7 MG/DL (ref 8.6–10.2)
CHLORIDE SERPL-SCNC: 102 MMOL/L (ref 98–107)
CO2 SERPL-SCNC: 25 MMOL/L (ref 22–29)
CREAT SERPL-MCNC: 0.6 MG/DL (ref 0.7–1.2)
EOSINOPHIL # BLD: 0.07 K/UL (ref 0.05–0.5)
EOSINOPHILS RELATIVE PERCENT: 2 % (ref 0–6)
ERYTHROCYTE [DISTWIDTH] IN BLOOD BY AUTOMATED COUNT: 14 % (ref 11.5–15)
FERRITIN SERPL-MCNC: 974 NG/ML
FOLATE SERPL-MCNC: 5.1 NG/ML (ref 4.8–24.2)
GFR, ESTIMATED: >90 ML/MIN/1.73M2
GLUCOSE SERPL-MCNC: 131 MG/DL (ref 74–99)
HCT VFR BLD AUTO: 29.7 % (ref 37–54)
HGB BLD-MCNC: 10.3 G/DL (ref 12.5–16.5)
IRON SATN MFR SERPL: 32 % (ref 20–55)
IRON SERPL-MCNC: 44 UG/DL (ref 59–158)
LYMPHOCYTES NFR BLD: 0.15 K/UL (ref 1.5–4)
LYMPHOCYTES RELATIVE PERCENT: 4 % (ref 20–42)
MCH RBC QN AUTO: 32.2 PG (ref 26–35)
MCHC RBC AUTO-ENTMCNC: 34.7 G/DL (ref 32–34.5)
MCV RBC AUTO: 92.8 FL (ref 80–99.9)
MONOCYTES NFR BLD: 0.48 K/UL (ref 0.1–0.95)
MONOCYTES NFR BLD: 12 % (ref 2–12)
NEUTROPHILS NFR BLD: 82 % (ref 43–80)
NEUTS SEG NFR BLD: 3.37 K/UL (ref 1.8–7.3)
NUCLEATED RED BLOOD CELLS: 1 PER 100 WBC
PLATELET # BLD AUTO: 213 K/UL (ref 130–450)
PMV BLD AUTO: 9.4 FL (ref 7–12)
POTASSIUM SERPL-SCNC: 3.2 MMOL/L (ref 3.5–5)
RBC # BLD AUTO: 3.2 M/UL (ref 3.8–5.8)
RBC # BLD: ABNORMAL 10*6/UL
SODIUM SERPL-SCNC: 139 MMOL/L (ref 132–146)
TIBC SERPL-MCNC: 139 UG/DL (ref 250–450)
VIT B12 SERPL-MCNC: 1368 PG/ML (ref 211–946)
WBC OTHER # BLD: 4.1 K/UL (ref 4.5–11.5)

## 2025-03-14 PROCEDURE — 6360000004 HC RX CONTRAST MEDICATION: Performed by: RADIOLOGY

## 2025-03-14 PROCEDURE — 6360000002 HC RX W HCPCS: Performed by: STUDENT IN AN ORGANIZED HEALTH CARE EDUCATION/TRAINING PROGRAM

## 2025-03-14 PROCEDURE — 99232 SBSQ HOSP IP/OBS MODERATE 35: CPT | Performed by: INTERNAL MEDICINE

## 2025-03-14 PROCEDURE — 2580000003 HC RX 258: Performed by: INTERNAL MEDICINE

## 2025-03-14 PROCEDURE — 97161 PT EVAL LOW COMPLEX 20 MIN: CPT

## 2025-03-14 PROCEDURE — 6360000002 HC RX W HCPCS: Performed by: INTERNAL MEDICINE

## 2025-03-14 PROCEDURE — 83540 ASSAY OF IRON: CPT

## 2025-03-14 PROCEDURE — 85025 COMPLETE CBC W/AUTO DIFF WBC: CPT

## 2025-03-14 PROCEDURE — 36415 COLL VENOUS BLD VENIPUNCTURE: CPT

## 2025-03-14 PROCEDURE — 82746 ASSAY OF FOLIC ACID SERUM: CPT

## 2025-03-14 PROCEDURE — 99232 SBSQ HOSP IP/OBS MODERATE 35: CPT | Performed by: PHYSICIAN ASSISTANT

## 2025-03-14 PROCEDURE — 2500000003 HC RX 250 WO HCPCS: Performed by: INTERNAL MEDICINE

## 2025-03-14 PROCEDURE — 80048 BASIC METABOLIC PNL TOTAL CA: CPT

## 2025-03-14 PROCEDURE — A9579 GAD-BASE MR CONTRAST NOS,1ML: HCPCS | Performed by: RADIOLOGY

## 2025-03-14 PROCEDURE — 82728 ASSAY OF FERRITIN: CPT

## 2025-03-14 PROCEDURE — 83550 IRON BINDING TEST: CPT

## 2025-03-14 PROCEDURE — 77386 HC NTSTY MODUL RAD TX DLVR CPLX: CPT | Performed by: SPECIALIST

## 2025-03-14 PROCEDURE — 70553 MRI BRAIN STEM W/O & W/DYE: CPT

## 2025-03-14 PROCEDURE — 82607 VITAMIN B-12: CPT

## 2025-03-14 PROCEDURE — 1200000000 HC SEMI PRIVATE

## 2025-03-14 PROCEDURE — 6360000002 HC RX W HCPCS: Performed by: NURSE PRACTITIONER

## 2025-03-14 PROCEDURE — 2580000003 HC RX 258: Performed by: STUDENT IN AN ORGANIZED HEALTH CARE EDUCATION/TRAINING PROGRAM

## 2025-03-14 PROCEDURE — 99232 SBSQ HOSP IP/OBS MODERATE 35: CPT | Performed by: NURSE PRACTITIONER

## 2025-03-14 RX ORDER — POTASSIUM CHLORIDE 7.45 MG/ML
10 INJECTION INTRAVENOUS
Status: DISPENSED | OUTPATIENT
Start: 2025-03-14 | End: 2025-03-14

## 2025-03-14 RX ADMIN — SODIUM CHLORIDE, PRESERVATIVE FREE 10 ML: 5 INJECTION INTRAVENOUS at 12:42

## 2025-03-14 RX ADMIN — POTASSIUM CHLORIDE 10 MEQ: 7.46 INJECTION, SOLUTION INTRAVENOUS at 18:18

## 2025-03-14 RX ADMIN — SODIUM CHLORIDE 125 MG: 9 INJECTION, SOLUTION INTRAVENOUS at 22:13

## 2025-03-14 RX ADMIN — HYDROMORPHONE HYDROCHLORIDE 0.5 MG: 1 INJECTION, SOLUTION INTRAMUSCULAR; INTRAVENOUS; SUBCUTANEOUS at 20:26

## 2025-03-14 RX ADMIN — HYDROMORPHONE HYDROCHLORIDE 0.5 MG: 1 INJECTION, SOLUTION INTRAMUSCULAR; INTRAVENOUS; SUBCUTANEOUS at 00:19

## 2025-03-14 RX ADMIN — HYDROMORPHONE HYDROCHLORIDE 0.5 MG: 1 INJECTION, SOLUTION INTRAMUSCULAR; INTRAVENOUS; SUBCUTANEOUS at 12:42

## 2025-03-14 RX ADMIN — PROCHLORPERAZINE EDISYLATE 10 MG: 5 INJECTION INTRAMUSCULAR; INTRAVENOUS at 16:34

## 2025-03-14 RX ADMIN — PANTOPRAZOLE SODIUM 40 MG: 40 INJECTION, POWDER, FOR SOLUTION INTRAVENOUS at 10:21

## 2025-03-14 RX ADMIN — SODIUM CHLORIDE, PRESERVATIVE FREE 10 ML: 5 INJECTION INTRAVENOUS at 16:34

## 2025-03-14 RX ADMIN — SODIUM CHLORIDE, PRESERVATIVE FREE 10 ML: 5 INJECTION INTRAVENOUS at 08:32

## 2025-03-14 RX ADMIN — POTASSIUM CHLORIDE 10 MEQ: 7.46 INJECTION, SOLUTION INTRAVENOUS at 19:22

## 2025-03-14 RX ADMIN — HYDROMORPHONE HYDROCHLORIDE 0.5 MG: 1 INJECTION, SOLUTION INTRAMUSCULAR; INTRAVENOUS; SUBCUTANEOUS at 08:29

## 2025-03-14 RX ADMIN — PROCHLORPERAZINE EDISYLATE 10 MG: 5 INJECTION INTRAMUSCULAR; INTRAVENOUS at 10:23

## 2025-03-14 RX ADMIN — GADOTERIDOL 13 ML: 279.3 INJECTION, SOLUTION INTRAVENOUS at 10:16

## 2025-03-14 RX ADMIN — SODIUM CHLORIDE, PRESERVATIVE FREE 10 ML: 5 INJECTION INTRAVENOUS at 10:25

## 2025-03-14 RX ADMIN — HYDROMORPHONE HYDROCHLORIDE 0.5 MG: 1 INJECTION, SOLUTION INTRAMUSCULAR; INTRAVENOUS; SUBCUTANEOUS at 03:55

## 2025-03-14 RX ADMIN — PROCHLORPERAZINE EDISYLATE 10 MG: 5 INJECTION INTRAMUSCULAR; INTRAVENOUS at 02:52

## 2025-03-14 RX ADMIN — HYDROMORPHONE HYDROCHLORIDE 0.5 MG: 1 INJECTION, SOLUTION INTRAMUSCULAR; INTRAVENOUS; SUBCUTANEOUS at 16:34

## 2025-03-14 RX ADMIN — PROCHLORPERAZINE EDISYLATE 10 MG: 5 INJECTION INTRAMUSCULAR; INTRAVENOUS at 20:24

## 2025-03-14 RX ADMIN — SODIUM CHLORIDE, PRESERVATIVE FREE 10 ML: 5 INJECTION INTRAVENOUS at 20:24

## 2025-03-14 RX ADMIN — PANTOPRAZOLE SODIUM 40 MG: 40 INJECTION, POWDER, FOR SOLUTION INTRAVENOUS at 20:24

## 2025-03-14 ASSESSMENT — PAIN SCALES - GENERAL
PAINLEVEL_OUTOF10: 0
PAINLEVEL_OUTOF10: 7
PAINLEVEL_OUTOF10: 4
PAINLEVEL_OUTOF10: 0
PAINLEVEL_OUTOF10: 6
PAINLEVEL_OUTOF10: 7
PAINLEVEL_OUTOF10: 4
PAINLEVEL_OUTOF10: 7
PAINLEVEL_OUTOF10: 5

## 2025-03-14 ASSESSMENT — PAIN DESCRIPTION - PAIN TYPE
TYPE: ACUTE PAIN
TYPE: CHRONIC PAIN
TYPE: CHRONIC PAIN
TYPE: ACUTE PAIN
TYPE: CHRONIC PAIN

## 2025-03-14 ASSESSMENT — PAIN DESCRIPTION - DESCRIPTORS
DESCRIPTORS: BURNING;THROBBING;SHOOTING
DESCRIPTORS: ACHING;DISCOMFORT;SHARP
DESCRIPTORS: ACHING;DISCOMFORT;SHARP
DESCRIPTORS: ACHING;DISCOMFORT;SHARP;SHOOTING
DESCRIPTORS: BURNING;SHOOTING;SORE

## 2025-03-14 ASSESSMENT — PAIN - FUNCTIONAL ASSESSMENT
PAIN_FUNCTIONAL_ASSESSMENT: ACTIVITIES ARE NOT PREVENTED
PAIN_FUNCTIONAL_ASSESSMENT: PREVENTS OR INTERFERES SOME ACTIVE ACTIVITIES AND ADLS
PAIN_FUNCTIONAL_ASSESSMENT: PREVENTS OR INTERFERES SOME ACTIVE ACTIVITIES AND ADLS
PAIN_FUNCTIONAL_ASSESSMENT: ACTIVITIES ARE NOT PREVENTED
PAIN_FUNCTIONAL_ASSESSMENT: PREVENTS OR INTERFERES SOME ACTIVE ACTIVITIES AND ADLS

## 2025-03-14 ASSESSMENT — PAIN DESCRIPTION - LOCATION
LOCATION: BACK;JAW;NECK
LOCATION: BACK;JAW;NECK
LOCATION: JAW;NECK
LOCATION: JAW;NECK

## 2025-03-14 ASSESSMENT — PAIN DESCRIPTION - ONSET
ONSET: ON-GOING
ONSET: ON-GOING
ONSET: AWAKENED FROM SLEEP
ONSET: ON-GOING
ONSET: ON-GOING

## 2025-03-14 ASSESSMENT — PAIN DESCRIPTION - FREQUENCY
FREQUENCY: INTERMITTENT
FREQUENCY: CONTINUOUS

## 2025-03-14 ASSESSMENT — PAIN DESCRIPTION - ORIENTATION
ORIENTATION: RIGHT
ORIENTATION: RIGHT;LEFT;ANTERIOR
ORIENTATION: RIGHT
ORIENTATION: MID

## 2025-03-14 NOTE — PLAN OF CARE
Problem: Pain  Goal: Verbalizes/displays adequate comfort level or baseline comfort level  3/13/2025 2321 by Magdalena Conroy, RN  Outcome: Progressing  3/13/2025 0952 by Stefania Asencio, RN  Outcome: Progressing

## 2025-03-14 NOTE — PROGRESS NOTES
OCCUPATIONAL THERAPY TREATMENT NOTE    [facility]      OT BEDSIDE TREATMENT NOTE      Date:3/14/2025  Patient Name: Prasanna Parnell  MRN: [id number]  : 1965  Room: [facility]/5424-A     OT orders received & appreciated, chart reviewed, pt. currently off unit at MRI.  Will follow.    Thank you,  Lilia Oneal, OTR/L   License #  [id number]

## 2025-03-14 NOTE — PROGRESS NOTES
Temp 97.4 °F (36.3 °C) (Temporal)   Resp 16   Ht 1.727 m (5' 7.99\")   Wt 56 kg (123 lb 6.4 oz)   SpO2 94%   BMI 18.76 kg/m²   Physical Exam  Vitals reviewed.   Constitutional:       General: He is not in acute distress.     Appearance: Normal appearance. He is not toxic-appearing.   HENT:      Head: Normocephalic and atraumatic.      Nose: Nose normal.      Mouth/Throat:      Pharynx: Posterior oropharyngeal erythema present.   Eyes:      Extraocular Movements: Extraocular movements intact.      Pupils: Pupils are equal, round, and reactive to light.   Cardiovascular:      Rate and Rhythm: Normal rate and regular rhythm.      Heart sounds: No murmur heard.  Pulmonary:      Effort: Pulmonary effort is normal. No respiratory distress.      Breath sounds: Normal breath sounds. No stridor. No wheezing.   Abdominal:      General: Bowel sounds are normal. There is no distension.      Palpations: Abdomen is soft.      Tenderness: There is no abdominal tenderness.      Comments: PEG intact   Musculoskeletal:      Cervical back: Normal range of motion. No rigidity.      Right lower leg: No edema.      Left lower leg: No edema.   Lymphadenopathy:      Cervical: Cervical adenopathy present.   Skin:     General: Skin is warm and dry.      Coloration: Skin is not jaundiced or pale.      Findings: No bruising, lesion or rash.   Neurological:      General: No focal deficit present.      Mental Status: He is alert and oriented to person, place, and time.   Psychiatric:         Mood and Affect: Mood normal.         Behavior: Behavior normal.         Thought Content: Thought content normal.              ECOG PS: 1      Intake/Output Summary (Last 24 hours) at 3/14/2025 1704  Last data filed at 3/14/2025 1634  Gross per 24 hour   Intake 1793.2 ml   Output 600 ml   Net 1193.2 ml           DIAGNOSTIC DATA:   Lab Results   Component Value Date    WBC 4.1 (L) 03/14/2025    HGB 10.3 (L) 03/14/2025    HCT 29.7 (L) 03/14/2025    MCV  TF as tolerated  Hgb dowtrending; continue to monitor  Iron studies reviewed, he does have iron deficiency, will order Ferrlecit, continue to monitor his counts, no vitamin B12 for deficiency  DC per Primary team, follow-up with Dr. Tomas after DC from the hospital   Brain MRI reviewed, negative for any acute intracranial abnormalities.    Jayde Fernandez MD   HEMATOLOGY/MEDICAL ONCOLOGY  [facility] MED ONCOLOGY  [address] [place] [id number]  Dept: [phone]  Loc: [phone]

## 2025-03-14 NOTE — PROGRESS NOTES
[facility] Hospitalist Progress Note    SYNOPSIS: Patient admitted on 3/3/2025 for Failure to thrive in adult    Prasanna Parnell is a 59 y.o. male who presents to [facility] ER complaining of failure to thrive.     Prasanna Parnell has a past medical history that includes stage I SCC of tongue/tonsil, lung mass SCC     Prasanna the ER from radiation office for decreased p.o. intake, nausea, vomiting.  He is being treated for throat cancer and is on chemo and radiation.  He has tried multiple antiemetics without success.  He does have a PEG tube but currently due to insurance issues he does not have any tube feeds.  He has been unable to eat for days. Will be admitted for initiation of tube feeds and if he cannot tolerate these, consideration of TPN.      Upon presentation to the ER, routine labwork was performed which revealed no acute abnormalities.  Imaging results are as outlined below in the Imaging section of this note.   Upon arrival to the ER, patient was 107/61.  The patient received zofran, 1L NS in the emergency room and was admitted to [facility].     3/5: Patient symptomatic with nausea this morning.  Tube feeds held for now.     3/6: Continues to have nausea.  CT abdomen/pelvis done and is unremarkable.  Oncology has consulted GI as they do not think this is a side effect of cancer treatments.     3/8: Patient received Benadryl IV x 1, Ativan IV x 1, and started on nortriptyline nightly and PPI IV twice daily by GI.  If symptoms do not improve with these plan is possible conversion of PEG tube PEG-J on Tuesday 3/11.     3/10: Still significant nausea unable to tolerate tube feeds.        3/11-s/p PEG J tube placement; gastric pport to be plced to gravity; and flush gastic port Q3hrly  Jejunal port for tube feeds only no meds    3/12-persistent nausea; palliative care added fentanyl patch; oncology plans to obtain mri brain to rule out metastasis to brain      3/13-tube feeding started;  DAILY    +++++++++++++++++++++++++++++++++++++++++++++++++  Emy Vallejo MD  [facility]  [facility], [place]  +++++++++++++++++++++++++++++++++++++++++++++++++  NOTE: This report was transcribed using voice recognition software. Every effort was made to ensure accuracy; however, inadvertent computerized transcription errors may be present.

## 2025-03-14 NOTE — PROGRESS NOTES
Gastroenterology, Hepatology, &  Advanced Endoscopy    Progress Note    Subjective: Denies dry heaving and nausea today. Denies emesis. Tolerating TF at 20cc/hr    Reason for Consult: FTT     HPI:   Prasanna Parnell is a 59 y.o. male w/ PMH of  has a past medical history of Cancer (HCC), Cervicalgia, COPD (chronic obstructive pulmonary disease) (HCC), Neuropathy, Retrolisthesis, and Spondylolisthesis. who presents to the ED with persistent nausea and decreased PO intake. The patient has SCC of the tongue and lungs and is currently following with Oncology and Radiation Oncology. He is s/p PEG tube placement. He is admitted due to decreased PO intake and so currently reports that chemotherapy is on hold. He reports that he has been unable to tolerate much by mouth or through the PEG tube without having immediate nausea and vomiting. He reports that he is regular with his bowel movements.       No results for input(s): \"INR\", \"ALT\", \"AST\", \"ALKPHOS\", \"BILITOT\", \"GLOB\", \"GGT\", \"LABPROT\", \"LABALBU\", \"BILIDIR\" in the last 72 hours.  Lab Results   Component Value Date    WBC 4.1 (L) 03/14/2025    HGB 10.3 (L) 03/14/2025    HCT 29.7 (L) 03/14/2025     03/14/2025     03/13/2025    K 3.2 (L) 03/13/2025     03/13/2025    CREATININE 0.5 (L) 03/13/2025    BUN 10 03/13/2025    CO2 25 03/13/2025    GLUCOSE 149 (H) 03/13/2025    INR 1.2 01/23/2025    PROTIME 13.0 (H) 01/23/2025    TSH 0.41 03/04/2025    LABA1C 5.6 03/04/2025     Lab Results   Component Value Date    INR 1.2 01/23/2025    INR 1.1 08/04/2022    PROTIME 13.0 (H) 01/23/2025    PROTIME 11.6 08/04/2022         Lipase   Date Value Ref Range Status   02/25/2025 14 13 - 60 U/L Final         CT Result (most recent):  CT ABDOMEN PELVIS WO CONTRAST 03/07/2025    Narrative  EXAMINATION:  CT OF THE ABDOMEN AND PELVIS WITHOUT CONTRAST3/7/2025 7:23 am    TECHNIQUE:  CT of the abdomen and pelvis was performed without the administration of  intravenous    - To induce deep sleep with Ativan/Benadryl at night x1 3/8, made him too tired and declined further.  - Continue scopolamine.   - Exchanged PEG with PEG-J to allow for constant venting of stomach while getting adequate nutrition.   -Palliative care consulted for symptom management     PEG-J TUBE INSTRUCTIONS:     GASTRIC PORT TO BE PLACED TO GRAVITY. ALL MEDICATIONS TO BE GIVEN THROUGH GASTRIC PORT WITH THE TUBE CLAMPED FOR ONE HOUR FOLLOWING ADMINISTRATION. FLUSH GASTRIC PORT EVERY 3 HOURS WITH 50CC WATER.      JEJUNAL PORT IS FOR TUBE FEEDS ONLY. ABSOLUTELY NO MEDICATION, LIQUID OR SOLID, IS TO BE GIVEN DOWN JEJUNAL PORT. FLUSH JEJUNAL PORT WITH 300CC WATER EVERY 3 HOURS.                      We will follow.    Thank you for including us in the care of this patient. Please do not hesitate to contact us with any additional questions or concerns.    Discussed with Haile Wiseman MD  Gastroenterology/Hepatology  Advanced Endoscopy

## 2025-03-14 NOTE — PROGRESS NOTES
IntraVENous Continuous Emy Vallejo  mL/hr at 03/13/25 2328 New Bag at 03/13/25 2328    fentaNYL (DURAGESIC) 12 MCG/HR 1 patch  1 patch TransDERmal Q72H Mamie Melgoza PA-C   1 patch at 03/12/25 1222    pantoprazole (PROTONIX) 40 mg in sodium chloride (PF) 0.9 % 10 mL injection  40 mg IntraVENous Q12H Haile Wiseman MD   40 mg at 03/13/25 2019    nortriptyline (PAMELOR) capsule 25 mg  25 mg Oral Nightly Haile Wiseman MD   25 mg at 03/13/25 2020    prochlorperazine (COMPAZINE) injection 10 mg  10 mg IntraVENous Q6H Haile Wiseman MD   10 mg at 03/14/25 0252    HYDROmorphone (DILAUDID) injection 0.5 mg  0.5 mg IntraVENous Q4H PRN Doris Johnson, APRN - CNP   0.5 mg at 03/14/25 0355    OLANZapine zydis (ZYPREXA) disintegrating tablet 2.5 mg  2.5 mg Oral Q6H PRN Doris Johnson, APRN - CNP        magic (miracle) mouthwash  15 mL Swish & Spit 4x Daily PRN Jayde Hernandez MD        benzocaine-menthol (CEPACOL SORE THROAT) lozenge 1 lozenge  1 lozenge Oral Q2H PRN Tammie Higginbotham, DO        benzonatate (TESSALON) capsule 100 mg  100 mg Oral TID PRN Tammie Higginbotham, DO        calcium carbonate (TUMS) chewable tablet 500 mg  500 mg Oral TID PRN Tammie Higginbotham, DO        hydrALAZINE (APRESOLINE) injection 10 mg  10 mg IntraVENous Q6H PRN Tammie Higginbotham, DO        melatonin tablet 3 mg  3 mg Oral Nightly PRN Tammie Higginbotham, DO        Polyvinyl Alcohol-Povidone PF (REFRESH) 1.4-0.6 % ophthalmic solution 1 drop  1 drop Both Eyes PRN Tammie Higginbotham, DO        sodium chloride (OCEAN, BABY AYR) 0.65 % nasal spray 1 spray  1 spray Each Nostril PRN Tammie Higginbotham, DO        sodium chloride flush 0.9 % injection 5-40 mL  5-40 mL IntraVENous 2 times per day Tammie Higginbotham, DO   10 mL at 03/13/25 0937    sodium chloride flush 0.9 % injection 5-40 mL  5-40 mL IntraVENous PRN Tammie Higginbotham, DO        0.9 % sodium chloride infusion   IntraVENous PRN Tammie Higginbotham, DO        potassium  chloride (KLOR-CON M) extended release tablet 40 mEq  40 mEq Oral PRN Tammie Higginbotham, DO   40 mEq at 03/04/25 1003    Or    potassium bicarb-citric acid (EFFER-K) effervescent tablet 40 mEq  40 mEq Oral PRN Tammie Higginbotham, DO        Or    potassium chloride 10 mEq/100 mL IVPB (Peripheral Line)  10 mEq IntraVENous PRN Tammie Higginbotham,  mL/hr at 03/10/25 0916 10 mEq at 03/10/25 0916    magnesium sulfate 2000 mg in 50 mL IVPB premix  2,000 mg IntraVENous PRN Tammie Higginbotham, DO        enoxaparin (LOVENOX) injection 40 mg  40 mg SubCUTAneous Daily Tammie Higginbotham, DO   40 mg at 03/10/25 0819    ondansetron (ZOFRAN-ODT) disintegrating tablet 4 mg  4 mg Oral Q8H PRN Tammie Higginbotham, DO   4 mg at 03/12/25 1137    Or    ondansetron (ZOFRAN) injection 4 mg  4 mg IntraVENous Q6H PRN Tammie Higginbotham, DO   4 mg at 03/09/25 1340    polyethylene glycol (GLYCOLAX) packet 17 g  17 g Oral Daily PRN Tammie Higginbotham, DO        acetaminophen (TYLENOL) tablet 650 mg  650 mg Oral Q6H PRN Tammie Higginbotham, DO   650 mg at 03/04/25 1911    Or    acetaminophen (TYLENOL) suppository 650 mg  650 mg Rectal Q6H PRN Tammie Higginbotham, DO        albuterol (PROVENTIL) (2.5 MG/3ML) 0.083% nebulizer solution 2.5 mg  2.5 mg Nebulization Q6H PRN Tammie Higginbotham, DO        lidocaine viscous hcl (XYLOCAINE) 2 % solution 15 mL  15 mL Mouth/Throat PRN Tammie Higginbotham, DO        nystatin (MYCOSTATIN) 549881 UNIT/ML suspension 500,000 Units  500,000 Units Oral 4x Daily Tammie Higginbotham, DO   500,000 Units at 03/10/25 0820    scopolamine (TRANSDERM-SCOP) transdermal patch 1 patch  1 patch TransDERmal Q3 Days Tammie Higginbotham DO   1 patch at 03/12/25 2105    sucralfate (CARAFATE) tablet 1 g  1 g Oral 4x Daily Tammie Higginbotham DO   1 g at 03/11/25 0829    oxyCODONE-acetaminophen (PERCOCET) 5-325 MG per tablet 1 tablet  1 tablet Oral Q4H PRN Tammie Higginbotham DO   1 tablet at 03/04/25 2310    Or     oxyCODONE-acetaminophen (PERCOCET) 5-325 MG per tablet 2 tablet  2 tablet Oral Q4H PRN Tammie Higginbotham, DO   2 tablet at 03/09/25 1933       REVIEW OF SYSTEMS:   General: Patient denies f/c or weight loss. +nausea and emesis.   HEENT: Patient denies persistent postnasal drip, scleral icterus, drooling, persistent bleeding from nose/mouth.  Resp: Patient denies SOB, wheezing, productive cough.  Cardio: Patient denies CP, palpitations, significant edema  GI: As above.  Derm: Patient denies jaundice/rashes.   Misc: Patient denies diffuse/irregular joint swelling or myalgias.      PHYSICAL EXAMINATION:   Vitals:    03/13/25 1101 03/13/25 2137 03/14/25 0600 03/14/25 0817   BP:  131/73  (!) 107/56   Pulse:  90  85   Resp: 18 20     Temp:  98.5 °F (36.9 °C)  97.4 °F (36.3 °C)   TempSrc:  Temporal  Temporal   SpO2:  95%  94%   Weight:   56 kg (123 lb 6.4 oz)    Height:         General: Overall well-appearing, NAD  HEENT: PERRLA, EOMI, Anicteric sclera, MMM, no rhinorrhea  Cards: RRR, no LE edema  Resp: Breathing comfortably, good air movement, no use of accessory muscles, no audible wheezing  Abdomen: soft, non-tender, non-distended. PEG in place.   Extremities: Moves all extremities, no effusions or bruising.  Skin: No rashes or jaundice  Neuro: A&O x 3, CN grossly intact, non-focal exam    ASSESSMENT:     59y/M w/ history of SCC of tongue and lungs undergoing chemoradiation who presents to the ED with nausea/vomiting and inability to tolerate PO intake. Current symptoms likely secondary to chemotherapy and current diagnosis.    PLAN:   - Tolerating tube flushes currently, continue to closely monitor  -Nutrition ordered for Tube feeding recommendations.  -Tube feedings per primary, currently TF at 20 cc/hr, advance as tolerated    - Await MRI Brain results.  -fentanyl patch added by primary  - Increase PPI to BID.  - Schedule Compazine. Monitor QT.  - Added nortriptyline qPM.  -Continue oral nystatin, Magic mouthwash

## 2025-03-14 NOTE — ONCOLOGY
Due to patient not having a PCP, Dr. Tomas will cover [facility] orders for now. Patient should be set up with PCP on discharge.

## 2025-03-14 NOTE — PROGRESS NOTES
Palliative Care Department  [phone]  Palliative Care Progress Note  Provider Mamie Melgoza PA-C    Prasanna Parnell  [id number]  Hospital Day: 12  Date of Initial Consult: 3/3/2025  Referring Provider: Tammie Higginbotham DO  Palliative Medicine was consulted for assistance with: \"SCC\"    HPI:   Prasanna Parnell is a 59 y.o. with a past medical history of spondylolisthesis, retrolisthesis, neuropathy, COPD, squamous cell carcinoma head and neck, tobacco use who was admitted on 3/3/2025 from radiation office with a CHIEF COMPLAINT of poor oral intake and failure to thrive.  Patient was diagnosed with squamous cell carcinoma of the head and neck in December 2024.  Laryngoscopy by ENT 1/14/2025 showed ulcerated masses seen in the area of the right palatine tonsil extending to the base of the tongue into the vallecula approaching midline.  Pathology of the right tonsil biopsy and right base of the tongue biopsy was consistent with HPV associated squamous cell carcinoma.  He had a bronchoscopy with fine-needle aspiration of right upper lobe lesion which was positive for malignancy consistent with squamous cell carcinoma.  Patient was initiated on chemotherapy and is following with Dr. Tomas in medical oncology and radiation therapy concurrently.  He underwent PEG tube placement. Patient is actively following with the palliative medicine clinic for symptom management.    He was being seen and radiation oncology office and was found to have very poor oral intake over a week.  His insurance reportedly denied his tube feedings for his PEG tube.  He has also been experiencing uncontrolled nausea and vomiting.  He was admitted to the hospital for further management.  Oncology and radiation oncology have been consulted.  Palliative care was consulted.    3/11: PEG to J tube  ASSESSMENT/PLAN:     Pertinent Hospital Diagnoses     Metastatic squamous cell carcinoma right base of the tongue/tonsil  Intractable nausea and  Palliative Medicine to participate in the care of Prasanna RODRIGUEZ Levonfeltonprateek.    Note: This report was completed using computerNoxilizer voiced recognition software.  Every effort has been made to ensure accuracy; however, inadvertent computerized transcription errors may be present.

## 2025-03-14 NOTE — PROGRESS NOTES
Pain when flushing IV site- notified MICU for iv site- swelling to bilateral forearms from previous peripheral IV's

## 2025-03-14 NOTE — PROGRESS NOTES
Room #:  5424/5424-A  Patient Name: Prasanna Parnell  Referring Provider:   03/12/25 1015  PT eval and treat  Start:  03/12/25 1015,   End:  03/12/25 1015,   ONE TIME,   Standing Count:  1 Occurrences,   R         Emy Vallejo MD      PHYSICAL THERAPY INITIAL EVALUATION    Plan of care: No skilled needs identified; will discharge from services. If condition changes, please reorder physical therapy.     Placement recommendation: home   Equipment recommendation:  None at this time        AM-PAC Basic Mobility   23/24          Order:  EVALUATE AND TREAT  Diagnosis/Problem list:      Patient Active Problem List   Diagnosis    Acute maxillary sinusitis    Head injury    Cancer of oral cavity (HCC)    Head and neck cancer (HCC)    Malignant neoplasm of overlapping sites of tonsil (HCC)    Tobacco use    Failure to thrive in adult    Severe protein-calorie malnutrition    Palliative care encounter    Neoplasm related pain    Intractable nausea and vomiting    PEG (percutaneous endoscopic gastrostomy) status (HCC)    Nausea & vomiting    Goals of care, counseling/discussion    Malignant neoplasm of head, face, and neck (HCC)    Normocytic anemia       Past medical history:       Diagnosis Date    Cancer (HCC)     oral cancer    Cervicalgia     COPD (chronic obstructive pulmonary disease) (HCC)     Neuropathy     Retrolisthesis     Spondylolisthesis    ;      Procedure Laterality Date    BRONCHOSCOPY N/A 01/23/2025    BRONCHOSCOPY ENDOBRONCHIAL ULTRASOUND FINE NEEDLE ASPIRATION performed by Edward Beebe MD at [facility] ENDOSCOPY    BRONCHOSCOPY N/A 01/23/2025    BRONCHOSCOPY ENDOBRONCHIAL ULTRASOUND FINE NEEDLE ASPIRATION ROBOTIC performed by Edward Beebe MD at [facility] ENDOSCOPY    FINGER SURGERY Right     amputation first finger    HERNIA REPAIR Bilateral     inguinal    KNEE SURGERY Left     repair tendons d/t chainsaw accidnet    LARYNGOSCOPY N/A 01/14/2025    PANENDOSCOPY WITH BIOPSY performed by Kal

## 2025-03-14 NOTE — CARE COORDINATION
3/14/2025social work transition of care planning  Pt plan is home,once medically stable. [facility] following. Sw faxed pharmacy agreement,nutrition note and face sheet to [facility] pharmacy x [phone]. Pt to call for ride at [place]. Pt approved for [employer],per sig other and public benefits.  Electronically signed by BRIAN Jay on 3/14/2025 at 8:44 AM    Addendum: Radha received call that pt has no physician to follow [facility] orders. Sw left vm for PAL to set up pcp at [place]. Pt will need physician to follow home care orders prior to dc.  Electronically signed by BRIAN Jay on 3/14/2025 at 9:27 AM    Addendum: Radha spoke with [facility] pharmacy tube feed is ready for Rn . [facility] can be called when pt dc. Pt must be at goal feed rate prior to dc. Orders are in epic.  Electronically signed by BRIAN Jay on 3/14/2025 at 3:35 PM

## 2025-03-15 LAB
ANION GAP SERPL CALCULATED.3IONS-SCNC: 14 MMOL/L (ref 7–16)
BASOPHILS # BLD: 0.02 K/UL (ref 0–0.2)
BASOPHILS NFR BLD: 0 % (ref 0–2)
BUN SERPL-MCNC: 6 MG/DL (ref 6–20)
CALCIUM SERPL-MCNC: 8.8 MG/DL (ref 8.6–10.2)
CHLORIDE SERPL-SCNC: 99 MMOL/L (ref 98–107)
CO2 SERPL-SCNC: 22 MMOL/L (ref 22–29)
CREAT SERPL-MCNC: 0.5 MG/DL (ref 0.7–1.2)
EOSINOPHIL # BLD: 0.17 K/UL (ref 0.05–0.5)
EOSINOPHILS RELATIVE PERCENT: 4 % (ref 0–6)
ERYTHROCYTE [DISTWIDTH] IN BLOOD BY AUTOMATED COUNT: 13.9 % (ref 11.5–15)
GFR, ESTIMATED: >90 ML/MIN/1.73M2
GLUCOSE SERPL-MCNC: 106 MG/DL (ref 74–99)
HCT VFR BLD AUTO: 27.9 % (ref 37–54)
HGB BLD-MCNC: 9.8 G/DL (ref 12.5–16.5)
IMM GRANULOCYTES # BLD AUTO: <0.03 K/UL (ref 0–0.58)
IMM GRANULOCYTES NFR BLD: 0 % (ref 0–5)
LYMPHOCYTES NFR BLD: 0.35 K/UL (ref 1.5–4)
LYMPHOCYTES RELATIVE PERCENT: 7 % (ref 20–42)
MCH RBC QN AUTO: 32.3 PG (ref 26–35)
MCHC RBC AUTO-ENTMCNC: 35.1 G/DL (ref 32–34.5)
MCV RBC AUTO: 92.1 FL (ref 80–99.9)
MONOCYTES NFR BLD: 0.36 K/UL (ref 0.1–0.95)
MONOCYTES NFR BLD: 8 % (ref 2–12)
NEUTROPHILS NFR BLD: 81 % (ref 43–80)
NEUTS SEG NFR BLD: 3.85 K/UL (ref 1.8–7.3)
PLATELET # BLD AUTO: 217 K/UL (ref 130–450)
PMV BLD AUTO: 9.6 FL (ref 7–12)
POTASSIUM SERPL-SCNC: 3.1 MMOL/L (ref 3.5–5)
RBC # BLD AUTO: 3.03 M/UL (ref 3.8–5.8)
RBC # BLD: ABNORMAL 10*6/UL
SODIUM SERPL-SCNC: 135 MMOL/L (ref 132–146)
WBC OTHER # BLD: 4.8 K/UL (ref 4.5–11.5)

## 2025-03-15 PROCEDURE — 99232 SBSQ HOSP IP/OBS MODERATE 35: CPT | Performed by: STUDENT IN AN ORGANIZED HEALTH CARE EDUCATION/TRAINING PROGRAM

## 2025-03-15 PROCEDURE — 6360000002 HC RX W HCPCS: Performed by: STUDENT IN AN ORGANIZED HEALTH CARE EDUCATION/TRAINING PROGRAM

## 2025-03-15 PROCEDURE — 36415 COLL VENOUS BLD VENIPUNCTURE: CPT

## 2025-03-15 PROCEDURE — 2500000003 HC RX 250 WO HCPCS: Performed by: INTERNAL MEDICINE

## 2025-03-15 PROCEDURE — 6370000000 HC RX 637 (ALT 250 FOR IP): Performed by: INTERNAL MEDICINE

## 2025-03-15 PROCEDURE — 2580000003 HC RX 258: Performed by: STUDENT IN AN ORGANIZED HEALTH CARE EDUCATION/TRAINING PROGRAM

## 2025-03-15 PROCEDURE — 1200000000 HC SEMI PRIVATE

## 2025-03-15 PROCEDURE — 6370000000 HC RX 637 (ALT 250 FOR IP): Performed by: PHYSICIAN ASSISTANT

## 2025-03-15 PROCEDURE — 80048 BASIC METABOLIC PNL TOTAL CA: CPT

## 2025-03-15 PROCEDURE — 85025 COMPLETE CBC W/AUTO DIFF WBC: CPT

## 2025-03-15 PROCEDURE — 6360000002 HC RX W HCPCS: Performed by: NURSE PRACTITIONER

## 2025-03-15 PROCEDURE — 6360000002 HC RX W HCPCS: Performed by: INTERNAL MEDICINE

## 2025-03-15 RX ORDER — LIDOCAINE HYDROCHLORIDE 10 MG/ML
5 INJECTION, SOLUTION EPIDURAL; INFILTRATION; INTRACAUDAL; PERINEURAL ONCE
Status: DISCONTINUED | OUTPATIENT
Start: 2025-03-15 | End: 2025-03-21 | Stop reason: HOSPADM

## 2025-03-15 RX ORDER — DEXTROSE MONOHYDRATE AND SODIUM CHLORIDE 5; .9 G/100ML; G/100ML
INJECTION, SOLUTION INTRAVENOUS CONTINUOUS
Status: DISCONTINUED | OUTPATIENT
Start: 2025-03-15 | End: 2025-03-18

## 2025-03-15 RX ORDER — SODIUM CHLORIDE 0.9 % (FLUSH) 0.9 %
5-40 SYRINGE (ML) INJECTION EVERY 12 HOURS SCHEDULED
Status: DISCONTINUED | OUTPATIENT
Start: 2025-03-15 | End: 2025-03-21 | Stop reason: HOSPADM

## 2025-03-15 RX ORDER — SODIUM CHLORIDE 9 MG/ML
25 INJECTION, SOLUTION INTRAVENOUS PRN
Status: DISCONTINUED | OUTPATIENT
Start: 2025-03-15 | End: 2025-03-21 | Stop reason: HOSPADM

## 2025-03-15 RX ORDER — SODIUM CHLORIDE 0.9 % (FLUSH) 0.9 %
5-40 SYRINGE (ML) INJECTION PRN
Status: DISCONTINUED | OUTPATIENT
Start: 2025-03-15 | End: 2025-03-21 | Stop reason: HOSPADM

## 2025-03-15 RX ADMIN — PROCHLORPERAZINE EDISYLATE 10 MG: 5 INJECTION INTRAMUSCULAR; INTRAVENOUS at 22:38

## 2025-03-15 RX ADMIN — PANTOPRAZOLE SODIUM 40 MG: 40 INJECTION, POWDER, FOR SOLUTION INTRAVENOUS at 08:51

## 2025-03-15 RX ADMIN — SODIUM CHLORIDE, PRESERVATIVE FREE 10 ML: 5 INJECTION INTRAVENOUS at 22:40

## 2025-03-15 RX ADMIN — HYDROMORPHONE HYDROCHLORIDE 0.5 MG: 1 INJECTION, SOLUTION INTRAMUSCULAR; INTRAVENOUS; SUBCUTANEOUS at 14:38

## 2025-03-15 RX ADMIN — SODIUM CHLORIDE, PRESERVATIVE FREE 10 ML: 5 INJECTION INTRAVENOUS at 05:13

## 2025-03-15 RX ADMIN — HYDROMORPHONE HYDROCHLORIDE 0.5 MG: 1 INJECTION, SOLUTION INTRAMUSCULAR; INTRAVENOUS; SUBCUTANEOUS at 05:11

## 2025-03-15 RX ADMIN — DEXTROSE AND SODIUM CHLORIDE: 5; .9 INJECTION, SOLUTION INTRAVENOUS at 14:44

## 2025-03-15 RX ADMIN — PANTOPRAZOLE SODIUM 40 MG: 40 INJECTION, POWDER, FOR SOLUTION INTRAVENOUS at 22:38

## 2025-03-15 RX ADMIN — PROCHLORPERAZINE EDISYLATE 10 MG: 5 INJECTION INTRAMUSCULAR; INTRAVENOUS at 08:52

## 2025-03-15 RX ADMIN — PROCHLORPERAZINE EDISYLATE 10 MG: 5 INJECTION INTRAMUSCULAR; INTRAVENOUS at 03:48

## 2025-03-15 RX ADMIN — PROCHLORPERAZINE EDISYLATE 10 MG: 5 INJECTION INTRAMUSCULAR; INTRAVENOUS at 14:39

## 2025-03-15 RX ADMIN — SODIUM CHLORIDE, PRESERVATIVE FREE 10 ML: 5 INJECTION INTRAVENOUS at 03:49

## 2025-03-15 RX ADMIN — SODIUM CHLORIDE, PRESERVATIVE FREE 10 ML: 5 INJECTION INTRAVENOUS at 08:54

## 2025-03-15 RX ADMIN — HYDROMORPHONE HYDROCHLORIDE 0.5 MG: 1 INJECTION, SOLUTION INTRAMUSCULAR; INTRAVENOUS; SUBCUTANEOUS at 00:55

## 2025-03-15 RX ADMIN — HYDROMORPHONE HYDROCHLORIDE 0.5 MG: 1 INJECTION, SOLUTION INTRAMUSCULAR; INTRAVENOUS; SUBCUTANEOUS at 09:04

## 2025-03-15 RX ADMIN — SODIUM CHLORIDE, PRESERVATIVE FREE 10 ML: 5 INJECTION INTRAVENOUS at 14:39

## 2025-03-15 RX ADMIN — HYDROMORPHONE HYDROCHLORIDE 0.5 MG: 1 INJECTION, SOLUTION INTRAMUSCULAR; INTRAVENOUS; SUBCUTANEOUS at 22:39

## 2025-03-15 ASSESSMENT — PAIN DESCRIPTION - ORIENTATION
ORIENTATION: RIGHT;LEFT;MID
ORIENTATION: MID
ORIENTATION: RIGHT;LEFT;MID;LOWER;POSTERIOR
ORIENTATION: RIGHT;LEFT

## 2025-03-15 ASSESSMENT — PAIN SCALES - WONG BAKER
WONGBAKER_NUMERICALRESPONSE: HURTS A LITTLE BIT
WONGBAKER_NUMERICALRESPONSE: NO HURT
WONGBAKER_NUMERICALRESPONSE: HURTS A LITTLE BIT

## 2025-03-15 ASSESSMENT — PAIN SCALES - GENERAL
PAINLEVEL_OUTOF10: 8
PAINLEVEL_OUTOF10: 7
PAINLEVEL_OUTOF10: 4
PAINLEVEL_OUTOF10: 7
PAINLEVEL_OUTOF10: 4
PAINLEVEL_OUTOF10: 7
PAINLEVEL_OUTOF10: 8
PAINLEVEL_OUTOF10: 5
PAINLEVEL_OUTOF10: 3
PAINLEVEL_OUTOF10: 7
PAINLEVEL_OUTOF10: 3
PAINLEVEL_OUTOF10: 0
PAINLEVEL_OUTOF10: 4

## 2025-03-15 ASSESSMENT — PAIN DESCRIPTION - DESCRIPTORS
DESCRIPTORS: SORE;NAGGING;DISCOMFORT
DESCRIPTORS: ACHING;TEARING
DESCRIPTORS: ACHING;SHARP;TENDER
DESCRIPTORS: ACHING;DISCOMFORT;NAGGING
DESCRIPTORS: ACHING;THROBBING

## 2025-03-15 ASSESSMENT — PAIN DESCRIPTION - ONSET
ONSET: ON-GOING

## 2025-03-15 ASSESSMENT — PAIN DESCRIPTION - PAIN TYPE
TYPE: CHRONIC PAIN

## 2025-03-15 ASSESSMENT — PAIN DESCRIPTION - FREQUENCY
FREQUENCY: CONTINUOUS

## 2025-03-15 ASSESSMENT — PAIN DESCRIPTION - LOCATION
LOCATION: GENERALIZED
LOCATION: NECK

## 2025-03-15 ASSESSMENT — PAIN - FUNCTIONAL ASSESSMENT
PAIN_FUNCTIONAL_ASSESSMENT: PREVENTS OR INTERFERES SOME ACTIVE ACTIVITIES AND ADLS

## 2025-03-15 NOTE — PLAN OF CARE
Problem: Discharge Planning  Goal: Discharge to home or other facility with appropriate resources  Outcome: Progressing     Problem: Skin/Tissue Integrity  Goal: Skin integrity remains intact  Description: 1.  Monitor for areas of redness and/or skin breakdown  2.  Assess vascular access sites hourly  3.  Every 4-6 hours minimum:  Change oxygen saturation probe site  4.  Every 4-6 hours:  If on nasal continuous positive airway pressure, respiratory therapy assess nares and determine need for appliance change or resting period  Outcome: Progressing  Flowsheets (Taken 3/14/2025 2000)  Skin Integrity Remains Intact: Monitor for areas of redness and/or skin breakdown     Problem: Safety - Adult  Goal: Free from fall injury  Outcome: Progressing  Flowsheets (Taken 3/14/2025 2000)  Free From Fall Injury: Instruct family/caregiver on patient safety     Problem: ABCDS Injury Assessment  Goal: Absence of physical injury  Outcome: Progressing  Flowsheets (Taken 3/14/2025 2000)  Absence of Physical Injury: Implement safety measures based on patient assessment     Problem: Pain  Goal: Verbalizes/displays adequate comfort level or baseline comfort level  Outcome: Progressing     Problem: Nutrition Deficit:  Goal: Optimize nutritional status  Outcome: Progressing

## 2025-03-15 NOTE — PROGRESS NOTES
NP Pamela Ordonez notified of patients continued nausea and vomiting.  Tube feeding is stopped as patient is not tolerating.

## 2025-03-15 NOTE — FLOWSHEET NOTE
Dr. Vallejo notified that pt refusing tube to restart due to feeling full.  Ok to hold of on tube feed until pt feels he can tolerate.

## 2025-03-15 NOTE — PROGRESS NOTES
This patient has refused most of his meds especially those ment to be given through the PEG TUBE and now he claims the IV line is painful and it should not be used in any way  despite flushing very well.

## 2025-03-15 NOTE — PROGRESS NOTES
Patient not tolerating tube feed - he is vomiting.  Requests tube feed be turned down - turned down to 10.  Also requests potassium be stopped because it is burning him

## 2025-03-15 NOTE — PROGRESS NOTES
The patient still experiencing episodes of vomiting despite giving him compazine. He has requested the feed to be stopped for now, awaiting review.

## 2025-03-15 NOTE — PROGRESS NOTES
[facility]    SYNOPSIS: Patient admitted on 3/3/2025 for Failure to thrive in adult    Prasanna Parnell is a 59 y.o. male who presents to [facility] ER complaining of failure to thrive.     Prasanna Parnell has a past medical history that includes stage I SCC of tongue/tonsil, lung mass SCC     Prasanna the ER from radiation office for decreased p.o. intake, nausea, vomiting.  He is being treated for throat cancer and is on chemo and radiation.  He has tried multiple antiemetics without success.  He does have a PEG tube but currently due to insurance issues he does not have any tube feeds.  He has been unable to eat for days. Will be admitted for initiation of tube feeds and if he cannot tolerate these, consideration of TPN.      Upon presentation to the ER, routine labwork was performed which revealed no acute abnormalities.  Imaging results are as outlined below in the Imaging section of this note.   Upon arrival to the ER, patient was 107/61.  The patient received zofran, 1L NS in the emergency room and was admitted to [facility].     3/5: Patient symptomatic with nausea this morning.  Tube feeds held for now.     3/6: Continues to have nausea.  CT abdomen/pelvis done and is unremarkable.  Oncology has consulted GI as they do not think this is a side effect of cancer treatments.     3/8: Patient received Benadryl IV x 1, Ativan IV x 1, and started on nortriptyline nightly and PPI IV twice daily by GI.  If symptoms do not improve with these plan is possible conversion of PEG tube PEG-J on Tuesday 3/11.     3/10: Still significant nausea unable to tolerate tube feeds.        3/11-s/p PEG J tube placement; gastric pport to be plced to gravity; and flush gastic port Q3hrly  Jejunal port for tube feeds only no meds    3/12-persistent nausea; palliative care added fentanyl patch; oncology plans to obtain mri brain to rule out metastasis to brain      3/13-tube feeding started;  LABA1C 5.6 03/04/2025       Radiology: REVIEWED DAILY    +++++++++++++++++++++++++++++++++++++++++++++++++  Emy Vallejo MD  [facility]  [place], [place]  +++++++++++++++++++++++++++++++++++++++++++++++++  NOTE: This report was transcribed using voice recognition software. Every effort was made to ensure accuracy; however, inadvertent computerized transcription errors may be present.

## 2025-03-16 LAB
ANION GAP SERPL CALCULATED.3IONS-SCNC: 13 MMOL/L (ref 7–16)
BASOPHILS # BLD: 0 K/UL (ref 0–0.2)
BASOPHILS NFR BLD: 0 % (ref 0–2)
BUN SERPL-MCNC: 5 MG/DL (ref 6–20)
CALCIUM SERPL-MCNC: 8.7 MG/DL (ref 8.6–10.2)
CHLORIDE SERPL-SCNC: 101 MMOL/L (ref 98–107)
CO2 SERPL-SCNC: 23 MMOL/L (ref 22–29)
CREAT SERPL-MCNC: 0.5 MG/DL (ref 0.7–1.2)
EOSINOPHIL # BLD: 0.34 K/UL (ref 0.05–0.5)
EOSINOPHILS RELATIVE PERCENT: 7 % (ref 0–6)
ERYTHROCYTE [DISTWIDTH] IN BLOOD BY AUTOMATED COUNT: 14 % (ref 11.5–15)
GFR, ESTIMATED: >90 ML/MIN/1.73M2
GLUCOSE SERPL-MCNC: 140 MG/DL (ref 74–99)
HCT VFR BLD AUTO: 25 % (ref 37–54)
HGB BLD-MCNC: 9 G/DL (ref 12.5–16.5)
LYMPHOCYTES NFR BLD: 0.21 K/UL (ref 1.5–4)
LYMPHOCYTES RELATIVE PERCENT: 4 % (ref 20–42)
MCH RBC QN AUTO: 32.6 PG (ref 26–35)
MCHC RBC AUTO-ENTMCNC: 36 G/DL (ref 32–34.5)
MCV RBC AUTO: 90.6 FL (ref 80–99.9)
MONOCYTES NFR BLD: 0.34 K/UL (ref 0.1–0.95)
MONOCYTES NFR BLD: 7 % (ref 2–12)
NEUTROPHILS NFR BLD: 81 % (ref 43–80)
NEUTS SEG NFR BLD: 3.87 K/UL (ref 1.8–7.3)
PLATELET # BLD AUTO: 217 K/UL (ref 130–450)
PMV BLD AUTO: 9.1 FL (ref 7–12)
POTASSIUM SERPL-SCNC: 3.3 MMOL/L (ref 3.5–5)
PROMYELOCYTES ABSOLUTE COUNT: 0.04 K/UL
PROMYELOCYTES: 1 %
RBC # BLD AUTO: 2.76 M/UL (ref 3.8–5.8)
RBC # BLD: ABNORMAL 10*6/UL
SODIUM SERPL-SCNC: 137 MMOL/L (ref 132–146)
WBC OTHER # BLD: 4.8 K/UL (ref 4.5–11.5)

## 2025-03-16 PROCEDURE — 76937 US GUIDE VASCULAR ACCESS: CPT

## 2025-03-16 PROCEDURE — 2580000003 HC RX 258: Performed by: STUDENT IN AN ORGANIZED HEALTH CARE EDUCATION/TRAINING PROGRAM

## 2025-03-16 PROCEDURE — 99232 SBSQ HOSP IP/OBS MODERATE 35: CPT | Performed by: STUDENT IN AN ORGANIZED HEALTH CARE EDUCATION/TRAINING PROGRAM

## 2025-03-16 PROCEDURE — C1751 CATH, INF, PER/CENT/MIDLINE: HCPCS

## 2025-03-16 PROCEDURE — 80048 BASIC METABOLIC PNL TOTAL CA: CPT

## 2025-03-16 PROCEDURE — 6360000002 HC RX W HCPCS: Performed by: NURSE PRACTITIONER

## 2025-03-16 PROCEDURE — 85025 COMPLETE CBC W/AUTO DIFF WBC: CPT

## 2025-03-16 PROCEDURE — 2500000003 HC RX 250 WO HCPCS

## 2025-03-16 PROCEDURE — 2500000003 HC RX 250 WO HCPCS: Performed by: INTERNAL MEDICINE

## 2025-03-16 PROCEDURE — 02HV33Z INSERTION OF INFUSION DEVICE INTO SUPERIOR VENA CAVA, PERCUTANEOUS APPROACH: ICD-10-PCS | Performed by: STUDENT IN AN ORGANIZED HEALTH CARE EDUCATION/TRAINING PROGRAM

## 2025-03-16 PROCEDURE — 36415 COLL VENOUS BLD VENIPUNCTURE: CPT

## 2025-03-16 PROCEDURE — 36569 INSJ PICC 5 YR+ W/O IMAGING: CPT

## 2025-03-16 PROCEDURE — 6360000002 HC RX W HCPCS: Performed by: INTERNAL MEDICINE

## 2025-03-16 PROCEDURE — 1200000000 HC SEMI PRIVATE

## 2025-03-16 PROCEDURE — 6360000002 HC RX W HCPCS: Performed by: STUDENT IN AN ORGANIZED HEALTH CARE EDUCATION/TRAINING PROGRAM

## 2025-03-16 RX ADMIN — HYDROMORPHONE HYDROCHLORIDE 0.5 MG: 1 INJECTION, SOLUTION INTRAMUSCULAR; INTRAVENOUS; SUBCUTANEOUS at 09:38

## 2025-03-16 RX ADMIN — DEXTROSE AND SODIUM CHLORIDE: 5; .9 INJECTION, SOLUTION INTRAVENOUS at 22:14

## 2025-03-16 RX ADMIN — HYDROMORPHONE HYDROCHLORIDE 0.5 MG: 1 INJECTION, SOLUTION INTRAMUSCULAR; INTRAVENOUS; SUBCUTANEOUS at 13:35

## 2025-03-16 RX ADMIN — PANTOPRAZOLE SODIUM 40 MG: 40 INJECTION, POWDER, FOR SOLUTION INTRAVENOUS at 08:50

## 2025-03-16 RX ADMIN — HYDROMORPHONE HYDROCHLORIDE 0.5 MG: 1 INJECTION, SOLUTION INTRAMUSCULAR; INTRAVENOUS; SUBCUTANEOUS at 18:15

## 2025-03-16 RX ADMIN — SODIUM CHLORIDE, PRESERVATIVE FREE 10 ML: 5 INJECTION INTRAVENOUS at 20:35

## 2025-03-16 RX ADMIN — DEXTROSE AND SODIUM CHLORIDE: 5; .9 INJECTION, SOLUTION INTRAVENOUS at 10:26

## 2025-03-16 RX ADMIN — PROCHLORPERAZINE EDISYLATE 10 MG: 5 INJECTION INTRAMUSCULAR; INTRAVENOUS at 16:10

## 2025-03-16 RX ADMIN — HYDROMORPHONE HYDROCHLORIDE 0.5 MG: 1 INJECTION, SOLUTION INTRAMUSCULAR; INTRAVENOUS; SUBCUTANEOUS at 05:29

## 2025-03-16 RX ADMIN — PANTOPRAZOLE SODIUM 40 MG: 40 INJECTION, POWDER, FOR SOLUTION INTRAVENOUS at 20:35

## 2025-03-16 RX ADMIN — ONDANSETRON 4 MG: 2 INJECTION, SOLUTION INTRAMUSCULAR; INTRAVENOUS at 07:02

## 2025-03-16 RX ADMIN — PROCHLORPERAZINE EDISYLATE 10 MG: 5 INJECTION INTRAMUSCULAR; INTRAVENOUS at 20:35

## 2025-03-16 RX ADMIN — HYDROMORPHONE HYDROCHLORIDE 0.5 MG: 1 INJECTION, SOLUTION INTRAMUSCULAR; INTRAVENOUS; SUBCUTANEOUS at 22:15

## 2025-03-16 RX ADMIN — SODIUM CHLORIDE, PRESERVATIVE FREE 10 ML: 5 INJECTION INTRAVENOUS at 08:51

## 2025-03-16 RX ADMIN — PROCHLORPERAZINE EDISYLATE 10 MG: 5 INJECTION INTRAMUSCULAR; INTRAVENOUS at 08:51

## 2025-03-16 ASSESSMENT — PAIN DESCRIPTION - FREQUENCY
FREQUENCY: CONTINUOUS

## 2025-03-16 ASSESSMENT — PAIN DESCRIPTION - LOCATION
LOCATION: NECK

## 2025-03-16 ASSESSMENT — PAIN DESCRIPTION - ONSET
ONSET: ON-GOING

## 2025-03-16 ASSESSMENT — PAIN DESCRIPTION - ORIENTATION
ORIENTATION: MID

## 2025-03-16 ASSESSMENT — PAIN SCALES - GENERAL
PAINLEVEL_OUTOF10: 6
PAINLEVEL_OUTOF10: 9
PAINLEVEL_OUTOF10: 7
PAINLEVEL_OUTOF10: 5
PAINLEVEL_OUTOF10: 5
PAINLEVEL_OUTOF10: 7
PAINLEVEL_OUTOF10: 4
PAINLEVEL_OUTOF10: 6
PAINLEVEL_OUTOF10: 3
PAINLEVEL_OUTOF10: 7
PAINLEVEL_OUTOF10: 3
PAINLEVEL_OUTOF10: 3

## 2025-03-16 ASSESSMENT — PAIN DESCRIPTION - PAIN TYPE
TYPE: CHRONIC PAIN

## 2025-03-16 ASSESSMENT — PAIN DESCRIPTION - DESCRIPTORS
DESCRIPTORS: SORE;ACHING;NAGGING
DESCRIPTORS: ACHING;NAGGING;DISCOMFORT
DESCRIPTORS: ACHING;DISCOMFORT;SORE
DESCRIPTORS: ACHING;DISCOMFORT;SORE
DESCRIPTORS: ACHING;SORE;POUNDING
DESCRIPTORS: ACHING;DISCOMFORT;NAGGING

## 2025-03-16 ASSESSMENT — PAIN SCALES - WONG BAKER
WONGBAKER_NUMERICALRESPONSE: HURTS A LITTLE BIT

## 2025-03-16 NOTE — PROGRESS NOTES
Chg double lumen picc Placement 3/16/2025    Product number: [id number]   Lot Number: [id number]      Ultrasound: yes   Right Brachial vein:                Upper Arm Circumference: (CM) 27cm    Size:(FR)/GUAGE 5.5fr/38cm    Exposed Length: (CM) 2cm    Internal Length: (CM) 36cm   Cut: (CM) 17cm   Vein Measurement: 0.60cm              Lidocaine Given: yes-given in picc kit  Priti Howell RN  3/16/2025  11:48 AM      rohan

## 2025-03-16 NOTE — PLAN OF CARE
Problem: Discharge Planning  Goal: Discharge to home or other facility with appropriate resources  Outcome: Progressing     Problem: Skin/Tissue Integrity  Goal: Skin integrity remains intact  Description: 1.  Monitor for areas of redness and/or skin breakdown  2.  Assess vascular access sites hourly  3.  Every 4-6 hours minimum:  Change oxygen saturation probe site  4.  Every 4-6 hours:  If on nasal continuous positive airway pressure, respiratory therapy assess nares and determine need for appliance change or resting period  Outcome: Progressing     Problem: Safety - Adult  Goal: Free from fall injury  Outcome: Progressing     Problem: ABCDS Injury Assessment  Goal: Absence of physical injury  Outcome: Progressing     Problem: Pain  Goal: Verbalizes/displays adequate comfort level or baseline comfort level  Outcome: Progressing     Problem: Nutrition Deficit:  Goal: Optimize nutritional status  Outcome: Not Progressing

## 2025-03-16 NOTE — PROGRESS NOTES
[facility]    SYNOPSIS: Patient admitted on 3/3/2025 for Failure to thrive in adult    Prasanna Parnell is a 59 y.o. male who presents to [facility] ER complaining of failure to thrive.     Prasanna Parnell has a past medical history that includes stage I SCC of tongue/tonsil, lung mass SCC     Prasanna the ER from radiation office for decreased p.o. intake, nausea, vomiting.  He is being treated for throat cancer and is on chemo and radiation.  He has tried multiple antiemetics without success.  He does have a PEG tube but currently due to insurance issues he does not have any tube feeds.  He has been unable to eat for days. Will be admitted for initiation of tube feeds and if he cannot tolerate these, consideration of TPN.      Upon presentation to the ER, routine labwork was performed which revealed no acute abnormalities.  Imaging results are as outlined below in the Imaging section of this note.   Upon arrival to the ER, patient was 107/61.  The patient received zofran, 1L NS in the emergency room and was admitted to [facility].     3/5: Patient symptomatic with nausea this morning.  Tube feeds held for now.     3/6: Continues to have nausea.  CT abdomen/pelvis done and is unremarkable.  Oncology has consulted GI as they do not think this is a side effect of cancer treatments.     3/8: Patient received Benadryl IV x 1, Ativan IV x 1, and started on nortriptyline nightly and PPI IV twice daily by GI.  If symptoms do not improve with these plan is possible conversion of PEG tube PEG-J on Tuesday 3/11.     3/10: Still significant nausea unable to tolerate tube feeds.        3/11-s/p PEG J tube placement; gastric pport to be plced to gravity; and flush gastic port Q3hrly  Jejunal port for tube feeds only no meds    3/12-persistent nausea; palliative care added fentanyl patch; oncology plans to obtain mri brain to rule out metastasis to brain      3/13-tube feeding started;  03/15/25  0536 03/16/25  0729   WBC 4.1* 4.8 4.8   HGB 10.3* 9.8* 9.0*   HCT 29.7* 27.9* 25.0*    217 217       Recent Labs     03/14/25  0510 03/15/25  0536 03/16/25  0729    135 137   K 3.2* 3.1* 3.3*    99 101   CO2 25 22 23   BUN 6 6 5*   CREATININE 0.6* 0.5* 0.5*   CALCIUM 8.7 8.8 8.7       No results for input(s): \"ALKPHOS\", \"ALT\", \"AST\", \"BILITOT\", \"AMYLASE\", \"LIPASE\" in the last 72 hours.    Invalid input(s): \"PROT\", \"ALB\"    No results for input(s): \"INR\" in the last 72 hours.    No results for input(s): \"CKTOTAL\", \"TROPONINI\" in the last 72 hours.    Chronic labs:    Lab Results   Component Value Date    CHOL 176 03/04/2025    TRIG 137 03/04/2025    HDL 37 (L) 03/04/2025    TSH 0.41 03/04/2025    INR 1.2 01/23/2025    LABA1C 5.6 03/04/2025       Radiology: REVIEWED DAILY    +++++++++++++++++++++++++++++++++++++++++++++++++  Emy Vallejo MD  [facility]  [place], [place]  +++++++++++++++++++++++++++++++++++++++++++++++++  NOTE: This report was transcribed using voice recognition software. Every effort was made to ensure accuracy; however, inadvertent computerized transcription errors may be present.

## 2025-03-16 NOTE — PROGRESS NOTES
Per Dr. Araujo, PICC or midline is the first step, if these are not being able to be done for some reason then we put a central line.  Pamela Ordonez NP made aware and order placed for PICC.

## 2025-03-17 ENCOUNTER — HOSPITAL ENCOUNTER (OUTPATIENT)
Dept: RADIATION ONCOLOGY | Age: 60
Discharge: HOME | End: 2025-03-17
Payer: COMMERCIAL

## 2025-03-17 LAB — GLUCOSE BLD-MCNC: 136 MG/DL (ref 74–99)

## 2025-03-17 PROCEDURE — 99232 SBSQ HOSP IP/OBS MODERATE 35: CPT | Performed by: NURSE PRACTITIONER

## 2025-03-17 PROCEDURE — 99232 SBSQ HOSP IP/OBS MODERATE 35: CPT | Performed by: CLINICAL NURSE SPECIALIST

## 2025-03-17 PROCEDURE — 6360000002 HC RX W HCPCS: Performed by: STUDENT IN AN ORGANIZED HEALTH CARE EDUCATION/TRAINING PROGRAM

## 2025-03-17 PROCEDURE — 3E0H76Z INTRODUCTION OF NUTRITIONAL SUBSTANCE INTO LOWER GI, VIA NATURAL OR ARTIFICIAL OPENING: ICD-10-PCS | Performed by: NURSE PRACTITIONER

## 2025-03-17 PROCEDURE — 6370000000 HC RX 637 (ALT 250 FOR IP): Performed by: INTERNAL MEDICINE

## 2025-03-17 PROCEDURE — 77427 RADIATION TX MANAGEMENT X5: CPT | Performed by: SPECIALIST

## 2025-03-17 PROCEDURE — 82962 GLUCOSE BLOOD TEST: CPT

## 2025-03-17 PROCEDURE — 6360000002 HC RX W HCPCS: Performed by: NURSE PRACTITIONER

## 2025-03-17 PROCEDURE — 77014 CHG CT GUIDANCE RADIATION THERAPY FLDS PLACEMENT: CPT | Performed by: SPECIALIST

## 2025-03-17 PROCEDURE — 2580000003 HC RX 258: Performed by: STUDENT IN AN ORGANIZED HEALTH CARE EDUCATION/TRAINING PROGRAM

## 2025-03-17 PROCEDURE — 77386 HC NTSTY MODUL RAD TX DLVR CPLX: CPT | Performed by: SPECIALIST

## 2025-03-17 PROCEDURE — 77336 RADIATION PHYSICS CONSULT: CPT | Performed by: SPECIALIST

## 2025-03-17 PROCEDURE — 1200000000 HC SEMI PRIVATE

## 2025-03-17 PROCEDURE — 2500000003 HC RX 250 WO HCPCS

## 2025-03-17 PROCEDURE — 2500000003 HC RX 250 WO HCPCS: Performed by: STUDENT IN AN ORGANIZED HEALTH CARE EDUCATION/TRAINING PROGRAM

## 2025-03-17 PROCEDURE — 2500000003 HC RX 250 WO HCPCS: Performed by: INTERNAL MEDICINE

## 2025-03-17 RX ADMIN — SODIUM CHLORIDE, PRESERVATIVE FREE 10 ML: 5 INJECTION INTRAVENOUS at 22:51

## 2025-03-17 RX ADMIN — SODIUM CHLORIDE, PRESERVATIVE FREE 10 ML: 5 INJECTION INTRAVENOUS at 09:14

## 2025-03-17 RX ADMIN — HYDROMORPHONE HYDROCHLORIDE 0.5 MG: 1 INJECTION, SOLUTION INTRAMUSCULAR; INTRAVENOUS; SUBCUTANEOUS at 02:15

## 2025-03-17 RX ADMIN — HYDROMORPHONE HYDROCHLORIDE 0.5 MG: 1 INJECTION, SOLUTION INTRAMUSCULAR; INTRAVENOUS; SUBCUTANEOUS at 06:15

## 2025-03-17 RX ADMIN — PROCHLORPERAZINE EDISYLATE 10 MG: 5 INJECTION INTRAMUSCULAR; INTRAVENOUS at 09:13

## 2025-03-17 RX ADMIN — HYDROMORPHONE HYDROCHLORIDE 0.5 MG: 1 INJECTION, SOLUTION INTRAMUSCULAR; INTRAVENOUS; SUBCUTANEOUS at 18:29

## 2025-03-17 RX ADMIN — SODIUM CHLORIDE, PRESERVATIVE FREE 10 ML: 5 INJECTION INTRAVENOUS at 20:13

## 2025-03-17 RX ADMIN — PANTOPRAZOLE SODIUM 40 MG: 40 INJECTION, POWDER, FOR SOLUTION INTRAVENOUS at 20:13

## 2025-03-17 RX ADMIN — HYDROMORPHONE HYDROCHLORIDE 0.5 MG: 1 INJECTION, SOLUTION INTRAMUSCULAR; INTRAVENOUS; SUBCUTANEOUS at 14:29

## 2025-03-17 RX ADMIN — PROCHLORPERAZINE EDISYLATE 10 MG: 5 INJECTION INTRAMUSCULAR; INTRAVENOUS at 20:13

## 2025-03-17 RX ADMIN — NYSTATIN 500000 UNITS: 100000 SUSPENSION ORAL at 09:10

## 2025-03-17 RX ADMIN — PANTOPRAZOLE SODIUM 40 MG: 40 INJECTION, POWDER, FOR SOLUTION INTRAVENOUS at 09:13

## 2025-03-17 RX ADMIN — SODIUM CHLORIDE, PRESERVATIVE FREE 10 ML: 5 INJECTION INTRAVENOUS at 20:14

## 2025-03-17 RX ADMIN — HYDROMORPHONE HYDROCHLORIDE 0.5 MG: 1 INJECTION, SOLUTION INTRAMUSCULAR; INTRAVENOUS; SUBCUTANEOUS at 10:26

## 2025-03-17 RX ADMIN — HYDROMORPHONE HYDROCHLORIDE 0.5 MG: 1 INJECTION, SOLUTION INTRAMUSCULAR; INTRAVENOUS; SUBCUTANEOUS at 22:50

## 2025-03-17 RX ADMIN — CALCIUM GLUCONATE: 98 INJECTION, SOLUTION INTRAVENOUS at 18:31

## 2025-03-17 RX ADMIN — PROCHLORPERAZINE EDISYLATE 10 MG: 5 INJECTION INTRAMUSCULAR; INTRAVENOUS at 14:26

## 2025-03-17 RX ADMIN — DEXTROSE AND SODIUM CHLORIDE: 5; .9 INJECTION, SOLUTION INTRAVENOUS at 09:18

## 2025-03-17 ASSESSMENT — PAIN - FUNCTIONAL ASSESSMENT

## 2025-03-17 ASSESSMENT — PAIN DESCRIPTION - LOCATION
LOCATION: JAW;MOUTH;THROAT
LOCATION: NECK
LOCATION: JAW;MOUTH;THROAT
LOCATION: THROAT
LOCATION: THROAT
LOCATION: NECK

## 2025-03-17 ASSESSMENT — PAIN DESCRIPTION - ORIENTATION
ORIENTATION: INNER
ORIENTATION: MID
ORIENTATION: INNER;MID
ORIENTATION: MID
ORIENTATION: INNER
ORIENTATION: MID
ORIENTATION: RIGHT
ORIENTATION: MID

## 2025-03-17 ASSESSMENT — PAIN DESCRIPTION - FREQUENCY
FREQUENCY: CONTINUOUS

## 2025-03-17 ASSESSMENT — PAIN SCALES - GENERAL
PAINLEVEL_OUTOF10: 7
PAINLEVEL_OUTOF10: 8
PAINLEVEL_OUTOF10: 8
PAINLEVEL_OUTOF10: 7
PAINLEVEL_OUTOF10: 7
PAINLEVEL_OUTOF10: 6
PAINLEVEL_OUTOF10: 6
PAINLEVEL_OUTOF10: 8
PAINLEVEL_OUTOF10: 6

## 2025-03-17 ASSESSMENT — PAIN DESCRIPTION - DESCRIPTORS
DESCRIPTORS: ACHING;DULL;SORE
DESCRIPTORS: ACHING;NAGGING;TENDER
DESCRIPTORS: ACHING;DULL;SORE
DESCRIPTORS: ACHING;DISCOMFORT;THROBBING
DESCRIPTORS: ACHING;DISCOMFORT;THROBBING;SORE
DESCRIPTORS: ACHING;NAGGING;TENDER
DESCRIPTORS: ACHING;DISCOMFORT

## 2025-03-17 ASSESSMENT — PAIN DESCRIPTION - ONSET
ONSET: ON-GOING

## 2025-03-17 ASSESSMENT — PAIN DESCRIPTION - PAIN TYPE
TYPE: CHRONIC PAIN

## 2025-03-17 ASSESSMENT — PAIN SCALES - WONG BAKER: WONGBAKER_NUMERICALRESPONSE: HURTS LITTLE MORE

## 2025-03-17 NOTE — CARE COORDINATION
Care Coordination  The plan is for the patient to return home with [facility] home care once medically stable. The patient was only able to tolerate 4 bits of icecream last pm. The patient has a peg tube. The patient was admitted due to poor po intake.  The patient has a peg tube as well. Gastric port to be placed to gravity and jejunal port is for tube feeding only. Per oncology The patient was started on Iv Tpn today and picc line was placed on 3/16. Yamel from [facility] is following. Referral made to [facility] for Iv Tpn and check benefits and let me know.

## 2025-03-17 NOTE — PLAN OF CARE
Problem: Discharge Planning  Goal: Discharge to home or other facility with appropriate resources  Outcome: Progressing     Problem: Skin/Tissue Integrity  Goal: Skin integrity remains intact  Description: 1.  Monitor for areas of redness and/or skin breakdown  2.  Assess vascular access sites hourly  3.  Every 4-6 hours minimum:  Change oxygen saturation probe site  4.  Every 4-6 hours:  If on nasal continuous positive airway pressure, respiratory therapy assess nares and determine need for appliance change or resting period  Outcome: Progressing     Problem: Safety - Adult  Goal: Free from fall injury  Outcome: Progressing     Problem: ABCDS Injury Assessment  Goal: Absence of physical injury  Outcome: Progressing

## 2025-03-17 NOTE — PROGRESS NOTES
still nauseous; mri brain is pending  3/14- MRI NO ACUTE FINDINGS  NAUSEA RESOLVED TOLERATING TUBE FEEDS WELL       3/15-nausea and vomiting- tube feeds held  D/w patient's girlfriend ;concerned about the nutritional status of patient ; d/w GI  Will plan on starting TPN      3/16-PICC line placed; dietician consulted for TPN recs  Will start once dietician gives the recommendations      3/17-TPN started      NOte  If patient were to tolerate tube feedings; advance rate tube feedings 10 cc every twelve hours  Patient experienced terrible nausea when advanced at faster rates    SUBJECTIVE:  Stable overnight. No other overnight issues reported.   Patient seen and examined complains of persistent nausea  Records reviewed.           Temp (24hrs), Av.4 °F (36.3 °C), Min:97.4 °F (36.3 °C), Max:97.4 °F (36.3 °C)    DIET: ADULT TUBE FEEDING; PEG/J; Peptide Based; Continuous; 10; Yes; 10; Q 12 hours; 60; 95; Q 4 hours  CODE: Full Code    Intake/Output Summary (Last 24 hours) at 3/17/2025 1248  Last data filed at 3/17/2025 0604  Gross per 24 hour   Intake 10 ml   Output 200 ml   Net -190 ml       Review of Systems  All bolded are positive; please see HPI  General:  Fever, chills, diaphoresis, fatigue, malaise, night sweats, weight loss  Psychological:  Anxiety, disorientation, hallucinations.  ENT:  Epistaxis, headaches, vertigo, visual changes.  Cardiovascular:  Chest pain, irregular heartbeats, palpitations, paroxysmal nocturnal dyspnea.  Respiratory:  Shortness of breath, coughing, sputum production, hemoptysis, wheezing, orthopnea.  Gastrointestinal:  Nausea, vomiting, diarrhea, heartburn, constipation, abdominal pain, hematemesis, hematochezia, melena, acholic stools  Genito-Urinary:  Dysuria, urgency, frequency, hematuria  Musculoskeletal:  Joint pain, joint stiffness, joint swelling, muscle pain  Neurology:  Headache, focal neurological deficits, weakness, numbness, paresthesia  Derm:  Rashes, ulcers, excoriations,  Carafate  Continue oxycodone as needed for pain  Oncology, radiation oncology, palliative care all following           DVT Prophylaxis [] Lovenox, []  Heparin, [] SCDs, [] Ambulation   GI Prophylaxis [] PPI,  [] H2 Blocker,  [] Carafate,  [] Diet/Tube Feeds   Disposition Patient requires continued admission due to pending GI clearance; constant nausea and vomiting; pending TUBE FEEDS TO REACH TO GOAL; plan for TPN meanwhile   MDM [] Low, [] Moderate,[]  High       Medications:  REVIEWED DAILY    Infusion Medications    dextrose 5 % and 0.9 % NaCl 100 mL/hr at 03/17/25 0918    sodium chloride      sodium chloride       Scheduled Medications    lidocaine 1 % injection  5 mL IntraDERmal Once    sodium chloride flush  5-40 mL IntraVENous 2 times per day    ferric gluconate (FERRLECIT) 125 mg in sodium chloride 0.9 % 100 mL IVPB  125 mg IntraVENous Daily    fentaNYL  1 patch TransDERmal Q72H    pantoprazole (PROTONIX) 40 mg in sodium chloride (PF) 0.9 % 10 mL injection  40 mg IntraVENous Q12H    nortriptyline  25 mg Oral Nightly    prochlorperazine  10 mg IntraVENous Q6H    sodium chloride flush  5-40 mL IntraVENous 2 times per day    enoxaparin  40 mg SubCUTAneous Daily    nystatin  500,000 Units Oral 4x Daily    scopolamine  1 patch TransDERmal Q3 Days    sucralfate  1 g Oral 4x Daily     PRN Meds: sodium chloride flush, sodium chloride, HYDROmorphone, OLANZapine zydis, magic (miracle) mouthwash, benzocaine-menthol, benzonatate, calcium carbonate, hydrALAZINE, melatonin, Polyvinyl Alcohol-Povidone PF, sodium chloride, sodium chloride flush, sodium chloride, potassium chloride **OR** potassium alternative oral replacement **OR** potassium chloride, magnesium sulfate, ondansetron **OR** ondansetron, polyethylene glycol, acetaminophen **OR** acetaminophen, albuterol, lidocaine viscous hcl, oxyCODONE-acetaminophen **OR** oxyCODONE-acetaminophen    Labs:     Recent Labs     03/15/25  0536 03/16/25  0729   WBC 4.8 4.8   HGB  9.8* 9.0*   HCT 27.9* 25.0*    217       Recent Labs     03/15/25  0536 03/16/25  0729    137   K 3.1* 3.3*   CL 99 101   CO2 22 23   BUN 6 5*   CREATININE 0.5* 0.5*   CALCIUM 8.8 8.7       No results for input(s): \"ALKPHOS\", \"ALT\", \"AST\", \"BILITOT\", \"AMYLASE\", \"LIPASE\" in the last 72 hours.    Invalid input(s): \"PROT\", \"ALB\"    No results for input(s): \"INR\" in the last 72 hours.    No results for input(s): \"CKTOTAL\", \"TROPONINI\" in the last 72 hours.    Chronic labs:    Lab Results   Component Value Date    CHOL 176 03/04/2025    TRIG 137 03/04/2025    HDL 37 (L) 03/04/2025    TSH 0.41 03/04/2025    INR 1.2 01/23/2025    LABA1C 5.6 03/04/2025       Radiology: REVIEWED DAILY    +++++++++++++++++++++++++++++++++++++++++++++++++  Emy Vallejo MD  [facility] [place], [place]  +++++++++++++++++++++++++++++++++++++++++++++++++  NOTE: This report was transcribed using voice recognition software. Every effort was made to ensure accuracy; however, inadvertent computerized transcription errors may be present.

## 2025-03-17 NOTE — PROGRESS NOTES
Patient off the floor at testing during the time of my encounter.  Chart reviewed.  Utilizing IV Dilaudid every 4 hours.  Will consider increasing dose of fentanyl patch.  Patient unable to tolerate anything by mouth.  Planned for TPN.  Palliative care will continue to follow.

## 2025-03-17 NOTE — PROGRESS NOTES
Gastroenterology, Hepatology, &  Advanced Endoscopy    Progress Note    Subjective: Denies dry heaving and nausea today. Denies emesis. Was able to tolerate 4 bites of ice cream last evening. For XRT today.    Reason for Consult: FTT     HPI:   Prasanna Parnell is a 59 y.o. male w/ PMH of  has a past medical history of Cancer (HCC), Cervicalgia, COPD (chronic obstructive pulmonary disease) (HCC), Neuropathy, Retrolisthesis, and Spondylolisthesis. who presents to the ED with persistent nausea and decreased PO intake. The patient has SCC of the tongue and lungs and is currently following with Oncology and Radiation Oncology. He is s/p PEG tube placement. He is admitted due to decreased PO intake and so currently reports that chemotherapy is on hold. He reports that he has been unable to tolerate much by mouth or through the PEG tube without having immediate nausea and vomiting. He reports that he is regular with his bowel movements.       No results for input(s): \"INR\", \"ALT\", \"AST\", \"ALKPHOS\", \"BILITOT\", \"GLOB\", \"GGT\", \"LABPROT\", \"LABALBU\", \"BILIDIR\" in the last 72 hours.  Lab Results   Component Value Date    WBC 4.8 03/16/2025    HGB 9.0 (L) 03/16/2025    HCT 25.0 (L) 03/16/2025     03/16/2025     03/16/2025    K 3.3 (L) 03/16/2025     03/16/2025    CREATININE 0.5 (L) 03/16/2025    BUN 5 (L) 03/16/2025    CO2 23 03/16/2025    FOLATE 5.1 03/14/2025    [id number] 1368 (H) 03/14/2025    GLUCOSE 140 (H) 03/16/2025    INR 1.2 01/23/2025    PROTIME 13.0 (H) 01/23/2025    TSH 0.41 03/04/2025    LABA1C 5.6 03/04/2025     Lab Results   Component Value Date    INR 1.2 01/23/2025    INR 1.1 08/04/2022    PROTIME 13.0 (H) 01/23/2025    PROTIME 11.6 08/04/2022         Lipase   Date Value Ref Range Status   02/25/2025 14 13 - 60 U/L Final         CT Result (most recent):  CT ABDOMEN PELVIS WO CONTRAST 03/07/2025    Narrative  EXAMINATION:  CT OF THE ABDOMEN AND PELVIS WITHOUT CONTRAST3/7/2025 7:23  Dose Route Frequency Provider Last Rate Last Admin    dextrose 5 % and 0.9 % sodium chloride infusion   IntraVENous Continuous Emy Vallejo  mL/hr at 03/17/25 0918 New Bag at 03/17/25 0918    lidocaine PF 1 % injection 5 mL  5 mL IntraDERmal Once Pamela Ordonez APRN - CNP        sodium chloride flush 0.9 % injection 5-40 mL  5-40 mL IntraVENous 2 times per day Pamela Ordonez APRN - CNP   10 mL at 03/17/25 0914    sodium chloride flush 0.9 % injection 5-40 mL  5-40 mL IntraVENous PRN Pamela Ordonez APRN - CNP        0.9 % sodium chloride infusion  25 mL IntraVENous PRN Pamela Ordonez APRN - CNP        ferric gluconate (FERRLECIT) 125 mg in sodium chloride 0.9 % 100 mL IVPB  125 mg IntraVENous Daily Jayde Hernandez MD   Stopped at 03/14/25 2214    fentaNYL (DURAGESIC) 12 MCG/HR 1 patch  1 patch TransDERmal Q72H Mamie Melgoza PA-C   1 patch at 03/15/25 1225    pantoprazole (PROTONIX) 40 mg in sodium chloride (PF) 0.9 % 10 mL injection  40 mg IntraVENous Q12H Haile Wiseman MD   40 mg at 03/17/25 0913    nortriptyline (PAMELOR) capsule 25 mg  25 mg Oral Nightly Haile Wiseman MD   25 mg at 03/13/25 2020    prochlorperazine (COMPAZINE) injection 10 mg  10 mg IntraVENous Q6H Haile Wiseman MD   10 mg at 03/17/25 0913    HYDROmorphone (DILAUDID) injection 0.5 mg  0.5 mg IntraVENous Q4H PRN Doris Johnson APRN - CNP   0.5 mg at 03/17/25 1026    OLANZapine zydis (ZYPREXA) disintegrating tablet 2.5 mg  2.5 mg Oral Q6H PRN Doris Johnson APRN - CNP        magic (miracle) mouthwash  15 mL Swish & Spit 4x Daily PRN Jayde Hernandez MD        benzocaine-menthol (CEPACOL SORE THROAT) lozenge 1 lozenge  1 lozenge Oral Q2H PRN Tammie Higginbotham, DO        benzonatate (TESSALON) capsule 100 mg  100 mg Oral TID PRN Tammie Higginbotham,         calcium carbonate (TUMS) chewable tablet 500 mg  500 mg Oral TID PRN Tammie Higginbotham,         hydrALAZINE (APRESOLINE) injection 10 mg  10 mg IntraVENous Q6H PRN

## 2025-03-17 NOTE — PROGRESS NOTES
Comprehensive Nutrition Assessment    Type and Reason for Visit:  Reassess, Consult (TPN Recommendations)    Nutrition Recommendations/Plan:   Continue NPO, d/c current EN and plan to restart as trickle feed once pt w/o nausea and able to tolerate start of EN support.  Recommend to Start half rate TPN x first 24hrs d/t pt at high risk for ReFeeding Syndrome; monitor/replace lytes PRN; advance slowly to goal as tolerated once half rate completed/tolerated.  Recommend to redraw Phos labs, last Phos draw 3/4.    Half Rate TPN Recommendations:  3-in-1 Central Standard @ 35.4ml/hr Continuous x 24hrs/d=  850ml TV, 880kcal, 43gm AA.    Goal TPN Recommendations:  3-in-1 Central Standard @ 70.8ml/hr Continuous x 24hrs/d=  1700ml TV, 1760kcal, 85gm AA.    Pt at high risk for Re-Feeding Syndrome d/t ongoing altered GI w/ minimal EN/nutritional intake x >5 days w/ Severe Malnutrition and current hypokalemia/hyperglycemia - Highly recommend starting at no more than half of goal rate x first 24hrs and increase slowly as tolerated to goal w/ close monitoring of BGL/Lytes Labs (x first ~3-5d post-nutrition initiation) and correct PRN both prior to and during initiation of nutrition support.      Malnutrition Assessment:  Malnutrition Status:  Severe malnutrition (03/04/25 1142)    Context:  Chronic Illness     Findings of the 6 clinical characteristics of malnutrition:  Energy Intake:  75% or less estimated energy requirements for 1 month or longer (chronic N/V w/ inability to tolerate PO intake ; pt w/ PEG, however was not utilizing this PTA d/t inability to acquire TF formula d/t insurance issues.)  Weight Loss:  Unable to assess (GALI d/t lack of wt hx per EMR; recent measured wts trending down, however unable to assess long term wt loss d/t lack of measured wt hx w/in adequate time frame for assessment.)     Body Fat Loss:  Severe body fat loss Orbital, Triceps, Buccal region   Muscle Mass Loss:  Severe muscle mass loss Temples  scale  Current BMI (kg/m2): 17.6  Usual Body Weight: 56.4 kg (124 lb 5.4 oz) (2/27/25-oncology note; 2/20/25 wt of 60.3kg noted via oncology note)     % Weight Change (Calculated): -6  Weight Adjustment For: No Adjustment                 BMI Categories: Underweight (BMI less than 18.5)    Estimated Daily Nutrient Needs:  Energy Requirements Based On: Kcal/kg  Weight Used for Energy Requirements: Current  Energy (kcal/day): 30-35kcal/kg CBW= 7208-0981  Weight Used for Protein Requirements: Current  Protein (g/day): 1.5-1.8gm/kg IBW= 80-95  Method Used for Fluid Requirements: 1 ml/kcal  Fluid (ml/day): 4964-0155    Nutrition Diagnosis:   Severe malnutrition, in context of chronic illness related to catabolic illness as evidenced by criteria as identified in malnutrition assessment    Nutrition Interventions:   Food and/or Nutrient Delivery: Continue NPO, Discontinue Tube Feeding, Start Parenteral Nutrition  Nutrition Education/Counseling: Education/Counseling not indicated  Coordination of Nutrition Care: Continue to monitor while inpatient  Plan of Care discussed with: d/w pharmacy regarding TPN rec's/refeed risk    Goals:  Goals: Tolerate nutrition support at goal rate, by next RD assessment  Type of Goal: New goal  Previous Goal Met: Progress towards Goal(s) Declining    Nutrition Monitoring and Evaluation:   Behavioral-Environmental Outcomes: None Identified  Food/Nutrient Intake Outcomes: Progression of Nutrition, Enteral Nutrition Intake/Tolerance, Parenteral Nutrition Intake/Tolerance  Physical Signs/Symptoms Outcomes: Biochemical Data, GI Status, Nausea or Vomiting, Fluid Status or Edema, Nutrition Focused Physical Findings, Skin, Weight    Discharge Planning:    Too soon to determine     Raz Mooney RD, LD  Contact: ext [phone]

## 2025-03-17 NOTE — PROGRESS NOTES
[facility]  [facility] ORTHO-TRAUMA  [address]  [facility] [address]  Dept: [phone]  Loc: [phone]  Jayde Fernandez MD   ·   MD Adriana Lucero APRN  ·  FADI [place]  Inpatient Medical Oncology/Hematology progress Note    Patient Name: Prasanna Parnell  YOB: 1965    DATE OF ADMISSION: 3/3/2025  DATE OF CONSULTATION: 3/3/2025  CONSULTING PROVIDER: Tammie Higginbotham DO  REASON FOR CONSULTATION: SCC  PCP: No primary care provider on file.    Room: [address] [place] [address]      CHIEF COMPLAINT:  Failure to thrive     Subjective:  The patient is feeling better, no nausea or vomiting at this time. To start TPN today through picc line per nursing     HISTORY OF PRESENT ILLNESS (3/4/2025):   Patient is a 59 y.o. 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 Since starting treatment, patient experiencing nausea and vomiting despite taking several antiemetics. Completed day 8, cycle 1 of cisplatin on 2/27/2025 with intravenous hydration. Next chemotherapy scheduled for 3/6/2025. PEG tube in place but due to not having insurance he is unable to supplement with tube feeding. Patient was sent to ED from radiation oncology clinic for evaluation of dizziness, low BP, and decrease oral intake. Work up in ED showing all labs normal except hematocrit 36.8, MCHC 36.1, absolute lymphocytes 0.81, and lymphocytes 13%.               Oncology History   Malignant neoplasm of overlapping sites of tonsil (HCC)   2/20/2025 -  Chemotherapy    OP CISplatin 40 mg/m2 Q7D  Plan Provider: Roberto Tomas MD  Treatment goal: Curative  Line of treatment: 1st Line

## 2025-03-17 NOTE — PROGRESS NOTES
Coordination of care discussion and chart review with PM team. Pt resting, denies needs. No immediate PM psychosocial needs identified for Prasanna Parnell.

## 2025-03-17 NOTE — PLAN OF CARE
Problem: Discharge Planning  Goal: Discharge to home or other facility with appropriate resources  3/16/2025 2140 by April Lees RN  Outcome: Progressing  3/16/2025 1000 by April Fuentes RN  Outcome: Progressing     Problem: Skin/Tissue Integrity  Goal: Skin integrity remains intact  Description: 1.  Monitor for areas of redness and/or skin breakdown  2.  Assess vascular access sites hourly  3.  Every 4-6 hours minimum:  Change oxygen saturation probe site  4.  Every 4-6 hours:  If on nasal continuous positive airway pressure, respiratory therapy assess nares and determine need for appliance change or resting period  3/16/2025 2140 by April Lees RN  Outcome: Progressing  3/16/2025 1000 by April Fuentes RN  Outcome: Progressing     Problem: Safety - Adult  Goal: Free from fall injury  3/16/2025 2140 by April Lees RN  Outcome: Progressing  3/16/2025 1000 by April Fuentes RN  Outcome: Progressing     Problem: ABCDS Injury Assessment  Goal: Absence of physical injury  3/16/2025 2140 by April Lees RN  Outcome: Progressing  3/16/2025 1000 by April Fuentes RN  Outcome: Progressing     Problem: Pain  Goal: Verbalizes/displays adequate comfort level or baseline comfort level  3/16/2025 2140 by April Lees RN  Outcome: Progressing  3/16/2025 1000 by April Fuentes RN  Outcome: Progressing     Problem: Nutrition Deficit:  Goal: Optimize nutritional status  3/16/2025 2140 by April Lees RN  Outcome: Progressing  3/16/2025 1000 by April Fuentes, RN  Outcome: Progressing

## 2025-03-18 ENCOUNTER — HOSPITAL ENCOUNTER (OUTPATIENT)
Dept: RADIATION ONCOLOGY | Age: 60
Discharge: HOME | End: 2025-03-18

## 2025-03-18 LAB
ALBUMIN SERPL-MCNC: 3 G/DL (ref 3.5–5.2)
ALP SERPL-CCNC: 58 U/L (ref 40–129)
ALT SERPL-CCNC: 11 U/L (ref 0–40)
ANION GAP SERPL CALCULATED.3IONS-SCNC: 10 MMOL/L (ref 7–16)
ANION GAP SERPL CALCULATED.3IONS-SCNC: 14 MMOL/L (ref 7–16)
AST SERPL-CCNC: 11 U/L (ref 0–39)
BILIRUB DIRECT SERPL-MCNC: 0.2 MG/DL (ref 0–0.3)
BILIRUB INDIRECT SERPL-MCNC: 0.2 MG/DL (ref 0–1)
BILIRUB SERPL-MCNC: 0.4 MG/DL (ref 0–1.2)
BUN SERPL-MCNC: 5 MG/DL (ref 6–20)
BUN SERPL-MCNC: 6 MG/DL (ref 6–20)
CALCIUM SERPL-MCNC: 8.9 MG/DL (ref 8.6–10.2)
CALCIUM SERPL-MCNC: 9.4 MG/DL (ref 8.6–10.2)
CHLORIDE SERPL-SCNC: 105 MMOL/L (ref 98–107)
CHLORIDE SERPL-SCNC: 107 MMOL/L (ref 98–107)
CO2 SERPL-SCNC: 25 MMOL/L (ref 22–29)
CO2 SERPL-SCNC: 27 MMOL/L (ref 22–29)
CREAT SERPL-MCNC: 0.6 MG/DL (ref 0.7–1.2)
CREAT SERPL-MCNC: 0.6 MG/DL (ref 0.7–1.2)
ERYTHROCYTE [DISTWIDTH] IN BLOOD BY AUTOMATED COUNT: 14.4 % (ref 11.5–15)
GFR, ESTIMATED: >90 ML/MIN/1.73M2
GFR, ESTIMATED: >90 ML/MIN/1.73M2
GLUCOSE BLD-MCNC: 129 MG/DL (ref 74–99)
GLUCOSE BLD-MCNC: 133 MG/DL (ref 74–99)
GLUCOSE BLD-MCNC: 135 MG/DL (ref 74–99)
GLUCOSE BLD-MCNC: 285 MG/DL (ref 74–99)
GLUCOSE SERPL-MCNC: 122 MG/DL (ref 74–99)
GLUCOSE SERPL-MCNC: 445 MG/DL (ref 74–99)
HCT VFR BLD AUTO: 24 % (ref 37–54)
HGB BLD-MCNC: 8.6 G/DL (ref 12.5–16.5)
MAGNESIUM SERPL-MCNC: 2.1 MG/DL (ref 1.6–2.6)
MCH RBC QN AUTO: 33.3 PG (ref 26–35)
MCHC RBC AUTO-ENTMCNC: 35.8 G/DL (ref 32–34.5)
MCV RBC AUTO: 93 FL (ref 80–99.9)
PHOSPHATE SERPL-MCNC: 4.2 MG/DL (ref 2.5–4.5)
PLATELET # BLD AUTO: 225 K/UL (ref 130–450)
PMV BLD AUTO: 9 FL (ref 7–12)
POTASSIUM SERPL-SCNC: 3.3 MMOL/L (ref 3.5–5)
POTASSIUM SERPL-SCNC: 5.4 MMOL/L (ref 3.5–5)
PROT SERPL-MCNC: 5.6 G/DL (ref 6.4–8.3)
RBC # BLD AUTO: 2.58 M/UL (ref 3.8–5.8)
SODIUM SERPL-SCNC: 144 MMOL/L (ref 132–146)
SODIUM SERPL-SCNC: 144 MMOL/L (ref 132–146)
TRIGL SERPL-MCNC: 256 MG/DL
WBC OTHER # BLD: 3.4 K/UL (ref 4.5–11.5)

## 2025-03-18 PROCEDURE — 82962 GLUCOSE BLOOD TEST: CPT

## 2025-03-18 PROCEDURE — 6360000002 HC RX W HCPCS: Performed by: STUDENT IN AN ORGANIZED HEALTH CARE EDUCATION/TRAINING PROGRAM

## 2025-03-18 PROCEDURE — 2500000003 HC RX 250 WO HCPCS

## 2025-03-18 PROCEDURE — 85027 COMPLETE CBC AUTOMATED: CPT

## 2025-03-18 PROCEDURE — 99232 SBSQ HOSP IP/OBS MODERATE 35: CPT | Performed by: PHYSICIAN ASSISTANT

## 2025-03-18 PROCEDURE — 99232 SBSQ HOSP IP/OBS MODERATE 35: CPT | Performed by: NURSE PRACTITIONER

## 2025-03-18 PROCEDURE — 6370000000 HC RX 637 (ALT 250 FOR IP): Performed by: NURSE PRACTITIONER

## 2025-03-18 PROCEDURE — 99232 SBSQ HOSP IP/OBS MODERATE 35: CPT | Performed by: INTERNAL MEDICINE

## 2025-03-18 PROCEDURE — 6370000000 HC RX 637 (ALT 250 FOR IP): Performed by: PHYSICIAN ASSISTANT

## 2025-03-18 PROCEDURE — 82248 BILIRUBIN DIRECT: CPT

## 2025-03-18 PROCEDURE — 84100 ASSAY OF PHOSPHORUS: CPT

## 2025-03-18 PROCEDURE — 1200000000 HC SEMI PRIVATE

## 2025-03-18 PROCEDURE — 6360000002 HC RX W HCPCS: Performed by: NURSE PRACTITIONER

## 2025-03-18 PROCEDURE — 2580000003 HC RX 258: Performed by: INTERNAL MEDICINE

## 2025-03-18 PROCEDURE — 77386 HC NTSTY MODUL RAD TX DLVR CPLX: CPT | Performed by: SPECIALIST

## 2025-03-18 PROCEDURE — 6360000002 HC RX W HCPCS: Performed by: INTERNAL MEDICINE

## 2025-03-18 PROCEDURE — 84478 ASSAY OF TRIGLYCERIDES: CPT

## 2025-03-18 PROCEDURE — 83735 ASSAY OF MAGNESIUM: CPT

## 2025-03-18 PROCEDURE — 2500000003 HC RX 250 WO HCPCS: Performed by: INTERNAL MEDICINE

## 2025-03-18 PROCEDURE — 80048 BASIC METABOLIC PNL TOTAL CA: CPT

## 2025-03-18 PROCEDURE — 6370000000 HC RX 637 (ALT 250 FOR IP): Performed by: INTERNAL MEDICINE

## 2025-03-18 PROCEDURE — 2580000003 HC RX 258: Performed by: STUDENT IN AN ORGANIZED HEALTH CARE EDUCATION/TRAINING PROGRAM

## 2025-03-18 PROCEDURE — 80053 COMPREHEN METABOLIC PANEL: CPT

## 2025-03-18 RX ORDER — GLUCAGON 1 MG/ML
1 KIT INJECTION PRN
Status: DISCONTINUED | OUTPATIENT
Start: 2025-03-18 | End: 2025-03-21 | Stop reason: HOSPADM

## 2025-03-18 RX ORDER — DEXTROSE MONOHYDRATE 100 MG/ML
INJECTION, SOLUTION INTRAVENOUS CONTINUOUS PRN
Status: DISCONTINUED | OUTPATIENT
Start: 2025-03-18 | End: 2025-03-21 | Stop reason: HOSPADM

## 2025-03-18 RX ORDER — INSULIN LISPRO 100 [IU]/ML
0-4 INJECTION, SOLUTION INTRAVENOUS; SUBCUTANEOUS
Status: DISCONTINUED | OUTPATIENT
Start: 2025-03-18 | End: 2025-03-21 | Stop reason: HOSPADM

## 2025-03-18 RX ORDER — SODIUM CHLORIDE, SODIUM LACTATE, POTASSIUM CHLORIDE, CALCIUM CHLORIDE 600; 310; 30; 20 MG/100ML; MG/100ML; MG/100ML; MG/100ML
INJECTION, SOLUTION INTRAVENOUS CONTINUOUS
Status: DISCONTINUED | OUTPATIENT
Start: 2025-03-18 | End: 2025-03-18

## 2025-03-18 RX ADMIN — HYDROMORPHONE HYDROCHLORIDE 0.5 MG: 1 INJECTION, SOLUTION INTRAMUSCULAR; INTRAVENOUS; SUBCUTANEOUS at 06:13

## 2025-03-18 RX ADMIN — PROCHLORPERAZINE EDISYLATE 10 MG: 5 INJECTION INTRAMUSCULAR; INTRAVENOUS at 20:25

## 2025-03-18 RX ADMIN — CALCIUM GLUCONATE: 98 INJECTION, SOLUTION INTRAVENOUS at 18:26

## 2025-03-18 RX ADMIN — SODIUM CHLORIDE, PRESERVATIVE FREE 10 ML: 5 INJECTION INTRAVENOUS at 10:04

## 2025-03-18 RX ADMIN — SODIUM CHLORIDE, PRESERVATIVE FREE 10 ML: 5 INJECTION INTRAVENOUS at 20:26

## 2025-03-18 RX ADMIN — INSULIN LISPRO 2 UNITS: 100 INJECTION, SOLUTION INTRAVENOUS; SUBCUTANEOUS at 08:06

## 2025-03-18 RX ADMIN — PROCHLORPERAZINE EDISYLATE 10 MG: 5 INJECTION INTRAMUSCULAR; INTRAVENOUS at 02:27

## 2025-03-18 RX ADMIN — HYDROMORPHONE HYDROCHLORIDE 0.5 MG: 1 INJECTION, SOLUTION INTRAMUSCULAR; INTRAVENOUS; SUBCUTANEOUS at 18:26

## 2025-03-18 RX ADMIN — PANTOPRAZOLE SODIUM 40 MG: 40 INJECTION, POWDER, FOR SOLUTION INTRAVENOUS at 10:02

## 2025-03-18 RX ADMIN — PANTOPRAZOLE SODIUM 40 MG: 40 INJECTION, POWDER, FOR SOLUTION INTRAVENOUS at 20:24

## 2025-03-18 RX ADMIN — PROCHLORPERAZINE EDISYLATE 10 MG: 5 INJECTION INTRAMUSCULAR; INTRAVENOUS at 10:03

## 2025-03-18 RX ADMIN — HYDROMORPHONE HYDROCHLORIDE 0.5 MG: 1 INJECTION, SOLUTION INTRAMUSCULAR; INTRAVENOUS; SUBCUTANEOUS at 10:03

## 2025-03-18 RX ADMIN — SODIUM CHLORIDE, PRESERVATIVE FREE 10 ML: 5 INJECTION INTRAVENOUS at 06:13

## 2025-03-18 RX ADMIN — PROCHLORPERAZINE EDISYLATE 10 MG: 5 INJECTION INTRAMUSCULAR; INTRAVENOUS at 14:27

## 2025-03-18 RX ADMIN — SODIUM CHLORIDE, PRESERVATIVE FREE 10 ML: 5 INJECTION INTRAVENOUS at 02:28

## 2025-03-18 RX ADMIN — HYDROMORPHONE HYDROCHLORIDE 0.5 MG: 1 INJECTION, SOLUTION INTRAMUSCULAR; INTRAVENOUS; SUBCUTANEOUS at 02:27

## 2025-03-18 RX ADMIN — HYDROMORPHONE HYDROCHLORIDE 0.5 MG: 1 INJECTION, SOLUTION INTRAMUSCULAR; INTRAVENOUS; SUBCUTANEOUS at 14:27

## 2025-03-18 ASSESSMENT — PAIN SCALES - GENERAL
PAINLEVEL_OUTOF10: 7
PAINLEVEL_OUTOF10: 7
PAINLEVEL_OUTOF10: 6
PAINLEVEL_OUTOF10: 7
PAINLEVEL_OUTOF10: 8
PAINLEVEL_OUTOF10: 7
PAINLEVEL_OUTOF10: 8
PAINLEVEL_OUTOF10: 6

## 2025-03-18 ASSESSMENT — ENCOUNTER SYMPTOMS
SHORTNESS OF BREATH: 0
ABDOMINAL PAIN: 0
DIARRHEA: 0
ABDOMINAL DISTENTION: 0
NAUSEA: 1
CONSTIPATION: 0
BACK PAIN: 1
COUGH: 0
VOMITING: 1

## 2025-03-18 ASSESSMENT — PAIN DESCRIPTION - ORIENTATION
ORIENTATION: INNER
ORIENTATION: INNER
ORIENTATION: RIGHT
ORIENTATION: INNER

## 2025-03-18 ASSESSMENT — PAIN DESCRIPTION - DESCRIPTORS
DESCRIPTORS: THROBBING;ACHING;DISCOMFORT;SORE
DESCRIPTORS: ACHING;DISCOMFORT;SORE;THROBBING
DESCRIPTORS: ACHING;DISCOMFORT;SORE
DESCRIPTORS: ACHING;DISCOMFORT;SORE
DESCRIPTORS: ACHING;THROBBING;SORE
DESCRIPTORS: ACHING;DISCOMFORT;SORE
DESCRIPTORS: ACHING;DISCOMFORT;SORE
DESCRIPTORS: ACHING;DISCOMFORT;SHOOTING;SORE
DESCRIPTORS: ACHING;DISCOMFORT;SORE

## 2025-03-18 ASSESSMENT — PAIN DESCRIPTION - LOCATION
LOCATION: JAW;MOUTH;THROAT
LOCATION: JAW;MOUTH
LOCATION: MOUTH;NECK
LOCATION: JAW;MOUTH;THROAT
LOCATION: JAW;MOUTH;THROAT

## 2025-03-18 ASSESSMENT — PAIN DESCRIPTION - PAIN TYPE
TYPE: CHRONIC PAIN

## 2025-03-18 ASSESSMENT — PAIN SCALES - WONG BAKER: WONGBAKER_NUMERICALRESPONSE: HURTS LITTLE MORE

## 2025-03-18 ASSESSMENT — PAIN DESCRIPTION - FREQUENCY
FREQUENCY: CONTINUOUS

## 2025-03-18 ASSESSMENT — PAIN DESCRIPTION - ONSET
ONSET: ON-GOING

## 2025-03-18 ASSESSMENT — PAIN - FUNCTIONAL ASSESSMENT
PAIN_FUNCTIONAL_ASSESSMENT: ACTIVITIES ARE NOT PREVENTED

## 2025-03-18 NOTE — PROGRESS NOTES
Gastroenterology, Hepatology, &  Advanced Endoscopy    Progress Note    Subjective: Denies dry heaving and nausea today. Denies emesis. Was able to tolerate 4 bites of ice cream last evening. For XRT today.    Reason for Consult: FTT     HPI:   Prasanna Parnell is a 59 y.o. male w/ PMH of  has a past medical history of Cancer (HCC), Cervicalgia, COPD (chronic obstructive pulmonary disease) (HCC), Neuropathy, Retrolisthesis, and Spondylolisthesis. who presents to the ED with persistent nausea and decreased PO intake. The patient has SCC of the tongue and lungs and is currently following with Oncology and Radiation Oncology. He is s/p PEG tube placement. He is admitted due to decreased PO intake and so currently reports that chemotherapy is on hold. He reports that he has been unable to tolerate much by mouth or through the PEG tube without having immediate nausea and vomiting. He reports that he is regular with his bowel movements.       Recent Labs     03/18/25  0600   ALT 11   AST 11   ALKPHOS 58   BILITOT 0.4   BILIDIR 0.2     Lab Results   Component Value Date    WBC 3.4 (L) 03/18/2025    HGB 8.6 (L) 03/18/2025    HCT 24.0 (L) 03/18/2025     03/18/2025     03/18/2025    K 5.4 (H) 03/18/2025     03/18/2025    CREATININE 0.6 (L) 03/18/2025    BUN 5 (L) 03/18/2025    CO2 25 03/18/2025    FOLATE 5.1 03/14/2025    [id number] 1368 (H) 03/14/2025    GLUCOSE 445 (H) 03/18/2025    INR 1.2 01/23/2025    PROTIME 13.0 (H) 01/23/2025    TSH 0.41 03/04/2025    LABA1C 5.6 03/04/2025     Lab Results   Component Value Date    INR 1.2 01/23/2025    INR 1.1 08/04/2022    PROTIME 13.0 (H) 01/23/2025    PROTIME 11.6 08/04/2022         Lipase   Date Value Ref Range Status   02/25/2025 14 13 - 60 U/L Final         CT Result (most recent):  CT ABDOMEN PELVIS WO CONTRAST 03/07/2025    Narrative  EXAMINATION:  CT OF THE ABDOMEN AND PELVIS WITHOUT CONTRAST3/7/2025 7:23 am    TECHNIQUE:  CT of the abdomen and pelvis  IS TO BE GIVEN DOWN JEJUNAL PORT. FLUSH JEJUNAL PORT WITH 300CC WATER EVERY 3 HOURS.                      We will follow.    Thank you for including us in the care of this patient. Please do not hesitate to contact us with any additional questions or concerns.    Discussed with Haile Wiseman MD  Gastroenterology/Hepatology  Advanced Endoscopy

## 2025-03-18 NOTE — PLAN OF CARE
Problem: Discharge Planning  Goal: Discharge to home or other facility with appropriate resources  3/18/2025 1930 by Nellie Garcia, RN  Outcome: Progressing     Problem: Skin/Tissue Integrity  Goal: Skin integrity remains intact  Description: 1.  Monitor for areas of redness and/or skin breakdown  2.  Assess vascular access sites hourly  3.  Every 4-6 hours minimum:  Change oxygen saturation probe site  4.  Every 4-6 hours:  If on nasal continuous positive airway pressure, respiratory therapy assess nares and determine need for appliance change or resting period  3/18/2025 1930 by Nellie Garcia, RN  Outcome: Progressing     Problem: Safety - Adult  Goal: Free from fall injury  3/18/2025 1930 by Nellie Garcia, RN  Outcome: Progressing     Problem: ABCDS Injury Assessment  Goal: Absence of physical injury  3/18/2025 1930 by Nellie Garcia, RN  Outcome: Progressing

## 2025-03-18 NOTE — CARE COORDINATION
Care Coordination  The patient was admitted with failure to thrive. The plan is for the patient to return home with [facility] care and they are following the patient . The patient has a peg tube also but is not tolerating tube feeding likely due to chemo therapy. Tube feeding is on hold for now. Tpn was started yesterday and is continuous. Picc line was placed. Dr Arzate discussed the option of hospice care and the patient declined at this time. The patient is post peg with J tube conversion. If Iv Tpn needed upon discharge so a referral was also made to [facility] infusion partners to supply the iv Tpn await final pricing. Palliative care continues to follow the patient. The patient remains on Iv Dilauded 0.5 mg iv q4 prn x 3 doses. His plan is to return home with his significant other.

## 2025-03-18 NOTE — PROGRESS NOTES
[facility]    Admitting Date and Time: 3/3/2025  2:03 PM  Admit Dx: Failure to thrive in adult [R62.7]    Synopsis:    Patient admitted on 3/3/2025 for Failure to thrive in adult     Prasanna Parnell is a 59 y.o. male who presents to [facility] ER complaining of failure to thrive.     Prasanna Parnell has a past medical history that includes stage I SCC of tongue/tonsil, lung mass SCC     Prasanna the ER from radiation office for decreased p.o. intake, nausea, vomiting.  He is being treated for throat cancer and is on chemo and radiation.  He has tried multiple antiemetics without success.  He does have a PEG tube but currently due to insurance issues he does not have any tube feeds.  He has been unable to eat for days. Will be admitted for initiation of tube feeds and if he cannot tolerate these, consideration of TPN.      Upon presentation to the ER, routine labwork was performed which revealed no acute abnormalities.  Imaging results are as outlined below in the Imaging section of this note.   Upon arrival to the ER, patient was 107/61.  The patient received zofran, 1L NS in the emergency room and was admitted to [facility].     3/5: Patient symptomatic with nausea this morning.  Tube feeds held for now.     3/6: Continues to have nausea.  CT abdomen/pelvis done and is unremarkable.  Oncology has consulted GI as they do not think this is a side effect of cancer treatments.     3/8: Patient received Benadryl IV x 1, Ativan IV x 1, and started on nortriptyline nightly and PPI IV twice daily by GI.  If symptoms do not improve with these plan is possible conversion of PEG tube PEG-J on Tuesday 3/11.     3/10: Still significant nausea unable to tolerate tube feeds.          3/11-s/p PEG J tube placement; gastric pport to be plced to gravity; and flush gastic port Q3hrly  Jejunal port for tube feeds only no meds     3/12-persistent nausea; palliative care added fentanyl patch;  chloride flush, 5-40 mL, PRN  sodium chloride, , PRN  potassium chloride, 40 mEq, PRN   Or  potassium alternative oral replacement, 40 mEq, PRN   Or  potassium chloride, 10 mEq, PRN  magnesium sulfate, 2,000 mg, PRN  ondansetron, 4 mg, Q8H PRN   Or  ondansetron, 4 mg, Q6H PRN  polyethylene glycol, 17 g, Daily PRN  acetaminophen, 650 mg, Q6H PRN   Or  acetaminophen, 650 mg, Q6H PRN  albuterol, 2.5 mg, Q6H PRN  lidocaine viscous hcl, 15 mL, PRN  oxyCODONE-acetaminophen, 1 tablet, Q4H PRN   Or  oxyCODONE-acetaminophen, 2 tablet, Q4H PRN         Objective:    BP (!) 104/57   Pulse 73   Temp 98.4 °F (36.9 °C) (Oral)   Resp 16   Ht 1.727 m (5' 7.99\")   Wt 56 kg (123 lb 6.4 oz)   SpO2 96%   BMI 18.76 kg/m²     General Appearance: alert and oriented to person, place and time and in no acute distress  Skin: warm and dry  Head: normocephalic and atraumatic  Eyes: pupils equal, round, and reactive to light, extraocular eye movements intact, conjunctivae normal  Neck: neck supple and non tender without mass   Pulmonary/Chest: clear to auscultation bilaterally- no wheezes, rales or rhonchi, normal air movement, no respiratory distress  Cardiovascular: normal rate, normal S1 and S2 and no carotid bruits  Abdomen: soft, non-tender, non-distended, normal bowel sounds, no masses or organomegaly  Extremities: no cyanosis, no clubbing and no edema  Neurologic: no cranial nerve deficit and speech normal        Recent Labs     03/16/25  0729 03/18/25  0600    144   K 3.3* 5.4*    105   CO2 23 25   BUN 5* 5*   CREATININE 0.5* 0.6*   GLUCOSE 140* 445*   CALCIUM 8.7 9.4       Recent Labs     03/16/25  0729 03/18/25  0600   WBC 4.8 3.4*   RBC 2.76* 2.58*   HGB 9.0* 8.6*   HCT 25.0* 24.0*   MCV 90.6 93.0   MCH 32.6 33.3   MCHC 36.0* 35.8*   RDW 14.0 14.4    225   MPV 9.1 9.0         Assessment:    Intractable nausea/vomiting likely 2/2 chemotherapy  Stage I SCC tongue/tonsil and stage I SCC of lung  S/p PEG

## 2025-03-18 NOTE — PROGRESS NOTES
[facility]  [facility] ORTHO-TRAUMA  [address]  [facility] [address]  Dept: [phone]  Loc: [phone]  Jayde Fernandez MD   ·   MD Adriana Lucero APRN  ·  FAD[facility] [place]  Inpatient Medical Oncology/Hematology progress Note    Patient Name: Prasanna Parnell  YOB: 1965    DATE OF ADMISSION: 3/3/2025  DATE OF CONSULTATION: 3/3/2025  CONSULTING PROVIDER: Tammie Higginbotham DO  REASON FOR CONSULTATION: SCC  PCP: No primary care provider on file.    Room: [address] [place] [address]      CHIEF COMPLAINT:  Failure to thrive     Subjective:  Patient laying in bed.  Denies nausea, vomiting, abdominal pain. TPN infusing.     HISTORY OF PRESENT ILLNESS (3/4/2025):   Patient is a 59 y.o. 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 Since starting treatment, patient experiencing nausea and vomiting despite taking several antiemetics. Completed day 8, cycle 1 of cisplatin on 2/27/2025 with intravenous hydration. Next chemotherapy scheduled for 3/6/2025. PEG tube in place but due to not having insurance he is unable to supplement with tube feeding. Patient was sent to ED from radiation oncology clinic for evaluation of dizziness, low BP, and decrease oral intake. Work up in ED showing all labs normal except hematocrit 36.8, MCHC 36.1, absolute lymphocytes 0.81, and lymphocytes 13%.               Oncology History   Malignant neoplasm of overlapping sites of tonsil (HCC)   2/20/2025 -  Chemotherapy    OP CISplatin 40 mg/m2 Q7D  Plan Provider: oRberto Tomas MD  Treatment goal: Curative  Line of treatment: 1st Line                 VITALS:    BP (!) 104/57    feeding on hold   Hgb dowtrending; continue to monitor, hemoglobin 8.6 today  Iron studies reviewed, he does have iron deficiency, continue Ferrlecit, continue to monitor his counts, no vitamin B12 for deficiency  DC per Primary team, follow-up with Dr. Tomas after DC from the hospital   Brain MRI reviewed, negative for any acute intracranial abnormalities.  TPN initiated on 3/17, PICC line placed 3/16     Nicole Devi APRN - CNP   HEMATOLOGY/MEDICAL ONCOLOGY  [facility] MED ONCOLOGY  [facility] [facility] [address]  Dept: [phone]  Loc: [phone]

## 2025-03-19 ENCOUNTER — HOSPITAL ENCOUNTER (OUTPATIENT)
Dept: RADIATION ONCOLOGY | Age: 60
Discharge: HOME | End: 2025-03-19

## 2025-03-19 LAB
ALBUMIN SERPL-MCNC: 3.5 G/DL (ref 3.5–5.2)
ALP SERPL-CCNC: 68 U/L (ref 40–129)
ALT SERPL-CCNC: 15 U/L (ref 0–40)
ANION GAP SERPL CALCULATED.3IONS-SCNC: 17 MMOL/L (ref 7–16)
AST SERPL-CCNC: 20 U/L (ref 0–39)
BILIRUB DIRECT SERPL-MCNC: <0.2 MG/DL (ref 0–0.3)
BILIRUB INDIRECT SERPL-MCNC: NORMAL MG/DL (ref 0–1)
BILIRUB SERPL-MCNC: 0.5 MG/DL (ref 0–1.2)
BUN SERPL-MCNC: 10 MG/DL (ref 6–20)
CALCIUM SERPL-MCNC: 9.7 MG/DL (ref 8.6–10.2)
CHLORIDE SERPL-SCNC: 104 MMOL/L (ref 98–107)
CO2 SERPL-SCNC: 21 MMOL/L (ref 22–29)
CREAT SERPL-MCNC: 0.6 MG/DL (ref 0.7–1.2)
ERYTHROCYTE [DISTWIDTH] IN BLOOD BY AUTOMATED COUNT: 14.6 % (ref 11.5–15)
GFR, ESTIMATED: >90 ML/MIN/1.73M2
GLUCOSE BLD-MCNC: 113 MG/DL (ref 74–99)
GLUCOSE BLD-MCNC: 115 MG/DL (ref 74–99)
GLUCOSE BLD-MCNC: 141 MG/DL (ref 74–99)
GLUCOSE BLD-MCNC: 143 MG/DL (ref 74–99)
GLUCOSE BLD-MCNC: 319 MG/DL (ref 74–99)
GLUCOSE SERPL-MCNC: 117 MG/DL (ref 74–99)
HCT VFR BLD AUTO: 30.9 % (ref 37–54)
HGB BLD-MCNC: 10.5 G/DL (ref 12.5–16.5)
MAGNESIUM SERPL-MCNC: 2 MG/DL (ref 1.6–2.6)
MCH RBC QN AUTO: 32.4 PG (ref 26–35)
MCHC RBC AUTO-ENTMCNC: 34 G/DL (ref 32–34.5)
MCV RBC AUTO: 95.4 FL (ref 80–99.9)
PHOSPHATE SERPL-MCNC: 3 MG/DL (ref 2.5–4.5)
PLATELET # BLD AUTO: 255 K/UL (ref 130–450)
PMV BLD AUTO: 9 FL (ref 7–12)
POTASSIUM SERPL-SCNC: 4.3 MMOL/L (ref 3.5–5)
PROT SERPL-MCNC: 6.6 G/DL (ref 6.4–8.3)
RBC # BLD AUTO: 3.24 M/UL (ref 3.8–5.8)
SODIUM SERPL-SCNC: 142 MMOL/L (ref 132–146)
WBC OTHER # BLD: 5.9 K/UL (ref 4.5–11.5)

## 2025-03-19 PROCEDURE — 82962 GLUCOSE BLOOD TEST: CPT

## 2025-03-19 PROCEDURE — 6370000000 HC RX 637 (ALT 250 FOR IP): Performed by: PHYSICIAN ASSISTANT

## 2025-03-19 PROCEDURE — 6360000002 HC RX W HCPCS: Performed by: NURSE PRACTITIONER

## 2025-03-19 PROCEDURE — 2500000003 HC RX 250 WO HCPCS: Performed by: INTERNAL MEDICINE

## 2025-03-19 PROCEDURE — 84100 ASSAY OF PHOSPHORUS: CPT

## 2025-03-19 PROCEDURE — 2580000003 HC RX 258: Performed by: INTERNAL MEDICINE

## 2025-03-19 PROCEDURE — 6360000002 HC RX W HCPCS: Performed by: PHYSICIAN ASSISTANT

## 2025-03-19 PROCEDURE — 82248 BILIRUBIN DIRECT: CPT

## 2025-03-19 PROCEDURE — 6360000002 HC RX W HCPCS: Performed by: STUDENT IN AN ORGANIZED HEALTH CARE EDUCATION/TRAINING PROGRAM

## 2025-03-19 PROCEDURE — 85027 COMPLETE CBC AUTOMATED: CPT

## 2025-03-19 PROCEDURE — 6360000002 HC RX W HCPCS: Performed by: INTERNAL MEDICINE

## 2025-03-19 PROCEDURE — 99233 SBSQ HOSP IP/OBS HIGH 50: CPT | Performed by: PHYSICIAN ASSISTANT

## 2025-03-19 PROCEDURE — 36415 COLL VENOUS BLD VENIPUNCTURE: CPT

## 2025-03-19 PROCEDURE — 2580000003 HC RX 258: Performed by: STUDENT IN AN ORGANIZED HEALTH CARE EDUCATION/TRAINING PROGRAM

## 2025-03-19 PROCEDURE — 99232 SBSQ HOSP IP/OBS MODERATE 35: CPT | Performed by: INTERNAL MEDICINE

## 2025-03-19 PROCEDURE — 1200000000 HC SEMI PRIVATE

## 2025-03-19 PROCEDURE — 2500000003 HC RX 250 WO HCPCS

## 2025-03-19 PROCEDURE — 97535 SELF CARE MNGMENT TRAINING: CPT

## 2025-03-19 PROCEDURE — 83735 ASSAY OF MAGNESIUM: CPT

## 2025-03-19 PROCEDURE — 80053 COMPREHEN METABOLIC PANEL: CPT

## 2025-03-19 PROCEDURE — 43762 RPLC GTUBE NO REVJ TRC: CPT

## 2025-03-19 PROCEDURE — 77386 HC NTSTY MODUL RAD TX DLVR CPLX: CPT | Performed by: SPECIALIST

## 2025-03-19 RX ORDER — FENTANYL 25 UG/1
1 PATCH TRANSDERMAL
Refills: 0 | Status: DISCONTINUED | OUTPATIENT
Start: 2025-03-19 | End: 2025-03-21 | Stop reason: HOSPADM

## 2025-03-19 RX ORDER — OXYCODONE HCL 5 MG/5 ML
10 SOLUTION, ORAL ORAL EVERY 4 HOURS PRN
Refills: 0 | Status: DISCONTINUED | OUTPATIENT
Start: 2025-03-19 | End: 2025-03-20

## 2025-03-19 RX ADMIN — METHYLNALTREXONE BROMIDE 12 MG: 12 INJECTION, SOLUTION SUBCUTANEOUS at 17:15

## 2025-03-19 RX ADMIN — PROCHLORPERAZINE EDISYLATE 10 MG: 5 INJECTION INTRAMUSCULAR; INTRAVENOUS at 03:19

## 2025-03-19 RX ADMIN — HYDROMORPHONE HYDROCHLORIDE 0.5 MG: 1 INJECTION, SOLUTION INTRAMUSCULAR; INTRAVENOUS; SUBCUTANEOUS at 13:25

## 2025-03-19 RX ADMIN — OXYCODONE HYDROCHLORIDE 10 MG: 5 SOLUTION ORAL at 16:20

## 2025-03-19 RX ADMIN — SODIUM CHLORIDE, PRESERVATIVE FREE 10 ML: 5 INJECTION INTRAVENOUS at 03:19

## 2025-03-19 RX ADMIN — SODIUM CHLORIDE, PRESERVATIVE FREE 10 ML: 5 INJECTION INTRAVENOUS at 04:45

## 2025-03-19 RX ADMIN — PROCHLORPERAZINE EDISYLATE 10 MG: 5 INJECTION INTRAMUSCULAR; INTRAVENOUS at 16:20

## 2025-03-19 RX ADMIN — CALCIUM GLUCONATE: 98 INJECTION, SOLUTION INTRAVENOUS at 19:20

## 2025-03-19 RX ADMIN — SODIUM CHLORIDE, PRESERVATIVE FREE 10 ML: 5 INJECTION INTRAVENOUS at 09:03

## 2025-03-19 RX ADMIN — ONDANSETRON 4 MG: 2 INJECTION, SOLUTION INTRAMUSCULAR; INTRAVENOUS at 17:01

## 2025-03-19 RX ADMIN — HYDROMORPHONE HYDROCHLORIDE 0.5 MG: 1 INJECTION, SOLUTION INTRAMUSCULAR; INTRAVENOUS; SUBCUTANEOUS at 00:25

## 2025-03-19 RX ADMIN — PANTOPRAZOLE SODIUM 40 MG: 40 INJECTION, POWDER, FOR SOLUTION INTRAVENOUS at 19:55

## 2025-03-19 RX ADMIN — SODIUM CHLORIDE, PRESERVATIVE FREE 10 ML: 5 INJECTION INTRAVENOUS at 13:24

## 2025-03-19 RX ADMIN — PANTOPRAZOLE SODIUM 40 MG: 40 INJECTION, POWDER, FOR SOLUTION INTRAVENOUS at 09:03

## 2025-03-19 RX ADMIN — SODIUM CHLORIDE, PRESERVATIVE FREE 10 ML: 5 INJECTION INTRAVENOUS at 00:25

## 2025-03-19 RX ADMIN — HYDROMORPHONE HYDROCHLORIDE 0.5 MG: 1 INJECTION, SOLUTION INTRAMUSCULAR; INTRAVENOUS; SUBCUTANEOUS at 19:55

## 2025-03-19 RX ADMIN — HYDROMORPHONE HYDROCHLORIDE 0.5 MG: 1 INJECTION, SOLUTION INTRAMUSCULAR; INTRAVENOUS; SUBCUTANEOUS at 04:45

## 2025-03-19 RX ADMIN — PROCHLORPERAZINE EDISYLATE 10 MG: 5 INJECTION INTRAMUSCULAR; INTRAVENOUS at 09:03

## 2025-03-19 RX ADMIN — HYDROMORPHONE HYDROCHLORIDE 0.5 MG: 1 INJECTION, SOLUTION INTRAMUSCULAR; INTRAVENOUS; SUBCUTANEOUS at 09:03

## 2025-03-19 RX ADMIN — SODIUM CHLORIDE, PRESERVATIVE FREE 10 ML: 5 INJECTION INTRAVENOUS at 19:57

## 2025-03-19 RX ADMIN — PROCHLORPERAZINE EDISYLATE 10 MG: 5 INJECTION INTRAMUSCULAR; INTRAVENOUS at 19:55

## 2025-03-19 ASSESSMENT — PAIN DESCRIPTION - DESCRIPTORS
DESCRIPTORS: ACHING;DISCOMFORT;SHARP;SHOOTING
DESCRIPTORS: SHARP;ACHING;DISCOMFORT
DESCRIPTORS: ACHING;DISCOMFORT;NAGGING
DESCRIPTORS: ACHING;THROBBING;SORE;DISCOMFORT
DESCRIPTORS: SHARP;ACHING;THROBBING
DESCRIPTORS: ACHING;SORE;DISCOMFORT

## 2025-03-19 ASSESSMENT — PAIN DESCRIPTION - LOCATION
LOCATION: MOUTH;NECK
LOCATION: MOUTH;NECK

## 2025-03-19 ASSESSMENT — ENCOUNTER SYMPTOMS
ABDOMINAL PAIN: 0
NAUSEA: 1
SHORTNESS OF BREATH: 0
DIARRHEA: 0
VOMITING: 1
COUGH: 0
BACK PAIN: 1
ABDOMINAL DISTENTION: 0
CONSTIPATION: 0

## 2025-03-19 ASSESSMENT — PAIN DESCRIPTION - ORIENTATION
ORIENTATION: RIGHT
ORIENTATION: RIGHT;LEFT
ORIENTATION: RIGHT;LOWER
ORIENTATION: RIGHT

## 2025-03-19 ASSESSMENT — PAIN SCALES - GENERAL
PAINLEVEL_OUTOF10: 8
PAINLEVEL_OUTOF10: 9
PAINLEVEL_OUTOF10: 8
PAINLEVEL_OUTOF10: 4
PAINLEVEL_OUTOF10: 5
PAINLEVEL_OUTOF10: 7
PAINLEVEL_OUTOF10: 8
PAINLEVEL_OUTOF10: 5
PAINLEVEL_OUTOF10: 8

## 2025-03-19 ASSESSMENT — PAIN DESCRIPTION - FREQUENCY
FREQUENCY: CONTINUOUS
FREQUENCY: CONTINUOUS

## 2025-03-19 ASSESSMENT — PAIN - FUNCTIONAL ASSESSMENT

## 2025-03-19 ASSESSMENT — PAIN DESCRIPTION - PAIN TYPE
TYPE: CHRONIC PAIN
TYPE: CHRONIC PAIN

## 2025-03-19 ASSESSMENT — PAIN DESCRIPTION - ONSET
ONSET: ON-GOING
ONSET: PROGRESSIVE

## 2025-03-19 NOTE — PROGRESS NOTES
[facility]    Admitting Date and Time: 3/3/2025  2:03 PM  Admit Dx: Failure to thrive in adult [R62.7]    Synopsis:    Patient admitted on 3/3/2025 for Failure to thrive in adult     Prasanna Parnell is a 59 y.o. male who presents to [facility] ER complaining of failure to thrive.     Prasanna Parnell has a past medical history that includes stage I SCC of tongue/tonsil, lung mass SCC     Prasanna the ER from radiation office for decreased p.o. intake, nausea, vomiting.  He is being treated for throat cancer and is on chemo and radiation.  He has tried multiple antiemetics without success.  He does have a PEG tube but currently due to insurance issues he does not have any tube feeds.  He has been unable to eat for days. Will be admitted for initiation of tube feeds and if he cannot tolerate these, consideration of TPN.      Upon presentation to the ER, routine labwork was performed which revealed no acute abnormalities.  Imaging results are as outlined below in the Imaging section of this note.   Upon arrival to the ER, patient was 107/61.  The patient received zofran, 1L NS in the emergency room and was admitted to [facility].     3/5: Patient symptomatic with nausea this morning.  Tube feeds held for now.     3/6: Continues to have nausea.  CT abdomen/pelvis done and is unremarkable.  Oncology has consulted GI as they do not think this is a side effect of cancer treatments.     3/8: Patient received Benadryl IV x 1, Ativan IV x 1, and started on nortriptyline nightly and PPI IV twice daily by GI.  If symptoms do not improve with these plan is possible conversion of PEG tube PEG-J on Tuesday 3/11.     3/10: Still significant nausea unable to tolerate tube feeds.          3/11-s/p PEG J tube placement; gastric pport to be plced to gravity; and flush gastic port Q3hrly  Jejunal port for tube feeds only no meds     3/12-persistent nausea; palliative care added fentanyl patch;  oncology plans to obtain mri brain to rule out metastasis to brain     3/13-tube feeding started; still nauseous; mri brain is pending    3/14- MRI negative     3/16-PICC line placed    3/17: TPN started    Subjective:  Patient is being followed for Failure to thrive in adult [R62.7]     Patient seen and examined at bedside this morning.  States he is tolerating TPN.    ROS: denies fever, chills, cp, sob, n/v, HA unless stated above.      insulin lispro  0-4 Units SubCUTAneous 4x Daily AC & HS    lidocaine 1 % injection  5 mL IntraDERmal Once    sodium chloride flush  5-40 mL IntraVENous 2 times per day    fentaNYL  1 patch TransDERmal Q72H    pantoprazole (PROTONIX) 40 mg in sodium chloride (PF) 0.9 % 10 mL injection  40 mg IntraVENous Q12H    nortriptyline  25 mg Oral Nightly    prochlorperazine  10 mg IntraVENous Q6H    sodium chloride flush  5-40 mL IntraVENous 2 times per day    enoxaparin  40 mg SubCUTAneous Daily    nystatin  500,000 Units Oral 4x Daily    scopolamine  1 patch TransDERmal Q3 Days    sucralfate  1 g Oral 4x Daily     glucose, 4 tablet, PRN  dextrose bolus, 125 mL, PRN   Or  dextrose bolus, 250 mL, PRN  glucagon (rDNA), 1 mg, PRN  dextrose, , Continuous PRN  sodium chloride flush, 5-40 mL, PRN  sodium chloride, 25 mL, PRN  HYDROmorphone, 0.5 mg, Q4H PRN  OLANZapine zydis, 2.5 mg, Q6H PRN  magic (miracle) mouthwash, 15 mL, 4x Daily PRN  benzocaine-menthol, 1 lozenge, Q2H PRN  benzonatate, 100 mg, TID PRN  calcium carbonate, 500 mg, TID PRN  hydrALAZINE, 10 mg, Q6H PRN  melatonin, 3 mg, Nightly PRN  Polyvinyl Alcohol-Povidone PF, 1 drop, PRN  sodium chloride, 1 spray, PRN  sodium chloride flush, 5-40 mL, PRN  sodium chloride, , PRN  potassium chloride, 40 mEq, PRN   Or  potassium alternative oral replacement, 40 mEq, PRN   Or  potassium chloride, 10 mEq, PRN  magnesium sulfate, 2,000 mg, PRN  ondansetron, 4 mg, Q8H PRN   Or  ondansetron, 4 mg, Q6H PRN  polyethylene glycol, 17 g, Daily

## 2025-03-19 NOTE — PROGRESS NOTES
Palliative Care Department  [phone]  Palliative Care Progress Note  Provider Mamie Melgoza PA-C    Prasanna Parnell  [id number]  Hospital Day: 17  Date of Initial Consult: 3/3/2025  Referring Provider: Tammie Higginbotham DO  Palliative Medicine was consulted for assistance with: \"SCC\"    HPI:   Prasanna Parnell is a 59 y.o. with a past medical history of spondylolisthesis, retrolisthesis, neuropathy, COPD, squamous cell carcinoma head and neck, tobacco use who was admitted on 3/3/2025 from radiation office with a CHIEF COMPLAINT of poor oral intake and failure to thrive.  Patient was diagnosed with squamous cell carcinoma of the head and neck in December 2024.  Laryngoscopy by ENT 1/14/2025 showed ulcerated masses seen in the area of the right palatine tonsil extending to the base of the tongue into the vallecula approaching midline.  Pathology of the right tonsil biopsy and right base of the tongue biopsy was consistent with HPV associated squamous cell carcinoma.  He had a bronchoscopy with fine-needle aspiration of right upper lobe lesion which was positive for malignancy consistent with squamous cell carcinoma.  Patient was initiated on chemotherapy and is following with Dr. Tomas in medical oncology and radiation therapy concurrently.  He underwent PEG tube placement. Patient is actively following with the palliative medicine clinic for symptom management.    He was being seen and radiation oncology office and was found to have very poor oral intake over a week.  His insurance reportedly denied his tube feedings for his PEG tube.  He has also been experiencing uncontrolled nausea and vomiting.  He was admitted to the hospital for further management.  Oncology and radiation oncology have been consulted.  Palliative care was consulted.    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  Capacity is time limited and situation/question specific  During encounter a surrogate medical decision-maker was not needed   Outcome of goals of care meeting:   Patient's goal is to identify the underlying cause of his nausea/vomiting, improve his symptoms, resume cancer directed therapies in hopes of prolonging his life and improving his quality of life  Full code  Code status Full Code  Advanced Directives: HC POA completed by palliative  3/4/25  Surrogate/Legal NOK:  Mary Anne Clement (partner/HC-POA): [phone]      Neoplasm related pain.   Home regimen: Oxycodone 4 mg every 4 hours as needed   Patient unable to tolerate oral intake due to intractable nausea and vomiting  IV Dilaudid 0.5 mg every 4 hours as needed for pain  Fentanyl patch 12mcg started-->increase to 25mcg  In anticipation for discharge, start oxycodone 10mg solution through G tube  No BM in 2 weeks-->relistor injection ordered  Patient on TPN, tolerating very little through GJ tube    2.   Intractable nausea and vomiting  G tube converted to GJ tube venting  Ongoing N/V unable to utilize J tube  TPN started 3/17  IV compazine scheduled every 6 hrs  Scopolamine patch  ODT Zofran as needed    PDMP reviewed and appropriate.    Spiritual assessment: no spiritual distress identified  Bereavement and grief: to be determined  Referrals to: none today  SUBJECTIVE:     Current medical issues leading to Palliative Medicine involvement include   Active Hospital Problems    Diagnosis Date Noted    Normocytic anemia [D64.9] 03/13/2025    Malignant neoplasm of head, face, and neck (HCC) [C76.0] 03/12/2025    Goals of care, counseling/discussion [Z71.89]  icterus  Neck: Radiation changes  Heart: Regular rate  Lungs: Nonlabored on room air  Abdomen: Soft nondistended, GJ tube  Extremities: Moving all extremities no edema  Skin: Warm and dry  Psych: Nonanxious  Neuro: No focal deficits    Objective data reviewed: labs, images, records, medication use, vitals, and chart    Discussed patient and the plan of care with the other IDT members: Palliative Medicine IDT Team, Floor Nurse, Patient, and Family    Time/Communication  Greater than 50% of time spent, total 50 minutes in counseling and coordination of care at the bedside regarding goals of care, symptom management, diagnosis and prognosis, and see above.    Thank you for allowing Palliative Medicine to participate in the care of Prasanna Parnell.    Note: This report was completed using [place] ([employer]) voiced recognition software.  Every effort has been made to ensure accuracy; however, inadvertent computerized transcription errors may be present.

## 2025-03-19 NOTE — PROGRESS NOTES
OCCUPATIONAL THERAPY TREATMENT NOTE    [facility]  OT BEDSIDE TREATMENT NOTE      Date:3/19/2025  Patient Name: Prasanna Parnell  MRN: [id number]  : 1965  [facility]      Evaluating OT: aFusto Ferrari OTR/L; [id number]        Referring Provider: Tammie Higginbotham DO     Specific Provider Orders/Date: OT Eval and Treat 25 2086        Diagnosis: Failure to thrive in adult;  Severe protein-calorie malnutrition (Chronic); Palliative care encounter; Neoplasm related pain; Intractable nausea and vomiting.    Surgery: None this admission.     Pertinent Medical History:  has a past medical history of Cancer (HCC), Cervicalgia, COPD (chronic obstructive pulmonary disease) (HCC), Neuropathy, Retrolisthesis, and Spondylolisthesis.      Recommended Adaptive Equipment: TBD      Precautions:  Fall Risk, +alarms, PEG, NPO, active w/ chemo (h/o squamous cell carcinoma head & neck)      Assessment of current deficits    [x] Functional mobility            [x]ADLs           [x] Strength                  []Cognition    [x] Functional transfers          [x] IADLs         [x] Safety Awareness   [x]Endurance    [] Fine Coordination                         [x] Balance      [] Vision/perception   []Sensation      []Gross Motor Coordination             [] ROM           [] Delirium                   [] Motor Control      OT PLAN OF CARE   OT POC based on physician orders, patient diagnosis and results of clinical assessment     Frequency/Duration 1-3 days/wk for 2 weeks PRN   Specific OT Treatment Interventions to include:   * Instruction/training on adapted ADL techniques and AE recommendations to increase functional independence within precautions       * Training on energy conservation strategies, correct breathing pattern and techniques to improve independence/tolerance for self-care routine  * Functional transfer/mobility training/DME recommendations for increased independence, safety, and  prevention strategies, EC/WS strategies, & safety awareness throughout ADLs.   Mobility-  Instruction/training on safety and improved independence with bed mobility/functional transfers  and functional mobility.  Skilled positioning/alignment-  Therapist facilitated proper positioning/alignment throughout session to maintain skin/joint integrity & proper body mechanics.    Pt has met all set goals.   OTR/L will d/c pt from OT services at this time. Pt & RN made aware to contact MD/OT department if change in pt's functional status prior to d/c. Pt & RN in agreement w/ plan.      Treatment Time In:8:55a            Treatment Time Out: 9:05a                Treatment Charges: Mins Units   Ther Ex  54213     Manual Therapy 88136     Thera Activities 92043     ADL/Home Mgt 30342 10 1   Neuro Re-ed 03798     Group Therapy      Orthotic manage/training  74594     Non-Billable Time     Total Timed Treatment 10 1         Fausto Ferrari OTR/L; [id number]

## 2025-03-19 NOTE — PLAN OF CARE
Reached out to Dr. Wiseman regarding plan for nutrition. Patient has not been on tube feeds since the weekend due to nausea. He already has a PEG-J placed on 3/11. TPN was started on 3/17 through a PICC line as patient continues to have nausea despite multiple interventions by primary and GI. Dr. Wiseman states it is unlikely tube feeds through PEG J is causing the patient's nausea at it bypasses his stomach. He would like tube feeds to be resumed today to see if patient can be discharged without TPN. If patient strongly feels that tube feeds are causing his nausea, TPN will have to be done at home which would limit his chemotherapy options per Dr. Wiseman. Nursing notified of this plan. Await patient response and GI recs tomorrow.    Electronically signed by Joon Arzate MD on 3/19/2025 at 2:09 PM

## 2025-03-19 NOTE — CARE COORDINATION
Care Coordination  Spoke to Dr Joon Arzate during interdisciplinary rounds this am. He feels the patient will be stable to possibly discharge home in am. The patient was unable to tolerate the tube feeds through the peg J tube placement. Picc line was placed and the patient was started on Iv TPN continuous. Makayla Rivero is following.  [facility]. Spoke to [facility] this am and the patient has 100 % coverage for it. They will need the orders and labs on the day of discharge. Messaged Dr Wiseman to see if he would write the iv Tpn orders and follow it at home. Per Dr Wiseman he feels the patient can tolerate his tube feeding instead of needing iv Tpn at home. Dr Arzate was notified of this. Spoke to [facility]. They already follow the patient at home and will supply all pain meds including his duragesic patch.

## 2025-03-20 ENCOUNTER — HOSPITAL ENCOUNTER (OUTPATIENT)
Dept: RADIATION ONCOLOGY | Age: 60
Discharge: HOME | End: 2025-03-20

## 2025-03-20 LAB
ANION GAP SERPL CALCULATED.3IONS-SCNC: 16 MMOL/L (ref 7–16)
BUN SERPL-MCNC: 16 MG/DL (ref 6–20)
CALCIUM SERPL-MCNC: 9.3 MG/DL (ref 8.6–10.2)
CHLORIDE SERPL-SCNC: 106 MMOL/L (ref 98–107)
CO2 SERPL-SCNC: 24 MMOL/L (ref 22–29)
CREAT SERPL-MCNC: 0.7 MG/DL (ref 0.7–1.2)
GFR, ESTIMATED: >90 ML/MIN/1.73M2
GLUCOSE BLD-MCNC: 123 MG/DL (ref 74–99)
GLUCOSE BLD-MCNC: 143 MG/DL (ref 74–99)
GLUCOSE BLD-MCNC: 176 MG/DL (ref 74–99)
GLUCOSE SERPL-MCNC: 91 MG/DL (ref 74–99)
MAGNESIUM SERPL-MCNC: 2 MG/DL (ref 1.6–2.6)
PHOSPHATE SERPL-MCNC: 3.9 MG/DL (ref 2.5–4.5)
POTASSIUM SERPL-SCNC: 3.8 MMOL/L (ref 3.5–5)
SODIUM SERPL-SCNC: 146 MMOL/L (ref 132–146)

## 2025-03-20 PROCEDURE — 1200000000 HC SEMI PRIVATE

## 2025-03-20 PROCEDURE — 82962 GLUCOSE BLOOD TEST: CPT

## 2025-03-20 PROCEDURE — 6360000002 HC RX W HCPCS: Performed by: STUDENT IN AN ORGANIZED HEALTH CARE EDUCATION/TRAINING PROGRAM

## 2025-03-20 PROCEDURE — 2500000003 HC RX 250 WO HCPCS: Performed by: INTERNAL MEDICINE

## 2025-03-20 PROCEDURE — 77386 HC NTSTY MODUL RAD TX DLVR CPLX: CPT | Performed by: SPECIALIST

## 2025-03-20 PROCEDURE — 99232 SBSQ HOSP IP/OBS MODERATE 35: CPT | Performed by: STUDENT IN AN ORGANIZED HEALTH CARE EDUCATION/TRAINING PROGRAM

## 2025-03-20 PROCEDURE — 99232 SBSQ HOSP IP/OBS MODERATE 35: CPT | Performed by: INTERNAL MEDICINE

## 2025-03-20 PROCEDURE — 2500000003 HC RX 250 WO HCPCS

## 2025-03-20 PROCEDURE — 6370000000 HC RX 637 (ALT 250 FOR IP): Performed by: PHYSICIAN ASSISTANT

## 2025-03-20 PROCEDURE — 36415 COLL VENOUS BLD VENIPUNCTURE: CPT

## 2025-03-20 PROCEDURE — 99232 SBSQ HOSP IP/OBS MODERATE 35: CPT | Performed by: PHYSICIAN ASSISTANT

## 2025-03-20 PROCEDURE — 84100 ASSAY OF PHOSPHORUS: CPT

## 2025-03-20 PROCEDURE — 6360000002 HC RX W HCPCS: Performed by: NURSE PRACTITIONER

## 2025-03-20 PROCEDURE — 6360000002 HC RX W HCPCS: Performed by: INTERNAL MEDICINE

## 2025-03-20 PROCEDURE — 2580000003 HC RX 258: Performed by: STUDENT IN AN ORGANIZED HEALTH CARE EDUCATION/TRAINING PROGRAM

## 2025-03-20 PROCEDURE — 80048 BASIC METABOLIC PNL TOTAL CA: CPT

## 2025-03-20 PROCEDURE — 83735 ASSAY OF MAGNESIUM: CPT

## 2025-03-20 PROCEDURE — 2580000003 HC RX 258: Performed by: INTERNAL MEDICINE

## 2025-03-20 RX ORDER — POLYETHYLENE GLYCOL 3350 17 G/17G
17 POWDER, FOR SOLUTION ORAL DAILY
Qty: 100 G | Refills: 0 | Status: ON HOLD | OUTPATIENT
Start: 2025-03-20 | End: 2025-03-26

## 2025-03-20 RX ORDER — PANTOPRAZOLE SODIUM 40 MG/1
40 TABLET, DELAYED RELEASE ORAL DAILY
Qty: 30 TABLET | Refills: 0 | Status: ON HOLD | OUTPATIENT
Start: 2025-03-20

## 2025-03-20 RX ORDER — NORTRIPTYLINE HYDROCHLORIDE 25 MG/1
25 CAPSULE ORAL NIGHTLY
Qty: 30 CAPSULE | Refills: 0 | Status: ON HOLD | OUTPATIENT
Start: 2025-03-20

## 2025-03-20 RX ORDER — FENTANYL 25 UG/1
1 PATCH TRANSDERMAL
Qty: 3 PATCH | Refills: 0 | Status: ON HOLD | OUTPATIENT
Start: 2025-03-22 | End: 2025-04-21

## 2025-03-20 RX ORDER — OXYCODONE HCL 5 MG/5 ML
10 SOLUTION, ORAL ORAL EVERY 4 HOURS PRN
Qty: 200 ML | Refills: 0 | Status: ON HOLD | OUTPATIENT
Start: 2025-03-20 | End: 2025-03-23

## 2025-03-20 RX ORDER — OXYCODONE HCL 5 MG/5 ML
10 SOLUTION, ORAL ORAL EVERY 4 HOURS PRN
Refills: 0 | Status: DISCONTINUED | OUTPATIENT
Start: 2025-03-20 | End: 2025-03-21 | Stop reason: HOSPADM

## 2025-03-20 RX ORDER — OXYCODONE HYDROCHLORIDE 10 MG/1
10 TABLET ORAL EVERY 4 HOURS PRN
Refills: 0 | Status: DISCONTINUED | OUTPATIENT
Start: 2025-03-20 | End: 2025-03-21 | Stop reason: HOSPADM

## 2025-03-20 RX ADMIN — SODIUM CHLORIDE, PRESERVATIVE FREE 10 ML: 5 INJECTION INTRAVENOUS at 08:29

## 2025-03-20 RX ADMIN — PROCHLORPERAZINE EDISYLATE 10 MG: 5 INJECTION INTRAMUSCULAR; INTRAVENOUS at 22:50

## 2025-03-20 RX ADMIN — SODIUM CHLORIDE, PRESERVATIVE FREE 10 ML: 5 INJECTION INTRAVENOUS at 22:51

## 2025-03-20 RX ADMIN — PROCHLORPERAZINE EDISYLATE 10 MG: 5 INJECTION INTRAMUSCULAR; INTRAVENOUS at 04:10

## 2025-03-20 RX ADMIN — OXYCODONE HYDROCHLORIDE 10 MG: 5 SOLUTION ORAL at 22:50

## 2025-03-20 RX ADMIN — CALCIUM GLUCONATE: 98 INJECTION, SOLUTION INTRAVENOUS at 18:00

## 2025-03-20 RX ADMIN — HYDROMORPHONE HYDROCHLORIDE 0.5 MG: 1 INJECTION, SOLUTION INTRAMUSCULAR; INTRAVENOUS; SUBCUTANEOUS at 04:10

## 2025-03-20 RX ADMIN — HYDROMORPHONE HYDROCHLORIDE 0.5 MG: 1 INJECTION, SOLUTION INTRAMUSCULAR; INTRAVENOUS; SUBCUTANEOUS at 00:17

## 2025-03-20 RX ADMIN — ALTEPLASE 1 MG: 2.2 INJECTION, POWDER, LYOPHILIZED, FOR SOLUTION INTRAVENOUS at 03:38

## 2025-03-20 RX ADMIN — OXYCODONE HYDROCHLORIDE 10 MG: 5 SOLUTION ORAL at 01:31

## 2025-03-20 RX ADMIN — PANTOPRAZOLE SODIUM 40 MG: 40 INJECTION, POWDER, FOR SOLUTION INTRAVENOUS at 08:24

## 2025-03-20 RX ADMIN — OXYCODONE HYDROCHLORIDE 10 MG: 5 SOLUTION ORAL at 05:57

## 2025-03-20 RX ADMIN — PROCHLORPERAZINE EDISYLATE 10 MG: 5 INJECTION INTRAMUSCULAR; INTRAVENOUS at 08:24

## 2025-03-20 RX ADMIN — PANTOPRAZOLE SODIUM 40 MG: 40 INJECTION, POWDER, FOR SOLUTION INTRAVENOUS at 22:50

## 2025-03-20 ASSESSMENT — PAIN - FUNCTIONAL ASSESSMENT
PAIN_FUNCTIONAL_ASSESSMENT: PREVENTS OR INTERFERES SOME ACTIVE ACTIVITIES AND ADLS
PAIN_FUNCTIONAL_ASSESSMENT: ACTIVITIES ARE NOT PREVENTED
PAIN_FUNCTIONAL_ASSESSMENT: PREVENTS OR INTERFERES SOME ACTIVE ACTIVITIES AND ADLS
PAIN_FUNCTIONAL_ASSESSMENT: ACTIVITIES ARE NOT PREVENTED
PAIN_FUNCTIONAL_ASSESSMENT: ACTIVITIES ARE NOT PREVENTED

## 2025-03-20 ASSESSMENT — PAIN DESCRIPTION - DESCRIPTORS
DESCRIPTORS: ACHING;DISCOMFORT;NAGGING

## 2025-03-20 ASSESSMENT — PAIN SCALES - GENERAL
PAINLEVEL_OUTOF10: 3
PAINLEVEL_OUTOF10: 4
PAINLEVEL_OUTOF10: 7
PAINLEVEL_OUTOF10: 3
PAINLEVEL_OUTOF10: 9
PAINLEVEL_OUTOF10: 7
PAINLEVEL_OUTOF10: 6
PAINLEVEL_OUTOF10: 4
PAINLEVEL_OUTOF10: 8
PAINLEVEL_OUTOF10: 7

## 2025-03-20 ASSESSMENT — PAIN DESCRIPTION - LOCATION
LOCATION: NECK;JAW
LOCATION: NECK
LOCATION: NECK;JAW
LOCATION: NECK
LOCATION: NECK

## 2025-03-20 ASSESSMENT — ENCOUNTER SYMPTOMS
NAUSEA: 1
ABDOMINAL DISTENTION: 0
BACK PAIN: 1
COUGH: 0
SHORTNESS OF BREATH: 0
CONSTIPATION: 0
DIARRHEA: 0
VOMITING: 1
ABDOMINAL PAIN: 0

## 2025-03-20 ASSESSMENT — PAIN DESCRIPTION - ORIENTATION
ORIENTATION: LEFT
ORIENTATION: RIGHT
ORIENTATION: RIGHT;LEFT
ORIENTATION: RIGHT
ORIENTATION: RIGHT

## 2025-03-20 NOTE — PROGRESS NOTES
He is here and stable at this time. He is very sleepy today. All questions were answered by Dr. Rodríguez. Tolerating treatment well.

## 2025-03-20 NOTE — DISCHARGE SUMMARY
[facility] Physician Discharge Summary     Patient ID:  Prasanna Parnell  [id number]  59 y.o.  1965    Admit date: 3/3/2025    Discharge date and time:  3/20/2025  2:07 PM    Hospital Course:   Patient Prasanna Parnell is a 59 y.o. presented with Failure to thrive in adult [R62.7]    Patient admitted on 3/3/2025 for Failure to thrive in adult     Prasanna Parnell is a 59 y.o. male who presents to [facility] ER complaining of failure to thrive.     Prasanna Parnell has a past medical history that includes stage I SCC of tongue/tonsil, lung mass SCC     Prasanna the ER from radiation office for decreased p.o. intake, nausea, vomiting.  He is being treated for throat cancer and is on chemo and radiation.  He has tried multiple antiemetics without success.  He does have a PEG tube but currently due to insurance issues he does not have any tube feeds.  He has been unable to eat for days. Will be admitted for initiation of tube feeds and if he cannot tolerate these, consideration of TPN.      Upon presentation to the ER, routine labwork was performed which revealed no acute abnormalities.  Imaging results are as outlined below in the Imaging section of this note.   Upon arrival to the ER, patient was 107/61.  The patient received zofran, 1L NS in the emergency room and was admitted to [facility].     3/5: Patient symptomatic with nausea this morning.  Tube feeds held for now.     3/6: Continues to have nausea.  CT abdomen/pelvis done and is unremarkable.  Oncology has consulted GI as they do not think this is a side effect of cancer treatments.     3/8: Patient received Benadryl IV x 1, Ativan IV x 1, and started on nortriptyline nightly and PPI IV twice daily by GI.  If symptoms do not improve with these plan is possible conversion of PEG tube PEG-J on Tuesday 3/11.     3/10: Still significant nausea unable to tolerate tube feeds.          3/11-s/p PEG J tube placement; gastric pport to be  capsule  pantoprazole 40 MG tablet           Note that more than 30 minutes was spent in preparing discharge papers, discussing discharge with patient, medication review, etc.    NOTE: This report was transcribed using voice recognition software. Every effort was made to ensure accuracy; however, inadvertent computerized transcription errors may be present.    Signed:  Electronically signed by Joon Arzate MD on 3/20/2025 at 2:07 PM

## 2025-03-20 NOTE — PROGRESS NOTES
Patient gastric tube flushes with 50cc water, Jejunal tube flushed with 100cc water.  Patient refused 300cc flush as ordered.

## 2025-03-20 NOTE — CARE COORDINATION
Care Coordination  The patient remains on Iv Tpn and the plan was to switch him back to tube feedings through the J tube. However the patient Is declining as he feels so much better on the Iv Tpn. If the patient goes home on Iv Tpn will need a Physician to write the home going orders and follow the patient upon discharge. He has no primary care physician. Left a detailed voice message for Tatyana at the clinic to set up a an new clinic appointment  for the patient as soon as possible. Notified Palliative care of his discharge as they will follow the patient as an outpatient and will supply all pain meds. [facility] was notified of the discharge. Will put home care orders in. Once the Iv Tpn orders are placed by Dr Wiseman will fax them to [facility] along with most recent lab work. His plan is to return home with his significant other. Called Ananya Steel to see if she can set up a clinic apt as close to discharge as possible.       1524- No home going Tpn orders in as yet have messaged Dr Wiseman. The [facility] home infusion cannot Mix the home Tpn for home unless the home going orders are not placed by 2-3 pm. Dr Joon Arzate is aware of this. Have explained all this to the patient and his significant other and have answered all questions. [facility] was notified of this and they are set up to see the patient tomorrow upon discharge. Plan home now tomorrow am.

## 2025-03-20 NOTE — PROGRESS NOTES
Patient gastric tube flushed with 50cc water, jejunal tube flushed with 100cc water.  Patient refused 300cc flush ordered.

## 2025-03-20 NOTE — PROGRESS NOTES
Palliative Care Department  [phone]  Palliative Care Progress Note  Provider Mamie Melgoza PA-C    Prasanna Parnell  [id number]  Hospital Day: 18  Date of Initial Consult: 3/3/2025  Referring Provider: Tammie Higginbotham DO  Palliative Medicine was consulted for assistance with: \"SCC\"    HPI:   Prasanna Parnell is a 59 y.o. with a past medical history of spondylolisthesis, retrolisthesis, neuropathy, COPD, squamous cell carcinoma head and neck, tobacco use who was admitted on 3/3/2025 from radiation office with a CHIEF COMPLAINT of poor oral intake and failure to thrive.  Patient was diagnosed with squamous cell carcinoma of the head and neck in December 2024.  Laryngoscopy by ENT 1/14/2025 showed ulcerated masses seen in the area of the right palatine tonsil extending to the base of the tongue into the vallecula approaching midline.  Pathology of the right tonsil biopsy and right base of the tongue biopsy was consistent with HPV associated squamous cell carcinoma.  He had a bronchoscopy with fine-needle aspiration of right upper lobe lesion which was positive for malignancy consistent with squamous cell carcinoma.  Patient was initiated on chemotherapy and is following with Dr. Tomas in medical oncology and radiation therapy concurrently.  He underwent PEG tube placement. Patient is actively following with the palliative medicine clinic for symptom management.    He was being seen and radiation oncology office and was found to have very poor oral intake over a week.  His insurance reportedly denied his tube feedings for his PEG tube.  He has also been experiencing uncontrolled nausea and vomiting.  He was admitted to the hospital for further management.  Oncology and radiation oncology have been consulted.  Palliative care was consulted.    3/11: PEG to J tube  3/16: PICC inserted  3/17: TPN started  ASSESSMENT/PLAN:     Pertinent Hospital Diagnoses     Metastatic squamous cell carcinoma right base of the  atraumatic  Eyes: No scleral icterus  Neck: Radiation changes  Heart: Regular rate  Lungs: Nonlabored on room air  Abdomen: Soft nondistended, GJ tube  Extremities: Moving all extremities no edema  Skin: Warm and dry  Psych: unable to assess  Neuro: lethargic    Objective data reviewed: labs, images, records, medication use, vitals, and chart    Discussed patient and the plan of care with the other IDT members: Palliative Medicine IDT Team, Floor Nurse, Patient, and Family    Time/Communication  Greater than 50% of time spent, total 35 minutes in counseling and coordination of care at the bedside regarding goals of care, symptom management, diagnosis and prognosis, and see above.    Thank you for allowing Palliative Medicine to participate in the care of Prasanna Parnell.    Note: This report was completed using computerClinicbook voiced recognition software.  Every effort has been made to ensure accuracy; however, inadvertent computerized transcription errors may be present.

## 2025-03-20 NOTE — PROGRESS NOTES
[facility] Hospitalist Progress Note    Admitting Date and Time: 3/3/2025  2:03 PM  Admit Dx: Failure to thrive in adult [R62.7]    Synopsis:    Patient admitted on 3/3/2025 for Failure to thrive in adult     Prasanna Parnell is a 59 y.o. male who presents to [facility] ER complaining of failure to thrive.     Prasanna Parnell has a past medical history that includes stage I SCC of tongue/tonsil, lung mass SCC     Prasanna the ER from radiation office for decreased p.o. intake, nausea, vomiting.  He is being treated for throat cancer and is on chemo and radiation.  He has tried multiple antiemetics without success.  He does have a PEG tube but currently due to insurance issues he does not have any tube feeds.  He has been unable to eat for days. Will be admitted for initiation of tube feeds and if he cannot tolerate these, consideration of TPN.      Upon presentation to the ER, routine labwork was performed which revealed no acute abnormalities.  Imaging results are as outlined below in the Imaging section of this note.   Upon arrival to the ER, patient was 107/61.  The patient received zofran, 1L NS in the emergency room and was admitted to [facility].     3/5: Patient symptomatic with nausea this morning.  Tube feeds held for now.     3/6: Continues to have nausea.  CT abdomen/pelvis done and is unremarkable.  Oncology has consulted GI as they do not think this is a side effect of cancer treatments.     3/8: Patient received Benadryl IV x 1, Ativan IV x 1, and started on nortriptyline nightly and PPI IV twice daily by GI.  If symptoms do not improve with these plan is possible conversion of PEG tube PEG-J on Tuesday 3/11.     3/10: Still significant nausea unable to tolerate tube feeds.          3/11-s/p PEG J tube placement; gastric pport to be plced to gravity; and flush gastic port Q3hrly  Jejunal port for tube feeds only no meds     3/12-persistent nausea; palliative care added fentanyl patch;

## 2025-03-20 NOTE — PROGRESS NOTES
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      Past medical, surgical, social and family histories reviewed and updated as indicated.    PAIN: 2/10    ALLERGIES:  Bee venom and Keflex [cephalexin]         No current facility-administered medications for this encounter.     No current outpatient medications on file.     Facility-Administered Medications Ordered in Other Encounters   Medication Dose Route Frequency Provider Last Rate Last Admin    PN-Adult  3 IN 1 Central Line (Custom)   IntraVENous Continuous TPN Joon Barrera MD        HYDROmorphone (DILAUDID) injection 0.25 mg  0.25 mg IntraVENous Q4H PRN Mamie Melgoza PA-C        oxyCODONE (ROXICODONE) 5 MG/5ML solution 10 mg  10 mg Per G Tube Q4H PRN Mamie Melgoza PA-C        Or    oxyCODONE HCl (OXY-IR) immediate release tablet 10 mg  10 mg Oral Q4H PRN Mamie Melgoza PA-C        fentaNYL (DURAGESIC) 25 MCG/HR 1 patch  1 patch TransDERmal Q72H Mamie Melgoza PA-C   1 patch at 03/19/25 1921    PN-Adult  3 IN 1 Central Line (Custom)   IntraVENous Continuous TPN Joon Barrera MD 35.4 mL/hr at 03/19/25 1920 New Bag at 03/19/25 1920    insulin lispro (HUMALOG,ADMELOG) injection vial 0-4 Units  0-4 Units SubCUTAneous 4x Daily AC & HS Fernanda Fernandez APRN - CNP   2 Units at 03/18/25 0806    glucose chewable tablet 16 g  4 tablet Oral PRN Fernanda Fernandez APRN - CNP        dextrose bolus 10% 125 mL  125 mL IntraVENous PRN Fernanda Fernandez APRN - CNP        Or    dextrose bolus 10% 250 mL  250 mL IntraVENous PRN Fernanda Fernandez APRN - CNP        glucagon injection 1 mg  1 mg SubCUTAneous PRN

## 2025-03-20 NOTE — PROGRESS NOTES
Line                 VITALS:    /60   Pulse 69   Temp 98 °F (36.7 °C) (Temporal)   Resp 18   Ht 1.727 m (5' 7.99\")   Wt 55.3 kg (121 lb 14.4 oz)   SpO2 100%   BMI 18.53 kg/m²   Physical Exam  Vitals reviewed.   Constitutional:       General: He is not in acute distress.     Appearance: Normal appearance. He is not toxic-appearing.   HENT:      Head: Normocephalic and atraumatic.      Mouth/Throat:      Pharynx: Posterior oropharyngeal erythema present.   Cardiovascular:      Rate and Rhythm: Normal rate and regular rhythm.   Pulmonary:      Effort: Pulmonary effort is normal.      Breath sounds: Normal breath sounds.   Abdominal:      General: Bowel sounds are normal. There is no distension.      Palpations: Abdomen is soft.      Tenderness: There is no abdominal tenderness.      Comments: PEG intact   Musculoskeletal:      Cervical back: Normal range of motion.      Right lower leg: No edema.      Left lower leg: No edema.   Lymphadenopathy:      Cervical: Cervical adenopathy present.   Skin:     General: Skin is warm and dry.   Neurological:      General: No focal deficit present.      Mental Status: He is alert and oriented to person, place, and time.   Psychiatric:         Mood and Affect: Mood normal.         Behavior: Behavior normal.              ECOG PS: 1      Intake/Output Summary (Last 24 hours) at 3/20/2025 0935  Last data filed at 3/20/2025 0829  Gross per 24 hour   Intake 1348 ml   Output --   Net 1348 ml           DIAGNOSTIC DATA:   Lab Results   Component Value Date    WBC 5.9 03/19/2025    HGB 10.5 (L) 03/19/2025    HCT 30.9 (L) 03/19/2025    MCV 95.4 03/19/2025     03/19/2025     Lab Results   Component Value Date    NEUTROABS 3.87 03/16/2025     Lab Results   Component Value Date    ALT 15 03/19/2025    AST 20 03/19/2025    ALKPHOS 68 03/19/2025    BILITOT 0.5 03/19/2025     Lab Results   Component Value Date    CREATININE 0.7 03/20/2025    BUN 16 03/20/2025      03/20/2025    K 3.8 03/20/2025     03/20/2025    CO2 24 03/20/2025             ASSESSMENT:    Squamous cell carcinoma of the right base of the tongue/tonsil, stage cT1 N1 M1, PD-L1 0, metastatic disease to right upper lung  Failure to thrive  Nausea and vomiting   Oral candidiasis      The patient is a 59 y.o. 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 Since starting treatment, patient experiencing nausea and vomiting despite taking several antiemetics. Completed day 8, cycle 1 of cisplatin on 2/27/2025 with intravenous hydration. Next chemotherapy scheduled for 3/6/2025. PEG tube in place but due to not having insurance he is unable to supplement with tube feeding. Patient was sent to ED from radiation oncology clinic for evaluation of dizziness, low BP, and decrease oral intake. Work up in ED showing all labs normal except hematocrit 36.8, MCHC 36.1, absolute lymphocytes 0.81, and lymphocytes 13%.     KUB negative   CT abd/pelvis from 3/7 reviewed, no acute process    PLAN  Hold chemotherapy while inpatient  Continuing radiation  Continue antiemetics  Palliative care consulted for symptom management  Continue oral nystatin, Magic mouthwash  PT/OT  Pain control  GI team following  s/p exchange PEG with PEG-J on 3/11/25  Tube feeding on hold   Hgb improved to 10.5 yesterday; continue to monitor  Iron studies reviewed, he does have iron deficiency, recieved Ferrlecit, continue to monitor his counts, no vitamin B12 for deficiency  DC per Primary team, follow-up with Dr. Tomas after DC from the hospital   Brain MRI reviewed, negative for any acute intracranial  abnormalities.  TPN initiated on 3/17, PICC line placed 3/16   Oncology unable to manage TPN outpatient. Sent consult to general surgery for TPN management since patient does not have a PCP. General surgery unable to manage. Discussed with hospitalist, admin contacted for support.     Nicole Devi, APRN - CNP   HEMATOLOGY/MEDICAL ONCOLOGY  [facility] MED ONCOLOGY  [address] [address]  Dept: [phone]  Loc: [phone]

## 2025-03-21 ENCOUNTER — TELEPHONE (OUTPATIENT)
Dept: ONCOLOGY | Age: 60
End: 2025-03-21

## 2025-03-21 ENCOUNTER — HOSPITAL ENCOUNTER (OUTPATIENT)
Dept: RADIATION ONCOLOGY | Age: 60
Discharge: HOME | End: 2025-03-21

## 2025-03-21 ENCOUNTER — TELEPHONE (OUTPATIENT)
Age: 60
End: 2025-03-21

## 2025-03-21 VITALS
HEART RATE: 67 BPM | SYSTOLIC BLOOD PRESSURE: 98 MMHG | OXYGEN SATURATION: 93 % | TEMPERATURE: 98.4 F | DIASTOLIC BLOOD PRESSURE: 51 MMHG | WEIGHT: 121.9 LBS | HEIGHT: 68 IN | BODY MASS INDEX: 18.47 KG/M2 | RESPIRATION RATE: 18 BRPM

## 2025-03-21 LAB
ALBUMIN SERPL-MCNC: 3.5 G/DL (ref 3.5–5.2)
ALP SERPL-CCNC: 66 U/L (ref 40–129)
ALT SERPL-CCNC: 13 U/L (ref 0–40)
ANION GAP SERPL CALCULATED.3IONS-SCNC: 12 MMOL/L (ref 7–16)
AST SERPL-CCNC: 18 U/L (ref 0–39)
BILIRUB DIRECT SERPL-MCNC: <0.2 MG/DL (ref 0–0.3)
BILIRUB INDIRECT SERPL-MCNC: ABNORMAL MG/DL (ref 0–1)
BILIRUB SERPL-MCNC: 0.5 MG/DL (ref 0–1.2)
BUN SERPL-MCNC: 18 MG/DL (ref 6–20)
CALCIUM SERPL-MCNC: 9.3 MG/DL (ref 8.6–10.2)
CHLORIDE SERPL-SCNC: 101 MMOL/L (ref 98–107)
CO2 SERPL-SCNC: 25 MMOL/L (ref 22–29)
CREAT SERPL-MCNC: 0.7 MG/DL (ref 0.7–1.2)
GFR, ESTIMATED: >90 ML/MIN/1.73M2
GLUCOSE BLD-MCNC: 106 MG/DL (ref 74–99)
GLUCOSE SERPL-MCNC: 117 MG/DL (ref 74–99)
MAGNESIUM SERPL-MCNC: 2 MG/DL (ref 1.6–2.6)
PHOSPHATE SERPL-MCNC: 3.5 MG/DL (ref 2.5–4.5)
POTASSIUM SERPL-SCNC: 4.2 MMOL/L (ref 3.5–5)
PROT SERPL-MCNC: 6.3 G/DL (ref 6.4–8.3)
SODIUM SERPL-SCNC: 138 MMOL/L (ref 132–146)

## 2025-03-21 PROCEDURE — 99232 SBSQ HOSP IP/OBS MODERATE 35: CPT | Performed by: INTERNAL MEDICINE

## 2025-03-21 PROCEDURE — 99232 SBSQ HOSP IP/OBS MODERATE 35: CPT | Performed by: NURSE PRACTITIONER

## 2025-03-21 PROCEDURE — 6360000002 HC RX W HCPCS: Performed by: STUDENT IN AN ORGANIZED HEALTH CARE EDUCATION/TRAINING PROGRAM

## 2025-03-21 PROCEDURE — 82248 BILIRUBIN DIRECT: CPT

## 2025-03-21 PROCEDURE — 2500000003 HC RX 250 WO HCPCS: Performed by: INTERNAL MEDICINE

## 2025-03-21 PROCEDURE — 84100 ASSAY OF PHOSPHORUS: CPT

## 2025-03-21 PROCEDURE — 82962 GLUCOSE BLOOD TEST: CPT

## 2025-03-21 PROCEDURE — 6370000000 HC RX 637 (ALT 250 FOR IP): Performed by: PHYSICIAN ASSISTANT

## 2025-03-21 PROCEDURE — 2500000003 HC RX 250 WO HCPCS

## 2025-03-21 PROCEDURE — 2580000003 HC RX 258: Performed by: STUDENT IN AN ORGANIZED HEALTH CARE EDUCATION/TRAINING PROGRAM

## 2025-03-21 PROCEDURE — 80053 COMPREHEN METABOLIC PANEL: CPT

## 2025-03-21 PROCEDURE — 77386 HC NTSTY MODUL RAD TX DLVR CPLX: CPT | Performed by: SPECIALIST

## 2025-03-21 PROCEDURE — 36415 COLL VENOUS BLD VENIPUNCTURE: CPT

## 2025-03-21 PROCEDURE — 83735 ASSAY OF MAGNESIUM: CPT

## 2025-03-21 RX ORDER — LACTOSE-REDUCED FOOD
60 LIQUID (ML) ORAL SEE ADMIN INSTRUCTIONS
Qty: 30 EACH | Refills: 100 | Status: ON HOLD | OUTPATIENT
Start: 2025-03-21

## 2025-03-21 RX ADMIN — PROCHLORPERAZINE EDISYLATE 10 MG: 5 INJECTION INTRAMUSCULAR; INTRAVENOUS at 04:35

## 2025-03-21 RX ADMIN — OXYCODONE HYDROCHLORIDE 10 MG: 10 TABLET ORAL at 08:33

## 2025-03-21 RX ADMIN — SODIUM CHLORIDE, PRESERVATIVE FREE 10 ML: 5 INJECTION INTRAVENOUS at 08:36

## 2025-03-21 RX ADMIN — PANTOPRAZOLE SODIUM 40 MG: 40 INJECTION, POWDER, FOR SOLUTION INTRAVENOUS at 08:32

## 2025-03-21 RX ADMIN — SODIUM CHLORIDE, PRESERVATIVE FREE 10 ML: 5 INJECTION INTRAVENOUS at 08:35

## 2025-03-21 RX ADMIN — PROCHLORPERAZINE EDISYLATE 10 MG: 5 INJECTION INTRAMUSCULAR; INTRAVENOUS at 08:33

## 2025-03-21 RX ADMIN — OXYCODONE HYDROCHLORIDE 10 MG: 5 SOLUTION ORAL at 04:35

## 2025-03-21 ASSESSMENT — PAIN SCALES - GENERAL
PAINLEVEL_OUTOF10: 4
PAINLEVEL_OUTOF10: 5
PAINLEVEL_OUTOF10: 7
PAINLEVEL_OUTOF10: 3
PAINLEVEL_OUTOF10: 7
PAINLEVEL_OUTOF10: 3

## 2025-03-21 ASSESSMENT — PAIN DESCRIPTION - FREQUENCY
FREQUENCY: CONTINUOUS

## 2025-03-21 ASSESSMENT — PAIN DESCRIPTION - LOCATION
LOCATION: NECK
LOCATION: HEAD;NECK;BACK

## 2025-03-21 ASSESSMENT — PAIN SCALES - WONG BAKER
WONGBAKER_NUMERICALRESPONSE: HURTS A LITTLE BIT

## 2025-03-21 ASSESSMENT — PAIN DESCRIPTION - DESCRIPTORS
DESCRIPTORS: ACHING;NAGGING;TENDER
DESCRIPTORS: ACHING;DISCOMFORT;NAGGING

## 2025-03-21 ASSESSMENT — PAIN DESCRIPTION - ORIENTATION
ORIENTATION: LEFT;RIGHT;MID
ORIENTATION: RIGHT

## 2025-03-21 ASSESSMENT — PAIN DESCRIPTION - PAIN TYPE
TYPE: CHRONIC PAIN

## 2025-03-21 ASSESSMENT — PAIN DESCRIPTION - ONSET
ONSET: ON-GOING

## 2025-03-21 NOTE — FLOWSHEET NOTE
PT will be discharged with PICC line per Dr. Arzate  He will be working on TPN/tube feed with Dr. Wiseman's clinic.

## 2025-03-21 NOTE — PLAN OF CARE
Spoke to patient and daughter at bedside regarding difficulties with setting up TPN as outpatient.  Advised them that TPN is to be ordered daily with daily labs and to be followed by a physician on a daily basis.  Patient and daughter are frustrated as patient has been here for 18 days and still cannot discharge due to TPN difficulties.  Patient now stating he wants to go home on tube feeds so that he can leave the hospital.  Advised him that he has been refusing it due to nausea but he states he would rather be on tube feeds so that he can go home with some nutrition despite the nausea.  Case management notified.  GI to be notified.    Electronically signed by Joon Arzate MD on 3/21/2025 at 11:46 AM

## 2025-03-21 NOTE — PLAN OF CARE
Patient eloped before tube feeds were set up for home    Electronically signed by Joon Arzate MD on 3/21/2025 at 1:17 PM

## 2025-03-21 NOTE — PROGRESS NOTES
Comprehensive Nutrition Assessment    Type and Reason for Visit:  Reassess, Consult (TF recs for home/discharge per phone call w/ nursing)    Nutrition Recommendations/Plan:   Continue NPO  Recommend to continue TPN to meet nutritional needs until pt able to tolerate TF at goal rate; hx of intolerance w/ persistent nausea w/ TF this admission- recommend starting TF at 5cc/hr and advancing slowly to goal rate. Monitor tolerance and Lytes, replacing Lytes PRN (pt at risk for re-feeding syndrome).Call received for discharge/home tube feeding recs via J-port (PEG/J); updated TF and TPN recs provided;     Tube feeding recs for PEG/J (J-port for nutrition):    Peptide Based formula (Vital AF 1.2) @ 60ml/hr to provide 1440ml TV, 1728kcals, 108g pro, 1168ml free water.    Flush rec if needed of 95ml q 4= 570ml water (1738ml total water).    Start TF at trickle rate (5ml/hr), advancing slowly to goal and monitoring tolerance. Recommend to continue TPN until pt able to tolerate TF; updated TPN rec provided, recommend;      Standard 3-in-1 @ 70.8ml/hr to provide 1700ml TV, 1760kcals, 85g AA.      Both recommendations meet 100% of pt's estimated nutritional needs.     Peptide based formula recommended to promote optimal GI tolerance. If pt unable to tolerate TF, consider continuing TPN to meet nutritional needs. Per discussion w/ nursing, pt wanting to discharge w/ TF via PEG/J.      Malnutrition Assessment:  Malnutrition Status:  Severe malnutrition (03/04/25 1142)    Context:  Chronic Illness     Findings of the 6 clinical characteristics of malnutrition:  Energy Intake:  75% or less estimated energy requirements for 1 month or longer (chronic N/V w/ inability to tolerate PO intake ; pt w/ PEG, however was not utilizing this PTA d/t inability to acquire TF formula d/t insurance issues.)  Weight Loss:  Unable to assess (GALI d/t lack of wt hx per EMR; recent measured wts trending down, however unable to assess long term wt loss  Orders:  Feeding Route: PEG/J (J-port for nutrition)  Formula: Peptide Based  Schedule: Continuous  Feeding Regimen: 60ml/hr  Additives/Modulars: None  Water Flushes: 95ml q 4= 570ml water.  Current TF Provides: 1440ml TV, 1728kcals, 108g pro, 1168ml free water, 1738ml total water.  Current Parenteral Nutrition Orders:  Type and Formula: 3-in-1 Custom   Lipids: None  Duration: Continuous  Rate/Volume: 53.1ml/hr  Current PN Order Provides: 1275ml TV, 1511kcals, 64g AA, 225g dextrose, 49g lipids.  Goal PN Orders Provides:      Anthropometric Measures:  Height: 172.7 cm (5' 7.99\")  Ideal Body Weight (IBW): 154 lbs (70 kg)    Admission Body Weight: 53 kg (116 lb 13.5 oz) (3/4/25-BS/first measured this adm)  Current Body Weight: 52.9 kg (116 lb 10 oz) (3/7-BS/adm wt, CBW of 55.3 kg on 3/21 appears elevated, likely r/t fluid shifts.), 75.7 % IBW. Weight Source: Bed scale  Current BMI (kg/m2): 17.7  Usual Body Weight: 56.4 kg (124 lb 5.4 oz) (2/27/25-oncology note; 2/20/25 wt of 60.3kg noted via oncology note)     % Weight Change (Calculated): -6  Weight Adjustment For: No Adjustment                 BMI Categories: Underweight (BMI less than 18.5)    Estimated Daily Nutrient Needs:  Energy Requirements Based On: Formula  Weight Used for Energy Requirements: Current (dry wt)  Energy (kcal/day): MSJ x 1.3 SF= 1728: 5460-1070  Weight Used for Protein Requirements: Current  Protein (g/day): 1.6-1.9g/kgxCBW=85-100g  Method Used for Fluid Requirements: 1 ml/kcal  Fluid (ml/day): 9487-9367    Nutrition Diagnosis:   Severe malnutrition, in context of chronic illness related to catabolic illness as evidenced by criteria as identified in malnutrition assessment    Nutrition Interventions:   Food and/or Nutrient Delivery: Continue NPO, Start Tube Feeding (updated TF and TPN recs provided. Call received from nursing for home TF recommendations, nursing unsure of what plan is for TPN at time of discussion.)  Nutrition

## 2025-03-21 NOTE — PROGRESS NOTES
Patient gastric tube flushed with 50cc water, jujunal tube flushed with 150cc water.  Patient refused 300cc flush ordered.

## 2025-03-21 NOTE — TELEPHONE ENCOUNTER
Updated that pt eloped from hospital w/o confirmed set up of home nutrition services. [facility] services unable to deliver formula/supplies w/o HHC for care and TF teaching. Pt remains at high risk given recent TPN and refusal of EN trials. Attempted to contact pt. Left voicemail instructing pt to return to ED for assistance in setting up HHC as well as monitoring for tolerance of EN w/ slow progression to goal. Contact information provided for call back if needed.  Electronically signed by Zaira Pratt MS, RD, LD on 3/21/2025 at 4:04 PM

## 2025-03-21 NOTE — PROGRESS NOTES
[facility]    Admitting Date and Time: 3/3/2025  2:03 PM  Admit Dx: Failure to thrive in adult [R62.7]    Synopsis:    Patient admitted on 3/3/2025 for Failure to thrive in adult     Prasanna Parnell is a 59 y.o. male who presents to [facility] ER complaining of failure to thrive.     Prasanna Parnell has a past medical history that includes stage I SCC of tongue/tonsil, lung mass SCC     Prasanna the ER from radiation office for decreased p.o. intake, nausea, vomiting.  He is being treated for throat cancer and is on chemo and radiation.  He has tried multiple antiemetics without success.  He does have a PEG tube but currently due to insurance issues he does not have any tube feeds.  He has been unable to eat for days. Will be admitted for initiation of tube feeds and if he cannot tolerate these, consideration of TPN.      Upon presentation to the ER, routine labwork was performed which revealed no acute abnormalities.  Imaging results are as outlined below in the Imaging section of this note.   Upon arrival to the ER, patient was 107/61.  The patient received zofran, 1L NS in the emergency room and was admitted to [facility].     3/5: Patient symptomatic with nausea this morning.  Tube feeds held for now.     3/6: Continues to have nausea.  CT abdomen/pelvis done and is unremarkable.  Oncology has consulted GI as they do not think this is a side effect of cancer treatments.     3/8: Patient received Benadryl IV x 1, Ativan IV x 1, and started on nortriptyline nightly and PPI IV twice daily by GI.  If symptoms do not improve with these plan is possible conversion of PEG tube PEG-J on Tuesday 3/11.     3/10: Still significant nausea unable to tolerate tube feeds.          3/11-s/p PEG J tube placement; gastric pport to be plced to gravity; and flush gastic port Q3hrly  Jejunal port for tube feeds only no meds     3/12-persistent nausea; palliative care added fentanyl patch;          Assessment:    Intractable nausea/vomiting likely 2/2 chemotherapy  Stage I SCC tongue/tonsil and stage I SCC of lung  S/p PEG tube  History of tobacco abuse      Plan:  GI following:  TPN started on 3/17 through PICC line  GI stated patient will have to be discharged on TPN as he is refusing tube feeds for reinitiation.  S/p PEG-J conversion on 3/11  Continue PPI IV twice daily  Continue Compazine 10 mg IV every 6 hours scheduled  Continue nortriptyline 25 mg nightly  Continue scopolamine patch  Palliative care following:  Remains on Dilaudid 0.5 mg IV every 4 hours  Remains on Percocet every 4 hours as needed  Continue fentanyl patch      DC planning: Still awaiting plan on which provider will follow TPN as outpatient. New PCP appointment not until 3/26.      NOTE: This report was transcribed using voice recognition software. Every effort was made to ensure accuracy; however, inadvertent computerized transcription errors may be present.    Electronically signed by Joon Arzate MD on 3/21/2025 at 11:05 AM

## 2025-03-21 NOTE — TELEPHONE ENCOUNTER
Dr Wiseman will write for tube feedings until the patient can get a PCP. I clarified that [facility] infusion has order for the tube feed but they need a home care agency to teach the patient.    He is with [facility] and they have his referral but that dept could not tell me what it was for. Either TPN or Tube feed.    The TPN would need to be adjusted weekly after he has weekly labs. Dr Wiseman will manage the tube feed.    Unable to reach any one at the home care center  I did leave a message for them that we will manage tube feedings at the Home care site. Electronically signed by RODRICK FISHMAN LPN on 3/21/2025 at 2:45 PM

## 2025-03-21 NOTE — PROGRESS NOTES
[facility]  [facility] ORTHO-TRAUMA  [address]  [facility] [address]  Dept: [phone]  Loc: [phone]  Jayde Fernandez MD   ·   MD Adriana Lucero APRN  ·  FADI [place]  Inpatient Medical Oncology/Hematology progress Note    Patient Name: Prasanna Parnell  YOB: 1965    DATE OF ADMISSION: 3/3/2025  DATE OF CONSULTATION: 3/3/2025  CONSULTING PROVIDER: Tammie Higginbotham DO  REASON FOR CONSULTATION: SCC  PCP: No primary care provider on file.    Room: [address] [place] [address]      CHIEF COMPLAINT:  Failure to thrive     Subjective:  Patient sitting up in bed. Alert, awaiting transport to radiation. Pending discharge today home. Denies nausea, vomiting, abdominal pain, or shortness of breath.     HISTORY OF PRESENT ILLNESS (3/4/2025):   Patient is a 59 y.o. 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 Since starting treatment, patient experiencing nausea and vomiting despite taking several antiemetics. Completed day 8, cycle 1 of cisplatin on 2/27/2025 with intravenous hydration. Next chemotherapy scheduled for 3/6/2025. PEG tube in place but due to not having insurance he is unable to supplement with tube feeding. Patient was sent to ED from radiation oncology clinic for evaluation of dizziness, low BP, and decrease oral intake. Work up in ED showing all labs normal except hematocrit 36.8, MCHC 36.1, absolute lymphocytes 0.81, and lymphocytes 13%.               Oncology History   Malignant neoplasm of overlapping sites of tonsil (HCC)   2/20/2025 -  Chemotherapy    OP CISplatin 40 mg/m2 Q7D  Plan Provider: Roberto Tomas MD  Treatment goal:  3/16   Oncology unable to manage TPN outpatient. Sent consult to general surgery for TPN management since patient does not have a PCP. General surgery unable to manage.   IM note reviewed, possible discharging with TF instead of TPN despite patient not being able to tolerate TF. Patient requesting to go home on TF.   Patient to follow up with Dr. Tomas in 2 weeks. Message sent to office staff to schedule   Received a call that patient left AMA prior to note being closed. Spoke with . Order sent for tube feedings already. Will clarify if patient knows how to infuse feedings.     Nicole Dvei, APRN - CNP   HEMATOLOGY/MEDICAL ONCOLOGY  [facility] SPECIALTY CARE [facility] MED ONCOLOGY  [address] [facility] [address]  Dept: [phone]  Loc: [phone]

## 2025-03-21 NOTE — CARE COORDINATION
Care Coordination  Have messaged Dr Wiseman this am for home Tpn orders also have messaged Paco Kim. Home care orders are in. Makayla Rivero is ready to go out and see the patient today. Palliative care is all set up as an outpatient to following the patient along with pain medication. Gabylarry Stanley is awaiting the final Tpn orders and will fax to them once on chart. The patients significant other is his ride home.     237  The patient left ama and [facility] has declined to see the patient . [facility] cannot deliver the home tube feeding with no home care going out. Referral made to [facility] they declined, [facility] they declined and called [employer] they don't take [facility] medicaid. The patients girlfriend Mary Anne Clement  was called and notified of this @ [phone].

## 2025-03-21 NOTE — PLAN OF CARE
Problem: Discharge Planning  Goal: Discharge to home or other facility with appropriate resources  3/20/2025 2030 by Racheal De La O RN  Outcome: Progressing  3/20/2025 0733 by April Fuentes RN  Outcome: Progressing     Problem: Skin/Tissue Integrity  Goal: Skin integrity remains intact  Description: 1.  Monitor for areas of redness and/or skin breakdown  2.  Assess vascular access sites hourly  3.  Every 4-6 hours minimum:  Change oxygen saturation probe site  4.  Every 4-6 hours:  If on nasal continuous positive airway pressure, respiratory therapy assess nares and determine need for appliance change or resting period  3/20/2025 2030 by Racheal De La O RN  Outcome: Progressing  3/20/2025 0733 by April Fuentes RN  Outcome: Progressing     Problem: Safety - Adult  Goal: Free from fall injury  3/20/2025 2030 by Racheal De La O RN  Outcome: Progressing  3/20/2025 0733 by April Fuentes RN  Outcome: Progressing     Problem: ABCDS Injury Assessment  Goal: Absence of physical injury  3/20/2025 2030 by Racheal De La O RN  Outcome: Progressing  3/20/2025 0733 by April Fuentes RN  Outcome: Progressing     Problem: Pain  Goal: Verbalizes/displays adequate comfort level or baseline comfort level  3/20/2025 2030 by Racheal De La O RN  Outcome: Progressing  3/20/2025 0733 by April Fuentes RN  Outcome: Progressing     Problem: Nutrition Deficit:  Goal: Optimize nutritional status  3/20/2025 2030 by Racheal De La O RN  Outcome: Progressing  3/20/2025 0733 by April Fuentes, RN  Outcome: Progressing

## 2025-03-21 NOTE — DISCHARGE SUMMARY
[facility]ist Physician Discharge Summary     Patient ID:  Prasanna Parnell  [id number]  59 y.o.  1965    Admit date: 3/3/2025    Discharge date and time:  3/21/2025  12:32 PM    Hospital Course:   Patient Prasanna Parnell is a 59 y.o. presented with Failure to thrive in adult [R62.7]    Patient admitted on 3/3/2025 for Failure to thrive in adult     Prasanna Parnell is a 59 y.o. male who presents to [facility] ER complaining of failure to thrive.     Prasanna Parnell has a past medical history that includes stage I SCC of tongue/tonsil, lung mass SCC     Prasanna the ER from radiation office for decreased p.o. intake, nausea, vomiting.  He is being treated for throat cancer and is on chemo and radiation.  He has tried multiple antiemetics without success.  He does have a PEG tube but currently due to insurance issues he does not have any tube feeds.  He has been unable to eat for days. Will be admitted for initiation of tube feeds and if he cannot tolerate these, consideration of TPN.      Upon presentation to the ER, routine labwork was performed which revealed no acute abnormalities.  Imaging results are as outlined below in the Imaging section of this note.   Upon arrival to the ER, patient was 107/61.  The patient received zofran, 1L NS in the emergency room and was admitted to [facility].     3/5: Patient symptomatic with nausea this morning.  Tube feeds held for now.     3/6: Continues to have nausea.  CT abdomen/pelvis done and is unremarkable.  Oncology has consulted GI as they do not think this is a side effect of cancer treatments.     3/8: Patient received Benadryl IV x 1, Ativan IV x 1, and started on nortriptyline nightly and PPI IV twice daily by GI.  If symptoms do not improve with these plan is possible conversion of PEG tube PEG-J on Tuesday 3/11.     3/10: Still significant nausea unable to tolerate tube feeds.          3/11-s/p PEG J tube placement; gastric pport to be

## 2025-03-23 ENCOUNTER — APPOINTMENT (OUTPATIENT)
Dept: GENERAL RADIOLOGY | Age: 60
DRG: 391 | End: 2025-03-23
Payer: MEDICARE

## 2025-03-23 ENCOUNTER — HOSPITAL ENCOUNTER (INPATIENT)
Age: 60
LOS: 8 days | Discharge: HOME HEALTH | DRG: 391 | End: 2025-04-03
Attending: EMERGENCY MEDICINE | Admitting: STUDENT IN AN ORGANIZED HEALTH CARE EDUCATION/TRAINING PROGRAM
Payer: MEDICARE

## 2025-03-23 DIAGNOSIS — Z51.5 PALLIATIVE CARE ENCOUNTER: ICD-10-CM

## 2025-03-23 DIAGNOSIS — C09.8 MALIGNANT NEOPLASM OF OVERLAPPING SITES OF TONSIL (HCC): ICD-10-CM

## 2025-03-23 DIAGNOSIS — R62.7 FAILURE TO THRIVE IN ADULT: Primary | ICD-10-CM

## 2025-03-23 DIAGNOSIS — C06.9 CANCER OF ORAL CAVITY (HCC): ICD-10-CM

## 2025-03-23 DIAGNOSIS — K92.2 GIB (GASTROINTESTINAL BLEEDING): ICD-10-CM

## 2025-03-23 DIAGNOSIS — C14.0 THROAT CANCER (HCC): ICD-10-CM

## 2025-03-23 LAB
ALBUMIN SERPL-MCNC: 3.8 G/DL (ref 3.5–5.2)
ALP SERPL-CCNC: 79 U/L (ref 40–129)
ALT SERPL-CCNC: 23 U/L (ref 0–40)
ANION GAP SERPL CALCULATED.3IONS-SCNC: 15 MMOL/L (ref 7–16)
AST SERPL-CCNC: 28 U/L (ref 0–39)
BASOPHILS # BLD: 0.04 K/UL (ref 0–0.2)
BASOPHILS NFR BLD: 1 % (ref 0–2)
BILIRUB SERPL-MCNC: 0.8 MG/DL (ref 0–1.2)
BUN SERPL-MCNC: 24 MG/DL (ref 6–20)
CALCIUM SERPL-MCNC: 9.8 MG/DL (ref 8.6–10.2)
CHLORIDE SERPL-SCNC: 99 MMOL/L (ref 98–107)
CO2 SERPL-SCNC: 28 MMOL/L (ref 22–29)
CREAT SERPL-MCNC: 0.8 MG/DL (ref 0.7–1.2)
EOSINOPHIL # BLD: 0.15 K/UL (ref 0.05–0.5)
EOSINOPHILS RELATIVE PERCENT: 3 % (ref 0–6)
ERYTHROCYTE [DISTWIDTH] IN BLOOD BY AUTOMATED COUNT: 14.8 % (ref 11.5–15)
GFR, ESTIMATED: >90 ML/MIN/1.73M2
GLUCOSE SERPL-MCNC: 99 MG/DL (ref 74–99)
HCT VFR BLD AUTO: 28.5 % (ref 37–54)
HGB BLD-MCNC: 9.7 G/DL (ref 12.5–16.5)
IMM GRANULOCYTES # BLD AUTO: <0.03 K/UL (ref 0–0.58)
IMM GRANULOCYTES NFR BLD: 0 % (ref 0–5)
LACTATE BLDV-SCNC: 1.1 MMOL/L (ref 0.5–2.2)
LIPASE SERPL-CCNC: 22 U/L (ref 13–60)
LYMPHOCYTES NFR BLD: 0.38 K/UL (ref 1.5–4)
LYMPHOCYTES RELATIVE PERCENT: 7 % (ref 20–42)
MAGNESIUM SERPL-MCNC: 1.9 MG/DL (ref 1.6–2.6)
MCH RBC QN AUTO: 32.9 PG (ref 26–35)
MCHC RBC AUTO-ENTMCNC: 34 G/DL (ref 32–34.5)
MCV RBC AUTO: 96.6 FL (ref 80–99.9)
MONOCYTES NFR BLD: 0.37 K/UL (ref 0.1–0.95)
MONOCYTES NFR BLD: 7 % (ref 2–12)
NEUTROPHILS NFR BLD: 82 % (ref 43–80)
NEUTS SEG NFR BLD: 4.48 K/UL (ref 1.8–7.3)
PLATELET # BLD AUTO: 273 K/UL (ref 130–450)
PMV BLD AUTO: 8.9 FL (ref 7–12)
POTASSIUM SERPL-SCNC: 3.9 MMOL/L (ref 3.5–5)
PROT SERPL-MCNC: 7.1 G/DL (ref 6.4–8.3)
RBC # BLD AUTO: 2.95 M/UL (ref 3.8–5.8)
RBC # BLD: ABNORMAL 10*6/UL
SODIUM SERPL-SCNC: 142 MMOL/L (ref 132–146)
TROPONIN I SERPL HS-MCNC: 12 NG/L (ref 0–11)
TROPONIN I SERPL HS-MCNC: 12 NG/L (ref 0–11)
WBC OTHER # BLD: 5.4 K/UL (ref 4.5–11.5)

## 2025-03-23 PROCEDURE — 6360000002 HC RX W HCPCS: Performed by: STUDENT IN AN ORGANIZED HEALTH CARE EDUCATION/TRAINING PROGRAM

## 2025-03-23 PROCEDURE — 2500000003 HC RX 250 WO HCPCS: Performed by: INTERNAL MEDICINE

## 2025-03-23 PROCEDURE — 96374 THER/PROPH/DIAG INJ IV PUSH: CPT

## 2025-03-23 PROCEDURE — 99254 IP/OBS CNSLTJ NEW/EST MOD 60: CPT | Performed by: STUDENT IN AN ORGANIZED HEALTH CARE EDUCATION/TRAINING PROGRAM

## 2025-03-23 PROCEDURE — 84484 ASSAY OF TROPONIN QUANT: CPT

## 2025-03-23 PROCEDURE — 80053 COMPREHEN METABOLIC PANEL: CPT

## 2025-03-23 PROCEDURE — 99222 1ST HOSP IP/OBS MODERATE 55: CPT | Performed by: STUDENT IN AN ORGANIZED HEALTH CARE EDUCATION/TRAINING PROGRAM

## 2025-03-23 PROCEDURE — 83735 ASSAY OF MAGNESIUM: CPT

## 2025-03-23 PROCEDURE — 93005 ELECTROCARDIOGRAM TRACING: CPT

## 2025-03-23 PROCEDURE — 99222 1ST HOSP IP/OBS MODERATE 55: CPT

## 2025-03-23 PROCEDURE — 6370000000 HC RX 637 (ALT 250 FOR IP): Performed by: INTERNAL MEDICINE

## 2025-03-23 PROCEDURE — 6360000002 HC RX W HCPCS

## 2025-03-23 PROCEDURE — G0378 HOSPITAL OBSERVATION PER HR: HCPCS

## 2025-03-23 PROCEDURE — 96375 TX/PRO/DX INJ NEW DRUG ADDON: CPT

## 2025-03-23 PROCEDURE — 99285 EMERGENCY DEPT VISIT HI MDM: CPT

## 2025-03-23 PROCEDURE — 83690 ASSAY OF LIPASE: CPT

## 2025-03-23 PROCEDURE — 85025 COMPLETE CBC W/AUTO DIFF WBC: CPT

## 2025-03-23 PROCEDURE — 6360000002 HC RX W HCPCS: Performed by: INTERNAL MEDICINE

## 2025-03-23 PROCEDURE — 96372 THER/PROPH/DIAG INJ SC/IM: CPT

## 2025-03-23 PROCEDURE — 96361 HYDRATE IV INFUSION ADD-ON: CPT

## 2025-03-23 PROCEDURE — 83605 ASSAY OF LACTIC ACID: CPT

## 2025-03-23 PROCEDURE — 2580000003 HC RX 258

## 2025-03-23 PROCEDURE — 71045 X-RAY EXAM CHEST 1 VIEW: CPT

## 2025-03-23 PROCEDURE — 96376 TX/PRO/DX INJ SAME DRUG ADON: CPT

## 2025-03-23 RX ORDER — PANTOPRAZOLE SODIUM 40 MG/1
40 TABLET, DELAYED RELEASE ORAL DAILY
Status: DISCONTINUED | OUTPATIENT
Start: 2025-03-23 | End: 2025-03-23 | Stop reason: CLARIF

## 2025-03-23 RX ORDER — 0.9 % SODIUM CHLORIDE 0.9 %
1000 INTRAVENOUS SOLUTION INTRAVENOUS ONCE
Status: COMPLETED | OUTPATIENT
Start: 2025-03-23 | End: 2025-03-23

## 2025-03-23 RX ORDER — LIDOCAINE HYDROCHLORIDE 20 MG/ML
15 SOLUTION OROPHARYNGEAL DAILY PRN
Status: DISCONTINUED | OUTPATIENT
Start: 2025-03-23 | End: 2025-04-03 | Stop reason: HOSPADM

## 2025-03-23 RX ORDER — LANSOPRAZOLE 30 MG/1
30 TABLET, ORALLY DISINTEGRATING, DELAYED RELEASE ORAL
Status: DISCONTINUED | OUTPATIENT
Start: 2025-03-23 | End: 2025-03-30 | Stop reason: SDUPTHER

## 2025-03-23 RX ORDER — OXYCODONE HCL 5 MG/5 ML
10 SOLUTION, ORAL ORAL EVERY 4 HOURS PRN
Status: DISCONTINUED | OUTPATIENT
Start: 2025-03-23 | End: 2025-04-03

## 2025-03-23 RX ORDER — FENTANYL 25 UG/1
1 PATCH TRANSDERMAL
Status: COMPLETED | OUTPATIENT
Start: 2025-03-23 | End: 2025-03-29

## 2025-03-23 RX ORDER — SCOPOLAMINE 1 MG/3D
1 PATCH, EXTENDED RELEASE TRANSDERMAL
Status: DISCONTINUED | OUTPATIENT
Start: 2025-03-23 | End: 2025-03-28

## 2025-03-23 RX ORDER — SODIUM CHLORIDE 0.9 % (FLUSH) 0.9 %
5-40 SYRINGE (ML) INJECTION EVERY 12 HOURS SCHEDULED
Status: DISCONTINUED | OUTPATIENT
Start: 2025-03-23 | End: 2025-04-03 | Stop reason: HOSPADM

## 2025-03-23 RX ORDER — SODIUM CHLORIDE 0.9 % (FLUSH) 0.9 %
5-40 SYRINGE (ML) INJECTION PRN
Status: DISCONTINUED | OUTPATIENT
Start: 2025-03-23 | End: 2025-04-03 | Stop reason: HOSPADM

## 2025-03-23 RX ORDER — POTASSIUM CHLORIDE 7.45 MG/ML
10 INJECTION INTRAVENOUS PRN
Status: DISCONTINUED | OUTPATIENT
Start: 2025-03-23 | End: 2025-04-03 | Stop reason: HOSPADM

## 2025-03-23 RX ORDER — LACTOSE-REDUCED FOOD
60 LIQUID (ML) ORAL SEE ADMIN INSTRUCTIONS
Status: DISCONTINUED | OUTPATIENT
Start: 2025-03-23 | End: 2025-03-23 | Stop reason: CLARIF

## 2025-03-23 RX ORDER — PROMETHAZINE HYDROCHLORIDE 25 MG/1
25 TABLET ORAL EVERY 6 HOURS PRN
Status: DISCONTINUED | OUTPATIENT
Start: 2025-03-23 | End: 2025-03-24

## 2025-03-23 RX ORDER — ACETAMINOPHEN 325 MG/1
650 TABLET ORAL EVERY 6 HOURS PRN
Status: DISCONTINUED | OUTPATIENT
Start: 2025-03-23 | End: 2025-04-03 | Stop reason: HOSPADM

## 2025-03-23 RX ORDER — NORTRIPTYLINE HYDROCHLORIDE 25 MG/1
25 CAPSULE ORAL NIGHTLY
Status: DISCONTINUED | OUTPATIENT
Start: 2025-03-23 | End: 2025-04-03 | Stop reason: HOSPADM

## 2025-03-23 RX ORDER — POLYETHYLENE GLYCOL 3350 17 G/17G
17 POWDER, FOR SOLUTION ORAL DAILY
Status: DISCONTINUED | OUTPATIENT
Start: 2025-03-23 | End: 2025-04-03 | Stop reason: HOSPADM

## 2025-03-23 RX ORDER — SUCRALFATE 1 G/1
1 TABLET ORAL 4 TIMES DAILY
Status: DISCONTINUED | OUTPATIENT
Start: 2025-03-23 | End: 2025-04-03 | Stop reason: HOSPADM

## 2025-03-23 RX ORDER — POTASSIUM CHLORIDE 1500 MG/1
40 TABLET, EXTENDED RELEASE ORAL PRN
Status: DISCONTINUED | OUTPATIENT
Start: 2025-03-23 | End: 2025-04-03 | Stop reason: HOSPADM

## 2025-03-23 RX ORDER — MAGNESIUM SULFATE IN WATER 40 MG/ML
2000 INJECTION, SOLUTION INTRAVENOUS PRN
Status: DISCONTINUED | OUTPATIENT
Start: 2025-03-23 | End: 2025-04-03 | Stop reason: HOSPADM

## 2025-03-23 RX ORDER — DIPHENHYDRAMINE HYDROCHLORIDE 50 MG/ML
25 INJECTION, SOLUTION INTRAMUSCULAR; INTRAVENOUS ONCE
Status: COMPLETED | OUTPATIENT
Start: 2025-03-23 | End: 2025-03-23

## 2025-03-23 RX ORDER — PROCHLORPERAZINE EDISYLATE 5 MG/ML
10 INJECTION INTRAMUSCULAR; INTRAVENOUS EVERY 6 HOURS PRN
Status: DISCONTINUED | OUTPATIENT
Start: 2025-03-23 | End: 2025-04-03 | Stop reason: HOSPADM

## 2025-03-23 RX ORDER — ENOXAPARIN SODIUM 100 MG/ML
30 INJECTION SUBCUTANEOUS DAILY
Status: DISCONTINUED | OUTPATIENT
Start: 2025-03-23 | End: 2025-04-03 | Stop reason: HOSPADM

## 2025-03-23 RX ORDER — SODIUM CHLORIDE 9 MG/ML
INJECTION, SOLUTION INTRAVENOUS PRN
Status: DISCONTINUED | OUTPATIENT
Start: 2025-03-23 | End: 2025-04-03 | Stop reason: HOSPADM

## 2025-03-23 RX ORDER — ACETAMINOPHEN 650 MG/1
650 SUPPOSITORY RECTAL EVERY 6 HOURS PRN
Status: DISCONTINUED | OUTPATIENT
Start: 2025-03-23 | End: 2025-03-23

## 2025-03-23 RX ORDER — MORPHINE SULFATE 2 MG/ML
1 INJECTION, SOLUTION INTRAMUSCULAR; INTRAVENOUS
Status: DISCONTINUED | OUTPATIENT
Start: 2025-03-23 | End: 2025-03-24

## 2025-03-23 RX ORDER — BISACODYL 10 MG
10 SUPPOSITORY, RECTAL RECTAL PRN
Status: DISCONTINUED | OUTPATIENT
Start: 2025-03-23 | End: 2025-03-27 | Stop reason: SDUPTHER

## 2025-03-23 RX ORDER — PROCHLORPERAZINE EDISYLATE 5 MG/ML
10 INJECTION INTRAMUSCULAR; INTRAVENOUS ONCE
Status: COMPLETED | OUTPATIENT
Start: 2025-03-23 | End: 2025-03-23

## 2025-03-23 RX ORDER — ONDANSETRON 4 MG/1
8 TABLET, ORALLY DISINTEGRATING ORAL EVERY 8 HOURS PRN
Status: DISCONTINUED | OUTPATIENT
Start: 2025-03-23 | End: 2025-04-03 | Stop reason: HOSPADM

## 2025-03-23 RX ORDER — FENTANYL CITRATE 50 UG/ML
50 INJECTION, SOLUTION INTRAMUSCULAR; INTRAVENOUS ONCE
Status: COMPLETED | OUTPATIENT
Start: 2025-03-23 | End: 2025-03-23

## 2025-03-23 RX ORDER — PROCHLORPERAZINE MALEATE 10 MG
10 TABLET ORAL EVERY 6 HOURS PRN
Status: DISCONTINUED | OUTPATIENT
Start: 2025-03-23 | End: 2025-03-23

## 2025-03-23 RX ADMIN — MORPHINE SULFATE 1 MG: 2 INJECTION, SOLUTION INTRAMUSCULAR; INTRAVENOUS at 13:45

## 2025-03-23 RX ADMIN — MORPHINE SULFATE 1 MG: 2 INJECTION, SOLUTION INTRAMUSCULAR; INTRAVENOUS at 20:58

## 2025-03-23 RX ADMIN — OXYCODONE HYDROCHLORIDE 10 MG: 5 SOLUTION ORAL at 11:27

## 2025-03-23 RX ADMIN — PROCHLORPERAZINE EDISYLATE 10 MG: 5 INJECTION INTRAMUSCULAR; INTRAVENOUS at 05:25

## 2025-03-23 RX ADMIN — SUCRALFATE 1 G: 1 TABLET ORAL at 09:09

## 2025-03-23 RX ADMIN — SODIUM CHLORIDE, PRESERVATIVE FREE 10 ML: 5 INJECTION INTRAVENOUS at 09:10

## 2025-03-23 RX ADMIN — LANSOPRAZOLE 30 MG: 30 TABLET, ORALLY DISINTEGRATING, DELAYED RELEASE ORAL at 11:28

## 2025-03-23 RX ADMIN — PROCHLORPERAZINE EDISYLATE 10 MG: 5 INJECTION INTRAMUSCULAR; INTRAVENOUS at 13:45

## 2025-03-23 RX ADMIN — ENOXAPARIN SODIUM 30 MG: 100 INJECTION SUBCUTANEOUS at 09:09

## 2025-03-23 RX ADMIN — SODIUM CHLORIDE, PRESERVATIVE FREE 10 ML: 5 INJECTION INTRAVENOUS at 22:37

## 2025-03-23 RX ADMIN — FENTANYL CITRATE 50 MCG: 50 INJECTION INTRAMUSCULAR; INTRAVENOUS at 06:33

## 2025-03-23 RX ADMIN — SODIUM CHLORIDE 1000 ML: 0.9 INJECTION, SOLUTION INTRAVENOUS at 05:20

## 2025-03-23 RX ADMIN — POLYETHYLENE GLYCOL 3350 17 G: 17 POWDER, FOR SOLUTION ORAL at 09:09

## 2025-03-23 RX ADMIN — DIPHENHYDRAMINE HYDROCHLORIDE 25 MG: 50 INJECTION INTRAMUSCULAR; INTRAVENOUS at 05:25

## 2025-03-23 ASSESSMENT — PAIN DESCRIPTION - ORIENTATION
ORIENTATION: RIGHT
ORIENTATION: RIGHT

## 2025-03-23 ASSESSMENT — PAIN SCALES - GENERAL
PAINLEVEL_OUTOF10: 8
PAINLEVEL_OUTOF10: 10
PAINLEVEL_OUTOF10: 10
PAINLEVEL_OUTOF10: 7
PAINLEVEL_OUTOF10: 8
PAINLEVEL_OUTOF10: 8
PAINLEVEL_OUTOF10: 5
PAINLEVEL_OUTOF10: 8

## 2025-03-23 ASSESSMENT — PAIN DESCRIPTION - LOCATION
LOCATION: JAW;FACE
LOCATION: JAW
LOCATION: BACK
LOCATION: FACE;EAR;HEAD
LOCATION: JAW
LOCATION: FACE

## 2025-03-23 ASSESSMENT — PAIN - FUNCTIONAL ASSESSMENT
PAIN_FUNCTIONAL_ASSESSMENT: ACTIVITIES ARE NOT PREVENTED
PAIN_FUNCTIONAL_ASSESSMENT: PREVENTS OR INTERFERES SOME ACTIVE ACTIVITIES AND ADLS
PAIN_FUNCTIONAL_ASSESSMENT: 0-10

## 2025-03-23 ASSESSMENT — PAIN DESCRIPTION - DESCRIPTORS
DESCRIPTORS: STABBING;SHARP
DESCRIPTORS: DISCOMFORT;ACHING;SHARP

## 2025-03-23 NOTE — CONSULTS
Jayde Fernandez MD   ·   MD Adriana Lucero APRN  ·  FADI Bermeo      [facility] [place]  [address] [place] [address]  P: [phone]  F: [phone] [facility] [place]  [address] [place] [address]  P: [phone]  F: [phone]  [facility] [place]  [address] [place] [address]  P: [phone]  F: [phone]           Inpatient Medical Oncology/Hematology Consult Note            Patient Name: Prasanna Parnell  YOB: 1965    DATE OF ADMISSION: 3/23/2025  DATE OF CONSULTATION: 3/23/2025  CONSULTING PROVIDER: Jennifer Haddad APRN - NP  REASON FOR CONSULTATION: \"SCC of tongue and tonsils\"         PCP: No primary care provider on file.    Room: [address] [place] [address]      CHIEF COMPLAINT:  Nausea and vomiting    HISTORY OF PRESENT ILLNESS (3/23/2025):   Patient is a 59 y.o. male comes in for intractable nausea and vomiting.   Recently he was hospitalized for similar issues in which he had a prolonged hospital course, but eventually had left AMA.    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 He was recently started on chemoRT, having only completed 2 cycles of weekly cisplatin but has continued radiation during his last hospital course. Last admission, he struggled to keep foods done oral as well as from his tube feeds. He also c/o symptoms with tube flushes. We had consulted GI, in which his PEG was exchanged for a PEG-J, but patient still had issues w/ tube feeds, and subsequently, he was started on TPN. TPN had improved his symptoms.     Due to certain logistical/financial issues and lack of PCP, there were issues with setting up tube feeds, and there were tentative plans to discharged with PICC and TPN for home, but according to report, no specialist recommended take over management of this.     Today patient feels unwell. Even without  issues.   He continued to have issues w/ N/V while on slow tube feeds, eventually he was started on TPN last admission. There were plans to continue tube feeds and potential continuation of TPN; patient did leave the hospital AMA.  Also is underinsured and does not have PCP, so being able to set these resources has also been another challenging item in patient's care.       PLAN     Already on Zofran, Compazine, and scopolamine  Ordered Phenergan; pharmacy informed me that this med is out of stock and on back order  Consider Zyprexa   Palliative care consulted  Consider Reglan  Hold chemo inpatient  May continue radiation if rad onc is ok with this  Dietitian consulted  Case management  Optimize on tube feeding and minimize use of TPN          Approximately spent 63 minutes with patient, discussing the laboratory, imaging, and clinical findings; and documentation, and I have discussed the clinical implications and recommendations. More than 50% of time was spent counseling patient. The patient verbalized understanding.      Roberto Tomas MD  Medical Oncology  [facility]  3/23/2025

## 2025-03-23 NOTE — CONSULTS
Palliative Care Department  [phone]  Palliative Care Initial Consult  Provider Sharon Mendoza, APRN - CNP      PATIENT: Prasanna Parnell  : 1965  MRN: [id number]  ADMISSION DATE: 3/23/2025  4:23 AM  Referring Provider: Roberto Tomas MD     Palliative Medicine was consulted on hospital day 0 for assistance with Symptom management: intractable nausea/vomiting; head and neck cancer    HPI:     Clinical Summary:Prasanna Parnell is a 59 y.o. y/o male spondylolisthesis, retrolisthesis, neuropathy, COPD, squamous cell carcinoma of the head and neck in 2024. Laryngoscopy by ENT 2025 showed ulcerated masses seen in the area of the right palatine tonsil extending to the base of the tongue into the vallecula approaching midline. Pathology of the right tonsil biopsy and right base of the tongue biopsy was consistent with HPV associated squamous cell carcinoma. He had a bronchoscopy with fine-needle aspiration of right upper lobe lesion which was positive for malignancy consistent with squamous cell carcinoma. Patient was initiated on chemotherapy and is following with Dr. Tomas in medical oncology and radiation therapy concurrently.  He had a recent hospitalization due to nausea and vomiting, on 3/3/2025, he underwent PEG tube placement 3/11/2025, insurance company did not cover tube feedings despite PEG tube in place and patient unable to eat.  PICC line was placed on 3/16/2025 and TPN was initiated on 3/17/2025.  Case management was having difficulty time setting up TPN outpatient, patient eloped on 3/21/2024 before tube feeding to be set up at home.  Who presented to [facility] on 3/23/2025 with failure to thrive and nausea.  At ED, given fentanyl, Benadryl, 1 L bolus.  He was admitted for further medical management of intractable nausea and failure to thrive.    ASSESSMENT/PLAN:     Pertinent Hospital Diagnoses     Intractable nausea  Failure to thrive  SCC of tongue/tonsil/lung    Palliative Care

## 2025-03-23 NOTE — H&P
[facility] Hospitalist Group History and Physical      CHIEF COMPLAINT:  nausea    History of Present Illness:      This is a 59 year old male with past medical history of SCC of tongue/tonsil and lung, COPD who presents to ER with complaints of failure to thrive and nausea. He is on chemotherapy and radiation at this time. He was recently inpatient at [facility] from 3/3/-3/21 but eventually eloped due to frustrations with insurance company not covering tube feeds despite having a PEG J tube placement and being unable to eat. Due to intractable nausea a PICC line was placed and TPN started. GI and oncology recommended tube feeds but patient refused due to nausea. TPN was initiated inpatient, but due to patient not having a PCP he is unable to go home on TPN (no specialists can follow this outpatient). Patient decided he would rather go home on tube feeds and be nauseous than not go home. However, he ended up leaving prior to tube feed being set up at home. All imaging studies at [facility] were negative.     Patient returned to ER with same complaints. He was given Compazine, Fentanyl, Benadryl, and 1 L bolus in ER. Vitals stable. Lab workup: Hgb 9.7. CXR negative.     Informant(s) for H&P: patient, epic     REVIEW OF SYSTEMS:  A comprehensive review of systems was negative except for: what is in the HPI    PMH:  Past Medical History:   Diagnosis Date    Cancer (HCC)     oral cancer    Cervicalgia     COPD (chronic obstructive pulmonary disease) (HCC)     Neuropathy     Retrolisthesis     Spondylolisthesis      Surgical History:  Past Surgical History:   Procedure Laterality Date    BRONCHOSCOPY N/A 01/23/2025    BRONCHOSCOPY ENDOBRONCHIAL ULTRASOUND FINE NEEDLE ASPIRATION performed by Edward Beebe MD at [facility] [place] ENDOSCOPY    BRONCHOSCOPY N/A 01/23/2025    BRONCHOSCOPY ENDOBRONCHIAL ULTRASOUND FINE NEEDLE ASPIRATION ROBOTIC performed by Edward Beebe MD at [facility] [place] ENDOSCOPY    FINGER SURGERY Right      daily for 6 days 3/20/25 3/26/25  Mamie Melgoza PA-C   scopolamine (TRANSDERM-SCOP) transdermal patch Place 1 patch onto the skin every 72 hours 2/25/25 3/27/25  Steven Kincaid APRN - CNP   ondansetron (ZOFRAN-ODT) 8 MG TBDP disintegrating tablet Take 1 tablet by mouth every 8 hours as needed for Nausea or Vomiting 2/19/25   Roberto Tomas MD   prochlorperazine (COMPAZINE) 10 MG tablet Take 1 tablet by mouth every 6 hours as needed (nausea) 2/19/25   Roberto Tomas MD   sucralfate (CARAFATE) 1 GM tablet Take 1 tablet by mouth 4 times daily 2/11/25   Steven Kincaid APRN - CNP   albuterol sulfate HFA (VENTOLIN HFA) 108 (90 Base) MCG/ACT inhaler Inhale 2 puffs into the lungs every 6 hours as needed for Wheezing    Provider, MD Radha   lidocaine viscous hcl (XYLOCAINE) 2 % SOLN solution Take 15 mLs by mouth as needed for Irritation 1/14/25   Jonathan Bowden DO   acetaminophen (TYLENOL) 160 MG/5ML suspension Take 15.62 mLs by mouth every 4 hours as needed for Fever  Patient taking differently: Take 500 mg by mouth every 4 hours as needed for Fever or Pain 1/14/25 2/13/25  Jonathan Bowden DO     Allergies:    Bee venom and Keflex [cephalexin]  Social History:    reports that he has been smoking cigarettes. He started smoking about 33 years ago. He has a 16.6 pack-year smoking history. He has never used smokeless tobacco. He reports that he does not drink alcohol and does not use drugs.  Family History:   family history includes Alcohol Abuse in his father; Breast Cancer in his maternal grandmother; Cancer in his paternal grandfather; Cancer (age of onset: 65) in his mother; Heart Attack in his father; High Blood Pressure in his father; Stroke in his father.       PHYSICAL EXAM:  Vitals:  /74   Pulse 94   Temp 98.6 °F (37 °C) (Oral)   Resp 18   Ht 1.727 m (5' 8\")   Wt 49.9 kg (110 lb)   SpO2 96%   BMI 16.73 kg/m²     General Appearance: alert and oriented to person, place and time- thin build frail  appearing male   Skin: warm and dry  Pulmonary/Chest: clear to auscultation bilaterally- no wheezes, rales or rhonchi, normal air movement, no respiratory distress  Cardiovascular: normal rate, normal S1 and S2 and no carotid bruits  Abdomen: soft, non-tender, non-distended, normal bowel sounds  Extremities: no cyanosis, no clubbing and no edema  Neurologic: no cranial nerve deficit and speech normal  Picc     LABS:  Recent Labs     03/21/25  0412 03/23/25  0523    142   K 4.2 3.9    99   CO2 25 28   BUN 18 24*   CREATININE 0.7 0.8   GLUCOSE 117* 99   CALCIUM 9.3 9.8     Recent Labs     03/23/25  0523   WBC 5.4   RBC 2.95*   HGB 9.7*   HCT 28.5*   MCV 96.6   MCH 32.9   MCHC 34.0   RDW 14.8      MPV 8.9     Recent Labs     03/20/25  1142 03/20/25  1701 03/20/25  1920 03/21/25  1157   POCGLU 176* 123* 143* 106*       Radiology:   XR CHEST PORTABLE   Final Result   No acute cardiopulmonary disease             EKG: reviewed       ASSESSMENT:    Active Problems:    * No active hospital problems. *  Resolved Problems:    * No resolved hospital problems. *    PLAN:    Intractable nausea/ failure to thrive - continue anti-emetics, NPO. Dietary consulted for TF vs TPN. Patient has right PICC line for TPN but no way to manage outpatient. Case management to follow - patient may need SNF vs LTAC for the next 3 weeks to get TPN.   SCC of tongue/tonsil/lung - continue fentanyl, PRN pain control, oncology consulted. Has 3 more weeks of radiation.   Iron deficiency anemia - Hgb 9.7 s/p ferrlecit. Monitor and transfuse for less than 7.     Code Status: full  DVT prophylaxis: Lovenox     30 minutes time spent reviewing patient chart, assessing patient, discussing plan of care with patient and family, discussing plan of care with collaborating physician, and charting.    Electronically signed by FADI Garrett NP on 3/23/2025 at 8:02 AM

## 2025-03-23 NOTE — ED NOTES
..ED to Inpatient Handoff Report    Notified Radha that electronic handoff available and patient ready for transport to room 524.    Safety Risks: Risk of falls    Patient in Restraints: no    Constant Observer or Patient : no    Telemetry Monitoring Ordered :No           Order to transfer to unit without monitor:NO    Last MEWS: 1 Time completed: 524    Deterioration Index Score:   Predictive Model Details          20 (Normal)  Factor Value    Calculated 3/23/2025 08:23 58% Age 59 years old    Deterioration Index Model 8% Respiratory rate 15     8% Pulse oximetry 100 %     8% Sodium 142 mmol/L     7% Hematocrit abnormal (28.5 %)     6% BUN abnormal (24 mg/dL)     5% Systolic 122     1% Pulse 80     1% WBC count 5.4 k/uL     0% Temperature 98.6 °F (37 °C)     0% Potassium 3.9 mmol/L        Vitals:    03/23/25 0617 03/23/25 0632 03/23/25 0717 03/23/25 0822   BP:  96/68 113/67 122/66   Pulse: 91 83 82 80   Resp: 13 13 15 15   Temp:    98.6 °F (37 °C)   TempSrc:    Oral   SpO2: 98% 99% 98% 100%   Weight:       Height:             Opportunity for questions and clarification was provided.

## 2025-03-23 NOTE — ED PROVIDER NOTES
Department of Emergency Medicine   ED Provider Note  Admit Date/RoomTime: 3/23/2025  4:23 AM  ED Room: 0524/0524-A          History of Present Illness:  3/23/25, Time: 4:48 AM EDT      Patient is a 59 y.o. male presents with a chief complaint of nausea  This has been occurring for the past few weeks.  Patient states that it gets better with nothing.  Patient states that it gets worse with nothing.  Patient states that it is severe in severity.  Patient states it was acute in onset.  Notes he has a history of throat cancer and his gotten radiation therapy for this which caused him to have a lot of drainage and he suspects this is part of what makes him nauseous.  Has had nausea since before the chemo started.  Chemo was stopped about 2 or 3 weeks ago as per wife at bedside.  He was admitted to [facility] for the past few weeks and discharged yesterday.  He has a GJ tube and has not been able to eat or drink anything.  No fevers or chills, abdominal pain, chest pain, shortness of breath, dysuria, hematuria, constipation or diarrhea, melena.  Notes he does have back pain that is chronic for him since having surgeries from T11 down.  No numbness or tingling, weakness, blurry or double vision.    Review of Systems:     Pertinent positives and negatives are stated within HPI.      --------------------------------------------- PAST HISTORY ---------------------------------------------  Past Medical History:  has a past medical history of Cancer (HCC), Cervicalgia, COPD (chronic obstructive pulmonary disease) (HCC), Neuropathy, Retrolisthesis, and Spondylolisthesis.    Past Surgical History:  has a past surgical history that includes lumbar laminectomy; hernia repair (Bilateral); laryngoscopy (N/A, 01/14/2025); Finger surgery (Right); Wrist surgery (Right); knee surgery (Left); Upper gastrointestinal endoscopy (N/A, 01/20/2025); bronchoscopy (N/A, 01/23/2025); bronchoscopy (N/A, 01/23/2025); Port Surgery (N/A,  leg: No edema.      Left lower leg: No edema.   Skin:     General: Skin is warm and dry.   Neurological:      General: No focal deficit present.      Mental Status: He is alert and oriented to person, place, and time.   Psychiatric:         Mood and Affect: Mood normal.         Behavior: Behavior normal.       -------------------------------------------------- RESULTS -------------------------------------------------  I have personally reviewed and interpreted all laboratory and imaging results for this patient. Results are listed below.     LABS:  Results for orders placed or performed during the hospital encounter of 03/23/25   CBC with Auto Differential   Result Value Ref Range    WBC 5.4 4.5 - 11.5 k/uL    RBC 2.95 (L) 3.80 - 5.80 m/uL    Hemoglobin 9.7 (L) 12.5 - 16.5 g/dL    Hematocrit 28.5 (L) 37.0 - 54.0 %    MCV 96.6 80.0 - 99.9 fL    MCH 32.9 26.0 - 35.0 pg    MCHC 34.0 32.0 - 34.5 g/dL    RDW 14.8 11.5 - 15.0 %    Platelets 273 130 - 450 k/uL    MPV 8.9 7.0 - 12.0 fL    Neutrophils % 82 (H) 43.0 - 80.0 %    Lymphocytes % 7 (L) 20.0 - 42.0 %    Monocytes % 7 2.0 - 12.0 %    Eosinophils % 3 0 - 6 %    Basophils % 1 0.0 - 2.0 %    Immature Granulocytes % 0 0.0 - 5.0 %    Neutrophils Absolute 4.48 1.80 - 7.30 k/uL    Lymphocytes Absolute 0.38 (L) 1.50 - 4.00 k/uL    Monocytes Absolute 0.37 0.10 - 0.95 k/uL    Eosinophils Absolute 0.15 0.05 - 0.50 k/uL    Basophils Absolute 0.04 0.00 - 0.20 k/uL    Immature Granulocytes Absolute <0.03 0.00 - 0.58 k/uL    RBC Morphology 2+ ANISOCYTOSIS     RBC Morphology 2+ OVALOCYTES     RBC Morphology 2+ POIKILOCYTOSIS     RBC Morphology 1+ POLYCHROMASIA    CMP   Result Value Ref Range    Sodium 142 132 - 146 mmol/L    Potassium 3.9 3.5 - 5.0 mmol/L    Chloride 99 98 - 107 mmol/L    CO2 28 22 - 29 mmol/L    Anion Gap 15 7 - 16 mmol/L    Glucose 99 74 - 99 mg/dL    BUN 24 (H) 6 - 20 mg/dL    Creatinine 0.8 0.70 - 1.20 mg/dL    Est, Glom Filt Rate >90 >60 mL/min/1.73m2     viscous hcl (XYLOCAINE) 2 % solution 15 mL (has no administration in time range)   nortriptyline (PAMELOR) capsule 25 mg (has no administration in time range)   ondansetron (ZOFRAN-ODT) disintegrating tablet 8 mg (has no administration in time range)   oxyCODONE (ROXICODONE) 5 MG/5ML solution 10 mg (10 mg Per G Tube Given 3/23/25 1127)   polyethylene glycol (GLYCOLAX) packet 17 g (17 g Per G Tube Given 3/23/25 0909)   scopolamine (TRANSDERM-SCOP) transdermal patch 1 patch (1 patch TransDERmal Patch Applied 3/23/25 1128)   sucralfate (CARAFATE) tablet 1 g (1 g Oral Not Given 3/23/25 1632)   sodium chloride flush 0.9 % injection 5-40 mL (10 mLs IntraVENous Given 3/23/25 0910)   sodium chloride flush 0.9 % injection 5-40 mL (has no administration in time range)   0.9 % sodium chloride infusion (has no administration in time range)   potassium chloride (KLOR-CON M) extended release tablet 40 mEq (has no administration in time range)     Or   potassium bicarb-citric acid (EFFER-K) effervescent tablet 40 mEq (has no administration in time range)     Or   potassium chloride 10 mEq/100 mL IVPB (Peripheral Line) (has no administration in time range)   magnesium sulfate 2000 mg in 50 mL IVPB premix (has no administration in time range)   enoxaparin Sodium (LOVENOX) injection 30 mg (30 mg SubCUTAneous Given 3/23/25 0909)   acetaminophen (TYLENOL) tablet 650 mg (has no administration in time range)   lansoprazole (PREVACID SOLUTAB) disintegrating tablet 30 mg (30 mg Oral Given 3/23/25 1128)   prochlorperazine (COMPAZINE) injection 10 mg (10 mg IntraVENous Given 3/23/25 1345)   morphine (PF) injection 1 mg (1 mg IntraVENous Given 3/23/25 1345)   bisacodyl (DULCOLAX) suppository 10 mg (has no administration in time range)   promethazine (PHENERGAN) tablet 25 mg (has no administration in time range)   sodium chloride 0.9 % bolus 1,000 mL (0 mLs IntraVENous Stopped 3/23/25 0816)   prochlorperazine (COMPAZINE) injection 10 mg (10 mg

## 2025-03-23 NOTE — PROGRESS NOTES
Perfect serve sent to Dr. Mullins regarding patient request for V pain and nausea meds d/t patients inability to tolerate PO meds at this time. Orders received. Electronically signed by Dorothy Newman RN on 3/23/2025 at 2:08 PM

## 2025-03-24 PROBLEM — C14.0 THROAT CANCER (HCC): Status: ACTIVE | Noted: 2025-03-24

## 2025-03-24 PROBLEM — E43 SEVERE PROTEIN-CALORIE MALNUTRITION: Chronic | Status: ACTIVE | Noted: 2025-03-24

## 2025-03-24 LAB
ALBUMIN SERPL-MCNC: 3.6 G/DL (ref 3.5–5.2)
ALP SERPL-CCNC: 74 U/L (ref 40–129)
ALT SERPL-CCNC: 22 U/L (ref 0–40)
ANION GAP SERPL CALCULATED.3IONS-SCNC: 16 MMOL/L (ref 7–16)
AST SERPL-CCNC: 23 U/L (ref 0–39)
BASOPHILS # BLD: 0 K/UL (ref 0–0.2)
BASOPHILS NFR BLD: 0 % (ref 0–2)
BILIRUB SERPL-MCNC: 0.7 MG/DL (ref 0–1.2)
BUN SERPL-MCNC: 26 MG/DL (ref 6–20)
CALCIUM SERPL-MCNC: 9.3 MG/DL (ref 8.6–10.2)
CHLORIDE SERPL-SCNC: 103 MMOL/L (ref 98–107)
CO2 SERPL-SCNC: 28 MMOL/L (ref 22–29)
CREAT SERPL-MCNC: 0.8 MG/DL (ref 0.7–1.2)
EKG ATRIAL RATE: 82 BPM
EKG P AXIS: 77 DEGREES
EKG P-R INTERVAL: 136 MS
EKG Q-T INTERVAL: 348 MS
EKG QRS DURATION: 98 MS
EKG QTC CALCULATION (BAZETT): 406 MS
EKG R AXIS: 78 DEGREES
EKG T AXIS: 37 DEGREES
EKG VENTRICULAR RATE: 82 BPM
EOSINOPHIL # BLD: 0.28 K/UL (ref 0.05–0.5)
EOSINOPHILS RELATIVE PERCENT: 6 % (ref 0–6)
ERYTHROCYTE [DISTWIDTH] IN BLOOD BY AUTOMATED COUNT: 15.3 % (ref 11.5–15)
GFR, ESTIMATED: >90 ML/MIN/1.73M2
GLUCOSE BLD-MCNC: 128 MG/DL (ref 74–99)
GLUCOSE BLD-MCNC: 68 MG/DL (ref 74–99)
GLUCOSE SERPL-MCNC: 62 MG/DL (ref 74–99)
HCT VFR BLD AUTO: 27 % (ref 37–54)
HGB BLD-MCNC: 8.9 G/DL (ref 12.5–16.5)
LYMPHOCYTES NFR BLD: 0.44 K/UL (ref 1.5–4)
LYMPHOCYTES RELATIVE PERCENT: 10 % (ref 20–42)
MCH RBC QN AUTO: 32.5 PG (ref 26–35)
MCHC RBC AUTO-ENTMCNC: 33 G/DL (ref 32–34.5)
MCV RBC AUTO: 98.5 FL (ref 80–99.9)
MONOCYTES NFR BLD: 0.2 K/UL (ref 0.1–0.95)
MONOCYTES NFR BLD: 4 % (ref 2–12)
MYELOCYTES ABSOLUTE COUNT: 0.04 K/UL
MYELOCYTES: 1 %
NEUTROPHILS NFR BLD: 79 % (ref 43–80)
NEUTS SEG NFR BLD: 3.65 K/UL (ref 1.8–7.3)
PLATELET # BLD AUTO: 277 K/UL (ref 130–450)
PMV BLD AUTO: 9 FL (ref 7–12)
POTASSIUM SERPL-SCNC: 4.3 MMOL/L (ref 3.5–5)
PROT SERPL-MCNC: 6.6 G/DL (ref 6.4–8.3)
RBC # BLD AUTO: 2.74 M/UL (ref 3.8–5.8)
RBC # BLD: ABNORMAL 10*6/UL
SODIUM SERPL-SCNC: 147 MMOL/L (ref 132–146)
WBC OTHER # BLD: 4.6 K/UL (ref 4.5–11.5)

## 2025-03-24 PROCEDURE — 2500000003 HC RX 250 WO HCPCS: Performed by: INTERNAL MEDICINE

## 2025-03-24 PROCEDURE — 82962 GLUCOSE BLOOD TEST: CPT

## 2025-03-24 PROCEDURE — 85025 COMPLETE CBC W/AUTO DIFF WBC: CPT

## 2025-03-24 PROCEDURE — 96376 TX/PRO/DX INJ SAME DRUG ADON: CPT

## 2025-03-24 PROCEDURE — 96361 HYDRATE IV INFUSION ADD-ON: CPT

## 2025-03-24 PROCEDURE — 93010 ELECTROCARDIOGRAM REPORT: CPT | Performed by: INTERNAL MEDICINE

## 2025-03-24 PROCEDURE — 99232 SBSQ HOSP IP/OBS MODERATE 35: CPT | Performed by: STUDENT IN AN ORGANIZED HEALTH CARE EDUCATION/TRAINING PROGRAM

## 2025-03-24 PROCEDURE — 6360000002 HC RX W HCPCS: Performed by: INTERNAL MEDICINE

## 2025-03-24 PROCEDURE — 99233 SBSQ HOSP IP/OBS HIGH 50: CPT | Performed by: NURSE PRACTITIONER

## 2025-03-24 PROCEDURE — 6360000002 HC RX W HCPCS: Performed by: NURSE PRACTITIONER

## 2025-03-24 PROCEDURE — 80053 COMPREHEN METABOLIC PANEL: CPT

## 2025-03-24 PROCEDURE — 99223 1ST HOSP IP/OBS HIGH 75: CPT | Performed by: STUDENT IN AN ORGANIZED HEALTH CARE EDUCATION/TRAINING PROGRAM

## 2025-03-24 PROCEDURE — 2580000003 HC RX 258

## 2025-03-24 PROCEDURE — 96372 THER/PROPH/DIAG INJ SC/IM: CPT

## 2025-03-24 PROCEDURE — 36592 COLLECT BLOOD FROM PICC: CPT

## 2025-03-24 PROCEDURE — G0378 HOSPITAL OBSERVATION PER HR: HCPCS

## 2025-03-24 PROCEDURE — 6370000000 HC RX 637 (ALT 250 FOR IP): Performed by: INTERNAL MEDICINE

## 2025-03-24 PROCEDURE — 96375 TX/PRO/DX INJ NEW DRUG ADDON: CPT

## 2025-03-24 PROCEDURE — 99232 SBSQ HOSP IP/OBS MODERATE 35: CPT | Performed by: NURSE PRACTITIONER

## 2025-03-24 PROCEDURE — 2580000003 HC RX 258: Performed by: STUDENT IN AN ORGANIZED HEALTH CARE EDUCATION/TRAINING PROGRAM

## 2025-03-24 PROCEDURE — 6360000002 HC RX W HCPCS: Performed by: STUDENT IN AN ORGANIZED HEALTH CARE EDUCATION/TRAINING PROGRAM

## 2025-03-24 RX ORDER — SENNA AND DOCUSATE SODIUM 50; 8.6 MG/1; MG/1
1 TABLET, FILM COATED ORAL DAILY
Status: DISCONTINUED | OUTPATIENT
Start: 2025-03-24 | End: 2025-04-03 | Stop reason: HOSPADM

## 2025-03-24 RX ORDER — GLUCAGON 1 MG/ML
1 KIT INJECTION PRN
Status: DISCONTINUED | OUTPATIENT
Start: 2025-03-24 | End: 2025-04-03 | Stop reason: HOSPADM

## 2025-03-24 RX ORDER — PROMETHAZINE HYDROCHLORIDE 25 MG/ML
12.5 INJECTION, SOLUTION INTRAMUSCULAR; INTRAVENOUS EVERY 6 HOURS PRN
Status: DISCONTINUED | OUTPATIENT
Start: 2025-03-24 | End: 2025-03-24

## 2025-03-24 RX ORDER — CHLORPROMAZINE HYDROCHLORIDE 25 MG/1
25 TABLET, FILM COATED ORAL ONCE
Status: DISCONTINUED | OUTPATIENT
Start: 2025-03-24 | End: 2025-04-03 | Stop reason: HOSPADM

## 2025-03-24 RX ORDER — PROMETHAZINE HYDROCHLORIDE 25 MG/1
25 TABLET ORAL EVERY 6 HOURS PRN
Status: DISCONTINUED | OUTPATIENT
Start: 2025-03-24 | End: 2025-03-30

## 2025-03-24 RX ORDER — DEXTROSE MONOHYDRATE AND SODIUM CHLORIDE 5; .45 G/100ML; G/100ML
INJECTION, SOLUTION INTRAVENOUS CONTINUOUS
Status: DISCONTINUED | OUTPATIENT
Start: 2025-03-24 | End: 2025-03-27

## 2025-03-24 RX ORDER — DEXTROSE MONOHYDRATE 100 MG/ML
INJECTION, SOLUTION INTRAVENOUS CONTINUOUS PRN
Status: DISCONTINUED | OUTPATIENT
Start: 2025-03-24 | End: 2025-04-03 | Stop reason: HOSPADM

## 2025-03-24 RX ADMIN — SODIUM CHLORIDE, PRESERVATIVE FREE 10 ML: 5 INJECTION INTRAVENOUS at 16:44

## 2025-03-24 RX ADMIN — ENOXAPARIN SODIUM 30 MG: 100 INJECTION SUBCUTANEOUS at 08:13

## 2025-03-24 RX ADMIN — ONDANSETRON 8 MG: 4 TABLET, ORALLY DISINTEGRATING ORAL at 10:22

## 2025-03-24 RX ADMIN — DEXTROSE: 10 SOLUTION INTRAVENOUS at 06:07

## 2025-03-24 RX ADMIN — DEXTROSE AND SODIUM CHLORIDE: 5; .45 INJECTION, SOLUTION INTRAVENOUS at 18:10

## 2025-03-24 RX ADMIN — MORPHINE SULFATE 1 MG: 2 INJECTION, SOLUTION INTRAMUSCULAR; INTRAVENOUS at 08:24

## 2025-03-24 RX ADMIN — MORPHINE SULFATE 1 MG: 2 INJECTION, SOLUTION INTRAMUSCULAR; INTRAVENOUS at 02:43

## 2025-03-24 RX ADMIN — HYDROMORPHONE HYDROCHLORIDE 0.25 MG: 1 INJECTION, SOLUTION INTRAMUSCULAR; INTRAVENOUS; SUBCUTANEOUS at 14:35

## 2025-03-24 RX ADMIN — PROCHLORPERAZINE EDISYLATE 10 MG: 5 INJECTION INTRAMUSCULAR; INTRAVENOUS at 03:26

## 2025-03-24 RX ADMIN — PROCHLORPERAZINE EDISYLATE 10 MG: 5 INJECTION INTRAMUSCULAR; INTRAVENOUS at 16:44

## 2025-03-24 RX ADMIN — SODIUM CHLORIDE, PRESERVATIVE FREE 10 ML: 5 INJECTION INTRAVENOUS at 08:13

## 2025-03-24 RX ADMIN — HYDROMORPHONE HYDROCHLORIDE 0.25 MG: 1 INJECTION, SOLUTION INTRAMUSCULAR; INTRAVENOUS; SUBCUTANEOUS at 21:21

## 2025-03-24 ASSESSMENT — PAIN DESCRIPTION - DESCRIPTORS: DESCRIPTORS: DISCOMFORT

## 2025-03-24 ASSESSMENT — PAIN SCALES - GENERAL
PAINLEVEL_OUTOF10: 4
PAINLEVEL_OUTOF10: 8
PAINLEVEL_OUTOF10: 5
PAINLEVEL_OUTOF10: 8
PAINLEVEL_OUTOF10: 7
PAINLEVEL_OUTOF10: 7

## 2025-03-24 ASSESSMENT — PAIN DESCRIPTION - ORIENTATION
ORIENTATION: RIGHT;LEFT;MID
ORIENTATION: RIGHT

## 2025-03-24 ASSESSMENT — PAIN SCALES - WONG BAKER: WONGBAKER_NUMERICALRESPONSE: HURTS A LITTLE BIT

## 2025-03-24 ASSESSMENT — PAIN DESCRIPTION - LOCATION: LOCATION: ABDOMEN

## 2025-03-24 NOTE — CONSULTS
Comprehensive Nutrition Assessment    Type and Reason for Visit:  Initial, Positive nutrition screen, Consult (TPN recommendations)    Nutrition Recommendations/Plan:   TPN RECOMMENDATION: Initiate Kabiven (Standard 3-in-1 w/ Electrolytes) @ 42.5 ml/hr (half rate). Advance to goal rate of 85 ml/hr as tolerated    To provide: 1020 ml TV, 34 g AA, 910 kcal @ half rate  2040 ml TV, 68 g AA, 1820 kcal @ goal rate    Due to National PN shortages, may be unable to meet 100% estimated calorie and/or protein needs with available products.     Monitor and replace lytes PRN  Will continue to monitor while inpatient     Malnutrition Assessment:  Malnutrition Status:  Severe malnutrition (03/24/25 0929)    Context:  Chronic Illness     Findings of the 6 clinical characteristics of malnutrition:  Energy Intake:  75% or less estimated energy requirements for 1 month or longer  Weight Loss:  Greater than 7.5% over 3 months     Body Fat Loss:  Mild body fat loss (moderate) Buccal region   Muscle Mass Loss:  Severe muscle mass loss Temples (temporalis), Clavicles (pectoralis & deltoids), Scapula (trapezius)  Fluid Accumulation:  No fluid accumulation     Strength:  Not Performed    Nutrition Assessment:    Pt adm w/ FTT; intractable N/V. Hx tonsillar SCC on chemo/radiation w/ mets to lung, COPD, PEG/J, severe PCM. Pt w/ recurrent N/V, unable to tolerate PO intake or EN >10 days. TPN started @ [facility] 3/17, pt stated he was tolerating PN. Will provide TPN recs- start at half rate d/t risk of refeeding. Will continue to monitor.    Nutrition Related Findings:    A&O x4, abd soft/flat, PEG/J - clamped, +BS, nausea, no edema, no I/O data, Na 147, gluc 62 Wound Type: None       Current Nutrition Intake & Therapies:    Average Meal Intake: NPO  Average Supplements Intake: NPO  Diet NPO    Anthropometric Measures:  Height: 172.7 cm (5' 8\")  Ideal Body Weight (IBW): 154 lbs (70 kg)    Admission Body Weight: 49.3 kg (108 lb 11.2 oz) (3/24

## 2025-03-24 NOTE — PROGRESS NOTES
Jayde Fernandez MD   ·   MD Adriana Lucero APRN  ·  FADI Bermeo      [facility] [place]  [address] [place] [address]  P: [phone]  F: [phone] [place] [facility] [place]  [address] [place] [address]  P: [phone]  F: [phone]  [facility] [place]  [address] [place] [address]  P: [phone]  F: [phone]           Inpatient Medical Oncology/Hematology Consult Note            Patient Name: Prasanna Parnell  YOB: 1965    DATE OF ADMISSION: 3/23/2025  DATE OF CONSULTATION: 3/23/2025  CONSULTING PROVIDER: Jennifer Haddad APRN - NP  REASON FOR CONSULTATION: \"SCC of tongue and tonsils\"         PCP: No primary care provider on file.    Room: [address] [place] [address]      CHIEF COMPLAINT:  Nausea and vomiting    SUBJECTIVE:   Patient seen and examined in room. Patient still having nausea and vomiting. He just had an episode of yellow emesis. Not tolerating any oral intake.     HISTORY OF PRESENT ILLNESS (3/23/2025):   Patient is a 59 y.o. male comes in for intractable nausea and vomiting.   Recently he was hospitalized for similar issues in which he had a prolonged hospital course, but eventually had left AMA.    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 He was recently started on chemoRT, having only completed 2 cycles of weekly cisplatin but has continued radiation during his last hospital course. Last admission, he struggled to keep foods done oral as well as from his tube feeds. He also c/o symptoms with tube flushes. We had consulted GI, in which his PEG was exchanged for a PEG-J, but patient still had issues w/ tube feeds, and subsequently, he was started on TPN. TPN had improved his symptoms.     Due to certain logistical/financial issues and lack of PCP, there were issues with setting up tube feeds, and there were tentative plans to  discharged with PICC and TPN for home, but according to report, no specialist recommended take over management of this.     Today patient feels unwell. Even without eating, he c/o dry heaves.   While at home after leaving AMA, he tried pushing Ensure through PEG-J but did not tolerate very well.       Oncology History   Malignant neoplasm of overlapping sites of tonsil (HCC)   2/20/2025 -  Chemotherapy    OP CISplatin 40 mg/m2 Q7D  Plan Provider: Roberto Tomas MD  Treatment goal: Curative  Line of treatment: 1st Line               Review of Systems   Constitutional:  Positive for appetite change and unexpected weight change.   HENT:   Positive for lump/mass.    Respiratory: Negative.     Cardiovascular: Negative.    Gastrointestinal:  Positive for nausea and vomiting. Negative for abdominal distention and abdominal pain.   Genitourinary: Negative.     Musculoskeletal: Negative.    Skin: Negative.    Neurological: Negative.    Hematological: Negative.    Psychiatric/Behavioral: Negative.                    Past Medical History:   Diagnosis Date    Cancer (HCC)     oral cancer    Cervicalgia     COPD (chronic obstructive pulmonary disease) (HCC)     Neuropathy     Retrolisthesis     Spondylolisthesis        Past Surgical History:   Procedure Laterality Date    BRONCHOSCOPY N/A 01/23/2025    BRONCHOSCOPY ENDOBRONCHIAL ULTRASOUND FINE NEEDLE ASPIRATION performed by Edward Beebe MD at [facility]    BRONCHOSCOPY N/A 01/23/2025    BRONCHOSCOPY ENDOBRONCHIAL ULTRASOUND FINE NEEDLE ASPIRATION ROBOTIC performed by Edward Beebe MD at [facility]    FINGER SURGERY Right     amputation first finger    HERNIA REPAIR Bilateral     inguinal    KNEE SURGERY Left     repair tendons d/t chainsaw accidnet    LARYNGOSCOPY N/A 01/14/2025    PANENDOSCOPY WITH BIOPSY performed by Jonathan Bowden DO at [facility]    LUMBAR LAMINECTOMY      1997,1998,2010    PORT SURGERY N/A 2/13/2025    MEDIPORT INSERTION performed by  BILITOT 0.7 03/24/2025     Lab Results   Component Value Date    CREATININE 0.8 03/24/2025    BUN 26 (H) 03/24/2025     (H) 03/24/2025    K 4.3 03/24/2025     03/24/2025    CO2 28 03/24/2025       Pathology:     Radiology:        ASSESSMENT     Intractable nausea/vomiting in the setting of head & neck cancer  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 male who comes in for intractable nausea and vomiting; we have been consulted due to his known diagnosis of squamous cell carcinoma and tonsils.     Patient's symptoms having been ongoing, even before starting treatment, but appear to have worsens since starting. He has only received 2 cycles of weekly cisplatin, last on 2/26/25; so I do not feel chemo is the reason for his symptoms at this point.   He has struggled with tube feels, difficulty tolerating.     He was admitted recently and had prolonged hospital course due to same issues.   He continued to have issues w/ N/V while on slow tube feeds, eventually he was started on TPN last admission. There were plans to continue tube feeds and potential continuation of TPN; patient did leave the hospital AMA.  Also is underinsured and does not have PCP, so being able to set these resources has also been another challenging item in patient's care.       PLAN     Continue Zofran, Compazine, scopolamine, and Phenergan  Consider Zyprexa   Palliative care consulted  Consider Reglan  Hold chemo inpatient  May continue radiation if rad onc is ok with this  Dietitian consulted  Case management  Optimize on tube feeding and minimize use of TPN  Patient actively receiving RT. Consult radiation oncology for possible resuming inpatient radiation. Appreciate renetta Devi, APRN - CNP  Medical Oncology  [facility]  3/24/2025

## 2025-03-24 NOTE — PLAN OF CARE
Problem: Pain  Goal: Verbalizes/displays adequate comfort level or baseline comfort level  3/24/2025 0128 by Diann Oleary, RN  Outcome: Progressing     Problem: Safety - Adult  Goal: Free from fall injury  3/24/2025 0128 by Diann Oleary, RN  Outcome: Progressing

## 2025-03-24 NOTE — CARE COORDINATION
Social Work discharge planning  Referral made to [facility] to see IF they can accept pt for POSSIBLE home TPN, as CM was pursuing  for pt last admit.  Pt does not have a PCP for [facility] TPN follow in community.   Discussed with charge Rn.   Electronically signed by IRWIN Aguirre on 3/24/2025 at 3:50 PM

## 2025-03-24 NOTE — PROGRESS NOTES
[facility] Hospitalist Progress Note    Admitting Date and Time: 3/23/2025  4:23 AM  Admit Dx: Throat cancer (HCC) [C14.0]  Failure to thrive in adult [R62.7]  Intractable nausea and vomiting [R11.2]    Subjective:  Patient is being followed for Throat cancer (HCC) [C14.0]  Failure to thrive in adult [R62.7]  Intractable nausea and vomiting [R11.2]   Pt feels nauseous  Per RN: No major concerns    ROS: denies fever, chills, cp, sob, n/v, HA unless stated above.      fentaNYL  1 patch TransDERmal Q72H    nortriptyline  25 mg PEG Tube Nightly    polyethylene glycol  17 g Per G Tube Daily    scopolamine  1 patch TransDERmal Q72H    sucralfate  1 g Oral 4x Daily    sodium chloride flush  5-40 mL IntraVENous 2 times per day    enoxaparin  30 mg SubCUTAneous Daily    lansoprazole  30 mg Oral QAM AC     glucose, 4 tablet, PRN  dextrose bolus, 125 mL, PRN   Or  dextrose bolus, 250 mL, PRN  glucagon (rDNA), 1 mg, PRN  dextrose, , Continuous PRN  lidocaine viscous hcl, 15 mL, Daily PRN  ondansetron, 8 mg, Q8H PRN  oxyCODONE, 10 mg, Q4H PRN  sodium chloride flush, 5-40 mL, PRN  sodium chloride, , PRN  potassium chloride, 40 mEq, PRN   Or  potassium alternative oral replacement, 40 mEq, PRN   Or  potassium chloride, 10 mEq, PRN  magnesium sulfate, 2,000 mg, PRN  acetaminophen, 650 mg, Q6H PRN  prochlorperazine, 10 mg, Q6H PRN  morphine, 1 mg, Q3H PRN  bisacodyl, 10 mg, PRN  promethazine, 25 mg, Q6H PRN         Objective:    BP (!) 94/58   Pulse 76   Temp 97.9 °F (36.6 °C) (Oral)   Resp 16   Ht 1.727 m (5' 8\")   Wt 48.5 kg (107 lb)   SpO2 97%   BMI 16.27 kg/m²     General Appearance: alert and oriented to person, place and time and in no acute distress, Frail, emaciated,  Skin: warm and dry  Head: normocephalic and atraumatic  Eyes: pupils equal, round, and reactive to light, extraocular eye movements intact, conjunctivae normal  Neck: neck supple and non tender without mass   Pulmonary/Chest: clear to  tongue/tonsil/lung - continue fentanyl, PRN pain control, oncology consulted. Has 3 more weeks of radiation.   Iron deficiency anemia - Hgb 9.7 s/p ferrlecit. Monitor and transfuse for less than 7.      Code Status: full  DVT prophylaxis: Lovenox       NOTE: This report was transcribed using voice recognition software. Every effort was made to ensure accuracy; however, inadvertent computerized transcription errors may be present.  Electronically signed by Halley Mullins MD on 3/24/2025 at 8:51 AM

## 2025-03-24 NOTE — PROGRESS NOTES
Palliative Care Department  [phone]  Palliative Care Progress Note  Provider Pam Bergman, APRN - CNP      PATIENT: Prasanna Parnell  : 1965  MRN: [id number]  ADMISSION DATE: 3/23/2025  4:23 AM  Referring Provider: Roberto Tomas MD     Palliative Medicine was consulted on hospital day 0 for assistance with Symptom management: intractable nausea/vomiting; head and neck cancer    HPI:     Clinical Summary:Prasanna Parnell is a 59 y.o. y/o male spondylolisthesis, retrolisthesis, neuropathy, COPD, squamous cell carcinoma of the head and neck in 2024. Laryngoscopy by ENT 2025 showed ulcerated masses seen in the area of the right palatine tonsil extending to the base of the tongue into the vallecula approaching midline. Pathology of the right tonsil biopsy and right base of the tongue biopsy was consistent with HPV associated squamous cell carcinoma. He had a bronchoscopy with fine-needle aspiration of right upper lobe lesion which was positive for malignancy consistent with squamous cell carcinoma. Patient was initiated on chemotherapy and is following with Dr. Tomas in medical oncology and radiation therapy concurrently.  He had a recent hospitalization due to nausea and vomiting, on 3/3/2025, he underwent PEG tube placement 3/11/2025, insurance company did not cover tube feedings despite PEG tube in place and patient unable to eat.  PICC line was placed on 3/16/2025 and TPN was initiated on 3/17/2025.  Case management was having difficulty time setting up TPN outpatient, patient eloped on 3/21/2024 before tube feeding to be set up at home.  Who presented to [facility] on 3/23/2025 with failure to thrive and nausea.  At ED, given fentanyl, Benadryl, 1 L bolus.  He was admitted for further medical management of intractable nausea and failure to thrive.    ASSESSMENT/PLAN:     Pertinent Hospital Diagnoses     Intractable nausea  Failure to thrive  SCC of tongue/tonsil/lung    Palliative Care  tolerate p.o. medication at this time.  He does state he believes the IV morphine is making him nauseous as he has had it in the past after surgery and it made him nauseous at that time as well.  Discussed stopping morphine and starting IV Dilaudid for breakthrough pain only.  Discussed importance of trying p.o. medications if he is able to keep them down.  Patient states he has not had a bowel movement today however he has not been eating anything.   Discussed goals of care and CODE STATUS options.  Patient does confirm full CODE STATUS and would like to continue all aggressive medical management will continue to follow.    Prognosis: Guarded    OBJECTIVE:     BP (!) 94/58   Pulse 76   Temp (!) 32 °F (0 °C)   Resp 16   Ht 1.727 m (5' 8\")   Wt 49.3 kg (108 lb 11.2 oz)   SpO2 97%   BMI 16.53 kg/m²     Physical Examination:  Gen: thin, NAD, awake, alert   Lungs: respirations easy  Heart: regular rate and rhythm  Abdomen: non-distended  Skin: warm, dry without rashes, lesions, bruising  Neuro: awake, alert, oriented x 3, follows commands    Objective data reviewed: labs, images, records, medication use, vitals, and chart    Time/Communication  Greater than 50% of time spent, total 50 minutes in counseling and coordination of care at the bedside regarding goals of care.    Thank you for allowing Palliative Medicine to participate in the care of Prasanna Parnell.    Note: This report was completed using [facility] voiced recognition software.  Every effort has been made to ensure accuracy; however, inadvertent computerized transcription errors may be present.

## 2025-03-25 ENCOUNTER — APPOINTMENT (OUTPATIENT)
Dept: GENERAL RADIOLOGY | Age: 60
DRG: 391 | End: 2025-03-25
Payer: MEDICARE

## 2025-03-25 ENCOUNTER — HOSPITAL ENCOUNTER (OUTPATIENT)
Dept: RADIATION ONCOLOGY | Age: 60
Discharge: HOME | End: 2025-03-25
Payer: MEDICARE

## 2025-03-25 LAB
ALBUMIN SERPL-MCNC: 3.7 G/DL (ref 3.5–5.2)
ALP SERPL-CCNC: 72 U/L (ref 40–129)
ALT SERPL-CCNC: 18 U/L (ref 0–40)
ANION GAP SERPL CALCULATED.3IONS-SCNC: 11 MMOL/L (ref 7–16)
AST SERPL-CCNC: 18 U/L (ref 0–39)
BASOPHILS # BLD: 0.04 K/UL (ref 0–0.2)
BASOPHILS NFR BLD: 1 % (ref 0–2)
BILIRUB SERPL-MCNC: 0.5 MG/DL (ref 0–1.2)
BUN SERPL-MCNC: 25 MG/DL (ref 6–20)
CALCIUM SERPL-MCNC: 9.6 MG/DL (ref 8.6–10.2)
CHLORIDE SERPL-SCNC: 102 MMOL/L (ref 98–107)
CO2 SERPL-SCNC: 29 MMOL/L (ref 22–29)
CREAT SERPL-MCNC: 0.7 MG/DL (ref 0.7–1.2)
EOSINOPHIL # BLD: 0.14 K/UL (ref 0.05–0.5)
EOSINOPHILS RELATIVE PERCENT: 4 % (ref 0–6)
ERYTHROCYTE [DISTWIDTH] IN BLOOD BY AUTOMATED COUNT: 15.3 % (ref 11.5–15)
GFR, ESTIMATED: >90 ML/MIN/1.73M2
GLUCOSE BLD-MCNC: 115 MG/DL (ref 74–99)
GLUCOSE BLD-MCNC: 214 MG/DL (ref 74–99)
GLUCOSE SERPL-MCNC: 106 MG/DL (ref 74–99)
HCT VFR BLD AUTO: 26.2 % (ref 37–54)
HGB BLD-MCNC: 8.8 G/DL (ref 12.5–16.5)
LYMPHOCYTES NFR BLD: 0.32 K/UL (ref 1.5–4)
LYMPHOCYTES RELATIVE PERCENT: 8 % (ref 20–42)
MAGNESIUM SERPL-MCNC: 2 MG/DL (ref 1.6–2.6)
MCH RBC QN AUTO: 33.1 PG (ref 26–35)
MCHC RBC AUTO-ENTMCNC: 33.6 G/DL (ref 32–34.5)
MCV RBC AUTO: 98.5 FL (ref 80–99.9)
MONOCYTES NFR BLD: 0.21 K/UL (ref 0.1–0.95)
MONOCYTES NFR BLD: 5 % (ref 2–12)
NEUTROPHILS NFR BLD: 83 % (ref 43–80)
NEUTS SEG NFR BLD: 3.39 K/UL (ref 1.8–7.3)
PHOSPHATE SERPL-MCNC: 3 MG/DL (ref 2.5–4.5)
PLATELET # BLD AUTO: 268 K/UL (ref 130–450)
PMV BLD AUTO: 8.9 FL (ref 7–12)
POTASSIUM SERPL-SCNC: 4 MMOL/L (ref 3.5–5)
PROT SERPL-MCNC: 6.6 G/DL (ref 6.4–8.3)
RBC # BLD AUTO: 2.66 M/UL (ref 3.8–5.8)
RBC # BLD: ABNORMAL 10*6/UL
SODIUM SERPL-SCNC: 142 MMOL/L (ref 132–146)
TRIGL SERPL-MCNC: 138 MG/DL
WBC OTHER # BLD: 4.1 K/UL (ref 4.5–11.5)

## 2025-03-25 PROCEDURE — 74018 RADEX ABDOMEN 1 VIEW: CPT

## 2025-03-25 PROCEDURE — 6360000002 HC RX W HCPCS

## 2025-03-25 PROCEDURE — 6360000002 HC RX W HCPCS: Performed by: STUDENT IN AN ORGANIZED HEALTH CARE EDUCATION/TRAINING PROGRAM

## 2025-03-25 PROCEDURE — 2580000003 HC RX 258: Performed by: STUDENT IN AN ORGANIZED HEALTH CARE EDUCATION/TRAINING PROGRAM

## 2025-03-25 PROCEDURE — 82962 GLUCOSE BLOOD TEST: CPT

## 2025-03-25 PROCEDURE — 77336 RADIATION PHYSICS CONSULT: CPT | Performed by: SPECIALIST

## 2025-03-25 PROCEDURE — 83735 ASSAY OF MAGNESIUM: CPT

## 2025-03-25 PROCEDURE — 84100 ASSAY OF PHOSPHORUS: CPT

## 2025-03-25 PROCEDURE — 84478 ASSAY OF TRIGLYCERIDES: CPT

## 2025-03-25 PROCEDURE — 2500000003 HC RX 250 WO HCPCS: Performed by: STUDENT IN AN ORGANIZED HEALTH CARE EDUCATION/TRAINING PROGRAM

## 2025-03-25 PROCEDURE — 6360000004 HC RX CONTRAST MEDICATION: Performed by: STUDENT IN AN ORGANIZED HEALTH CARE EDUCATION/TRAINING PROGRAM

## 2025-03-25 PROCEDURE — 96361 HYDRATE IV INFUSION ADD-ON: CPT

## 2025-03-25 PROCEDURE — 80053 COMPREHEN METABOLIC PANEL: CPT

## 2025-03-25 PROCEDURE — 99232 SBSQ HOSP IP/OBS MODERATE 35: CPT

## 2025-03-25 PROCEDURE — G0378 HOSPITAL OBSERVATION PER HR: HCPCS

## 2025-03-25 PROCEDURE — 77386 HC NTSTY MODUL RAD TX DLVR CPLX: CPT | Performed by: SPECIALIST

## 2025-03-25 PROCEDURE — 77014 CHG CT GUIDANCE RADIATION THERAPY FLDS PLACEMENT: CPT | Performed by: SPECIALIST

## 2025-03-25 PROCEDURE — 99232 SBSQ HOSP IP/OBS MODERATE 35: CPT | Performed by: STUDENT IN AN ORGANIZED HEALTH CARE EDUCATION/TRAINING PROGRAM

## 2025-03-25 PROCEDURE — 85025 COMPLETE CBC W/AUTO DIFF WBC: CPT

## 2025-03-25 PROCEDURE — 6360000002 HC RX W HCPCS: Performed by: NURSE PRACTITIONER

## 2025-03-25 PROCEDURE — 77427 RADIATION TX MANAGEMENT X5: CPT | Performed by: SPECIALIST

## 2025-03-25 PROCEDURE — 96376 TX/PRO/DX INJ SAME DRUG ADON: CPT

## 2025-03-25 PROCEDURE — 2500000003 HC RX 250 WO HCPCS: Performed by: INTERNAL MEDICINE

## 2025-03-25 RX ORDER — DIATRIZOATE MEGLUMINE AND DIATRIZOATE SODIUM 660; 100 MG/ML; MG/ML
30 SOLUTION ORAL; RECTAL
Status: DISCONTINUED | OUTPATIENT
Start: 2025-03-25 | End: 2025-04-03 | Stop reason: HOSPADM

## 2025-03-25 RX ADMIN — HYDROMORPHONE HYDROCHLORIDE 0.25 MG: 1 INJECTION, SOLUTION INTRAMUSCULAR; INTRAVENOUS; SUBCUTANEOUS at 18:46

## 2025-03-25 RX ADMIN — SODIUM CHLORIDE, PRESERVATIVE FREE 10 ML: 5 INJECTION INTRAVENOUS at 08:41

## 2025-03-25 RX ADMIN — PROCHLORPERAZINE EDISYLATE 10 MG: 5 INJECTION INTRAMUSCULAR; INTRAVENOUS at 15:00

## 2025-03-25 RX ADMIN — HYDROMORPHONE HYDROCHLORIDE 0.25 MG: 1 INJECTION, SOLUTION INTRAMUSCULAR; INTRAVENOUS; SUBCUTANEOUS at 06:39

## 2025-03-25 RX ADMIN — HYDROMORPHONE HYDROCHLORIDE 0.25 MG: 1 INJECTION, SOLUTION INTRAMUSCULAR; INTRAVENOUS; SUBCUTANEOUS at 11:35

## 2025-03-25 RX ADMIN — PROCHLORPERAZINE EDISYLATE 10 MG: 5 INJECTION INTRAMUSCULAR; INTRAVENOUS at 08:48

## 2025-03-25 RX ADMIN — HYDROMORPHONE HYDROCHLORIDE 0.25 MG: 1 INJECTION, SOLUTION INTRAMUSCULAR; INTRAVENOUS; SUBCUTANEOUS at 02:40

## 2025-03-25 RX ADMIN — HYDROMORPHONE HYDROCHLORIDE 0.25 MG: 1 INJECTION, SOLUTION INTRAMUSCULAR; INTRAVENOUS; SUBCUTANEOUS at 15:40

## 2025-03-25 RX ADMIN — HYDROMORPHONE HYDROCHLORIDE 0.25 MG: 1 INJECTION, SOLUTION INTRAMUSCULAR; INTRAVENOUS; SUBCUTANEOUS at 21:44

## 2025-03-25 RX ADMIN — POTASSIUM CHLORIDE: 2 INJECTION, SOLUTION, CONCENTRATE INTRAVENOUS at 17:41

## 2025-03-25 RX ADMIN — DIATRIZOATE MEGLUMINE AND DIATRIZOATE SODIUM 30 ML: 600; 100 SOLUTION ORAL; RECTAL at 08:23

## 2025-03-25 RX ADMIN — PROCHLORPERAZINE EDISYLATE 10 MG: 5 INJECTION INTRAMUSCULAR; INTRAVENOUS at 21:44

## 2025-03-25 RX ADMIN — SODIUM CHLORIDE, PRESERVATIVE FREE 10 ML: 5 INJECTION INTRAVENOUS at 18:45

## 2025-03-25 RX ADMIN — SODIUM CHLORIDE, PRESERVATIVE FREE 10 ML: 5 INJECTION INTRAVENOUS at 21:56

## 2025-03-25 ASSESSMENT — PAIN DESCRIPTION - ORIENTATION
ORIENTATION: RIGHT;LEFT
ORIENTATION: MID;LOWER
ORIENTATION: LOWER

## 2025-03-25 ASSESSMENT — PAIN DESCRIPTION - DESCRIPTORS
DESCRIPTORS: ACHING;DISCOMFORT
DESCRIPTORS: ACHING;SHARP;STABBING
DESCRIPTORS: ACHING;STABBING;SHARP

## 2025-03-25 ASSESSMENT — PAIN DESCRIPTION - LOCATION
LOCATION: BACK;HEAD
LOCATION: BACK;EAR;THROAT
LOCATION: BACK;HEAD
LOCATION: THROAT
LOCATION: GENERALIZED

## 2025-03-25 ASSESSMENT — PAIN SCALES - GENERAL
PAINLEVEL_OUTOF10: 8
PAINLEVEL_OUTOF10: 8
PAINLEVEL_OUTOF10: 0
PAINLEVEL_OUTOF10: 8
PAINLEVEL_OUTOF10: 7
PAINLEVEL_OUTOF10: 8
PAINLEVEL_OUTOF10: 4
PAINLEVEL_OUTOF10: 8

## 2025-03-25 ASSESSMENT — PAIN - FUNCTIONAL ASSESSMENT
PAIN_FUNCTIONAL_ASSESSMENT: ACTIVITIES ARE NOT PREVENTED

## 2025-03-25 NOTE — CARE COORDINATION
Social Work discharge planning  Notified Dori with [facility] by Kulwinder that Dr Wiseman agreed to follow for TPN outpt.   Referral called to Denny with [facility] ph [phone] and faxed pharmacy agreement to him that pt signed.     PLAN: Await start of care date for [facility], and will need to notify home pharmacy.   Will need [facility] order for home TPN.  Electronically signed by IRWIN Aguirre on 3/25/2025 at 9:04 AM     Addendum  Start of Care with [facility] by Kulwinder is Thursday 3/27/25. Notified pharmacist [facility] [phone]. Notified charge Rn.  Electronically signed by IRWIN Aguirre on 3/25/2025 at 9:20 AM     Addendum  Per Dori with [facility], they can see pt at home tomorrow afternoon if needed. Notified Rn.  Pt is NOT receptive to LTAC nor SNF.   Spoke to pt's partner Mary Anne. She said pt lives with her and she can be his caregiver to learn TPN. She plans to talk visit this evening. Encouraged her to start asking questions about TPN administration process while pt here, and continue education for it with [facility].   Electronically signed by IRWIN Aguirre on 3/25/2025 at 3:25 PM

## 2025-03-25 NOTE — PROGRESS NOTES
[facility] Hospitalist Progress Note    Admitting Date and Time: 3/23/2025  4:23 AM  Admit Dx: Throat cancer (HCC) [C14.0]  Failure to thrive in adult [R62.7]  Intractable nausea and vomiting [R11.2]    Subjective:  Patient is being followed for Throat cancer (HCC) [C14.0]  Failure to thrive in adult [R62.7]  Intractable nausea and vomiting [R11.2]   Pt feels nauseous  Per RN: No major concerns    ROS: denies fever, chills, cp, sob, n/v, HA unless stated above.      sennosides-docusate sodium  1 tablet Oral Daily    chlorproMAZINE  25 mg Oral Once    fentaNYL  1 patch TransDERmal Q72H    nortriptyline  25 mg PEG Tube Nightly    polyethylene glycol  17 g Per G Tube Daily    scopolamine  1 patch TransDERmal Q72H    sucralfate  1 g Oral 4x Daily    sodium chloride flush  5-40 mL IntraVENous 2 times per day    enoxaparin  30 mg SubCUTAneous Daily    lansoprazole  30 mg Oral QAM AC     diatrizoate meglumine-sodium, 30 mL, ONCE PRN  glucose, 4 tablet, PRN  dextrose bolus, 125 mL, PRN   Or  dextrose bolus, 250 mL, PRN  glucagon (rDNA), 1 mg, PRN  dextrose, , Continuous PRN  promethazine, 25 mg, Q6H PRN  HYDROmorphone, 0.25 mg, Q4H PRN  lidocaine viscous hcl, 15 mL, Daily PRN  ondansetron, 8 mg, Q8H PRN  oxyCODONE, 10 mg, Q4H PRN  sodium chloride flush, 5-40 mL, PRN  sodium chloride, , PRN  potassium chloride, 40 mEq, PRN   Or  potassium alternative oral replacement, 40 mEq, PRN   Or  potassium chloride, 10 mEq, PRN  magnesium sulfate, 2,000 mg, PRN  acetaminophen, 650 mg, Q6H PRN  prochlorperazine, 10 mg, Q6H PRN  bisacodyl, 10 mg, PRN         Objective:    /64   Pulse 92   Temp 97.3 °F (36.3 °C) (Oral)   Resp 18   Ht 1.727 m (5' 8\")   Wt 50 kg (110 lb 3.2 oz)   SpO2 96%   BMI 16.76 kg/m²     General Appearance: alert and oriented to person, place and time and in no acute distress, Frail, emaciated,  Skin: warm and dry  Head: normocephalic and atraumatic  Eyes: pupils equal, round, and reactive to  light, extraocular eye movements intact, conjunctivae normal  Neck: neck supple and non tender without mass   Pulmonary/Chest: clear to auscultation bilaterally- no wheezes, rales or rhonchi, normal air movement, no respiratory distress  Cardiovascular: normal rate, normal S1 and S2 and no carotid bruits  Abdomen: soft, non-tender, non-distended, normal bowel sounds, no masses or organomegaly  Extremities: no cyanosis, no clubbing and no edema, PICC rt arm+, left chest port+   Neurologic: no cranial nerve deficit and speech normal        Recent Labs     03/23/25  0523 03/24/25  0245 03/25/25  0321    147* 142   K 3.9 4.3 4.0   CL 99 103 102   CO2 28 28 29   BUN 24* 26* 25*   CREATININE 0.8 0.8 0.7   GLUCOSE 99 62* 106*   CALCIUM 9.8 9.3 9.6       Recent Labs     03/23/25  0523 03/24/25  0245 03/25/25  0321   WBC 5.4 4.6 4.1*   RBC 2.95* 2.74* 2.66*   HGB 9.7* 8.9* 8.8*   HCT 28.5* 27.0* 26.2*   MCV 96.6 98.5 98.5   MCH 32.9 32.5 33.1   MCHC 34.0 33.0 33.6   RDW 14.8 15.3* 15.3*    277 268   MPV 8.9 9.0 8.9       Micro:  No components found for: \"BC)\"    Radiology:   XR CHEST PORTABLE  Result Date: 3/23/2025  EXAMINATION: ONE XRAY VIEW OF THE CHEST 3/23/2025 5:02 am COMPARISON: 02/25/2025 HISTORY: ORDERING SYSTEM PROVIDED HISTORY: concern for PNA TECHNOLOGIST PROVIDED HISTORY: Reason for exam:->concern for PNA FINDINGS: Portable chest reveals cardiac and mediastinal silhouettes within normal limits.  PICC line catheter identified on the right tip in the SVC.  Erendira catheter stable on the left tip in the SVC.  Lung fields are clear.  No focal parenchymal opacification present.  No pleural effusion or pneumothorax.  The bony structures are unremarkable.  Pulmonary vascularity is within normal limits.     No acute cardiopulmonary disease       Assessment:    Principal Problem:    Intractable nausea and vomiting  Active Problems:    Severe protein-calorie malnutrition    Throat cancer (HCC)  Resolved

## 2025-03-25 NOTE — PATIENT CARE CONFERENCE
[facility] Quality Flow/Interdisciplinary Rounds Progress Note        Quality Flow Rounds held on March 25, 2025    Disciplines Attending:  Bedside Nurse, , , and Nursing Unit Leadership    Prasanna Parnell was admitted on 3/23/2025  4:23 AM    Anticipated Discharge Date:       Disposition:    Rahul Score:  Rahul Scale Score: 19    [facility] RISK OF UNPLANNED READMISSION 2.0             0 Total Score        Discussed patient goal for the day, patient clinical progression, and barriers to discharge.  The following Goal(s) of the Day/Commitment(s) have been identified:  Diagnostics - Report Results and Labs - Report Results      Mary Carrillo RN  March 25, 2025

## 2025-03-25 NOTE — PROGRESS NOTES
Palliative Care Department  [phone]  Palliative Care Progress Note  Provider Stefania Joyce, APRN - CNP      PATIENT: Prasanna Parnell  : 1965  MRN: [id number]  ADMISSION DATE: 3/23/2025  4:23 AM  Referring Provider: Roberto Tomas MD     Palliative Medicine was consulted on hospital day 0 for assistance with Symptom management: intractable nausea/vomiting; head and neck cancer    HPI:     Clinical Summary:Prasanna Parnell is a 59 y.o. y/o male spondylolisthesis, retrolisthesis, neuropathy, COPD, squamous cell carcinoma of the head and neck in 2024. Laryngoscopy by ENT 2025 showed ulcerated masses seen in the area of the right palatine tonsil extending to the base of the tongue into the vallecula approaching midline. Pathology of the right tonsil biopsy and right base of the tongue biopsy was consistent with HPV associated squamous cell carcinoma. He had a bronchoscopy with fine-needle aspiration of right upper lobe lesion which was positive for malignancy consistent with squamous cell carcinoma. Patient was initiated on chemotherapy and is following with Dr. Tomas in medical oncology and radiation therapy concurrently.  He had a recent hospitalization due to nausea and vomiting, on 3/3/2025, he underwent PEG tube placement 3/11/2025, insurance company did not cover tube feedings despite PEG tube in place and patient unable to eat.  PICC line was placed on 3/16/2025 and TPN was initiated on 3/17/2025.  Case management was having difficulty time setting up TPN outpatient, patient eloped on 3/21/2024 before tube feeding to be set up at home.  Who presented to [facility] on 3/23/2025 with failure to thrive and nausea.  At ED, given fentanyl, Benadryl, 1 L bolus.  He was admitted for further medical management of intractable nausea and failure to thrive.    ASSESSMENT/PLAN:     Pertinent Hospital Diagnoses     Intractable nausea  Failure to thrive  SCC of tongue/tonsil/lung    Palliative Care

## 2025-03-25 NOTE — PROGRESS NOTES
I will sign for TPN on outpatient basis until patient can be established with a PCP.     I will make referral to [facility].

## 2025-03-25 NOTE — CONSULTS
Gastroenterology, Hepatology, &  Advanced Endoscopy    Consult Note      Reason for Consult: Nausea, Malnutrition, FTT    HPI:   Prasanna Parnell is a 59 y.o. male w/ PMH of  has a past medical history of Cancer (HCC), Cervicalgia, COPD (chronic obstructive pulmonary disease) (HCC), Neuropathy, Retrolisthesis, and Spondylolisthesis. who presents to the ED with continued poor PO intake. He has SCC of tongue and lung and was undergoing chemoradiation though began having issues with persistent nausea and poor PO intake. PEG tube was placed but he was unable to tolerate tube feeds due to the nausea. Several medications were trialed without success. He underwent conversion of PEG to PEG-J but remained with significant nausea despite gastric port drainage to gravity and tube feeds directly to the jejunum. He reports feeling significant improvement on TPN and with PEG tube draining to gravity.     There was difficulty in getting home health care arranged at his last recent admission due to lack of having a PCP. He ended up leaving A before Home Health Care could be arranged but presents back to the ED with continued symptoms and FTT.       Recent Labs     03/23/25  0523 03/24/25  0245   ALT 23 22   AST 28 23   ALKPHOS 79 74   BILITOT 0.8 0.7     Lab Results   Component Value Date    WBC 4.6 03/24/2025    HGB 8.9 (L) 03/24/2025    HCT 27.0 (L) 03/24/2025     03/24/2025     (H) 03/24/2025    K 4.3 03/24/2025     03/24/2025    CREATININE 0.8 03/24/2025    BUN 26 (H) 03/24/2025    CO2 28 03/24/2025    FOLATE 5.1 03/14/2025    [id number] 1368 (H) 03/14/2025    GLUCOSE 62 (L) 03/24/2025    INR 1.2 01/23/2025    PROTIME 13.0 (H) 01/23/2025    TSH 0.41 03/04/2025    LABA1C 5.6 03/04/2025     Lab Results   Component Value Date    INR 1.2 01/23/2025    INR 1.1 08/04/2022    PROTIME 13.0 (H) 01/23/2025    PROTIME 11.6 08/04/2022         Lipase   Date Value Ref Range Status   03/23/2025 22 13 - 60 U/L Final

## 2025-03-26 LAB
ALBUMIN SERPL-MCNC: 3.8 G/DL (ref 3.5–5.2)
ALBUMIN SERPL-MCNC: 3.8 G/DL (ref 3.5–5.2)
ALP SERPL-CCNC: 73 U/L (ref 40–129)
ALP SERPL-CCNC: 74 U/L (ref 40–129)
ALT SERPL-CCNC: 19 U/L (ref 0–40)
ALT SERPL-CCNC: 19 U/L (ref 0–40)
ANION GAP SERPL CALCULATED.3IONS-SCNC: 11 MMOL/L (ref 7–16)
AST SERPL-CCNC: 19 U/L (ref 0–39)
AST SERPL-CCNC: 19 U/L (ref 0–39)
BASOPHILS # BLD: 0.04 K/UL (ref 0–0.2)
BASOPHILS NFR BLD: 1 % (ref 0–2)
BILIRUB DIRECT SERPL-MCNC: <0.2 MG/DL (ref 0–0.3)
BILIRUB INDIRECT SERPL-MCNC: NORMAL MG/DL (ref 0–1)
BILIRUB SERPL-MCNC: 0.5 MG/DL (ref 0–1.2)
BILIRUB SERPL-MCNC: 0.5 MG/DL (ref 0–1.2)
BUN SERPL-MCNC: 19 MG/DL (ref 6–20)
CALCIUM SERPL-MCNC: 9.7 MG/DL (ref 8.6–10.2)
CHLORIDE SERPL-SCNC: 102 MMOL/L (ref 98–107)
CO2 SERPL-SCNC: 30 MMOL/L (ref 22–29)
CREAT SERPL-MCNC: 0.7 MG/DL (ref 0.7–1.2)
EOSINOPHIL # BLD: 0.2 K/UL (ref 0.05–0.5)
EOSINOPHILS RELATIVE PERCENT: 4 % (ref 0–6)
ERYTHROCYTE [DISTWIDTH] IN BLOOD BY AUTOMATED COUNT: 15.6 % (ref 11.5–15)
GFR, ESTIMATED: >90 ML/MIN/1.73M2
GLUCOSE BLD-MCNC: 134 MG/DL (ref 74–99)
GLUCOSE BLD-MCNC: 180 MG/DL (ref 74–99)
GLUCOSE SERPL-MCNC: 129 MG/DL (ref 74–99)
HCT VFR BLD AUTO: 29 % (ref 37–54)
HGB BLD-MCNC: 9.5 G/DL (ref 12.5–16.5)
LYMPHOCYTES NFR BLD: 0.45 K/UL (ref 1.5–4)
LYMPHOCYTES RELATIVE PERCENT: 10 % (ref 20–42)
MAGNESIUM SERPL-MCNC: 2.2 MG/DL (ref 1.6–2.6)
MCH RBC QN AUTO: 32.3 PG (ref 26–35)
MCHC RBC AUTO-ENTMCNC: 32.8 G/DL (ref 32–34.5)
MCV RBC AUTO: 98.6 FL (ref 80–99.9)
METAMYELOCYTES ABSOLUTE COUNT: 0.04 K/UL (ref 0–0.12)
METAMYELOCYTES: 1 % (ref 0–1)
MONOCYTES NFR BLD: 0.41 K/UL (ref 0.1–0.95)
MONOCYTES NFR BLD: 9 % (ref 2–12)
NEUTROPHILS NFR BLD: 76 % (ref 43–80)
NEUTS SEG NFR BLD: 3.56 K/UL (ref 1.8–7.3)
PHOSPHATE SERPL-MCNC: 2.9 MG/DL (ref 2.5–4.5)
PLATELET # BLD AUTO: 260 K/UL (ref 130–450)
PMV BLD AUTO: 9.3 FL (ref 7–12)
POTASSIUM SERPL-SCNC: 3.8 MMOL/L (ref 3.5–5)
PROT SERPL-MCNC: 7 G/DL (ref 6.4–8.3)
PROT SERPL-MCNC: 7 G/DL (ref 6.4–8.3)
RBC # BLD AUTO: 2.94 M/UL (ref 3.8–5.8)
RBC # BLD: ABNORMAL 10*6/UL
SODIUM SERPL-SCNC: 143 MMOL/L (ref 132–146)
WBC OTHER # BLD: 4.7 K/UL (ref 4.5–11.5)

## 2025-03-26 PROCEDURE — 99232 SBSQ HOSP IP/OBS MODERATE 35: CPT

## 2025-03-26 PROCEDURE — 6360000002 HC RX W HCPCS: Performed by: STUDENT IN AN ORGANIZED HEALTH CARE EDUCATION/TRAINING PROGRAM

## 2025-03-26 PROCEDURE — 1200000000 HC SEMI PRIVATE

## 2025-03-26 PROCEDURE — 99233 SBSQ HOSP IP/OBS HIGH 50: CPT | Performed by: STUDENT IN AN ORGANIZED HEALTH CARE EDUCATION/TRAINING PROGRAM

## 2025-03-26 PROCEDURE — 84100 ASSAY OF PHOSPHORUS: CPT

## 2025-03-26 PROCEDURE — 6360000002 HC RX W HCPCS

## 2025-03-26 PROCEDURE — 80053 COMPREHEN METABOLIC PANEL: CPT

## 2025-03-26 PROCEDURE — 85025 COMPLETE CBC W/AUTO DIFF WBC: CPT

## 2025-03-26 PROCEDURE — 96376 TX/PRO/DX INJ SAME DRUG ADON: CPT

## 2025-03-26 PROCEDURE — 83735 ASSAY OF MAGNESIUM: CPT

## 2025-03-26 PROCEDURE — 2500000003 HC RX 250 WO HCPCS: Performed by: STUDENT IN AN ORGANIZED HEALTH CARE EDUCATION/TRAINING PROGRAM

## 2025-03-26 PROCEDURE — 2580000003 HC RX 258: Performed by: STUDENT IN AN ORGANIZED HEALTH CARE EDUCATION/TRAINING PROGRAM

## 2025-03-26 PROCEDURE — 99232 SBSQ HOSP IP/OBS MODERATE 35: CPT | Performed by: CLINICAL NURSE SPECIALIST

## 2025-03-26 PROCEDURE — 2500000003 HC RX 250 WO HCPCS: Performed by: INTERNAL MEDICINE

## 2025-03-26 PROCEDURE — 6370000000 HC RX 637 (ALT 250 FOR IP): Performed by: INTERNAL MEDICINE

## 2025-03-26 PROCEDURE — 80076 HEPATIC FUNCTION PANEL: CPT

## 2025-03-26 PROCEDURE — 82962 GLUCOSE BLOOD TEST: CPT

## 2025-03-26 RX ADMIN — HYDROMORPHONE HYDROCHLORIDE 0.25 MG: 1 INJECTION, SOLUTION INTRAMUSCULAR; INTRAVENOUS; SUBCUTANEOUS at 21:41

## 2025-03-26 RX ADMIN — HYDROMORPHONE HYDROCHLORIDE 0.25 MG: 1 INJECTION, SOLUTION INTRAMUSCULAR; INTRAVENOUS; SUBCUTANEOUS at 15:21

## 2025-03-26 RX ADMIN — SODIUM CHLORIDE, PRESERVATIVE FREE 10 ML: 5 INJECTION INTRAVENOUS at 09:57

## 2025-03-26 RX ADMIN — HYDROMORPHONE HYDROCHLORIDE 0.25 MG: 1 INJECTION, SOLUTION INTRAMUSCULAR; INTRAVENOUS; SUBCUTANEOUS at 09:50

## 2025-03-26 RX ADMIN — PROCHLORPERAZINE EDISYLATE 10 MG: 5 INJECTION INTRAMUSCULAR; INTRAVENOUS at 04:55

## 2025-03-26 RX ADMIN — HYDROMORPHONE HYDROCHLORIDE 0.25 MG: 1 INJECTION, SOLUTION INTRAMUSCULAR; INTRAVENOUS; SUBCUTANEOUS at 04:39

## 2025-03-26 RX ADMIN — HYDROMORPHONE HYDROCHLORIDE 0.25 MG: 1 INJECTION, SOLUTION INTRAMUSCULAR; INTRAVENOUS; SUBCUTANEOUS at 18:36

## 2025-03-26 RX ADMIN — PROCHLORPERAZINE EDISYLATE 10 MG: 5 INJECTION INTRAMUSCULAR; INTRAVENOUS at 18:36

## 2025-03-26 RX ADMIN — HYDROMORPHONE HYDROCHLORIDE 0.25 MG: 1 INJECTION, SOLUTION INTRAMUSCULAR; INTRAVENOUS; SUBCUTANEOUS at 00:59

## 2025-03-26 RX ADMIN — POTASSIUM CHLORIDE: 2 INJECTION, SOLUTION, CONCENTRATE INTRAVENOUS at 18:29

## 2025-03-26 RX ADMIN — PROCHLORPERAZINE EDISYLATE 10 MG: 5 INJECTION INTRAMUSCULAR; INTRAVENOUS at 12:20

## 2025-03-26 ASSESSMENT — PAIN SCALES - GENERAL
PAINLEVEL_OUTOF10: 7
PAINLEVEL_OUTOF10: 6
PAINLEVEL_OUTOF10: 8
PAINLEVEL_OUTOF10: 2
PAINLEVEL_OUTOF10: 7
PAINLEVEL_OUTOF10: 6
PAINLEVEL_OUTOF10: 8
PAINLEVEL_OUTOF10: 8
PAINLEVEL_OUTOF10: 7

## 2025-03-26 ASSESSMENT — PAIN SCALES - WONG BAKER: WONGBAKER_NUMERICALRESPONSE: NO HURT

## 2025-03-26 ASSESSMENT — PAIN - FUNCTIONAL ASSESSMENT
PAIN_FUNCTIONAL_ASSESSMENT: ACTIVITIES ARE NOT PREVENTED
PAIN_FUNCTIONAL_ASSESSMENT: ACTIVITIES ARE NOT PREVENTED

## 2025-03-26 ASSESSMENT — PAIN DESCRIPTION - LOCATION
LOCATION: THROAT
LOCATION: HEAD;THROAT
LOCATION: HEAD;THROAT
LOCATION: THROAT
LOCATION: HEAD
LOCATION: ABDOMEN;BACK

## 2025-03-26 ASSESSMENT — PAIN DESCRIPTION - PAIN TYPE: TYPE: ACUTE PAIN

## 2025-03-26 ASSESSMENT — PAIN DESCRIPTION - ORIENTATION
ORIENTATION: MID
ORIENTATION: RIGHT;LEFT
ORIENTATION: RIGHT;LEFT

## 2025-03-26 ASSESSMENT — PAIN DESCRIPTION - DESCRIPTORS
DESCRIPTORS: ACHING
DESCRIPTORS: ACHING;BURNING
DESCRIPTORS: BURNING;ACHING
DESCRIPTORS: ACHING;BURNING
DESCRIPTORS: ACHING
DESCRIPTORS: ACHING;DISCOMFORT

## 2025-03-26 ASSESSMENT — PAIN DESCRIPTION - ONSET: ONSET: ON-GOING

## 2025-03-26 ASSESSMENT — PAIN DESCRIPTION - FREQUENCY: FREQUENCY: CONTINUOUS

## 2025-03-26 NOTE — PROGRESS NOTES
Home care can open case tomorrow 3/27 at noon. Patient will have ride here by 10:30 for discharge.  Electronically signed by Brenda Silva RN on 3/26/2025 at 12:30 PM

## 2025-03-26 NOTE — PROGRESS NOTES
Physician's ambulance transport arranged for tomorrow 3/26 for radiation treatment. Treatment scheduled for 1630,  will be 1530.    Electronically signed by Mary Carrillo RN on 3/25/2025 at 10:58 PM

## 2025-03-26 NOTE — PROGRESS NOTES
Jayde Fernandez MD   ·   MD Adriana Lucero APRN  ·  FADI Bermeo      [place] [facility] [place]  [address] [place] [address]  P: [phone]  F: [phone] [place] [facility] [place]  [address] [place] [address]  P: [phone]  F: [phone]  [place] [facility] [place]  [address] [place] [address]  P: [phone]  F: [phone]           Inpatient Medical Oncology/Hematology progress  Note            Room: 0524/0524-A      CHIEF COMPLAINT:  Nausea and vomiting    SUBJECTIVE:   Patient seen and examined in room. Patient still having nausea and vomiting.. . TPN infusion running      HISTORY OF PRESENT ILLNESS (3/23/2025):   Patient is a 59 y.o. male comes in for intractable nausea and vomiting.   Recently he was hospitalized for similar issues in which he had a prolonged hospital course, but eventually had left [facility].    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 He was recently started on chemoRT, having only completed 2 cycles of weekly cisplatin but has continued radiation during his last hospital course. Last admission, he struggled to keep foods done oral as well as from his tube feeds. He also c/o symptoms with tube flushes. We had consulted GI, in which his PEG was exchanged for a PEG-J, but patient still had issues w/ tube feeds, and subsequently, he was started on TPN. TPN had improved his symptoms.     Due to certain logistical/financial issues and lack of PCP, there were issues with setting up tube feeds, and there were tentative plans to discharged with PICC and TPN for home, but according to report, no specialist recommended take over management of this.     Today patient feels unwell. Even without eating, he c/o dry heaves.   While at home after leaving [facility], he tried pushing Ensure through PEG-J but did not tolerate very well.       Oncology History  normal.         Behavior: Behavior normal.         Thought Content: Thought content normal.          ECOG PS: 1-2          Intake/Output Summary (Last 24 hours) at 3/26/2025 1452  Last data filed at 3/26/2025 0048  Gross per 24 hour   Intake 2729.88 ml   Output 325 ml   Net 2404.88 ml           DIAGNOSTIC DATA:   Lab Results   Component Value Date    WBC 4.7 03/26/2025    HGB 9.5 (L) 03/26/2025    HCT 29.0 (L) 03/26/2025    MCV 98.6 03/26/2025     03/26/2025     Lab Results   Component Value Date    NEUTROABS 3.56 03/26/2025     Lab Results   Component Value Date    ALT 19 03/26/2025    ALT 19 03/26/2025    AST 19 03/26/2025    AST 19 03/26/2025    ALKPHOS 74 03/26/2025    ALKPHOS 73 03/26/2025    BILITOT 0.5 03/26/2025    BILITOT 0.5 03/26/2025     Lab Results   Component Value Date    CREATININE 0.7 03/26/2025    BUN 19 03/26/2025     03/26/2025    K 3.8 03/26/2025     03/26/2025    CO2 30 (H) 03/26/2025       Pathology:     Radiology:        ASSESSMENT     Intractable nausea/vomiting in the setting of head & neck cancer  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 male who comes in for intractable nausea and vomiting; we have been consulted due to his known diagnosis of squamous cell carcinoma and tonsils.     Patient's symptoms having been ongoing, even before starting treatment, but appear to have worsens since starting. He has only received 2 cycles of weekly cisplatin, last on 2/26/25; so I do not feel chemo is the reason for his symptoms at this point.   He has struggled with tube feels, difficulty tolerating.     He was admitted recently and had prolonged hospital course due to same issues.   He continued to have issues

## 2025-03-26 NOTE — PROGRESS NOTES
Palliative Care Department  [phone]  Palliative Care Progress Note  Provider Stefania Joyce, APRN - CNP      PATIENT: Prasanna Parnell  : 1965  MRN: [id number]  ADMISSION DATE: 3/23/2025  4:23 AM  Referring Provider: Roberto Tomas MD     Palliative Medicine was consulted on hospital day 0 for assistance with Symptom management: intractable nausea/vomiting; head and neck cancer    HPI:     Clinical Summary:Prasanna Parnell is a 59 y.o. y/o male spondylolisthesis, retrolisthesis, neuropathy, COPD, squamous cell carcinoma of the head and neck in 2024. Laryngoscopy by ENT 2025 showed ulcerated masses seen in the area of the right palatine tonsil extending to the base of the tongue into the vallecula approaching midline. Pathology of the right tonsil biopsy and right base of the tongue biopsy was consistent with HPV associated squamous cell carcinoma. He had a bronchoscopy with fine-needle aspiration of right upper lobe lesion which was positive for malignancy consistent with squamous cell carcinoma. Patient was initiated on chemotherapy and is following with Dr. Tomas in medical oncology and radiation therapy concurrently.  He had a recent hospitalization due to nausea and vomiting, on 3/3/2025, he underwent PEG tube placement 3/11/2025, insurance company did not cover tube feedings despite PEG tube in place and patient unable to eat.  PICC line was placed on 3/16/2025 and TPN was initiated on 3/17/2025.  Case management was having difficulty time setting up TPN outpatient, patient eloped on 3/21/2024 before tube feeding to be set up at home.  Who presented to [facility] on 3/23/2025 with failure to thrive and nausea.  At ED, given fentanyl, Benadryl, 1 L bolus.  He was admitted for further medical management of intractable nausea and failure to thrive.    ASSESSMENT/PLAN:     Pertinent Hospital Diagnoses     Intractable nausea  Failure to thrive  SCC of tongue/tonsil/lung    Palliative Care  after 2-3hrs.  Patient does have fentanyl patch applied.  He has used no oxycodone in the last 24 hours and 7 doses of Dilaudid.  Patient states he is unable to tolerate p.o. medication at this time. Discussed importance of trying p.o. medications if he is able to keep them down.  Patient states he has not had a bowel movement today however he has not been eating anything.   Discussed goals of care and CODE STATUS options.  Patient does confirm full CODE STATUS and would like to continue all aggressive medical management will continue to follow.  He tells me after discussions with his physicians he is agreeable to LTAC to continue TPN and IV pain regimen and PT, and is considering holding off on radiation therapy until he is stronger.  Updated CM team to discuss further with him on discharge planning.    Patient follows with [facility] palliative care OP for symptoms, will ensure he has follow-up appointment at [place].    Prognosis: Guarded    OBJECTIVE:     /64   Pulse 79   Temp 98.1 °F (36.7 °C) (Oral)   Resp 16   Ht 1.727 m (5' 8\")   Wt 49.8 kg (109 lb 11.2 oz)   SpO2 92%   BMI 16.68 kg/m²     Physical Examination:  Gen: thin, NAD, awake, alert   Lungs: respirations easy  Heart: regular rate and rhythm  Abdomen: non-distended  Skin: warm, dry without rashes, lesions, bruising  Neuro: awake, alert, oriented x 3, follows commands    Objective data reviewed: labs, images, records, medication use, vitals, and chart    Time/Communication  Greater than 50% of time spent, total 35 minutes in counseling and coordination of care at the bedside regarding goals of care.    Thank you for allowing Palliative Medicine to participate in the care of Prasanna Parnell.    Note: This report was completed using [employer] voiced recognition software.  Every effort has been made to ensure accuracy; however, inadvertent computerized transcription errors may be present.

## 2025-03-26 NOTE — PROGRESS NOTES
Pt. Does NOT want radiation today.    Electronically signed by Maddy Merino RN on 3/26/2025 at 10:04 AM

## 2025-03-26 NOTE — CARE COORDINATION
Social Work discharge planning  Per Brooke with [facility], they need a home going TPN order.  2. [facility] needs HHC ORDER for home TPN teaching, lab work, PTOT.  [facility] may be able to see pt at home later today, and can see tomorrow.   Electronically signed by IRWIN Aguirre on 3/26/2025 at 9:40 AM     Addendum - [facility] is checking IF they can see pt at home today.  Electronically signed by IRWIN Aguirre on 3/26/2025 at 10:06 AM     Addendum  [facility] can see pt tomorrow at noon for first visit with TPN once home going TPN orders are available and hhc order in Epic.  Discussed with charge Rn.  Electronically signed by IRWIN Aguirre on 3/26/2025 at 11:39 AM

## 2025-03-26 NOTE — PLAN OF CARE
Problem: Pain  Goal: Verbalizes/displays adequate comfort level or baseline comfort level  3/26/2025 1131 by Maddy Merino RN  Outcome: Progressing     Problem: Safety - Adult  Goal: Free from fall injury  3/26/2025 1131 by Maddy Merino, RN  Outcome: Progressing     Problem: Nutrition Deficit:  Goal: Optimize nutritional status  3/26/2025 1131 by Maddy Merino RN  Outcome: Progressing

## 2025-03-26 NOTE — CARE COORDINATION
Addendum  Pt requesting placement for TPN duration. He said he spoke to his Dr about putting radiation on hold \"until this heals up\". Discussed ltac and snf. Pt receptive to [facility] if eligible and able to get IV pain meds there. Referral called to [place] with [facility]. Await their eval.  Electronically signed by IRWIN Aguirre on 3/26/2025 at 12:55 PM     Addendum  Per [facility], they will need note from Oncology (if that is plan) stating pt is pausing any chemo and radiation before they can accept. Updated Rn.  Electronically signed by IRWIN Aguirre on 3/26/2025 at 2:09 PM     Addendum  [facility] advised they also have Medicare for pt.   Sent message to [employer] to verify if pt has Medicare primary. His only insurance on face sheet is [employer].   Electronically signed by IRWIN Aguirre on 3/26/2025 at 2:34 PM     Addendum  [facility] can NOT accept with pt's primary insurance being Medicare, as he does not have \"rev codes\". Also, snf would not be option as pt is Observ[facility]. Awaiting verification from [facility]  Electronically signed by IRWIN Aguirre on 3/26/2025 at 3:31 PM

## 2025-03-26 NOTE — PROGRESS NOTES
[facility] Hospitalist Progress Note    Admitting Date and Time: 3/23/2025  4:23 AM  Admit Dx: Throat cancer (HCC) [C14.0]  Failure to thrive in adult [R62.7]  Intractable nausea and vomiting [R11.2]    Subjective:  Patient is being followed for Throat cancer (HCC) [C14.0]  Failure to thrive in adult [R62.7]  Intractable nausea and vomiting [R11.2]   Patient was seen examined    ROS: denies fever, chills, cp, sob, n/v, HA unless stated above.      sennosides-docusate sodium  1 tablet Oral Daily    chlorproMAZINE  25 mg Oral Once    fentaNYL  1 patch TransDERmal Q72H    nortriptyline  25 mg PEG Tube Nightly    polyethylene glycol  17 g Per G Tube Daily    scopolamine  1 patch TransDERmal Q72H    sucralfate  1 g Oral 4x Daily    sodium chloride flush  5-40 mL IntraVENous 2 times per day    enoxaparin  30 mg SubCUTAneous Daily    lansoprazole  30 mg Oral QAM AC     diatrizoate meglumine-sodium, 30 mL, ONCE PRN  HYDROmorphone, 0.25 mg, Q3H PRN  glucose, 4 tablet, PRN  dextrose bolus, 125 mL, PRN   Or  dextrose bolus, 250 mL, PRN  glucagon (rDNA), 1 mg, PRN  dextrose, , Continuous PRN  promethazine, 25 mg, Q6H PRN  lidocaine viscous hcl, 15 mL, Daily PRN  ondansetron, 8 mg, Q8H PRN  oxyCODONE, 10 mg, Q4H PRN  sodium chloride flush, 5-40 mL, PRN  sodium chloride, , PRN  potassium chloride, 40 mEq, PRN   Or  potassium alternative oral replacement, 40 mEq, PRN   Or  potassium chloride, 10 mEq, PRN  magnesium sulfate, 2,000 mg, PRN  acetaminophen, 650 mg, Q6H PRN  prochlorperazine, 10 mg, Q6H PRN  bisacodyl, 10 mg, PRN         Objective:    /64   Pulse 79   Temp 98.1 °F (36.7 °C) (Oral)   Resp 16   Ht 1.727 m (5' 8\")   Wt 49.8 kg (109 lb 11.2 oz)   SpO2 92%   BMI 16.68 kg/m²     General Appearance: alert and oriented to person, place and time and in no acute distress  Skin: warm and dry  Head: normocephalic and atraumatic  Eyes: pupils equal, round, and reactive to light, extraocular eye movements  however, inadvertent computerized transcription errors may be present.  Electronically signed by Lee Zarate DO on 3/26/2025 at 3:23 PM

## 2025-03-26 NOTE — DISCHARGE INSTR - COC
Continuity of Care Form    Patient Name: Prasanna Parnell   :  1965  MRN:  [id number]    Admit date:  3/23/2025  Discharge date:  ***    Code Status Order: Full Code   Advance Directives:     Admitting Physician:  Halley Mullins MD  PCP: No primary care provider on file.    Discharging Nurse: ***  Discharging Hospital Unit/Room#: 0524/0524-A  Discharging Unit Phone Number: [phone]    Emergency Contact:   Extended Emergency Contact Information  Primary Emergency Contact: DEANGELO DONG  Home Phone: [phone]  Relation: Domestic Partner  Preferred language: English   needed? No    Past Surgical History:  Past Surgical History:   Procedure Laterality Date    BRONCHOSCOPY N/A 2025    BRONCHOSCOPY ENDOBRONCHIAL ULTRASOUND FINE NEEDLE ASPIRATION performed by Edward Beebe MD at [facility] ENDOSCOPY    BRONCHOSCOPY N/A 2025    BRONCHOSCOPY ENDOBRONCHIAL ULTRASOUND FINE NEEDLE ASPIRATION ROBOTIC performed by Edward Beebe MD at [facility] ENDOSCOPY    FINGER SURGERY Right     amputation first finger    HERNIA REPAIR Bilateral     inguinal    KNEE SURGERY Left     repair tendons d/t chainsaw accidnet    LARYNGOSCOPY N/A 2025    PANENDOSCOPY WITH BIOPSY performed by Jonathan Bowden DO at [facility] OR    LUMBAR LAMINECTOMY      ,,    PORT SURGERY N/A 2025    MEDIPORT INSERTION performed by Delvis Frank DO at [facility] OR    UPPER GASTROINTESTINAL ENDOSCOPY N/A 2025    ESOPHAGOGASTRODUODENOSCOPY PERCUTANEOUS ENDOSCOPIC GASTROSTOMY TUBE PLACEMENT performed by Delvis Frank DO at [facility] ENDOSCOPY    UPPER GASTROINTESTINAL ENDOSCOPY N/A 3/11/2025    ESOPHAGOGASTRODUODENOSCOPY JEJUNOSTOMY TUBE PLACEMENT performed by Haile Wiseman MD at [facility] ENDOSCOPY    WRIST SURGERY Right     tendon repair       Immunization History:     There is no immunization history on file for this patient.    Active Problems:  Patient Active Problem List   Diagnosis Code    Acute maxillary  sinusitis J01.00    Head injury S09.90XA    Cancer of oral cavity (HCC) C06.9    Head and neck cancer (HCC) C76.0    Malignant neoplasm of overlapping sites of tonsil (HCC) C09.8    Tobacco use Z72.0    Failure to thrive in adult R62.7    Severe protein-calorie malnutrition E43    Palliative care encounter Z51.5    Neoplasm related pain G89.3    Intractable nausea and vomiting R11.2    PEG (percutaneous endoscopic gastrostomy) status (HCC) Z93.1    Nausea & vomiting R11.2    Goals of care, counseling/discussion Z71.89    Malignant neoplasm of head, face, and neck (HCC) C76.0    Normocytic anemia D64.9    Throat cancer (HCC) C14.0       Isolation/Infection:   Isolation            No Isolation          Patient Infection Status    None to display              Nurse Assessment:  Last Vital Signs: /64   Pulse 79   Temp 98.1 °F (36.7 °C) (Oral)   Resp 16   Ht 1.727 m (5' 8\")   Wt 49.8 kg (109 lb 11.2 oz)   SpO2 92%   BMI 16.68 kg/m²     Last documented pain score (0-10 scale): Pain Level: 8  Last Weight:   Wt Readings from Last 1 Encounters:   03/26/25 49.8 kg (109 lb 11.2 oz)     Mental Status:  {IP PT MENTAL STATUS:20030}    IV Access:  { IRMA IV ACCESS:107745994}    Nursing Mobility/ADLs:  Walking   {CHP DME ADLs:686837166}  Transfer  {CHP DME ADLs:570314694}  Bathing  {CHP DME ADLs:225942688}  Dressing  {CHP DME ADLs:067685615}  Toileting  {CHP DME ADLs:534422203}  Feeding  {CHP DME ADLs:750140036}  Med Admin  {CHP DME ADLs:325778445}  Med Delivery   {[facility] MED Delivery:847405139}    Wound Care Documentation and Therapy:        Elimination:  Continence:   Bowel: {YES / NO:19727}  Bladder: {YES / NO:19727}  Urinary Catheter: {Urinary Catheter:004639596}   Colostomy/Ileostomy/Ileal Conduit: {YES / NO:19727}       Date of Last BM: ***    Intake/Output Summary (Last 24 hours) at 3/26/2025 1327  Last data filed at 3/26/2025 0048  Gross per 24 hour   Intake 2729.88 ml   Output 325 ml   Net 2404.88 ml     I/O

## 2025-03-27 ENCOUNTER — HOSPITAL ENCOUNTER (OUTPATIENT)
Dept: RADIATION ONCOLOGY | Age: 60
DRG: 391 | End: 2025-03-27
Payer: MEDICARE

## 2025-03-27 ENCOUNTER — TELEPHONE (OUTPATIENT)
Dept: FAMILY MEDICINE CLINIC | Age: 60
End: 2025-03-27

## 2025-03-27 LAB
ANION GAP SERPL CALCULATED.3IONS-SCNC: 12 MMOL/L (ref 7–16)
BASOPHILS # BLD: 0.04 K/UL (ref 0–0.2)
BASOPHILS NFR BLD: 1 % (ref 0–2)
BUN SERPL-MCNC: 29 MG/DL (ref 6–20)
CALCIUM SERPL-MCNC: 10.1 MG/DL (ref 8.6–10.2)
CHLORIDE SERPL-SCNC: 107 MMOL/L (ref 98–107)
CO2 SERPL-SCNC: 29 MMOL/L (ref 22–29)
CREAT SERPL-MCNC: 0.7 MG/DL (ref 0.7–1.2)
EOSINOPHIL # BLD: 0.23 K/UL (ref 0.05–0.5)
EOSINOPHILS RELATIVE PERCENT: 5 % (ref 0–6)
ERYTHROCYTE [DISTWIDTH] IN BLOOD BY AUTOMATED COUNT: 15.7 % (ref 11.5–15)
GFR, ESTIMATED: >90 ML/MIN/1.73M2
GLUCOSE BLD-MCNC: 128 MG/DL (ref 74–99)
GLUCOSE BLD-MCNC: 131 MG/DL (ref 74–99)
GLUCOSE BLD-MCNC: 151 MG/DL (ref 74–99)
GLUCOSE SERPL-MCNC: 104 MG/DL (ref 74–99)
HCT VFR BLD AUTO: 27.2 % (ref 37–54)
HGB BLD-MCNC: 8.8 G/DL (ref 12.5–16.5)
LYMPHOCYTES NFR BLD: 0.39 K/UL (ref 1.5–4)
LYMPHOCYTES RELATIVE PERCENT: 9 % (ref 20–42)
MAGNESIUM SERPL-MCNC: 2.2 MG/DL (ref 1.6–2.6)
MCH RBC QN AUTO: 32.2 PG (ref 26–35)
MCHC RBC AUTO-ENTMCNC: 32.4 G/DL (ref 32–34.5)
MCV RBC AUTO: 99.6 FL (ref 80–99.9)
MONOCYTES NFR BLD: 0.16 K/UL (ref 0.1–0.95)
MONOCYTES NFR BLD: 4 % (ref 2–12)
NEUTROPHILS NFR BLD: 82 % (ref 43–80)
NEUTS SEG NFR BLD: 3.68 K/UL (ref 1.8–7.3)
PHOSPHATE SERPL-MCNC: 4.3 MG/DL (ref 2.5–4.5)
PLATELET # BLD AUTO: 288 K/UL (ref 130–450)
PMV BLD AUTO: 9.6 FL (ref 7–12)
POTASSIUM SERPL-SCNC: 4.2 MMOL/L (ref 3.5–5)
RBC # BLD AUTO: 2.73 M/UL (ref 3.8–5.8)
RBC # BLD: ABNORMAL 10*6/UL
SODIUM SERPL-SCNC: 148 MMOL/L (ref 132–146)
WBC OTHER # BLD: 4.5 K/UL (ref 4.5–11.5)

## 2025-03-27 PROCEDURE — 97165 OT EVAL LOW COMPLEX 30 MIN: CPT

## 2025-03-27 PROCEDURE — 6360000002 HC RX W HCPCS: Performed by: STUDENT IN AN ORGANIZED HEALTH CARE EDUCATION/TRAINING PROGRAM

## 2025-03-27 PROCEDURE — 84100 ASSAY OF PHOSPHORUS: CPT

## 2025-03-27 PROCEDURE — 85025 COMPLETE CBC W/AUTO DIFF WBC: CPT

## 2025-03-27 PROCEDURE — 1200000000 HC SEMI PRIVATE

## 2025-03-27 PROCEDURE — 83735 ASSAY OF MAGNESIUM: CPT

## 2025-03-27 PROCEDURE — 80048 BASIC METABOLIC PNL TOTAL CA: CPT

## 2025-03-27 PROCEDURE — 99233 SBSQ HOSP IP/OBS HIGH 50: CPT | Performed by: STUDENT IN AN ORGANIZED HEALTH CARE EDUCATION/TRAINING PROGRAM

## 2025-03-27 PROCEDURE — 2500000003 HC RX 250 WO HCPCS: Performed by: INTERNAL MEDICINE

## 2025-03-27 PROCEDURE — 2500000003 HC RX 250 WO HCPCS: Performed by: STUDENT IN AN ORGANIZED HEALTH CARE EDUCATION/TRAINING PROGRAM

## 2025-03-27 PROCEDURE — 82962 GLUCOSE BLOOD TEST: CPT

## 2025-03-27 PROCEDURE — 2580000003 HC RX 258: Performed by: STUDENT IN AN ORGANIZED HEALTH CARE EDUCATION/TRAINING PROGRAM

## 2025-03-27 PROCEDURE — 6360000002 HC RX W HCPCS

## 2025-03-27 PROCEDURE — 99231 SBSQ HOSP IP/OBS SF/LOW 25: CPT

## 2025-03-27 RX ORDER — BISACODYL 10 MG
10 SUPPOSITORY, RECTAL RECTAL DAILY PRN
Status: DISCONTINUED | OUTPATIENT
Start: 2025-03-27 | End: 2025-04-03 | Stop reason: HOSPADM

## 2025-03-27 RX ORDER — DEXTROSE MONOHYDRATE 50 MG/ML
INJECTION, SOLUTION INTRAVENOUS CONTINUOUS
Status: DISCONTINUED | OUTPATIENT
Start: 2025-03-27 | End: 2025-03-31

## 2025-03-27 RX ADMIN — PROCHLORPERAZINE EDISYLATE 10 MG: 5 INJECTION INTRAMUSCULAR; INTRAVENOUS at 07:57

## 2025-03-27 RX ADMIN — HYDROMORPHONE HYDROCHLORIDE 0.25 MG: 1 INJECTION, SOLUTION INTRAMUSCULAR; INTRAVENOUS; SUBCUTANEOUS at 13:12

## 2025-03-27 RX ADMIN — HYDROMORPHONE HYDROCHLORIDE 0.25 MG: 1 INJECTION, SOLUTION INTRAMUSCULAR; INTRAVENOUS; SUBCUTANEOUS at 18:58

## 2025-03-27 RX ADMIN — HYDROMORPHONE HYDROCHLORIDE 0.25 MG: 1 INJECTION, SOLUTION INTRAMUSCULAR; INTRAVENOUS; SUBCUTANEOUS at 04:13

## 2025-03-27 RX ADMIN — HYDROMORPHONE HYDROCHLORIDE 0.25 MG: 1 INJECTION, SOLUTION INTRAMUSCULAR; INTRAVENOUS; SUBCUTANEOUS at 00:53

## 2025-03-27 RX ADMIN — SODIUM CHLORIDE, PRESERVATIVE FREE 10 ML: 5 INJECTION INTRAVENOUS at 22:13

## 2025-03-27 RX ADMIN — SODIUM CHLORIDE, PRESERVATIVE FREE 10 ML: 5 INJECTION INTRAVENOUS at 13:13

## 2025-03-27 RX ADMIN — HYDROMORPHONE HYDROCHLORIDE 0.25 MG: 1 INJECTION, SOLUTION INTRAMUSCULAR; INTRAVENOUS; SUBCUTANEOUS at 07:57

## 2025-03-27 RX ADMIN — HYDROMORPHONE HYDROCHLORIDE 0.25 MG: 1 INJECTION, SOLUTION INTRAMUSCULAR; INTRAVENOUS; SUBCUTANEOUS at 16:14

## 2025-03-27 RX ADMIN — POTASSIUM CHLORIDE: 2 INJECTION, SOLUTION, CONCENTRATE INTRAVENOUS at 17:58

## 2025-03-27 RX ADMIN — HYDROMORPHONE HYDROCHLORIDE 0.25 MG: 1 INJECTION, SOLUTION INTRAMUSCULAR; INTRAVENOUS; SUBCUTANEOUS at 22:26

## 2025-03-27 RX ADMIN — PROCHLORPERAZINE EDISYLATE 10 MG: 5 INJECTION INTRAMUSCULAR; INTRAVENOUS at 00:53

## 2025-03-27 RX ADMIN — SODIUM CHLORIDE, PRESERVATIVE FREE 10 ML: 5 INJECTION INTRAVENOUS at 14:37

## 2025-03-27 RX ADMIN — PROCHLORPERAZINE EDISYLATE 10 MG: 5 INJECTION INTRAMUSCULAR; INTRAVENOUS at 14:37

## 2025-03-27 RX ADMIN — PROCHLORPERAZINE EDISYLATE 10 MG: 5 INJECTION INTRAMUSCULAR; INTRAVENOUS at 22:26

## 2025-03-27 RX ADMIN — SODIUM CHLORIDE, PRESERVATIVE FREE 10 ML: 5 INJECTION INTRAVENOUS at 16:15

## 2025-03-27 RX ADMIN — DEXTROSE MONOHYDRATE: 50 INJECTION, SOLUTION INTRAVENOUS at 16:36

## 2025-03-27 RX ADMIN — SODIUM CHLORIDE, PRESERVATIVE FREE 10 ML: 5 INJECTION INTRAVENOUS at 07:58

## 2025-03-27 RX ADMIN — SODIUM CHLORIDE, PRESERVATIVE FREE 10 ML: 5 INJECTION INTRAVENOUS at 18:59

## 2025-03-27 ASSESSMENT — PAIN DESCRIPTION - LOCATION
LOCATION: GENERALIZED
LOCATION: GENERALIZED
LOCATION: NECK;THROAT
LOCATION: GENERALIZED
LOCATION: THROAT
LOCATION: BACK;NECK
LOCATION: GENERALIZED

## 2025-03-27 ASSESSMENT — PAIN DESCRIPTION - ORIENTATION
ORIENTATION: RIGHT
ORIENTATION: RIGHT;LEFT
ORIENTATION: MID
ORIENTATION: RIGHT;LEFT
ORIENTATION: MID

## 2025-03-27 ASSESSMENT — PAIN SCALES - GENERAL
PAINLEVEL_OUTOF10: 7
PAINLEVEL_OUTOF10: 6
PAINLEVEL_OUTOF10: 8
PAINLEVEL_OUTOF10: 8
PAINLEVEL_OUTOF10: 6
PAINLEVEL_OUTOF10: 7

## 2025-03-27 ASSESSMENT — PAIN - FUNCTIONAL ASSESSMENT
PAIN_FUNCTIONAL_ASSESSMENT: ACTIVITIES ARE NOT PREVENTED

## 2025-03-27 ASSESSMENT — PAIN DESCRIPTION - DESCRIPTORS
DESCRIPTORS: DISCOMFORT;BURNING
DESCRIPTORS: ACHING;DISCOMFORT
DESCRIPTORS: ACHING;DISCOMFORT;SORE;TENDER
DESCRIPTORS: ACHING;DISCOMFORT

## 2025-03-27 NOTE — PLAN OF CARE
Problem: Pain  Goal: Verbalizes/displays adequate comfort level or baseline comfort level  3/26/2025 2352 by Bailey Tovar, RN  Outcome: Progressing  3/26/2025 1131 by Maddy Merino, RN  Outcome: Progressing     Problem: Safety - Adult  Goal: Free from fall injury  3/26/2025 2352 by Bailey Tovar, RN  Outcome: Progressing  3/26/2025 1131 by Maddy Merino RN  Outcome: Progressing     Problem: Nutrition Deficit:  Goal: Optimize nutritional status  3/26/2025 2352 by Bailey Tovar, RN  Outcome: Progressing  3/26/2025 1131 by Maddy Merino, RN  Outcome: Progressing

## 2025-03-27 NOTE — PROGRESS NOTES
[facility] Hospitalist Progress Note    Admitting Date and Time: 3/23/2025  4:23 AM  Admit Dx: Throat cancer (HCC) [C14.0]  Failure to thrive in adult [R62.7]  Intractable nausea and vomiting [R11.2]    Subjective:  Patient is being followed for Throat cancer (HCC) [C14.0]  Failure to thrive in adult [R62.7]  Intractable nausea and vomiting [R11.2]   Patient was seen examined    ROS: denies fever, chills, cp, sob, n/v, HA unless stated above.      sennosides-docusate sodium  1 tablet Oral Daily    chlorproMAZINE  25 mg Oral Once    fentaNYL  1 patch TransDERmal Q72H    nortriptyline  25 mg PEG Tube Nightly    polyethylene glycol  17 g Per G Tube Daily    scopolamine  1 patch TransDERmal Q72H    sucralfate  1 g Oral 4x Daily    sodium chloride flush  5-40 mL IntraVENous 2 times per day    enoxaparin  30 mg SubCUTAneous Daily    lansoprazole  30 mg Oral QAM AC     bisacodyl, 10 mg, Daily PRN  diatrizoate meglumine-sodium, 30 mL, ONCE PRN  HYDROmorphone, 0.25 mg, Q3H PRN  glucose, 4 tablet, PRN  dextrose bolus, 125 mL, PRN   Or  dextrose bolus, 250 mL, PRN  glucagon (rDNA), 1 mg, PRN  dextrose, , Continuous PRN  promethazine, 25 mg, Q6H PRN  lidocaine viscous hcl, 15 mL, Daily PRN  ondansetron, 8 mg, Q8H PRN  oxyCODONE, 10 mg, Q4H PRN  sodium chloride flush, 5-40 mL, PRN  sodium chloride, , PRN  potassium chloride, 40 mEq, PRN   Or  potassium alternative oral replacement, 40 mEq, PRN   Or  potassium chloride, 10 mEq, PRN  magnesium sulfate, 2,000 mg, PRN  acetaminophen, 650 mg, Q6H PRN  prochlorperazine, 10 mg, Q6H PRN         Objective:    BP (!) 106/57   Pulse 77   Temp 97.5 °F (36.4 °C) (Oral)   Resp 16   Ht 1.727 m (5' 8\")   Wt 50.4 kg (111 lb 3.2 oz)   SpO2 97%   BMI 16.91 kg/m²     General Appearance: alert and oriented to person, place and time and in no acute distress  Skin: warm and dry  Head: normocephalic and atraumatic  Eyes: pupils equal, round, and reactive to light, extraocular eye  placement    NOTE: This report was transcribed using voice recognition software. Every effort was made to ensure accuracy; however, inadvertent computerized transcription errors may be present.  Electronically signed by Lee Zarate DO on 3/27/2025 at 3:45 PM     250

## 2025-03-27 NOTE — PROGRESS NOTES
Gastroenterology, Hepatology, &  Advanced Endoscopy    Progress Note        HPI:     Symptoms stable. Wishes to hold on chemo and radiation treatments for now. Arrangements being made for [facility].     Lab Results   Component Value Date    INR 1.2 01/23/2025    INR 1.1 08/04/2022    PROTIME 13.0 (H) 01/23/2025    PROTIME 11.6 08/04/2022         ALT   Date Value Ref Range Status   03/26/2025 19 0 - 40 U/L Final   03/26/2025 19 0 - 40 U/L Final   03/25/2025 18 0 - 40 U/L Final     AST   Date Value Ref Range Status   03/26/2025 19 0 - 39 U/L Final   03/26/2025 19 0 - 39 U/L Final   03/25/2025 18 0 - 39 U/L Final     Alkaline Phosphatase   Date Value Ref Range Status   03/26/2025 74 40 - 129 U/L Final   03/26/2025 73 40 - 129 U/L Final   03/25/2025 72 40 - 129 U/L Final     Total Bilirubin   Date Value Ref Range Status   03/26/2025 0.5 0.0 - 1.2 mg/dL Final   03/26/2025 0.5 0.0 - 1.2 mg/dL Final   03/25/2025 0.5 0.0 - 1.2 mg/dL Final     Bilirubin, Direct   Date Value Ref Range Status   03/26/2025 <0.2 0.0 - 0.3 mg/dL Final   03/21/2025 <0.2 0.0 - 0.3 mg/dL Final   03/19/2025 <0.2 0.0 - 0.3 mg/dL Final      Lab Results   Component Value Date    WBC 4.7 03/26/2025    HGB 9.5 (L) 03/26/2025    HCT 29.0 (L) 03/26/2025     03/26/2025     03/26/2025    K 3.8 03/26/2025     03/26/2025    CREATININE 0.7 03/26/2025    BUN 19 03/26/2025    CO2 30 (H) 03/26/2025    FOLATE 5.1 03/14/2025    [id number] 1368 (H) 03/14/2025    GLUCOSE 129 (H) 03/26/2025    INR 1.2 01/23/2025    PROTIME 13.0 (H) 01/23/2025    TSH 0.41 03/04/2025    LABA1C 5.6 03/04/2025     Computed MELD 3.0 unavailable. One or more values for this score either were not found within the given timeframe or did not fit some other criterion.  Computed MELD-Na unavailable. One or more values for this score either were not found within the given timeframe or did not fit some other criterion.     Lipase   Date Value Ref Range Status   03/23/2025 22 13 -  demonstrate  no acute abnormality.    BONES/SOFT TISSUES: The bone marrow signal intensity appears normal. The soft  tissues demonstrate no acute abnormality.    Impression  No acute intracranial abnormality.  No abnormal postcontrast enhancement.       Previous Endoscopies: No colonoscopy on file  No flexible sigmoidoscopy on file    Home Medications:  Current Facility-Administered Medications   Medication Dose Route Frequency Provider Last Rate Last Admin    PN-Adult  3-in-1 Central Line (Standard)   IntraVENous Continuous TPN Haile Wiseman MD 85 mL/hr at 03/26/25 1829 New Bag at 03/26/25 1829    diatrizoate meglumine-sodium (GASTROGRAFIN) 66-10 % solution 30 mL  30 mL Oral ONCE PRN Haile Wiseman MD   30 mL at 03/25/25 0823    HYDROmorphone (DILAUDID) injection 0.25 mg  0.25 mg IntraVENous Q3H PRN Stefania Joyce APRN - CNP   0.25 mg at 03/27/25 0413    glucose chewable tablet 16 g  4 tablet Oral PRN Thania Fernandez APRN - CNP        dextrose bolus 10% 125 mL  125 mL IntraVENous PRN Thania Fernandez APRN - CNP        Or    dextrose bolus 10% 250 mL  250 mL IntraVENous PRN Thania Fernandez APRN - CNP        glucagon injection 1 mg  1 mg IntraMUSCular PRN Thania Fernandez APRN - CNP        dextrose 10 % infusion   IntraVENous Continuous PRN Thania Fernandez APRN - CNP   Stopped at 03/24/25 0838    promethazine (PHENERGAN) tablet 25 mg  25 mg Oral Q6H PRN Pam Bergman APRN - CNP        sennosides-docusate sodium (SENOKOT-S) 8.6-50 MG tablet 1 tablet  1 tablet Oral Daily Pam Bergman APRN - CNP        chlorproMAZINE (THORAZINE) tablet 25 mg  25 mg Oral Once Halley Mullins MD        dextrose 5 % and 0.45 % sodium chloride infusion   IntraVENous Continuous Halley Mullins MD   Stopped at 03/25/25 1734    fentaNYL (DURAGESIC) 25 MCG/HR 1 patch  1 patch TransDERmal Q72H Jennifer Haddad APRN - NP   1 patch at 03/26/25 0955    lidocaine viscous hcl (XYLOCAINE) 2 % solution 15 mL  15 mL  - NP   30 mg at 03/23/25 1128    prochlorperazine (COMPAZINE) injection 10 mg  10 mg IntraVENous Q6H PRN Halley Mullins MD   10 mg at 03/27/25 0053    bisacodyl (DULCOLAX) suppository 10 mg  10 mg Rectal PRN Sharon Mendoza, APRN - CNP           OBJECTIVE        Physical    {VITALS/IO:[id number]}  Physical Exam:  General: Overall well-appearing, NAD  HEENT: PERRLA, EOMI, Anicteric sclera, MMM, no rhinorrhea  Cards: RRR, no LE edema  Resp: Breathing comfortably on room air, good air movement, no use of accessory muscles, no audible wheezing  Abdomen: ***  Extremities: Moves all extremities, no effusions or bruising.  Skin: No rashes or jaundice  Neuro: A&O x 3, CN grossly intact, non-focal exam     Data    {DATA REVIEW:[id number]}    ASSESSMENT AND PLAN      TPN and venting of G tube until symptoms approve.  Agree with [facility] for rehab.   He will see Oncology in 2-4 weeks to determine functional status and if he is in a better condition to again begin chemoradiation therapy.

## 2025-03-27 NOTE — TELEPHONE ENCOUNTER
This pt is wanting to establish with you. His girlfriend, Mary Anne Clement is a pt of yours. Is it ok to get him scheduled? Thanks!

## 2025-03-27 NOTE — PROGRESS NOTES
Comprehensive Nutrition Assessment    Type and Reason for Visit:  Reassess    Nutrition Recommendations/Plan:   Continue current TPN, at goal rate  To provide: 2040 ml TV, 68 g AA, 1820 kcal    Due to National PN shortages, may be unable to meet 100% estimated calorie and/or protein needs with available products.     Will continue to monitor while inpatient     Malnutrition Assessment:  Malnutrition Status:  Severe malnutrition (03/24/25 0929)    Context:  Chronic Illness     Findings of the 6 clinical characteristics of malnutrition:  Energy Intake:  75% or less estimated energy requirements for 1 month or longer  Weight Loss:  Greater than 7.5% over 3 months     Body Fat Loss:  Mild body fat loss (moderate) Buccal region   Muscle Mass Loss:  Severe muscle mass loss Temples (temporalis), Clavicles (pectoralis & deltoids), Scapula (trapezius)  Fluid Accumulation:  No fluid accumulation     Strength:  Not Performed    Nutrition Assessment:    Pt continues on TPN support, at goal rate. Experiencing some nausea. Denies V/D/C. OK for liquid pleasure feeds if passes swallow study per GI. Plan for TPN as outpatient. Continue current TPN and monitor.    Nutrition Related Findings:    A&O x4, abd soft/flat, PEG/J, +BS, nausea, no edema, +2.2 L, Na 148, K+/Mg/phos WNL Wound Type: None       Current Nutrition Intake & Therapies:    Average Meal Intake: NPO  Average Supplements Intake: NPO  Diet NPO  PN-Adult  3-in-1 Central Line (Standard)  Current Parenteral Nutrition Orders:  Type and Formula: 3-in-1 Standard (w/ Electrolytes; Kabiven)   Lipids: None  Duration: Continuous  Rate/Volume: 85 ml/hr / 2040 ml TV  Current PN Order Provides: at goal  Goal PN Orders Provides: 2040 ml TV, 68 g AA, 1820 kcal    Anthropometric Measures:  Height: 172.7 cm (5' 8\")  Ideal Body Weight (IBW): 154 lbs (70 kg)    Admission Body Weight: 49.3 kg (108 lb 11.2 oz) (3/24 bed scale)  Current Body Weight: 50.4 kg (111 lb 3.2 oz) (3/27), 70.6 %

## 2025-03-27 NOTE — CARE COORDINATION
Social Work discharge planning    1. [facility]: Pt was found to have Medicare primary, and does not meet the rev codes under Medicare this hospital admit. Per [facility], pt can not use his last inpatient stay to qualify now.     2. Gave pt list of Medicare SNFs that potentially accept a pt on TPN. Await his choices to make referrals.    3. [facility] and [facility] are still following for possible need.     Electronically signed by IRWIN Aguirre on 3/27/2025 at 7:55 AM     Addendum  Pt advised he needs more time to talk to his family about snf.  Electronically signed by IRWIN Aguirre on 3/27/2025 at 1:18 PM     Addendum  Met with pt and girlfriend. They have reviewed snf choice list.They do NOT SNF. They have chosen HOME with [facility]. Notified [facility].   Called [facility]. [phone]. Spoke to Pharmacist Denny. He advised they can not start new TPN on weekend. They will need Rx for TPN in pt's folder signed please. Updated with charge Rn.  Spoke to Ananya with [facility], is going to try to make PCP establishment appt for pt with Dr Hemphill in [place] per pt and gf\" preference.  Electronically signed by IRWIN Aguirre on 3/27/2025 at 3:15 PM

## 2025-03-27 NOTE — PROGRESS NOTES
Palliative Care Department  [phone]  Palliative Care Progress Note  Provider Stefania Joyce, APRN - CNP      PATIENT: Prasanna Parnell  : 1965  MRN: [id number]  ADMISSION DATE: 3/23/2025  4:23 AM  Referring Provider: Roberto Tomas MD     Palliative Medicine was consulted on hospital day 1 for assistance with Symptom management: intractable nausea/vomiting; head and neck cancer    HPI:     Clinical Summary:Prasanna Parnell is a 59 y.o. y/o male spondylolisthesis, retrolisthesis, neuropathy, COPD, squamous cell carcinoma of the head and neck in 2024. Laryngoscopy by ENT 2025 showed ulcerated masses seen in the area of the right palatine tonsil extending to the base of the tongue into the vallecula approaching midline. Pathology of the right tonsil biopsy and right base of the tongue biopsy was consistent with HPV associated squamous cell carcinoma. He had a bronchoscopy with fine-needle aspiration of right upper lobe lesion which was positive for malignancy consistent with squamous cell carcinoma. Patient was initiated on chemotherapy and is following with Dr. Tomas in medical oncology and radiation therapy concurrently.  He had a recent hospitalization due to nausea and vomiting, on 3/3/2025, he underwent PEG tube placement 3/11/2025, insurance company did not cover tube feedings despite PEG tube in place and patient unable to eat.  PICC line was placed on 3/16/2025 and TPN was initiated on 3/17/2025.  Case management was having difficulty time setting up TPN outpatient, patient eloped on 3/21/2024 before tube feeding to be set up at home.  Who presented to [facility] on 3/23/2025 with failure to thrive and nausea.  At ED, given fentanyl, Benadryl, 1 L bolus.  He was admitted for further medical management of intractable nausea and failure to thrive.    ASSESSMENT/PLAN:     Pertinent Hospital Diagnoses     Intractable nausea  Failure to thrive  SCC of tongue/tonsil/lung    Palliative Care  starts to feel pain after 2-3hrs.  Patient does have fentanyl patch applied.  He has used no oxycodone in the last 24 hours and 7 doses of Dilaudid.  Patient states he is unable to tolerate p.o. medication at this time. Discussed importance of trying p.o. medications if he is able to keep them down.  Patient states he has not had a bowel movement today however he has not been eating anything.   Discussed goals of care and CODE STATUS options.  Patient does confirm full CODE STATUS and would like to continue all aggressive medical management will continue to follow.  He tells me after discussions with his physicians he is  holding off on radiation therapy until he is stronger.  He is to meet with his girlfriend and CM today to discuss discharge planning further.    Patient follows with [facility] palliative care OP for symptoms, will ensure [place].    Prognosis: Guarded    OBJECTIVE:     BP (!) 106/57   Pulse 77   Temp 97.5 °F (36.4 °C) (Oral)   Resp 16   Ht 1.727 m (5' 8\")   Wt 50.4 kg (111 lb 3.2 oz)   SpO2 97%   BMI 16.91 kg/m²     Physical Examination:  Gen: thin, NAD, awake, alert   Lungs: respirations easy  Heart: regular rate and rhythm  Abdomen: non-distended  Skin: warm, dry without rashes, lesions, bruising  Neuro: awake, alert, oriented x 3, follows commands    Objective data reviewed: labs, images, records, medication use, vitals, and chart    Time/Communication  Greater than 50% of time spent, total 25 minutes in counseling and coordination of care at the bedside regarding goals of care.    Thank you for allowing Palliative Medicine to participate in the care of Prasanna Parnell.    Note: This report was completed using [employer] voiced recognition software.  Every effort has been made to ensure accuracy; however, inadvertent computerized transcription errors may be present.

## 2025-03-27 NOTE — PROGRESS NOTES
Occupational Therapy  OCCUPATIONAL THERAPY INITIAL EVALUATION  [facility]  [address], [place]    Date: 3/27/2025     Patient Name: Prasanna Parnell  MRN: [id number]  : 1965  Room: [address] [place] [address]    Evaluating OT: Pam Mooney, OTR/L - [id number]    Referring Provider: Lee Zarate DO  Specific Provider Orders/Date: \"OT eval and treat\" - 3/27/2025    Diagnosis: Throat cancer (HCC) [C14.0]  Failure to thrive in adult [R62.7]  Intractable nausea and vomiting [R11.2]     Pertinent Medical History: \"metastatic squamous cell carcinoma of the right base of the tongue/tonsil\" - \"metastatic disease to right upper lung\" (per oncology note), COPD, neuropathy, recent PEG tube placement (3/11/2025)    Precautions: fall risk, PEG/J-tube    Assessment of Current Deficits:    [] Functional mobility   [] ADLs  [x] Strength               [] Cognition   [] Functional transfers   [x] IADLs         [x] Safety Awareness   [x] Endurance   [] Fine Motor Coordination  [] Balance      [] Vision/Perception   [] Sensation    [] Gross Motor Coordination [] ROM          [] Delirium                  [] Motor Control     OT PLAN OF CARE   OT POC is based on physician orders, patient diagnosis, and results of clinical assessment.  Frequency/Duration 2-5 days/week for 2-4 weeks PRN   Specific OT Treatment Interventions to Include:   * Instruction/training on adapted ADL techniques and AE recommendations to increase functional independence within precautions       * Training on energy conservation strategies, correct breathing pattern and techniques to improve independence/tolerance for self-care routine  * Functional transfer/mobility training/DME recommendations for increased independence, safety, and fall prevention  * Patient/Family education to increase follow through with safety techniques and functional independence  * Recommendation of environmental modifications    Functional Transfers Sit-to-Stand: Independent   from EOB  N/A   Functional Mobility Supervision - Independent (without device) within patient's room and hallway.  Mod I / Independent with functional mobility (with device, as needed/appropriate) in order to maximize independence with ADLs/IADLs and other functional tasks.   Balance Sitting: Good  Standing: Good- to Good (without device)  Good dynamic standing balance during completion of ADLs/IADLs and other functional tasks.   Activity Tolerance Fair+  Patient will demonstrate Good understanding and consistent implementation of energy conservation techniques and work simplification techniques into ADL/IADL routines.   Visual/  Perceptual WFL     N/A     Additional Long-Term Goal: Patient will increase functional independence to PLOF in order to allow patient to live in least restrictive environment.     B UE AROM/Strength: WFL grossly    Hearing: WFL  Sensation: No c/o numbness/tingling in B UEs.  Tone: WFL  Edema: No    Comments: RN approved patient's participation in OOB activities. Upon arrival, patient seated at EOB. At end of session, patient standing in patient's room (per his preference) with all lines and tubes intact. Patient would benefit from continued skilled OT to increase safety and independence with completion of ADL/IADL tasks for functional independence and quality of life.     Patient education provided regardin) potential benefits of having assist from family/friends, as needed, upon discharge. Patient indicated understanding.    Further skilled OT treatment indicated to increase patient's safety and independence with completion of ADL/IADL tasks in order to maximize patient's functional independence and quality of life.    Rehab Potential: Good for established goals.  Patient / Family Goal: Patient wants to return to his PLOF.  Patient and/or family were instructed on functional diagnosis, prognosis/goals, and OT plan of care. Demonstrated

## 2025-03-28 ENCOUNTER — TELEPHONE (OUTPATIENT)
Dept: RADIATION ONCOLOGY | Age: 60
End: 2025-03-28

## 2025-03-28 ENCOUNTER — APPOINTMENT (OUTPATIENT)
Dept: RADIATION ONCOLOGY | Age: 60
DRG: 391 | End: 2025-03-28
Payer: MEDICARE

## 2025-03-28 LAB
ALBUMIN SERPL-MCNC: 3.7 G/DL (ref 3.5–5.2)
ALP SERPL-CCNC: 78 U/L (ref 40–129)
ALT SERPL-CCNC: 18 U/L (ref 0–40)
ANION GAP SERPL CALCULATED.3IONS-SCNC: 12 MMOL/L (ref 7–16)
AST SERPL-CCNC: 20 U/L (ref 0–39)
ATYPICAL LYMPHOCYTE ABSOLUTE COUNT: 0.06 K/UL (ref 0–0.46)
ATYPICAL LYMPHOCYTES: 2 % (ref 0–4)
BASOPHILS # BLD: 0.09 K/UL (ref 0–0.2)
BASOPHILS NFR BLD: 3 % (ref 0–2)
BILIRUB DIRECT SERPL-MCNC: 0.3 MG/DL (ref 0–0.3)
BILIRUB INDIRECT SERPL-MCNC: 0.4 MG/DL (ref 0–1)
BILIRUB SERPL-MCNC: 0.7 MG/DL (ref 0–1.2)
BUN SERPL-MCNC: 33 MG/DL (ref 6–20)
CALCIUM SERPL-MCNC: 9.7 MG/DL (ref 8.6–10.2)
CHLORIDE SERPL-SCNC: 103 MMOL/L (ref 98–107)
CO2 SERPL-SCNC: 30 MMOL/L (ref 22–29)
CREAT SERPL-MCNC: 0.6 MG/DL (ref 0.7–1.2)
EOSINOPHIL # BLD: 0.26 K/UL (ref 0.05–0.5)
EOSINOPHILS RELATIVE PERCENT: 8 % (ref 0–6)
ERYTHROCYTE [DISTWIDTH] IN BLOOD BY AUTOMATED COUNT: 16.2 % (ref 11.5–15)
GFR, ESTIMATED: >90 ML/MIN/1.73M2
GLUCOSE BLD-MCNC: 128 MG/DL (ref 74–99)
GLUCOSE BLD-MCNC: 143 MG/DL (ref 74–99)
GLUCOSE BLD-MCNC: 156 MG/DL (ref 74–99)
GLUCOSE BLD-MCNC: 156 MG/DL (ref 74–99)
GLUCOSE BLD-MCNC: 164 MG/DL (ref 74–99)
GLUCOSE SERPL-MCNC: 93 MG/DL (ref 74–99)
HCT VFR BLD AUTO: 44.7 % (ref 37–54)
HGB BLD-MCNC: 14.5 G/DL (ref 12.5–16.5)
LYMPHOCYTES NFR BLD: 0.32 K/UL (ref 1.5–4)
LYMPHOCYTES RELATIVE PERCENT: 10 % (ref 20–42)
MAGNESIUM SERPL-MCNC: 2.2 MG/DL (ref 1.6–2.6)
MCH RBC QN AUTO: 32 PG (ref 26–35)
MCHC RBC AUTO-ENTMCNC: 32.4 G/DL (ref 32–34.5)
MCV RBC AUTO: 98.7 FL (ref 80–99.9)
MONOCYTES NFR BLD: 0.12 K/UL (ref 0.1–0.95)
MONOCYTES NFR BLD: 3 % (ref 2–12)
NEUTROPHILS NFR BLD: 75 % (ref 43–80)
NEUTS SEG NFR BLD: 2.55 K/UL (ref 1.8–7.3)
PHOSPHATE SERPL-MCNC: 4 MG/DL (ref 2.5–4.5)
PLATELET # BLD AUTO: 198 K/UL (ref 130–450)
PMV BLD AUTO: 9.8 FL (ref 7–12)
POTASSIUM SERPL-SCNC: 4.2 MMOL/L (ref 3.5–5)
PROT SERPL-MCNC: 6.8 G/DL (ref 6.4–8.3)
RBC # BLD AUTO: 4.53 M/UL (ref 3.8–5.8)
RBC # BLD: ABNORMAL 10*6/UL
SODIUM SERPL-SCNC: 145 MMOL/L (ref 132–146)
WBC OTHER # BLD: 3.4 K/UL (ref 4.5–11.5)

## 2025-03-28 PROCEDURE — 84100 ASSAY OF PHOSPHORUS: CPT

## 2025-03-28 PROCEDURE — 2580000003 HC RX 258: Performed by: STUDENT IN AN ORGANIZED HEALTH CARE EDUCATION/TRAINING PROGRAM

## 2025-03-28 PROCEDURE — 6370000000 HC RX 637 (ALT 250 FOR IP): Performed by: NURSE PRACTITIONER

## 2025-03-28 PROCEDURE — 83735 ASSAY OF MAGNESIUM: CPT

## 2025-03-28 PROCEDURE — 2500000003 HC RX 250 WO HCPCS: Performed by: STUDENT IN AN ORGANIZED HEALTH CARE EDUCATION/TRAINING PROGRAM

## 2025-03-28 PROCEDURE — 2500000003 HC RX 250 WO HCPCS: Performed by: INTERNAL MEDICINE

## 2025-03-28 PROCEDURE — 82962 GLUCOSE BLOOD TEST: CPT

## 2025-03-28 PROCEDURE — 85025 COMPLETE CBC W/AUTO DIFF WBC: CPT

## 2025-03-28 PROCEDURE — 6360000002 HC RX W HCPCS

## 2025-03-28 PROCEDURE — 6360000002 HC RX W HCPCS: Performed by: STUDENT IN AN ORGANIZED HEALTH CARE EDUCATION/TRAINING PROGRAM

## 2025-03-28 PROCEDURE — 99233 SBSQ HOSP IP/OBS HIGH 50: CPT | Performed by: STUDENT IN AN ORGANIZED HEALTH CARE EDUCATION/TRAINING PROGRAM

## 2025-03-28 PROCEDURE — 36592 COLLECT BLOOD FROM PICC: CPT

## 2025-03-28 PROCEDURE — 80053 COMPREHEN METABOLIC PANEL: CPT

## 2025-03-28 PROCEDURE — 82248 BILIRUBIN DIRECT: CPT

## 2025-03-28 PROCEDURE — 99232 SBSQ HOSP IP/OBS MODERATE 35: CPT | Performed by: STUDENT IN AN ORGANIZED HEALTH CARE EDUCATION/TRAINING PROGRAM

## 2025-03-28 PROCEDURE — 1200000000 HC SEMI PRIVATE

## 2025-03-28 PROCEDURE — 99232 SBSQ HOSP IP/OBS MODERATE 35: CPT

## 2025-03-28 RX ORDER — SCOPOLAMINE 1 MG/3D
1 PATCH, EXTENDED RELEASE TRANSDERMAL
Status: DISCONTINUED | OUTPATIENT
Start: 2025-03-28 | End: 2025-04-03 | Stop reason: HOSPADM

## 2025-03-28 RX ORDER — SCOPOLAMINE 1 MG/3D
1 PATCH, EXTENDED RELEASE TRANSDERMAL
Status: DISCONTINUED | OUTPATIENT
Start: 2025-03-29 | End: 2025-03-28

## 2025-03-28 RX ORDER — ONDANSETRON 2 MG/ML
4 INJECTION INTRAMUSCULAR; INTRAVENOUS EVERY 6 HOURS PRN
Status: DISCONTINUED | OUTPATIENT
Start: 2025-03-28 | End: 2025-04-03 | Stop reason: HOSPADM

## 2025-03-28 RX ORDER — FENTANYL 50 UG/1
1 PATCH TRANSDERMAL
Refills: 0 | Status: DISCONTINUED | OUTPATIENT
Start: 2025-03-29 | End: 2025-04-03

## 2025-03-28 RX ADMIN — HYDROMORPHONE HYDROCHLORIDE 0.25 MG: 1 INJECTION, SOLUTION INTRAMUSCULAR; INTRAVENOUS; SUBCUTANEOUS at 19:14

## 2025-03-28 RX ADMIN — HYDROMORPHONE HYDROCHLORIDE 0.25 MG: 1 INJECTION, SOLUTION INTRAMUSCULAR; INTRAVENOUS; SUBCUTANEOUS at 13:04

## 2025-03-28 RX ADMIN — PROCHLORPERAZINE EDISYLATE 10 MG: 5 INJECTION INTRAMUSCULAR; INTRAVENOUS at 04:44

## 2025-03-28 RX ADMIN — HYDROMORPHONE HYDROCHLORIDE 0.25 MG: 1 INJECTION, SOLUTION INTRAMUSCULAR; INTRAVENOUS; SUBCUTANEOUS at 23:12

## 2025-03-28 RX ADMIN — ONDANSETRON 4 MG: 2 INJECTION, SOLUTION INTRAMUSCULAR; INTRAVENOUS at 16:01

## 2025-03-28 RX ADMIN — SODIUM CHLORIDE, PRESERVATIVE FREE 10 ML: 5 INJECTION INTRAVENOUS at 23:13

## 2025-03-28 RX ADMIN — HYDROMORPHONE HYDROCHLORIDE 0.25 MG: 1 INJECTION, SOLUTION INTRAMUSCULAR; INTRAVENOUS; SUBCUTANEOUS at 01:56

## 2025-03-28 RX ADMIN — HYDROMORPHONE HYDROCHLORIDE 0.25 MG: 1 INJECTION, SOLUTION INTRAMUSCULAR; INTRAVENOUS; SUBCUTANEOUS at 07:00

## 2025-03-28 RX ADMIN — HYDROMORPHONE HYDROCHLORIDE 0.25 MG: 1 INJECTION, SOLUTION INTRAMUSCULAR; INTRAVENOUS; SUBCUTANEOUS at 16:02

## 2025-03-28 RX ADMIN — POTASSIUM CHLORIDE: 2 INJECTION, SOLUTION, CONCENTRATE INTRAVENOUS at 18:08

## 2025-03-28 RX ADMIN — PROCHLORPERAZINE EDISYLATE 10 MG: 5 INJECTION INTRAMUSCULAR; INTRAVENOUS at 09:06

## 2025-03-28 RX ADMIN — DEXTROSE MONOHYDRATE: 50 INJECTION, SOLUTION INTRAVENOUS at 06:52

## 2025-03-28 RX ADMIN — PROCHLORPERAZINE EDISYLATE 10 MG: 5 INJECTION INTRAMUSCULAR; INTRAVENOUS at 23:13

## 2025-03-28 ASSESSMENT — PAIN SCALES - GENERAL
PAINLEVEL_OUTOF10: 8
PAINLEVEL_OUTOF10: 2
PAINLEVEL_OUTOF10: 3
PAINLEVEL_OUTOF10: 10

## 2025-03-28 ASSESSMENT — PAIN DESCRIPTION - ORIENTATION
ORIENTATION: RIGHT;LEFT
ORIENTATION: RIGHT;LEFT
ORIENTATION: LEFT

## 2025-03-28 ASSESSMENT — PAIN - FUNCTIONAL ASSESSMENT
PAIN_FUNCTIONAL_ASSESSMENT: PREVENTS OR INTERFERES SOME ACTIVE ACTIVITIES AND ADLS
PAIN_FUNCTIONAL_ASSESSMENT: ACTIVITIES ARE NOT PREVENTED
PAIN_FUNCTIONAL_ASSESSMENT: ACTIVITIES ARE NOT PREVENTED

## 2025-03-28 ASSESSMENT — PAIN DESCRIPTION - LOCATION
LOCATION: THROAT
LOCATION: THROAT

## 2025-03-28 ASSESSMENT — PAIN DESCRIPTION - DESCRIPTORS
DESCRIPTORS: ACHING;DISCOMFORT;SORE;TENDER
DESCRIPTORS: ACHING;DISCOMFORT;SORE;TENDER
DESCRIPTORS: ACHING;DISCOMFORT;SORE

## 2025-03-28 NOTE — PLAN OF CARE
Problem: Pain  Goal: Verbalizes/displays adequate comfort level or baseline comfort level  Outcome: Not Progressing     Problem: Nutrition Deficit:  Goal: Optimize nutritional status  Outcome: Not Progressing     Problem: Safety - Adult  Goal: Free from fall injury  Outcome: Progressing     Problem: Pain  Goal: Verbalizes/displays adequate comfort level or baseline comfort level  Outcome: Not Progressing     Problem: Nutrition Deficit:  Goal: Optimize nutritional status  Outcome: Not Progressing

## 2025-03-28 NOTE — PROGRESS NOTES
[facility] Hospitalist Progress Note    Admitting Date and Time: 3/23/2025  4:23 AM  Admit Dx: Throat cancer (HCC) [C14.0]  Failure to thrive in adult [R62.7]  Intractable nausea and vomiting [R11.2]    Subjective:  Patient is being followed for Throat cancer (HCC) [C14.0]  Failure to thrive in adult [R62.7]  Intractable nausea and vomiting [R11.2]   Patient was seen examined    ROS: denies fever, chills, cp, sob, n/v, HA unless stated above.      [START ON 3/29/2025] fentaNYL  1 patch TransDERmal Q72H    scopolamine  1 patch TransDERmal Q72H    sennosides-docusate sodium  1 tablet Oral Daily    chlorproMAZINE  25 mg Oral Once    fentaNYL  1 patch TransDERmal Q72H    nortriptyline  25 mg PEG Tube Nightly    polyethylene glycol  17 g Per G Tube Daily    sucralfate  1 g Oral 4x Daily    sodium chloride flush  5-40 mL IntraVENous 2 times per day    enoxaparin  30 mg SubCUTAneous Daily    lansoprazole  30 mg Oral QAM AC     bisacodyl, 10 mg, Daily PRN  diatrizoate meglumine-sodium, 30 mL, ONCE PRN  HYDROmorphone, 0.25 mg, Q3H PRN  glucose, 4 tablet, PRN  dextrose bolus, 125 mL, PRN   Or  dextrose bolus, 250 mL, PRN  glucagon (rDNA), 1 mg, PRN  dextrose, , Continuous PRN  promethazine, 25 mg, Q6H PRN  lidocaine viscous hcl, 15 mL, Daily PRN  ondansetron, 8 mg, Q8H PRN  oxyCODONE, 10 mg, Q4H PRN  sodium chloride flush, 5-40 mL, PRN  sodium chloride, , PRN  potassium chloride, 40 mEq, PRN   Or  potassium alternative oral replacement, 40 mEq, PRN   Or  potassium chloride, 10 mEq, PRN  magnesium sulfate, 2,000 mg, PRN  acetaminophen, 650 mg, Q6H PRN  prochlorperazine, 10 mg, Q6H PRN         Objective:    BP (!) 117/56   Pulse 81   Temp 98.2 °F (36.8 °C) (Oral)   Resp 16   Ht 1.727 m (5' 8\")   Wt 50.3 kg (111 lb)   SpO2 97%   BMI 16.88 kg/m²     General Appearance: alert and oriented to person, place and time and in no acute distress  Skin: warm and dry  Head: normocephalic and atraumatic  Eyes: pupils equal,  round, and reactive to light, extraocular eye movements intact, conjunctivae normal  Neck: neck supple and non tender without mass   Pulmonary/Chest: clear to auscultation bilaterally- no wheezes, rales or rhonchi, normal air movement, no respiratory distress  Cardiovascular: normal rate, normal S1 and S2 and no carotid bruits  Abdomen: soft, non-tender, non-distended, normal bowel sounds, no masses or organomegaly  Extremities: no cyanosis, no clubbing and no edema  Neurologic: no cranial nerve deficit and speech normal        Recent Labs     03/26/25  0451 03/27/25  0504 03/28/25  0451    148* 145   K 3.8 4.2 4.2    107 103   CO2 30* 29 30*   BUN 19 29* 33*   CREATININE 0.7 0.7 0.6*   GLUCOSE 129* 104* 93   CALCIUM 9.7 10.1 9.7       Recent Labs     03/26/25 0451 03/27/25  0504 03/28/25  0451   WBC 4.7 4.5 3.4*   RBC 2.94* 2.73* 4.53   HGB 9.5* 8.8* 14.5   HCT 29.0* 27.2* 44.7   MCV 98.6 99.6 98.7   MCH 32.3 32.2 32.0   MCHC 32.8 32.4 32.4   RDW 15.6* 15.7* 16.2*    288 198   MPV 9.3 9.6 9.8       Radiology:     Assessment:    Principal Problem:    Intractable nausea and vomiting  Active Problems:    Severe protein-calorie malnutrition    Throat cancer (HCC)  Resolved Problems:    * No resolved hospital problems. *    Plan:  Intractable nausea/ failure to thrive - continue anti-emetics, NPO. Dietary consulted for TF vs TPN. Patient has right PICC line for TPN but no way to manage outpatient. Case management to follow - patient may need SNF vs LTAC for the next 3 weeks to get TPN. Not tolerating tube feeds, gets nauseuos even with fluids through PEG.   SCC of tongue/tonsil/lung - continue fentanyl, PRN pain control, oncology consulted. Has 3 more weeks of radiation.   Iron deficiency anemia - HH stable  s/p ferrlecit. Monitor and transfuse for less than 7.   Hypernatremia change fluid to D5 75 cc today.  Recheck morning labs     Code Status: full   DVT prophylaxis: Lovenox   Dispo - dc with [facility],

## 2025-03-28 NOTE — PLAN OF CARE
Problem: Pain  Goal: Verbalizes/displays adequate comfort level or baseline comfort level  3/28/2025 1428 by Dorothy Newman RN  Outcome: Progressing  3/28/2025 0235 by Karla Tovar RN  Outcome: Not Progressing     Problem: Safety - Adult  Goal: Free from fall injury  3/28/2025 1428 by Dorothy Newman RN  Outcome: Progressing  3/28/2025 0235 by Karla Tovar RN  Outcome: Progressing     Problem: Nutrition Deficit:  Goal: Optimize nutritional status  3/28/2025 1428 by Dorothy Newman RN  Outcome: Progressing  3/28/2025 0235 by Karla Tovar RN  Outcome: Not Progressing     Problem: Pain  Goal: Verbalizes/displays adequate comfort level or baseline comfort level  3/28/2025 1428 by Dorothy Newman RN  Outcome: Progressing  3/28/2025 0235 by Karla Tovar RN  Outcome: Not Progressing     Problem: Nutrition Deficit:  Goal: Optimize nutritional status  3/28/2025 1428 by Dorothy Newman RN  Outcome: Progressing  3/28/2025 0235 by Karla Tovar RN  Outcome: Not Progressing

## 2025-03-28 NOTE — CARE COORDINATION
Social Work discharge planning    [facility]. [phone] x3  pharmacist Denny advised they can not do a new start TPN at home on weekend. They will need final TPN Rx in pt's folder signed by Dr please.  Fax to [phone]    [facility] aware of plan to discharge home Monday if medically ready per .Pt will need c order for TPN, nursing please.    Ananya with [facility] working on requested PCP for pt.In the meantime, Dr Wiseman kindly agreed to follow for c.    Electronically signed by IRWIN Aguirre on 3/28/2025 at 10:21 AM

## 2025-03-28 NOTE — PROGRESS NOTES
Messaged Dr Wiseman regarding tpn for today and weekend, he ordered it for the next 2 weeks, pharmacy informed me this can not be done, needs ordered on a daily basis to monitor electrolytes, I updated Dr Wiseman, he ordered for today but requested that medicine order for the weekend because he will be unavailable and no one covers for him here, will update Dr Zarate

## 2025-03-28 NOTE — PROGRESS NOTES
Messaged palliative NP Pam Bergman to see if there is anything else we can do to control patient's nausea. Awaiting response. Electronically signed by Dorothy Newman RN on 3/28/2025 at 11:30 AM

## 2025-03-28 NOTE — PROGRESS NOTES
Palliative Care Department  [phone]  Palliative Care Progress Note  Provider Stefania Joyce, APRN - CNP      PATIENT: Prasanna Parnell  : 1965  MRN: [id number]  ADMISSION DATE: 3/23/2025  4:23 AM  Referring Provider: Roberto Tomas MD     Palliative Medicine was consulted on hospital day 2 for assistance with Symptom management: intractable nausea/vomiting; head and neck cancer    HPI:     Clinical Summary:Prasanna Parnell is a 59 y.o. y/o male spondylolisthesis, retrolisthesis, neuropathy, COPD, squamous cell carcinoma of the head and neck in 2024. Laryngoscopy by ENT 2025 showed ulcerated masses seen in the area of the right palatine tonsil extending to the base of the tongue into the vallecula approaching midline. Pathology of the right tonsil biopsy and right base of the tongue biopsy was consistent with HPV associated squamous cell carcinoma. He had a bronchoscopy with fine-needle aspiration of right upper lobe lesion which was positive for malignancy consistent with squamous cell carcinoma. Patient was initiated on chemotherapy and is following with Dr. Tomas in medical oncology and radiation therapy concurrently.  He had a recent hospitalization due to nausea and vomiting, on 3/3/2025, he underwent PEG tube placement 3/11/2025, insurance company did not cover tube feedings despite PEG tube in place and patient unable to eat.  PICC line was placed on 3/16/2025 and TPN was initiated on 3/17/2025.  Case management was having difficulty time setting up TPN outpatient, patient eloped on 3/21/2024 before tube feeding to be set up at home.  Who presented to [facility] on 3/23/2025 with failure to thrive and nausea.  At ED, given fentanyl, Benadryl, 1 L bolus.  He was admitted for further medical management of intractable nausea and failure to thrive.    ASSESSMENT/PLAN:     Pertinent Hospital Diagnoses     Intractable nausea  Failure to thrive  SCC of tongue/tonsil/lung    Palliative Care

## 2025-03-28 NOTE — TELEPHONE ENCOUNTER
I spoke to Prasanna blanca: continuation of radiation treatment, he states he wants to hold off treatment for now until he builds up his strength a little bit.  I did not have a chance to explain to him the importance of continuing radiation and not taking a break, he had to hung up D/T he was having dry heaving and doesn't want to talk.

## 2025-03-29 LAB
ANION GAP SERPL CALCULATED.3IONS-SCNC: 8 MMOL/L (ref 7–16)
BASOPHILS # BLD: 0.03 K/UL (ref 0–0.2)
BASOPHILS NFR BLD: 1 % (ref 0–2)
BUN SERPL-MCNC: 28 MG/DL (ref 6–20)
CALCIUM SERPL-MCNC: 9.8 MG/DL (ref 8.6–10.2)
CHLORIDE SERPL-SCNC: 97 MMOL/L (ref 98–107)
CO2 SERPL-SCNC: 31 MMOL/L (ref 22–29)
CREAT SERPL-MCNC: 0.6 MG/DL (ref 0.7–1.2)
EOSINOPHIL # BLD: 0.31 K/UL (ref 0.05–0.5)
EOSINOPHILS RELATIVE PERCENT: 6 % (ref 0–6)
ERYTHROCYTE [DISTWIDTH] IN BLOOD BY AUTOMATED COUNT: 15.9 % (ref 11.5–15)
GFR, ESTIMATED: >90 ML/MIN/1.73M2
GLUCOSE BLD-MCNC: 103 MG/DL (ref 74–99)
GLUCOSE BLD-MCNC: 138 MG/DL (ref 74–99)
GLUCOSE BLD-MCNC: 149 MG/DL (ref 74–99)
GLUCOSE SERPL-MCNC: 141 MG/DL (ref 74–99)
HCT VFR BLD AUTO: 28.2 % (ref 37–54)
HGB BLD-MCNC: 9.3 G/DL (ref 12.5–16.5)
IMM GRANULOCYTES # BLD AUTO: 0.03 K/UL (ref 0–0.58)
IMM GRANULOCYTES NFR BLD: 1 % (ref 0–5)
LYMPHOCYTES NFR BLD: 0.44 K/UL (ref 1.5–4)
LYMPHOCYTES RELATIVE PERCENT: 8 % (ref 20–42)
MAGNESIUM SERPL-MCNC: 2.2 MG/DL (ref 1.6–2.6)
MCH RBC QN AUTO: 32.9 PG (ref 26–35)
MCHC RBC AUTO-ENTMCNC: 33 G/DL (ref 32–34.5)
MCV RBC AUTO: 99.6 FL (ref 80–99.9)
MONOCYTES NFR BLD: 0.35 K/UL (ref 0.1–0.95)
MONOCYTES NFR BLD: 6 % (ref 2–12)
NEUTROPHILS NFR BLD: 79 % (ref 43–80)
NEUTS SEG NFR BLD: 4.3 K/UL (ref 1.8–7.3)
PHOSPHATE SERPL-MCNC: 3.3 MG/DL (ref 2.5–4.5)
PLATELET # BLD AUTO: 271 K/UL (ref 130–450)
PMV BLD AUTO: 9.7 FL (ref 7–12)
POTASSIUM SERPL-SCNC: 4 MMOL/L (ref 3.5–5)
RBC # BLD AUTO: 2.83 M/UL (ref 3.8–5.8)
RBC # BLD: ABNORMAL 10*6/UL
SODIUM SERPL-SCNC: 136 MMOL/L (ref 132–146)
WBC OTHER # BLD: 5.5 K/UL (ref 4.5–11.5)

## 2025-03-29 PROCEDURE — 83735 ASSAY OF MAGNESIUM: CPT

## 2025-03-29 PROCEDURE — 2580000003 HC RX 258: Performed by: STUDENT IN AN ORGANIZED HEALTH CARE EDUCATION/TRAINING PROGRAM

## 2025-03-29 PROCEDURE — 82962 GLUCOSE BLOOD TEST: CPT

## 2025-03-29 PROCEDURE — 85025 COMPLETE CBC W/AUTO DIFF WBC: CPT

## 2025-03-29 PROCEDURE — 99232 SBSQ HOSP IP/OBS MODERATE 35: CPT | Performed by: NURSE PRACTITIONER

## 2025-03-29 PROCEDURE — 1200000000 HC SEMI PRIVATE

## 2025-03-29 PROCEDURE — 6370000000 HC RX 637 (ALT 250 FOR IP)

## 2025-03-29 PROCEDURE — 6360000002 HC RX W HCPCS: Performed by: STUDENT IN AN ORGANIZED HEALTH CARE EDUCATION/TRAINING PROGRAM

## 2025-03-29 PROCEDURE — 2500000003 HC RX 250 WO HCPCS: Performed by: STUDENT IN AN ORGANIZED HEALTH CARE EDUCATION/TRAINING PROGRAM

## 2025-03-29 PROCEDURE — 6360000002 HC RX W HCPCS

## 2025-03-29 PROCEDURE — 99233 SBSQ HOSP IP/OBS HIGH 50: CPT | Performed by: STUDENT IN AN ORGANIZED HEALTH CARE EDUCATION/TRAINING PROGRAM

## 2025-03-29 PROCEDURE — 84100 ASSAY OF PHOSPHORUS: CPT

## 2025-03-29 PROCEDURE — 80048 BASIC METABOLIC PNL TOTAL CA: CPT

## 2025-03-29 RX ADMIN — HYDROMORPHONE HYDROCHLORIDE 0.25 MG: 1 INJECTION, SOLUTION INTRAMUSCULAR; INTRAVENOUS; SUBCUTANEOUS at 18:21

## 2025-03-29 RX ADMIN — PROCHLORPERAZINE EDISYLATE 10 MG: 5 INJECTION INTRAMUSCULAR; INTRAVENOUS at 16:27

## 2025-03-29 RX ADMIN — HYDROMORPHONE HYDROCHLORIDE 0.25 MG: 1 INJECTION, SOLUTION INTRAMUSCULAR; INTRAVENOUS; SUBCUTANEOUS at 21:38

## 2025-03-29 RX ADMIN — PROCHLORPERAZINE EDISYLATE 10 MG: 5 INJECTION INTRAMUSCULAR; INTRAVENOUS at 06:13

## 2025-03-29 RX ADMIN — HYDROMORPHONE HYDROCHLORIDE 0.25 MG: 1 INJECTION, SOLUTION INTRAMUSCULAR; INTRAVENOUS; SUBCUTANEOUS at 08:25

## 2025-03-29 RX ADMIN — CALCIUM GLUCONATE: 98 INJECTION, SOLUTION INTRAVENOUS at 18:09

## 2025-03-29 RX ADMIN — HYDROMORPHONE HYDROCHLORIDE 0.25 MG: 1 INJECTION, SOLUTION INTRAMUSCULAR; INTRAVENOUS; SUBCUTANEOUS at 14:37

## 2025-03-29 RX ADMIN — DEXTROSE MONOHYDRATE: 50 INJECTION, SOLUTION INTRAVENOUS at 17:17

## 2025-03-29 RX ADMIN — HYDROMORPHONE HYDROCHLORIDE 0.25 MG: 1 INJECTION, SOLUTION INTRAMUSCULAR; INTRAVENOUS; SUBCUTANEOUS at 11:48

## 2025-03-29 RX ADMIN — ONDANSETRON 4 MG: 2 INJECTION, SOLUTION INTRAMUSCULAR; INTRAVENOUS at 04:19

## 2025-03-29 RX ADMIN — ONDANSETRON 4 MG: 2 INJECTION, SOLUTION INTRAMUSCULAR; INTRAVENOUS at 21:38

## 2025-03-29 RX ADMIN — ONDANSETRON 4 MG: 2 INJECTION, SOLUTION INTRAMUSCULAR; INTRAVENOUS at 11:50

## 2025-03-29 RX ADMIN — HYDROMORPHONE HYDROCHLORIDE 0.25 MG: 1 INJECTION, SOLUTION INTRAMUSCULAR; INTRAVENOUS; SUBCUTANEOUS at 04:18

## 2025-03-29 ASSESSMENT — PAIN DESCRIPTION - PAIN TYPE: TYPE: ACUTE PAIN;CHRONIC PAIN

## 2025-03-29 ASSESSMENT — PAIN DESCRIPTION - DESCRIPTORS
DESCRIPTORS: ACHING;DISCOMFORT
DESCRIPTORS: ACHING;SORE;SHARP
DESCRIPTORS: ACHING;BURNING;SORE
DESCRIPTORS: ACHING
DESCRIPTORS: ACHING;BURNING;SORE
DESCRIPTORS: ACHING;SORE;DISCOMFORT
DESCRIPTORS: ACHING;SORE;SHARP

## 2025-03-29 ASSESSMENT — PAIN SCALES - GENERAL
PAINLEVEL_OUTOF10: 8
PAINLEVEL_OUTOF10: 3
PAINLEVEL_OUTOF10: 8
PAINLEVEL_OUTOF10: 7
PAINLEVEL_OUTOF10: 8
PAINLEVEL_OUTOF10: 0
PAINLEVEL_OUTOF10: 8
PAINLEVEL_OUTOF10: 8

## 2025-03-29 ASSESSMENT — PAIN DESCRIPTION - FREQUENCY: FREQUENCY: CONTINUOUS

## 2025-03-29 ASSESSMENT — PAIN DESCRIPTION - ORIENTATION
ORIENTATION: RIGHT;LEFT
ORIENTATION: LEFT;RIGHT
ORIENTATION: RIGHT;LEFT
ORIENTATION: OTHER (COMMENT)
ORIENTATION: RIGHT;LEFT

## 2025-03-29 ASSESSMENT — PAIN DESCRIPTION - LOCATION
LOCATION: GENERALIZED
LOCATION: GENERALIZED

## 2025-03-29 ASSESSMENT — PAIN - FUNCTIONAL ASSESSMENT
PAIN_FUNCTIONAL_ASSESSMENT: PREVENTS OR INTERFERES SOME ACTIVE ACTIVITIES AND ADLS
PAIN_FUNCTIONAL_ASSESSMENT: ACTIVITIES ARE NOT PREVENTED

## 2025-03-29 ASSESSMENT — PAIN DESCRIPTION - ONSET: ONSET: ON-GOING

## 2025-03-29 NOTE — PROGRESS NOTES
IntraVENous Continuous PRN Thania Fernandez APRN - CNP   Stopped at 03/24/25 0838    promethazine (PHENERGAN) tablet 25 mg  25 mg Oral Q6H PRN Pam Bergman APRN - CNP        sennosides-docusate sodium (SENOKOT-S) 8.6-50 MG tablet 1 tablet  1 tablet Oral Daily Pam Bergman APRN - CNP        chlorproMAZINE (THORAZINE) tablet 25 mg  25 mg Oral Once Halley Mullins MD        fentaNYL (DURAGESIC) 25 MCG/HR 1 patch  1 patch TransDERmal Q72H Stefania Joyce APRN - CNP   1 patch at 03/26/25 0955    lidocaine viscous hcl (XYLOCAINE) 2 % solution 15 mL  15 mL Mouth/Throat Daily PRN Jennifer Haddad APRN - NP        nortriptyline (PAMELOR) capsule 25 mg  25 mg PEG Tube Nightly Jennifer Haddad APRN - NP        ondansetron (ZOFRAN-ODT) disintegrating tablet 8 mg  8 mg Oral Q8H PRN Jennifer Haddad APRN - NP   8 mg at 03/24/25 1022    oxyCODONE (ROXICODONE) 5 MG/5ML solution 10 mg  10 mg Per G Tube Q4H PRN Jennifer Haddad APRN - NP   10 mg at 03/23/25 1127    polyethylene glycol (GLYCOLAX) packet 17 g  17 g Per G Tube Daily Jennifer Haddad APRN - NP   17 g at 03/23/25 0909    sucralfate (CARAFATE) tablet 1 g  1 g Oral 4x Daily Jennifer Haddad APRN - NP   1 g at 03/23/25 0909    sodium chloride flush 0.9 % injection 5-40 mL  5-40 mL IntraVENous 2 times per day Jennifer Haddad APRN - NP   10 mL at 03/28/25 2313    sodium chloride flush 0.9 % injection 5-40 mL  5-40 mL IntraVENous PRN Jennifer Haddad APRN - NP   10 mL at 03/27/25 1859    0.9 % sodium chloride infusion   IntraVENous PRN Jennifer Haddad APRN - NP        potassium chloride (KLOR-CON M) extended release tablet 40 mEq  40 mEq Oral PRN Jennifer Haddad APRN - NP        Or    potassium bicarb-citric acid (EFFER-K) effervescent tablet 40 mEq  40 mEq Oral PRN Jennifer Haddad APRN - NP        Or    potassium chloride 10 mEq/100 mL IVPB (Peripheral Line)  10 mEq IntraVENous PRN Jennifer Haddad APRN - NP        magnesium  starting tomorrow. I have signed the prescription for outpatient TPN but orders will need to be renewed daily by Medicine for the remainder of the inpatient stay.     Thank you for including us in the care of this patient. Please do not hesitate to contact us with any additional questions or concerns.     Haile Wiseman MD  Gastroenterology/Hepatology  Advanced Endoscopy

## 2025-03-29 NOTE — PROGRESS NOTES
[facility] Hospitalist Progress Note    Admitting Date and Time: 3/23/2025  4:23 AM  Admit Dx: Throat cancer (HCC) [C14.0]  Failure to thrive in adult [R62.7]  Intractable nausea and vomiting [R11.2]    Subjective:  Patient is being followed for Throat cancer (HCC) [C14.0]  Failure to thrive in adult [R62.7]  Intractable nausea and vomiting [R11.2]   Patient was seen examined    ROS: denies fever, chills, cp, sob, n/v, HA unless stated above.      fentaNYL  1 patch TransDERmal Q72H    scopolamine  1 patch TransDERmal Q72H    sennosides-docusate sodium  1 tablet Oral Daily    chlorproMAZINE  25 mg Oral Once    nortriptyline  25 mg PEG Tube Nightly    polyethylene glycol  17 g Per G Tube Daily    sucralfate  1 g Oral 4x Daily    sodium chloride flush  5-40 mL IntraVENous 2 times per day    enoxaparin  30 mg SubCUTAneous Daily    lansoprazole  30 mg Oral QAM AC     ondansetron, 4 mg, Q6H PRN  bisacodyl, 10 mg, Daily PRN  diatrizoate meglumine-sodium, 30 mL, ONCE PRN  HYDROmorphone, 0.25 mg, Q3H PRN  glucose, 4 tablet, PRN  dextrose bolus, 125 mL, PRN   Or  dextrose bolus, 250 mL, PRN  glucagon (rDNA), 1 mg, PRN  dextrose, , Continuous PRN  promethazine, 25 mg, Q6H PRN  lidocaine viscous hcl, 15 mL, Daily PRN  ondansetron, 8 mg, Q8H PRN  oxyCODONE, 10 mg, Q4H PRN  sodium chloride flush, 5-40 mL, PRN  sodium chloride, , PRN  potassium chloride, 40 mEq, PRN   Or  potassium alternative oral replacement, 40 mEq, PRN   Or  potassium chloride, 10 mEq, PRN  magnesium sulfate, 2,000 mg, PRN  acetaminophen, 650 mg, Q6H PRN  prochlorperazine, 10 mg, Q6H PRN         Objective:    BP (!) 101/58   Pulse 88   Temp 98.1 °F (36.7 °C) (Oral)   Resp 16   Ht 1.727 m (5' 8\")   Wt 51.5 kg (113 lb 9.6 oz)   SpO2 97%   BMI 17.27 kg/m²     General Appearance: alert and oriented to person, place and time and in no acute distress  Skin: warm and dry  Head: normocephalic and atraumatic  Eyes: pupils equal, round, and reactive to  light, extraocular eye movements intact, conjunctivae normal  Neck: neck supple and non tender without mass   Pulmonary/Chest: clear to auscultation bilaterally- no wheezes, rales or rhonchi, normal air movement, no respiratory distress  Cardiovascular: normal rate, normal S1 and S2 and no carotid bruits  Abdomen: soft, non-tender, non-distended, normal bowel sounds, no masses or organomegaly  Extremities: no cyanosis, no clubbing and no edema  Neurologic: no cranial nerve deficit and speech normal        Recent Labs     03/27/25  0504 03/28/25  0451 03/29/25  0620   * 145 136   K 4.2 4.2 4.0    103 97*   CO2 29 30* 31*   BUN 29* 33* 28*   CREATININE 0.7 0.6* 0.6*   GLUCOSE 104* 93 141*   CALCIUM 10.1 9.7 9.8       Recent Labs     03/27/25  0504 03/28/25  0451 03/29/25  0620   WBC 4.5 3.4* 5.5   RBC 2.73* 4.53 2.83*   HGB 8.8* 14.5 9.3*   HCT 27.2* 44.7 28.2*   MCV 99.6 98.7 99.6   MCH 32.2 32.0 32.9   MCHC 32.4 32.4 33.0   RDW 15.7* 16.2* 15.9*    198 271   MPV 9.6 9.8 9.7       Radiology:     Assessment:    Principal Problem:    Intractable nausea and vomiting  Active Problems:    Severe protein-calorie malnutrition    Throat cancer (HCC)  Resolved Problems:    * No resolved hospital problems. *    Plan:  Intractable nausea/ failure to thrive - continue anti-emetics, NPO. Dietary consulted for TF vs TPN. Patient has right PICC line for TPN but no way to manage outpatient. Case management to follow - patient may need SNF vs LTAC for the next 3 weeks to get TPN. Not tolerating tube feeds, gets nauseuos even with fluids through PEG.   SCC of tongue/tonsil/lung - continue fentanyl, PRN pain control, oncology consulted. Has 3 more weeks of radiation.   Iron deficiency anemia - HH stable  s/p ferrlecit. Monitor and transfuse for less than 7.   Hypernatremia change fluid to D5 75 cc today.  Recheck morning labs     Code Status: full   DVT prophylaxis: Lovenox   Dispo - dc with HHC, TPN per GI.

## 2025-03-29 NOTE — PROGRESS NOTES
Palliative Care Department  [phone]  Palliative Care Progress Note  Provider Pam Bergman, APRN - CNP      PATIENT: Prasanna Parnell  : 1965  MRN: [id number]  ADMISSION DATE: 3/23/2025  4:23 AM  Referring Provider: Roberto Tomas MD     Palliative Medicine was consulted on hospital day 3 for assistance with Symptom management: intractable nausea/vomiting; head and neck cancer    HPI:     Clinical Summary:Prasanna Parnell is a 59 y.o. y/o male spondylolisthesis, retrolisthesis, neuropathy, COPD, squamous cell carcinoma of the head and neck in 2024. Laryngoscopy by ENT 2025 showed ulcerated masses seen in the area of the right palatine tonsil extending to the base of the tongue into the vallecula approaching midline. Pathology of the right tonsil biopsy and right base of the tongue biopsy was consistent with HPV associated squamous cell carcinoma. He had a bronchoscopy with fine-needle aspiration of right upper lobe lesion which was positive for malignancy consistent with squamous cell carcinoma. Patient was initiated on chemotherapy and is following with Dr. Tomas in medical oncology and radiation therapy concurrently.  He had a recent hospitalization due to nausea and vomiting, on 3/3/2025, he underwent PEG tube placement 3/11/2025, insurance company did not cover tube feedings despite PEG tube in place and patient unable to eat.  PICC line was placed on 3/16/2025 and TPN was initiated on 3/17/2025.  Case management was having difficulty time setting up TPN outpatient, patient eloped on 3/21/2024 before tube feeding to be set up at home.  Who presented to [facility] on 3/23/2025 with failure to thrive and nausea.  At ED, given fentanyl, Benadryl, 1 L bolus.  He was admitted for further medical management of intractable nausea and failure to thrive.    ASSESSMENT/PLAN:     Pertinent Hospital Diagnoses     Intractable nausea  Failure to thrive  SCC of tongue/tonsil/lung    Palliative Care  follow.    Prognosis: Guarded    OBJECTIVE:     BP (!) 101/58   Pulse 88   Temp 98.1 °F (36.7 °C) (Oral)   Resp 16   Ht 1.727 m (5' 8\")   Wt 51.5 kg (113 lb 9.6 oz)   SpO2 97%   BMI 17.27 kg/m²     Physical Examination:  Gen: thin, NAD, awake, alert   Lungs: respirations easy  Heart: regular rate and rhythm  Abdomen: non-distended  Skin: warm, dry without rashes, lesions, bruising  Neuro: awake, alert, oriented x 3, follows commands    Objective data reviewed: labs, images, records, medication use, vitals, and chart    Time/Communication  Greater than 50% of time spent, total 35 minutes in counseling and coordination of care at the bedside regarding goals of care.    Thank you for allowing Palliative Medicine to participate in the care of Prasanna Parnell.    Note: This report was completed using computerize voiced recognition software.  Every effort has been made to ensure accuracy; however, inadvertent computerized transcription errors may be present.

## 2025-03-29 NOTE — PROGRESS NOTES
Physical Therapy  Facility/Department: [address] MED SURG    Name: Prasanna Parnell  : 1965  MRN: [id number]    Order received, chart reviewed and discussed with nursing.  Per nursing, pt is independent with functional mobility around his room and does not need PT while in the hospital.  Will discontinue PT order.     [facility], [place] [id number]

## 2025-03-30 ENCOUNTER — APPOINTMENT (OUTPATIENT)
Dept: GENERAL RADIOLOGY | Age: 60
DRG: 391 | End: 2025-03-30
Payer: MEDICARE

## 2025-03-30 LAB
ANION GAP SERPL CALCULATED.3IONS-SCNC: 10 MMOL/L (ref 7–16)
BASOPHILS # BLD: 0.03 K/UL (ref 0–0.2)
BASOPHILS NFR BLD: 1 % (ref 0–2)
BUN SERPL-MCNC: 25 MG/DL (ref 6–20)
CALCIUM SERPL-MCNC: 9.9 MG/DL (ref 8.6–10.2)
CHLORIDE SERPL-SCNC: 96 MMOL/L (ref 98–107)
CO2 SERPL-SCNC: 30 MMOL/L (ref 22–29)
CREAT SERPL-MCNC: 0.7 MG/DL (ref 0.7–1.2)
EOSINOPHIL # BLD: 0.29 K/UL (ref 0.05–0.5)
EOSINOPHILS RELATIVE PERCENT: 6 % (ref 0–6)
ERYTHROCYTE [DISTWIDTH] IN BLOOD BY AUTOMATED COUNT: 15.7 % (ref 11.5–15)
GFR, ESTIMATED: >90 ML/MIN/1.73M2
GLUCOSE BLD-MCNC: 131 MG/DL (ref 74–99)
GLUCOSE BLD-MCNC: 143 MG/DL (ref 74–99)
GLUCOSE BLD-MCNC: 153 MG/DL (ref 74–99)
GLUCOSE BLD-MCNC: 154 MG/DL (ref 74–99)
GLUCOSE BLD-MCNC: 155 MG/DL (ref 74–99)
GLUCOSE SERPL-MCNC: 109 MG/DL (ref 74–99)
HCT VFR BLD AUTO: 25.7 % (ref 37–54)
HCT VFR BLD AUTO: 28.3 % (ref 37–54)
HCT VFR BLD AUTO: 29.1 % (ref 37–54)
HGB BLD-MCNC: 9.2 G/DL (ref 12.5–16.5)
HGB BLD-MCNC: 9.4 G/DL (ref 12.5–16.5)
HGB BLD-MCNC: 9.6 G/DL (ref 12.5–16.5)
IMM GRANULOCYTES # BLD AUTO: 0.04 K/UL (ref 0–0.58)
IMM GRANULOCYTES NFR BLD: 1 % (ref 0–5)
LYMPHOCYTES NFR BLD: 0.49 K/UL (ref 1.5–4)
LYMPHOCYTES RELATIVE PERCENT: 10 % (ref 20–42)
MAGNESIUM SERPL-MCNC: 2.1 MG/DL (ref 1.6–2.6)
MCH RBC QN AUTO: 32.4 PG (ref 26–35)
MCHC RBC AUTO-ENTMCNC: 33.2 G/DL (ref 32–34.5)
MCV RBC AUTO: 97.6 FL (ref 80–99.9)
MONOCYTES NFR BLD: 0.38 K/UL (ref 0.1–0.95)
MONOCYTES NFR BLD: 8 % (ref 2–12)
NEUTROPHILS NFR BLD: 75 % (ref 43–80)
NEUTS SEG NFR BLD: 3.76 K/UL (ref 1.8–7.3)
PHOSPHATE SERPL-MCNC: 3.7 MG/DL (ref 2.5–4.5)
PLATELET # BLD AUTO: 263 K/UL (ref 130–450)
PMV BLD AUTO: 10.1 FL (ref 7–12)
POTASSIUM SERPL-SCNC: 4.2 MMOL/L (ref 3.5–5)
RBC # BLD AUTO: 2.9 M/UL (ref 3.8–5.8)
RBC # BLD: ABNORMAL 10*6/UL
SODIUM SERPL-SCNC: 136 MMOL/L (ref 132–146)
WBC OTHER # BLD: 5 K/UL (ref 4.5–11.5)

## 2025-03-30 PROCEDURE — 74018 RADEX ABDOMEN 1 VIEW: CPT

## 2025-03-30 PROCEDURE — 6370000000 HC RX 637 (ALT 250 FOR IP): Performed by: STUDENT IN AN ORGANIZED HEALTH CARE EDUCATION/TRAINING PROGRAM

## 2025-03-30 PROCEDURE — 83735 ASSAY OF MAGNESIUM: CPT

## 2025-03-30 PROCEDURE — 80048 BASIC METABOLIC PNL TOTAL CA: CPT

## 2025-03-30 PROCEDURE — 6360000002 HC RX W HCPCS: Performed by: STUDENT IN AN ORGANIZED HEALTH CARE EDUCATION/TRAINING PROGRAM

## 2025-03-30 PROCEDURE — 2580000003 HC RX 258: Performed by: STUDENT IN AN ORGANIZED HEALTH CARE EDUCATION/TRAINING PROGRAM

## 2025-03-30 PROCEDURE — 6360000002 HC RX W HCPCS

## 2025-03-30 PROCEDURE — 2500000003 HC RX 250 WO HCPCS: Performed by: STUDENT IN AN ORGANIZED HEALTH CARE EDUCATION/TRAINING PROGRAM

## 2025-03-30 PROCEDURE — 2500000003 HC RX 250 WO HCPCS: Performed by: INTERNAL MEDICINE

## 2025-03-30 PROCEDURE — 82962 GLUCOSE BLOOD TEST: CPT

## 2025-03-30 PROCEDURE — 84100 ASSAY OF PHOSPHORUS: CPT

## 2025-03-30 PROCEDURE — 99233 SBSQ HOSP IP/OBS HIGH 50: CPT | Performed by: STUDENT IN AN ORGANIZED HEALTH CARE EDUCATION/TRAINING PROGRAM

## 2025-03-30 PROCEDURE — 85018 HEMOGLOBIN: CPT

## 2025-03-30 PROCEDURE — 85014 HEMATOCRIT: CPT

## 2025-03-30 PROCEDURE — 85025 COMPLETE CBC W/AUTO DIFF WBC: CPT

## 2025-03-30 PROCEDURE — 1200000000 HC SEMI PRIVATE

## 2025-03-30 RX ORDER — PROMETHAZINE HYDROCHLORIDE 25 MG/1
25 SUPPOSITORY RECTAL EVERY 6 HOURS PRN
Status: DISCONTINUED | OUTPATIENT
Start: 2025-03-30 | End: 2025-04-03 | Stop reason: HOSPADM

## 2025-03-30 RX ORDER — HYDROXYZINE HYDROCHLORIDE 10 MG/1
25 TABLET, FILM COATED ORAL ONCE
Status: DISCONTINUED | OUTPATIENT
Start: 2025-03-30 | End: 2025-03-30

## 2025-03-30 RX ORDER — ONDANSETRON 2 MG/ML
4 INJECTION INTRAMUSCULAR; INTRAVENOUS EVERY 6 HOURS PRN
Status: DISCONTINUED | OUTPATIENT
Start: 2025-03-30 | End: 2025-03-31

## 2025-03-30 RX ORDER — LORAZEPAM 2 MG/ML
1 INJECTION INTRAMUSCULAR ONCE
Status: DISCONTINUED | OUTPATIENT
Start: 2025-03-30 | End: 2025-03-30

## 2025-03-30 RX ADMIN — SODIUM CHLORIDE, PRESERVATIVE FREE 10 ML: 5 INJECTION INTRAVENOUS at 21:09

## 2025-03-30 RX ADMIN — PROCHLORPERAZINE EDISYLATE 10 MG: 5 INJECTION INTRAMUSCULAR; INTRAVENOUS at 18:18

## 2025-03-30 RX ADMIN — HYDROMORPHONE HYDROCHLORIDE 0.25 MG: 1 INJECTION, SOLUTION INTRAMUSCULAR; INTRAVENOUS; SUBCUTANEOUS at 15:07

## 2025-03-30 RX ADMIN — HYDROMORPHONE HYDROCHLORIDE 0.25 MG: 1 INJECTION, SOLUTION INTRAMUSCULAR; INTRAVENOUS; SUBCUTANEOUS at 18:18

## 2025-03-30 RX ADMIN — PROCHLORPERAZINE EDISYLATE 10 MG: 5 INJECTION INTRAMUSCULAR; INTRAVENOUS at 01:44

## 2025-03-30 RX ADMIN — HYDROMORPHONE HYDROCHLORIDE 0.25 MG: 1 INJECTION, SOLUTION INTRAMUSCULAR; INTRAVENOUS; SUBCUTANEOUS at 11:50

## 2025-03-30 RX ADMIN — CALCIUM GLUCONATE: 98 INJECTION, SOLUTION INTRAVENOUS at 18:30

## 2025-03-30 RX ADMIN — PROCHLORPERAZINE EDISYLATE 10 MG: 5 INJECTION INTRAMUSCULAR; INTRAVENOUS at 08:53

## 2025-03-30 RX ADMIN — ONDANSETRON 4 MG: 2 INJECTION, SOLUTION INTRAMUSCULAR; INTRAVENOUS at 05:20

## 2025-03-30 RX ADMIN — HYDROMORPHONE HYDROCHLORIDE 0.25 MG: 1 INJECTION, SOLUTION INTRAMUSCULAR; INTRAVENOUS; SUBCUTANEOUS at 01:44

## 2025-03-30 RX ADMIN — HYDROMORPHONE HYDROCHLORIDE 0.25 MG: 1 INJECTION, SOLUTION INTRAMUSCULAR; INTRAVENOUS; SUBCUTANEOUS at 21:20

## 2025-03-30 RX ADMIN — SODIUM CHLORIDE 40 MG: 9 INJECTION, SOLUTION INTRAMUSCULAR; INTRAVENOUS; SUBCUTANEOUS at 13:00

## 2025-03-30 RX ADMIN — DEXTROSE MONOHYDRATE: 50 INJECTION, SOLUTION INTRAVENOUS at 07:12

## 2025-03-30 RX ADMIN — PROMETHAZINE HYDROCHLORIDE 25 MG: 25 SUPPOSITORY RECTAL at 14:22

## 2025-03-30 RX ADMIN — ONDANSETRON 4 MG: 2 INJECTION, SOLUTION INTRAMUSCULAR; INTRAVENOUS at 11:51

## 2025-03-30 RX ADMIN — ONDANSETRON 4 MG: 2 INJECTION, SOLUTION INTRAMUSCULAR; INTRAVENOUS at 21:19

## 2025-03-30 RX ADMIN — HYDROMORPHONE HYDROCHLORIDE 0.25 MG: 1 INJECTION, SOLUTION INTRAMUSCULAR; INTRAVENOUS; SUBCUTANEOUS at 06:22

## 2025-03-30 ASSESSMENT — PAIN SCALES - GENERAL
PAINLEVEL_OUTOF10: 8
PAINLEVEL_OUTOF10: 7
PAINLEVEL_OUTOF10: 8
PAINLEVEL_OUTOF10: 0
PAINLEVEL_OUTOF10: 7
PAINLEVEL_OUTOF10: 7
PAINLEVEL_OUTOF10: 8
PAINLEVEL_OUTOF10: 7
PAINLEVEL_OUTOF10: 0
PAINLEVEL_OUTOF10: 8
PAINLEVEL_OUTOF10: 8

## 2025-03-30 ASSESSMENT — PAIN - FUNCTIONAL ASSESSMENT
PAIN_FUNCTIONAL_ASSESSMENT: ACTIVITIES ARE NOT PREVENTED

## 2025-03-30 ASSESSMENT — PAIN DESCRIPTION - LOCATION
LOCATION: GENERALIZED

## 2025-03-30 ASSESSMENT — PAIN DESCRIPTION - DESCRIPTORS
DESCRIPTORS: ACHING;DISCOMFORT
DESCRIPTORS: DISCOMFORT;ACHING
DESCRIPTORS: ACHING;DISCOMFORT

## 2025-03-30 ASSESSMENT — PAIN DESCRIPTION - PAIN TYPE: TYPE: ACUTE PAIN;CHRONIC PAIN

## 2025-03-30 ASSESSMENT — PAIN DESCRIPTION - ORIENTATION
ORIENTATION: OTHER (COMMENT)
ORIENTATION: OTHER (COMMENT)
ORIENTATION: RIGHT;LEFT
ORIENTATION: OTHER (COMMENT)
ORIENTATION: OTHER (COMMENT)
ORIENTATION: RIGHT;LEFT
ORIENTATION: OTHER (COMMENT)

## 2025-03-30 ASSESSMENT — PAIN DESCRIPTION - FREQUENCY: FREQUENCY: CONTINUOUS

## 2025-03-30 ASSESSMENT — PAIN DESCRIPTION - ONSET: ONSET: ON-GOING

## 2025-03-30 NOTE — PLAN OF CARE
I received a call back from [facility] transfer center I discussed his case with attending physician at [facility], they do not feel the need of inpatient transfer for second opinion as now but they are happy to do outpatient consult once this patient get discharged from [facility]  I was able to get a hold of the GI attending, he is currently out of [place] for a [place], therefore will consult another GI group to follow-up the case  Pending bleeding scan  Will order KUB rule out obstruction versus ileus  Will trend H&H every 8 transfuse if needed.

## 2025-03-30 NOTE — PROGRESS NOTES
[facility] Hospitalist Progress Note    Admitting Date and Time: 3/23/2025  4:23 AM  Admit Dx: Throat cancer (HCC) [C14.0]  Failure to thrive in adult [R62.7]  Intractable nausea and vomiting [R11.2]    Subjective:  Patient is being followed for Throat cancer (HCC) [C14.0]  Failure to thrive in adult [R62.7]  Intractable nausea and vomiting [R11.2]   Patient was seen examined    ROS: denies fever, chills, cp, sob, n/v, HA unless stated above.      pantoprazole (PROTONIX) 40 mg in sodium chloride (PF) 0.9 % 10 mL injection  40 mg IntraVENous Q12H    fentaNYL  1 patch TransDERmal Q72H    scopolamine  1 patch TransDERmal Q72H    sennosides-docusate sodium  1 tablet Oral Daily    chlorproMAZINE  25 mg Oral Once    nortriptyline  25 mg PEG Tube Nightly    polyethylene glycol  17 g Per G Tube Daily    sucralfate  1 g Oral 4x Daily    sodium chloride flush  5-40 mL IntraVENous 2 times per day    [Held by provider] enoxaparin  30 mg SubCUTAneous Daily     ondansetron, 4 mg, Q6H PRN  promethazine, 25 mg, Q6H PRN  ondansetron, 4 mg, Q6H PRN  bisacodyl, 10 mg, Daily PRN  diatrizoate meglumine-sodium, 30 mL, ONCE PRN  HYDROmorphone, 0.25 mg, Q3H PRN  glucose, 4 tablet, PRN  dextrose bolus, 125 mL, PRN   Or  dextrose bolus, 250 mL, PRN  glucagon (rDNA), 1 mg, PRN  dextrose, , Continuous PRN  lidocaine viscous hcl, 15 mL, Daily PRN  ondansetron, 8 mg, Q8H PRN  oxyCODONE, 10 mg, Q4H PRN  sodium chloride flush, 5-40 mL, PRN  sodium chloride, , PRN  potassium chloride, 40 mEq, PRN   Or  potassium alternative oral replacement, 40 mEq, PRN   Or  potassium chloride, 10 mEq, PRN  magnesium sulfate, 2,000 mg, PRN  acetaminophen, 650 mg, Q6H PRN  prochlorperazine, 10 mg, Q6H PRN         Objective:    BP (!) 123/54   Pulse 66   Temp 97.3 °F (36.3 °C) (Axillary)   Resp 16   Ht 1.727 m (5' 8\")   Wt 52.3 kg (115 lb 6.4 oz)   SpO2 97%   BMI 17.55 kg/m²     General Appearance: alert and oriented to person, place and time and in  Has 2 rounds of chemotherapy outpatient and then stopped due to intolerance and the side effect of severe nausea and vomiting  Iron deficiency anemia - HH stable  s/p ferrlecit. Monitor and transfuse for less than 7.  Trend H&H every 8 transfuse as needed  Hypernatremia now resolved after D5     Code Status: full   DVT prophylaxis: Lovenox   Dispo - dc with HHC, TPN per GI.     This patient does have brownish output from the PEG-J tube, repeat H&H stable I do not think he has active bleeding although I discussed with the patient's we did a occult testing which is positive and also I ordered a stat bleeding scan.      I offered this patient Ativan for anxiety and also nausea this morning but he generally declined he said he took Advil before and gave him delirium and hallucination we will hold off for now I reach out to GI physician he is currently out of country for a week, so we have to consult another GI physician in this hospital to evaluate possible postop bleeding or complication from the PEG J-tube today I discussed the case with [facility] per family request they declined transfer they can offer outpatient consultation after patient stabilized at [facility]    NOTE: This report was transcribed using voice recognition software. Every effort was made to ensure accuracy; however, inadvertent computerized transcription errors may be present.  Electronically signed by Lee Zarate DO on 3/30/2025 at 2:55 PM

## 2025-03-30 NOTE — PROGRESS NOTES
Per pharmacist, dietician would be the one to give recommendations on how to wean or discontinue TPN.  Call placed to dietician.    Electronically signed by Maddy Merino RN on 3/30/2025 at 10:15 AM    Notified Dr. Zarate that pt. Has refused ordered ativan due to confusion from ativan in the past.  Notified Dr. Zarate that TPN was titrated to 65cc/hr until dietician can give recommendations.    Electronically signed by Maddy Merino RN on 3/30/2025 at 10:28 AM    Dr. Zarate wanted to try atarax.  Pt. Refusing any oral medication.  Notified Dr. Zarate.    Electronically signed by Maddy Merino RN on 3/30/2025 at 10:40 AM    Message sent to Dr. Wiseman per Dr. Zarate request.  Electronically signed by Maddy Merino RN on 3/30/2025 at 10:52 AM

## 2025-03-30 NOTE — CONSULTS
Consult received  Charts/labs reviewed  Extensive review of medical record  See orders  Full consult to follow        Discussed with Dr. José Luis Macias, FADI - CNP 3/30/2025 3:16 PM for Dr. Valladares

## 2025-03-30 NOTE — PROGRESS NOTES
Clinical Manager Fannie alonzo of EGD  Electronically signed by Brenda Silva RN on 3/30/2025 at 6:25 PM

## 2025-03-30 NOTE — PROGRESS NOTES
Per Dr. Wiseman - do NOT stop TPN.  Pt.'s symptoms are not being caused by the TPN.      Electronically signed by Maddy Merino RN on 3/30/2025 at 12:06 PM

## 2025-03-30 NOTE — PROGRESS NOTES
Pt. Girlfriend is requesting contact from pt. [facility].  Message left to pt. [facility].    Electronically signed by Maddy Merino RN on 3/30/2025 at 12:15 PM

## 2025-03-30 NOTE — PROGRESS NOTES
Pt. Unable to complete Bleeding scan due to inability to lay flat.      Electronically signed by Maddy Merino RN on 3/30/2025 at 2:49 PM

## 2025-03-30 NOTE — PROGRESS NOTES
Message sent to Dr. Zarate regarding pt current complaints of continuous dry heaves unrelieved by antiemetics throughout the night and continuing into this morning.  He is requesting his TPN be turned down because it is \"too much\".  Notified Dr. Zarate.    Electronically signed by Maddy Merino RN on 3/30/2025 at 9:08 AM    Message sent to Dr. Zarate.  Pt. Stating he doesn't feel right and something is wrong.    Electronically signed by Maddy Merino RN on 3/30/2025 at 9:59 AM

## 2025-03-30 NOTE — PLAN OF CARE
Problem: Pain  Goal: Verbalizes/displays adequate comfort level or baseline comfort level  3/30/2025 1114 by Maddy Merino, RN  Outcome: Progressing     Problem: Safety - Adult  Goal: Free from fall injury  3/30/2025 1114 by Maddy Merino, RN  Outcome: Progressing     Problem: Nutrition Deficit:  Goal: Optimize nutritional status  Outcome: Progressing

## 2025-03-30 NOTE — PROGRESS NOTES
Dr. Zarate notified patient and significant other Mary Anne want him transferred to [facility]. He states he will call.  Electronically signed by Brenda Silva RN on 3/30/2025 at 1:01 PM      [facility] will not accept per Dr. Zarate  Electronically signed by Brenda Silva RN on 3/30/2025 at 6:19 PM

## 2025-03-31 ENCOUNTER — APPOINTMENT (OUTPATIENT)
Dept: GENERAL RADIOLOGY | Age: 60
DRG: 391 | End: 2025-03-31
Payer: MEDICARE

## 2025-03-31 ENCOUNTER — ANESTHESIA EVENT (OUTPATIENT)
Dept: ENDOSCOPY | Age: 60
End: 2025-03-31
Payer: MEDICARE

## 2025-03-31 ENCOUNTER — ANESTHESIA (OUTPATIENT)
Dept: ENDOSCOPY | Age: 60
End: 2025-03-31
Payer: MEDICARE

## 2025-03-31 PROBLEM — K92.2 GIB (GASTROINTESTINAL BLEEDING): Status: ACTIVE | Noted: 2025-03-23

## 2025-03-31 LAB
ALBUMIN SERPL-MCNC: 3.8 G/DL (ref 3.5–5.2)
ALP SERPL-CCNC: 104 U/L (ref 40–129)
ALT SERPL-CCNC: 58 U/L (ref 0–40)
AMMONIA PLAS-SCNC: 15 UMOL/L (ref 16–60)
ANION GAP SERPL CALCULATED.3IONS-SCNC: 7 MMOL/L (ref 7–16)
AST SERPL-CCNC: 52 U/L (ref 0–39)
BASOPHILS # BLD: 0.05 K/UL (ref 0–0.2)
BASOPHILS NFR BLD: 1 % (ref 0–2)
BILIRUB DIRECT SERPL-MCNC: 0.5 MG/DL (ref 0–0.3)
BILIRUB INDIRECT SERPL-MCNC: 0.7 MG/DL (ref 0–1)
BILIRUB SERPL-MCNC: 1.2 MG/DL (ref 0–1.2)
BUN SERPL-MCNC: 24 MG/DL (ref 6–20)
CALCIUM SERPL-MCNC: 10.1 MG/DL (ref 8.6–10.2)
CHLORIDE SERPL-SCNC: 97 MMOL/L (ref 98–107)
CO2 SERPL-SCNC: 31 MMOL/L (ref 22–29)
CREAT SERPL-MCNC: 0.7 MG/DL (ref 0.7–1.2)
EOSINOPHIL # BLD: 0.19 K/UL (ref 0.05–0.5)
EOSINOPHILS RELATIVE PERCENT: 4 % (ref 0–6)
ERYTHROCYTE [DISTWIDTH] IN BLOOD BY AUTOMATED COUNT: 16.1 % (ref 11.5–15)
GFR, ESTIMATED: >90 ML/MIN/1.73M2
GLUCOSE BLD-MCNC: 128 MG/DL (ref 74–99)
GLUCOSE BLD-MCNC: 132 MG/DL (ref 74–99)
GLUCOSE BLD-MCNC: 133 MG/DL (ref 74–99)
GLUCOSE BLD-MCNC: 88 MG/DL (ref 74–99)
GLUCOSE SERPL-MCNC: 128 MG/DL (ref 74–99)
HCT VFR BLD AUTO: 28.3 % (ref 37–54)
HCT VFR BLD AUTO: 38.1 % (ref 37–54)
HGB BLD-MCNC: 12.3 G/DL (ref 12.5–16.5)
HGB BLD-MCNC: 9.3 G/DL (ref 12.5–16.5)
LYMPHOCYTES NFR BLD: 0.28 K/UL (ref 1.5–4)
LYMPHOCYTES RELATIVE PERCENT: 5 % (ref 20–42)
MAGNESIUM SERPL-MCNC: 2.2 MG/DL (ref 1.6–2.6)
MCH RBC QN AUTO: 32.4 PG (ref 26–35)
MCHC RBC AUTO-ENTMCNC: 32.9 G/DL (ref 32–34.5)
MCV RBC AUTO: 98.6 FL (ref 80–99.9)
MONOCYTES NFR BLD: 0.23 K/UL (ref 0.1–0.95)
MONOCYTES NFR BLD: 4 % (ref 2–12)
MYELOCYTES ABSOLUTE COUNT: 0.05 K/UL
MYELOCYTES: 1 %
NEUTROPHILS NFR BLD: 85 % (ref 43–80)
NEUTS SEG NFR BLD: 4.51 K/UL (ref 1.8–7.3)
PHOSPHATE SERPL-MCNC: 3.5 MG/DL (ref 2.5–4.5)
PLATELET # BLD AUTO: 261 K/UL (ref 130–450)
PMV BLD AUTO: 10.5 FL (ref 7–12)
POTASSIUM SERPL-SCNC: 4.4 MMOL/L (ref 3.5–5)
PROT SERPL-MCNC: 6.9 G/DL (ref 6.4–8.3)
RBC # BLD AUTO: 2.87 M/UL (ref 3.8–5.8)
RBC # BLD: ABNORMAL 10*6/UL
SODIUM SERPL-SCNC: 135 MMOL/L (ref 132–146)
WBC OTHER # BLD: 5.3 K/UL (ref 4.5–11.5)

## 2025-03-31 PROCEDURE — 3700000000 HC ANESTHESIA ATTENDED CARE: Performed by: INTERNAL MEDICINE

## 2025-03-31 PROCEDURE — 85014 HEMATOCRIT: CPT

## 2025-03-31 PROCEDURE — 82140 ASSAY OF AMMONIA: CPT

## 2025-03-31 PROCEDURE — 7100000010 HC PHASE II RECOVERY - FIRST 15 MIN: Performed by: INTERNAL MEDICINE

## 2025-03-31 PROCEDURE — 1200000000 HC SEMI PRIVATE

## 2025-03-31 PROCEDURE — 82962 GLUCOSE BLOOD TEST: CPT

## 2025-03-31 PROCEDURE — 85018 HEMOGLOBIN: CPT

## 2025-03-31 PROCEDURE — 2580000003 HC RX 258: Performed by: NURSE ANESTHETIST, CERTIFIED REGISTERED

## 2025-03-31 PROCEDURE — 3609012400 HC EGD TRANSORAL BIOPSY SINGLE/MULTIPLE: Performed by: INTERNAL MEDICINE

## 2025-03-31 PROCEDURE — 83735 ASSAY OF MAGNESIUM: CPT

## 2025-03-31 PROCEDURE — 6360000002 HC RX W HCPCS: Performed by: NURSE ANESTHETIST, CERTIFIED REGISTERED

## 2025-03-31 PROCEDURE — 84100 ASSAY OF PHOSPHORUS: CPT

## 2025-03-31 PROCEDURE — 2500000003 HC RX 250 WO HCPCS: Performed by: INTERNAL MEDICINE

## 2025-03-31 PROCEDURE — 82248 BILIRUBIN DIRECT: CPT

## 2025-03-31 PROCEDURE — 7100000011 HC PHASE II RECOVERY - ADDTL 15 MIN: Performed by: INTERNAL MEDICINE

## 2025-03-31 PROCEDURE — 2580000003 HC RX 258: Performed by: STUDENT IN AN ORGANIZED HEALTH CARE EDUCATION/TRAINING PROGRAM

## 2025-03-31 PROCEDURE — 6360000002 HC RX W HCPCS

## 2025-03-31 PROCEDURE — 2720000010 HC SURG SUPPLY STERILE: Performed by: INTERNAL MEDICINE

## 2025-03-31 PROCEDURE — 80053 COMPREHEN METABOLIC PANEL: CPT

## 2025-03-31 PROCEDURE — 2709999900 HC NON-CHARGEABLE SUPPLY: Performed by: INTERNAL MEDICINE

## 2025-03-31 PROCEDURE — 74018 RADEX ABDOMEN 1 VIEW: CPT

## 2025-03-31 PROCEDURE — 6360000002 HC RX W HCPCS: Performed by: NURSE PRACTITIONER

## 2025-03-31 PROCEDURE — 36592 COLLECT BLOOD FROM PICC: CPT

## 2025-03-31 PROCEDURE — 0DB68ZX EXCISION OF STOMACH, VIA NATURAL OR ARTIFICIAL OPENING ENDOSCOPIC, DIAGNOSTIC: ICD-10-PCS | Performed by: INTERNAL MEDICINE

## 2025-03-31 PROCEDURE — 0DWD8UZ REVISION OF FEEDING DEVICE IN LOWER INTESTINAL TRACT, VIA NATURAL OR ARTIFICIAL OPENING ENDOSCOPIC: ICD-10-PCS | Performed by: INTERNAL MEDICINE

## 2025-03-31 PROCEDURE — 6360000002 HC RX W HCPCS: Performed by: STUDENT IN AN ORGANIZED HEALTH CARE EDUCATION/TRAINING PROGRAM

## 2025-03-31 PROCEDURE — 3700000001 HC ADD 15 MINUTES (ANESTHESIA): Performed by: INTERNAL MEDICINE

## 2025-03-31 PROCEDURE — 99233 SBSQ HOSP IP/OBS HIGH 50: CPT | Performed by: STUDENT IN AN ORGANIZED HEALTH CARE EDUCATION/TRAINING PROGRAM

## 2025-03-31 PROCEDURE — C1889 IMPLANT/INSERT DEVICE, NOC: HCPCS | Performed by: INTERNAL MEDICINE

## 2025-03-31 PROCEDURE — 6370000000 HC RX 637 (ALT 250 FOR IP): Performed by: NURSE PRACTITIONER

## 2025-03-31 PROCEDURE — 2500000003 HC RX 250 WO HCPCS: Performed by: STUDENT IN AN ORGANIZED HEALTH CARE EDUCATION/TRAINING PROGRAM

## 2025-03-31 PROCEDURE — 85025 COMPLETE CBC W/AUTO DIFF WBC: CPT

## 2025-03-31 DEVICE — CLIP
Type: IMPLANTABLE DEVICE | Status: FUNCTIONAL
Brand: MANTIS™ CLIP

## 2025-03-31 RX ORDER — METOCLOPRAMIDE HYDROCHLORIDE 5 MG/ML
5 INJECTION INTRAMUSCULAR; INTRAVENOUS EVERY 6 HOURS
Status: DISCONTINUED | OUTPATIENT
Start: 2025-03-31 | End: 2025-04-02

## 2025-03-31 RX ORDER — HEPARIN 100 UNIT/ML
300 SYRINGE INTRAVENOUS 2 TIMES DAILY
Status: DISCONTINUED | OUTPATIENT
Start: 2025-03-31 | End: 2025-04-03 | Stop reason: HOSPADM

## 2025-03-31 RX ORDER — SODIUM CHLORIDE 9 MG/ML
INJECTION, SOLUTION INTRAVENOUS
Status: DISCONTINUED | OUTPATIENT
Start: 2025-03-31 | End: 2025-03-31 | Stop reason: SDUPTHER

## 2025-03-31 RX ORDER — PROPOFOL 10 MG/ML
INJECTION, EMULSION INTRAVENOUS
Status: DISCONTINUED | OUTPATIENT
Start: 2025-03-31 | End: 2025-03-31 | Stop reason: SDUPTHER

## 2025-03-31 RX ORDER — LIDOCAINE HYDROCHLORIDE 20 MG/ML
INJECTION, SOLUTION EPIDURAL; INFILTRATION; INTRACAUDAL; PERINEURAL
Status: DISCONTINUED | OUTPATIENT
Start: 2025-03-31 | End: 2025-03-31 | Stop reason: SDUPTHER

## 2025-03-31 RX ORDER — HEPARIN 100 UNIT/ML
300 SYRINGE INTRAVENOUS PRN
Status: DISCONTINUED | OUTPATIENT
Start: 2025-03-31 | End: 2025-04-03 | Stop reason: HOSPADM

## 2025-03-31 RX ADMIN — ONDANSETRON 4 MG: 2 INJECTION, SOLUTION INTRAMUSCULAR; INTRAVENOUS at 04:06

## 2025-03-31 RX ADMIN — HYDROMORPHONE HYDROCHLORIDE 0.25 MG: 1 INJECTION, SOLUTION INTRAMUSCULAR; INTRAVENOUS; SUBCUTANEOUS at 22:53

## 2025-03-31 RX ADMIN — METOCLOPRAMIDE 5 MG: 5 INJECTION, SOLUTION INTRAMUSCULAR; INTRAVENOUS at 08:06

## 2025-03-31 RX ADMIN — WATER 1 MG: 1 INJECTION INTRAMUSCULAR; INTRAVENOUS; SUBCUTANEOUS at 17:01

## 2025-03-31 RX ADMIN — LIDOCAINE HYDROCHLORIDE 5 ML: 20 INJECTION, SOLUTION EPIDURAL; INFILTRATION; INTRACAUDAL; PERINEURAL at 15:08

## 2025-03-31 RX ADMIN — METOCLOPRAMIDE 5 MG: 5 INJECTION, SOLUTION INTRAMUSCULAR; INTRAVENOUS at 22:52

## 2025-03-31 RX ADMIN — HYDROMORPHONE HYDROCHLORIDE 0.25 MG: 1 INJECTION, SOLUTION INTRAMUSCULAR; INTRAVENOUS; SUBCUTANEOUS at 00:09

## 2025-03-31 RX ADMIN — HYDROMORPHONE HYDROCHLORIDE 0.25 MG: 1 INJECTION, SOLUTION INTRAMUSCULAR; INTRAVENOUS; SUBCUTANEOUS at 16:28

## 2025-03-31 RX ADMIN — Medication 300 UNITS: at 22:57

## 2025-03-31 RX ADMIN — SODIUM CHLORIDE 40 MG: 9 INJECTION, SOLUTION INTRAMUSCULAR; INTRAVENOUS; SUBCUTANEOUS at 00:10

## 2025-03-31 RX ADMIN — POTASSIUM CHLORIDE: 2 INJECTION, SOLUTION, CONCENTRATE INTRAVENOUS at 22:51

## 2025-03-31 RX ADMIN — SODIUM CHLORIDE, PRESERVATIVE FREE 10 ML: 5 INJECTION INTRAVENOUS at 08:06

## 2025-03-31 RX ADMIN — PROCHLORPERAZINE EDISYLATE 10 MG: 5 INJECTION INTRAMUSCULAR; INTRAVENOUS at 00:09

## 2025-03-31 RX ADMIN — SODIUM CHLORIDE, PRESERVATIVE FREE 10 ML: 5 INJECTION INTRAVENOUS at 19:40

## 2025-03-31 RX ADMIN — PROCHLORPERAZINE EDISYLATE 10 MG: 5 INJECTION INTRAMUSCULAR; INTRAVENOUS at 13:20

## 2025-03-31 RX ADMIN — HYDROMORPHONE HYDROCHLORIDE 0.25 MG: 1 INJECTION, SOLUTION INTRAMUSCULAR; INTRAVENOUS; SUBCUTANEOUS at 19:39

## 2025-03-31 RX ADMIN — ONDANSETRON 4 MG: 2 INJECTION, SOLUTION INTRAMUSCULAR; INTRAVENOUS at 10:44

## 2025-03-31 RX ADMIN — HYDROMORPHONE HYDROCHLORIDE 0.25 MG: 1 INJECTION, SOLUTION INTRAMUSCULAR; INTRAVENOUS; SUBCUTANEOUS at 10:43

## 2025-03-31 RX ADMIN — METOCLOPRAMIDE 5 MG: 5 INJECTION, SOLUTION INTRAMUSCULAR; INTRAVENOUS at 16:19

## 2025-03-31 RX ADMIN — PROCHLORPERAZINE EDISYLATE 10 MG: 5 INJECTION INTRAMUSCULAR; INTRAVENOUS at 07:27

## 2025-03-31 RX ADMIN — Medication 300 UNITS: at 10:23

## 2025-03-31 RX ADMIN — PROPOFOL 180 MG: 10 INJECTION, EMULSION INTRAVENOUS at 15:08

## 2025-03-31 RX ADMIN — SODIUM CHLORIDE: 9 INJECTION, SOLUTION INTRAVENOUS at 14:32

## 2025-03-31 RX ADMIN — HYDROMORPHONE HYDROCHLORIDE 0.25 MG: 1 INJECTION, SOLUTION INTRAMUSCULAR; INTRAVENOUS; SUBCUTANEOUS at 07:27

## 2025-03-31 RX ADMIN — SODIUM CHLORIDE: 9 INJECTION, SOLUTION INTRAVENOUS at 14:48

## 2025-03-31 RX ADMIN — HYDROMORPHONE HYDROCHLORIDE 0.25 MG: 1 INJECTION, SOLUTION INTRAMUSCULAR; INTRAVENOUS; SUBCUTANEOUS at 04:06

## 2025-03-31 RX ADMIN — SODIUM CHLORIDE 40 MG: 9 INJECTION, SOLUTION INTRAMUSCULAR; INTRAVENOUS; SUBCUTANEOUS at 12:15

## 2025-03-31 ASSESSMENT — PAIN DESCRIPTION - LOCATION
LOCATION: GENERALIZED
LOCATION: GENERALIZED
LOCATION: THROAT
LOCATION: GENERALIZED

## 2025-03-31 ASSESSMENT — PAIN SCALES - GENERAL
PAINLEVEL_OUTOF10: 8
PAINLEVEL_OUTOF10: 0
PAINLEVEL_OUTOF10: 8
PAINLEVEL_OUTOF10: 8
PAINLEVEL_OUTOF10: 6
PAINLEVEL_OUTOF10: 0
PAINLEVEL_OUTOF10: 8
PAINLEVEL_OUTOF10: 8

## 2025-03-31 ASSESSMENT — PAIN - FUNCTIONAL ASSESSMENT
PAIN_FUNCTIONAL_ASSESSMENT: ACTIVITIES ARE NOT PREVENTED
PAIN_FUNCTIONAL_ASSESSMENT: NONE - DENIES PAIN

## 2025-03-31 ASSESSMENT — PAIN DESCRIPTION - DESCRIPTORS
DESCRIPTORS: ACHING;DISCOMFORT
DESCRIPTORS: ACHING;DISCOMFORT;BURNING
DESCRIPTORS: ACHING
DESCRIPTORS: ACHING;DISCOMFORT
DESCRIPTORS: SORE;ACHING
DESCRIPTORS: ACHING;DISCOMFORT
DESCRIPTORS: ACHING;SORE
DESCRIPTORS: ACHING;DISCOMFORT

## 2025-03-31 ASSESSMENT — PAIN DESCRIPTION - ONSET
ONSET: ON-GOING
ONSET: ON-GOING

## 2025-03-31 ASSESSMENT — PAIN DESCRIPTION - ORIENTATION
ORIENTATION: OTHER (COMMENT)
ORIENTATION: OTHER (COMMENT)
ORIENTATION: RIGHT;LEFT;MID
ORIENTATION: OTHER (COMMENT)

## 2025-03-31 ASSESSMENT — PAIN DESCRIPTION - FREQUENCY
FREQUENCY: CONTINUOUS
FREQUENCY: CONTINUOUS

## 2025-03-31 ASSESSMENT — PAIN DESCRIPTION - PAIN TYPE
TYPE: ACUTE PAIN
TYPE: ACUTE PAIN;CHRONIC PAIN

## 2025-03-31 ASSESSMENT — LIFESTYLE VARIABLES: SMOKING_STATUS: 1

## 2025-03-31 ASSESSMENT — COPD QUESTIONNAIRES: CAT_SEVERITY: MODERATE

## 2025-03-31 NOTE — CARE COORDINATION
Social Work discharge planning  [facility] by [facility]ul[facility] aware of EGD today. Spoke to Dori.  [facility] [phone] Ana received Rx and spoke to charge Rn.  Electronically signed by IRWIN Aguirre on 3/31/2025 at 10:54 AM     Addendum  Per Ananya with [facility], pending response from Dr Hemphill's office re: pt PCP establishment.  Electronically signed by IRWIN Aguirre on 3/31/2025 at 12:40 PM     Addendum  Dr Hemphill agreed to accept pt. The next available new PCP appt is July 1st.  Electronically signed by IRWIN Aguirre on 3/31/2025 at 3:52 PM

## 2025-03-31 NOTE — ANESTHESIA PRE PROCEDURE
Department of Anesthesiology  Preprocedure Note       Name:  Prasanna Parnell   Age:  59 y.o.  :  1965                                          MRN:  [id number]         Date:  3/31/2025      Surgeon: Surgeon(s):  José Luis Valladares DO    Procedure: Procedure(s):  ESOPHAGOGASTRODUODENOSCOPY    Medications prior to admission:   Prior to Admission medications    Medication Sig Start Date End Date Taking? Authorizing Provider   TPN WITH LIPIDS, OUTPATIENT ONLY, Infuse 2,040 mLs intravenously daily Standard 3-in-1 w/ Electrolytes @85ml/hr.    68g AA  1820kcal 3/26/25  Yes Haile Wiseman MD   pantoprazole (PROTONIX) 40 MG tablet Take 1 tablet by mouth daily 3/20/25  Yes Joon Barrera MD   fentaNYL (DURAGESIC) 25 MCG/HR Place 1 patch onto the skin every 72 hours for 30 days. Max Daily Amount: 1 patch 3/22/25 4/21/25 Yes Mamie Melgoza PA-C   ondansetron (ZOFRAN-ODT) 8 MG TBDP disintegrating tablet Take 1 tablet by mouth every 8 hours as needed for Nausea or Vomiting 25  Yes Roberto Tomas MD   prochlorperazine (COMPAZINE) 10 MG tablet Take 1 tablet by mouth every 6 hours as needed (nausea) 25  Yes Roberto Tomas MD   albuterol sulfate HFA (VENTOLIN HFA) 108 (90 Base) MCG/ACT inhaler Inhale 2 puffs into the lungs every 6 hours as needed for Wheezing   Yes Provider, MD Radha   Nutritional Supplements (VITAL AF 1.2 MARE) LIQD Take 60 mL/hr by mouth See Admin Instructions Peptide Based (Vital AF 1.2) @ 60ml/hr to provide 1440ml TV, 1728kcals, 108g pro, 1168ml free water.     Flush rec of 95ml q 4= 570ml water (1738ml total water).      Start at trickle rate (10cc/hr) and adv slowly to goal- monitor for refeeding syndrome; monitor/replace Lytes PRN.     J-tube for TF. 3/21/25   Haile Wiseman MD   nortriptyline (PAMELOR) 25 MG capsule 1 capsule by PEG Tube route nightly  Patient not taking: Reported on 3/23/2025 3/20/25   Joon Barrera MD   sucralfate (CARAFATE) 1 GM tablet Take 1 tablet by mouth 4

## 2025-03-31 NOTE — ANESTHESIA POSTPROCEDURE EVALUATION
Department of Anesthesiology  Postprocedure Note    Patient: Prasanna Parnell  MRN: [id number]  YOB: 1965  Date of evaluation: 3/31/2025    Procedure Summary       Date: 03/31/25 Room / Location: [place] [id number] / [facility]    Anesthesia Start: 1506 Anesthesia Stop: 1529    Procedure: ESOPHAGOGASTRODUODENOSCOPY Diagnosis:       GIB (gastrointestinal bleeding)      (GIB (gastrointestinal bleeding) [K92.2])    Surgeons: José Luis Valladares DO Responsible Provider: Ashlie Rucker DO    Anesthesia Type: MAC ASA Status: 3            Anesthesia Type: MAC    Helen Phase I:      Helen Phase II:      Anesthesia Post Evaluation    Patient location during evaluation: bedside  Patient participation: complete - patient participated  Level of consciousness: awake  Pain score: 0  Airway patency: patent  Nausea & Vomiting: no nausea and no vomiting  Cardiovascular status: blood pressure returned to baseline and hemodynamically stable  Respiratory status: acceptable  Hydration status: stable  Pain management: adequate and satisfactory to patient        No notable events documented.

## 2025-03-31 NOTE — PROGRESS NOTES
Comprehensive Nutrition Assessment    Type and Reason for Visit:  Reassess    Nutrition Recommendations/Plan:     Verified with pharmacist pt receiving Standard 3-in-1 and not Kabiven.  Recommend decrease TPN rate, recommend:    Central, Standard 3-in-1 @ 70.8 ml/hr; 1700 ml TV to provide: 85 g AA, 1760 kcal    Continue inpatient monitoring     Malnutrition Assessment:  Malnutrition Status:  Severe malnutrition (03/24/25 0929)    Context:  Chronic Illness     Findings of the 6 clinical characteristics of malnutrition:  Energy Intake:  75% or less estimated energy requirements for 1 month or longer  Weight Loss:  Greater than 7.5% over 3 months     Body Fat Loss:  Mild body fat loss (moderate) Buccal region   Muscle Mass Loss:  Severe muscle mass loss Temples (temporalis), Clavicles (pectoralis & deltoids), Scapula (trapezius)  Fluid Accumulation:  No fluid accumulation     Strength:  Not Performed    Nutrition Assessment:    Pt amendable to continue TPN today, he expresses continued nausea and frustration w/ feeling sick. Plan for EGD today. Recommend modify TPN, will provide recommendation.    Nutrition Related Findings:    A&O X4, +34.5L, no edema, abd flat, hypo BS, N/V, Reglan, PEG-J to gravity   Wound Type: None       Current Nutrition Intake & Therapies:    Diet NPO  PN-Adult  3-in-1 Central Line (Standard)    Current Parenteral Nutrition Orders:  Type and Formula: 3-in-1 Standard (verified w/ pharmacy)   Lipids: None  Duration: Continuous  Rate/Volume: 85 ml/hr, 2040 ml TV  Current PN Order Provides: at goal  Goal PN Orders Provides: 2111 kcal, 102 g AA    Anthropometric Measures:  Height: 172.7 cm (5' 8\")  Ideal Body Weight (IBW): 154 lbs (70 kg)    Admission Body Weight: 49.3 kg (108 lb 11.2 oz) (3/24 bed scale)  Current Body Weight: 51.3 kg (113 lb 3.2 oz) (3/31), 70.6 % IBW. Weight Source: Bed scale  Current BMI (kg/m2): 17.2  Usual Body Weight: 61.6 kg (135 lb 13 oz) (1/21/25)     % Weight Change

## 2025-03-31 NOTE — PROCEDURES
Procedure:    Esophagogastroduodenoscopy    Indication:    Nausea  Occult +  Dysphagia    Consent:   Informed consent was obtained from the patient including and not limited to risk of perforation, aspiration of gastric contents or teeth, bleeding, infection, dental breakage, ileus, need for surgery, or worst case death.    Sedation  Endoscope was advanced easily through mouth to second portion of duodenum    Oropharynx:    views are limited but large tonsillar appearing ulcer consistent with patient's malignancy.    Esophagus:   LA-B esophagitis  Displaced J-tube noted with alpha-loop in the esophagus, tube was advanced out of the esophagus into the stomach     Stomach:   Antrum is normal    Fundus, cardia, and gastric body with linear erythema consist with mechanical tube trauma    Bx performed      Duodenum: Bulb is normal mild acute angulation.      Second portion of duodenum is normal.  No fresh of old blood.      EBL - minimal    Once exam was finished, the J-tube was repositioned the suture/distal tip of the J tube was grasped and advanced the duodenum, clip was deployed anchoring the the J-tube.     IMPRESSION AND PLAN:   1.  J-Tube malposition in the esophagus  -Repositioning and anchoring in the distal duodenum with mantis clip and rat tooth forcep  2.  Linear gastritis likely secondary to mechanical etiology from mal placed J-tube.   -bx performed  3.  Large tonsillar appearing ulcer consistent with patient's malignancy.    KUB will be ordered to confirm placement. May consider repeat KUB as outpt, as repeat PUSH may be required for more distal placement of the J-tube. Once placement confirmed ok for J-tube use. Ok to resume medications.     José Luis Valladares,   3/31/2025  3:46 PM

## 2025-03-31 NOTE — CONSULTS
[facility] Consult Note   JODIE Arnold with José Luis Valladares D.O. Consult Note        Date of Service: 3/31/2025  Reason for Consult: positive gastroccult   Requesting Physician: Dr. Zarate    CHIEF COMPLAINT:  nausea    History Obtained From:  patient, electronic medical record    HISTORY OF PRESENT ILLNESS:       Prasanna Parnell is a 59 y.o. 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  Chart reviewed patient known to Dr. Wiseman who is currently out of town.  Patient with history of SCC of the tongue/tonsil on chemo and radiation.  Was recently admitted to [facility] from 3/3-3/21 at that time PEG-J was placed due to the inability to eat and nausea however patient was unable to tolerate and was to be started on TPN.  3/11/25 PEG-J- Normal esophagus. Z-line regular. Normal stomach. A PEG was found in the stomach. Duodenitis, characterized by erythema and congestion (edema) was appreciated in the proximal duodenum. An externally removable PEG-J conversion was successfully completed. No specimens collected.Patient was ultimately unable to continue TPN as outpatient due to no coverage as outpatient.  Per notes patient ended up going home AMA and tube feeds were to start as outpatient.  Patient presented back with complaints of failure to thrive and nausea.  Patient was seen by Dr. Wiseman in consult, TPN was started and PEG tube was to gravity.  Our service consulted for positive Gastroccult per PEG tube.  Pt reports he has had continued nausea that has been ongoing for months. Nausea associated with lightheadedness states \"I don't know how much longer I can do this\". No reports of abdominal pain. Reports constipation using suppositories as needed, denies melena or hematochezia. Reports dysphagia, unable to tolerate PO or TF and  has lost approximately 40# since December. No thrush noted. Pt states thus far there has been no improvement of symptoms while inpatient and receiving IV medication. Last radiation treatment 25th, last chemo treatment approx 3 weeks ago. States he is supposed to have chemo weekly but currently on hold given symptoms. Last EGD noted. No prior colonoscopy. No sig FM[facility].     Admission labs: Hemoglobin 9.7, platelet 273. Imaging: KUB- No pattern for small-bowel obstruction.  No pattern for colonic obstruction. No pattern for significant ileus of the bowel.  Labs today: Hemoglobin 9.6.    Consultation for positive gastroccult.   Currently, pt reports continued nausea, no vomiting. Last BM yesterday brown.      Past Medical History:        Diagnosis Date    Cancer (HCC)     oral cancer    Cervicalgia     COPD (chronic obstructive pulmonary disease) (HCC)     Neuropathy     Retrolisthesis     Spondylolisthesis      Past Surgical History:        Procedure Laterality Date    BRONCHOSCOPY N/A 01/23/2025    BRONCHOSCOPY ENDOBRONCHIAL ULTRASOUND FINE NEEDLE ASPIRATION performed by Edward Beebe MD at [facility]    BRONCHOSCOPY N/A 01/23/2025    BRONCHOSCOPY ENDOBRONCHIAL ULTRASOUND FINE NEEDLE ASPIRATION ROBOTIC performed by Edward Beebe MD at [facility]    FINGER SURGERY Right     amputation first finger    HERNIA REPAIR Bilateral     inguinal    KNEE SURGERY Left     repair tendons d/t chainsaw accidnet    LARYNGOSCOPY N/A 01/14/2025    PANENDOSCOPY WITH BIOPSY performed by Jonathan Bowden DO at [facility] OR    LUMBAR LAMINECTOMY      1997,1998,2010    PORT SURGERY N/A 2/13/2025    MEDIPORT INSERTION performed by Delvis Frank DO at [facility] OR    UPPER GASTROINTESTINAL ENDOSCOPY N/A 01/20/2025    ESOPHAGOGASTRODUODENOSCOPY PERCUTANEOUS ENDOSCOPIC GASTROSTOMY TUBE PLACEMENT performed by Delvis Frank DO at [facility] ENDOSCOPY    UPPER GASTROINTESTINAL ENDOSCOPY N/A 3/11/2025     computer voice recognition software. Every effort has been made to ensure accuracy, however; inadvertent computerized transcription errors may be present.     Thank you very much for your consultation. We will follow closely with you.    Discussed with Dr. Valladares  Treatment plan developed by Dr. Jacquelyn Macias, APRN-NP-C 3/31/2025 7:57 AM for Dr. Valladares      GI Attending Addendum:  The patient was seen and examined independently from the midlevel team. The case was discuss with the team and the above assessment and plan was developed.  José Luis Valladares, DO

## 2025-03-31 NOTE — PATIENT CARE CONFERENCE
[facility] Quality Flow/Interdisciplinary Rounds Progress Note        Quality Flow Rounds held on March 31, 2025    Disciplines Attending:  Bedside Nurse, , , and Nursing Unit Leadership    Prasanna Parnell was admitted on 3/23/2025  4:23 AM    Anticipated Discharge Date:       Disposition:    Rahul Score:  Rahul Scale Score: 21    [facility] RISK OF UNPLANNED READMISSION 2.0             25.8 Total Score        Discussed patient goal for the day, patient clinical progression, and barriers to discharge.  The following Goal(s) of the Day/Commitment(s) have been identified:  Diagnostics - Report Results      Radha Mckeon RN  March 31, 2025

## 2025-03-31 NOTE — PLAN OF CARE
Problem: Pain  Goal: Verbalizes/displays adequate comfort level or baseline comfort level  3/31/2025 1207 by Brooke Garcias RN  Outcome: Progressing     Problem: Safety - Adult  Goal: Free from fall injury  3/31/2025 1207 by Brooke Garcias RN  Outcome: Progressing     Problem: Nutrition Deficit:  Goal: Optimize nutritional status  3/31/2025 1207 by Brooke Garcias RN  Outcome: Progressing  Flowsheets (Taken 3/31/2025 0944 by D'Amico, Miranda, RD, LD)  Nutrient intake appropriate for improving, restoring, or maintaining nutritional needs:   Assess nutritional status and recommend course of action   Monitor oral intake, labs, and treatment plans   Recommend, monitor, and adjust tube feedings and TPN/PPN based on assessed needs     Problem: Gastrointestinal - Adult  Goal: Minimal or absence of nausea and vomiting  Outcome: Progressing     Problem: Gastrointestinal - Adult  Goal: Maintains or returns to baseline bowel function  Outcome: Progressing     Problem: Gastrointestinal - Adult  Goal: Maintains adequate nutritional intake  Outcome: Progressing     Problem: Metabolic/Fluid and Electrolytes - Adult  Goal: Electrolytes maintained within normal limits  Outcome: Progressing     Problem: Metabolic/Fluid and Electrolytes - Adult  Goal: Glucose maintained within prescribed range  Outcome: Progressing     Problem: Hematologic - Adult  Goal: Maintains hematologic stability  Outcome: Progressing

## 2025-03-31 NOTE — PROGRESS NOTES
[facility] Hospitalist Progress Note    Admitting Date and Time: 3/23/2025  4:23 AM  Admit Dx: Throat cancer (HCC) [C14.0]  Failure to thrive in adult [R62.7]  Intractable nausea and vomiting [R11.2]    Subjective:  Patient is being followed for Throat cancer (HCC) [C14.0]  Failure to thrive in adult [R62.7]  Intractable nausea and vomiting [R11.2]   Patient was seen examined    ROS: denies fever, chills, cp, sob, n/v, HA unless stated above.      metoclopramide  5 mg IntraVENous Q6H    heparin (PF)  300 Units IntraCATHeter BID    ALTEplase  1 mg IntraCATHeter Once    pantoprazole (PROTONIX) 40 mg in sodium chloride (PF) 0.9 % 10 mL injection  40 mg IntraVENous Q12H    fentaNYL  1 patch TransDERmal Q72H    scopolamine  1 patch TransDERmal Q72H    sennosides-docusate sodium  1 tablet Oral Daily    chlorproMAZINE  25 mg Oral Once    nortriptyline  25 mg PEG Tube Nightly    polyethylene glycol  17 g Per G Tube Daily    [Held by provider] sucralfate  1 g Oral 4x Daily    sodium chloride flush  5-40 mL IntraVENous 2 times per day    [Held by provider] enoxaparin  30 mg SubCUTAneous Daily     heparin (PF), 300 Units, PRN  promethazine, 25 mg, Q6H PRN  ondansetron, 4 mg, Q6H PRN  bisacodyl, 10 mg, Daily PRN  diatrizoate meglumine-sodium, 30 mL, ONCE PRN  HYDROmorphone, 0.25 mg, Q3H PRN  glucose, 4 tablet, PRN  dextrose bolus, 125 mL, PRN   Or  dextrose bolus, 250 mL, PRN  glucagon (rDNA), 1 mg, PRN  dextrose, , Continuous PRN  lidocaine viscous hcl, 15 mL, Daily PRN  ondansetron, 8 mg, Q8H PRN  oxyCODONE, 10 mg, Q4H PRN  sodium chloride flush, 5-40 mL, PRN  sodium chloride, , PRN  potassium chloride, 40 mEq, PRN   Or  potassium alternative oral replacement, 40 mEq, PRN   Or  potassium chloride, 10 mEq, PRN  magnesium sulfate, 2,000 mg, PRN  acetaminophen, 650 mg, Q6H PRN  prochlorperazine, 10 mg, Q6H PRN         Objective:    /69   Pulse 88   Temp 98.2 °F (36.8 °C) (Oral)   Resp 16   Ht 1.727 m (5' 8\")   inadvertent computerized transcription errors may be present.  Electronically signed by Lee Zarate DO on 3/31/2025 at 2:31 PM

## 2025-04-01 ENCOUNTER — TELEPHONE (OUTPATIENT)
Dept: RADIATION ONCOLOGY | Age: 60
End: 2025-04-01

## 2025-04-01 ENCOUNTER — HOSPITAL ENCOUNTER (OUTPATIENT)
Dept: RADIATION ONCOLOGY | Age: 60
End: 2025-04-01
Payer: MEDICARE

## 2025-04-01 LAB
ANION GAP SERPL CALCULATED.3IONS-SCNC: 10 MMOL/L (ref 7–16)
BASOPHILS # BLD: 0.05 K/UL (ref 0–0.2)
BASOPHILS NFR BLD: 1 % (ref 0–2)
BUN SERPL-MCNC: 25 MG/DL (ref 6–20)
CALCIUM SERPL-MCNC: 10.1 MG/DL (ref 8.6–10.2)
CHLORIDE SERPL-SCNC: 103 MMOL/L (ref 98–107)
CO2 SERPL-SCNC: 28 MMOL/L (ref 22–29)
CREAT SERPL-MCNC: 0.7 MG/DL (ref 0.7–1.2)
EOSINOPHIL # BLD: 0.24 K/UL (ref 0.05–0.5)
EOSINOPHILS RELATIVE PERCENT: 5 % (ref 0–6)
ERYTHROCYTE [DISTWIDTH] IN BLOOD BY AUTOMATED COUNT: 16.1 % (ref 11.5–15)
GFR, ESTIMATED: >90 ML/MIN/1.73M2
GLUCOSE BLD-MCNC: 124 MG/DL (ref 74–99)
GLUCOSE BLD-MCNC: 131 MG/DL (ref 74–99)
GLUCOSE BLD-MCNC: 142 MG/DL (ref 74–99)
GLUCOSE BLD-MCNC: 143 MG/DL (ref 74–99)
GLUCOSE BLD-MCNC: 143 MG/DL (ref 74–99)
GLUCOSE SERPL-MCNC: 97 MG/DL (ref 74–99)
HCT VFR BLD AUTO: 28.7 % (ref 37–54)
HGB BLD-MCNC: 9.3 G/DL (ref 12.5–16.5)
IMM GRANULOCYTES # BLD AUTO: 0.03 K/UL (ref 0–0.58)
IMM GRANULOCYTES NFR BLD: 1 % (ref 0–5)
LYMPHOCYTES NFR BLD: 0.49 K/UL (ref 1.5–4)
LYMPHOCYTES RELATIVE PERCENT: 10 % (ref 20–42)
MAGNESIUM SERPL-MCNC: 2.2 MG/DL (ref 1.6–2.6)
MCH RBC QN AUTO: 32.3 PG (ref 26–35)
MCHC RBC AUTO-ENTMCNC: 32.4 G/DL (ref 32–34.5)
MCV RBC AUTO: 99.7 FL (ref 80–99.9)
MONOCYTES NFR BLD: 0.4 K/UL (ref 0.1–0.95)
MONOCYTES NFR BLD: 8 % (ref 2–12)
NEUTROPHILS NFR BLD: 75 % (ref 43–80)
NEUTS SEG NFR BLD: 3.54 K/UL (ref 1.8–7.3)
PHOSPHATE SERPL-MCNC: 3.7 MG/DL (ref 2.5–4.5)
PLATELET # BLD AUTO: 238 K/UL (ref 130–450)
PMV BLD AUTO: 10.3 FL (ref 7–12)
POTASSIUM SERPL-SCNC: 4.4 MMOL/L (ref 3.5–5)
RBC # BLD AUTO: 2.88 M/UL (ref 3.8–5.8)
RBC # BLD: ABNORMAL 10*6/UL
SODIUM SERPL-SCNC: 141 MMOL/L (ref 132–146)
WBC OTHER # BLD: 4.8 K/UL (ref 4.5–11.5)

## 2025-04-01 PROCEDURE — 1200000000 HC SEMI PRIVATE

## 2025-04-01 PROCEDURE — 99233 SBSQ HOSP IP/OBS HIGH 50: CPT | Performed by: STUDENT IN AN ORGANIZED HEALTH CARE EDUCATION/TRAINING PROGRAM

## 2025-04-01 PROCEDURE — 6360000002 HC RX W HCPCS: Performed by: STUDENT IN AN ORGANIZED HEALTH CARE EDUCATION/TRAINING PROGRAM

## 2025-04-01 PROCEDURE — 80048 BASIC METABOLIC PNL TOTAL CA: CPT

## 2025-04-01 PROCEDURE — 85025 COMPLETE CBC W/AUTO DIFF WBC: CPT

## 2025-04-01 PROCEDURE — 82962 GLUCOSE BLOOD TEST: CPT

## 2025-04-01 PROCEDURE — 84100 ASSAY OF PHOSPHORUS: CPT

## 2025-04-01 PROCEDURE — 6370000000 HC RX 637 (ALT 250 FOR IP)

## 2025-04-01 PROCEDURE — 6360000002 HC RX W HCPCS

## 2025-04-01 PROCEDURE — 2580000003 HC RX 258: Performed by: STUDENT IN AN ORGANIZED HEALTH CARE EDUCATION/TRAINING PROGRAM

## 2025-04-01 PROCEDURE — 2500000003 HC RX 250 WO HCPCS: Performed by: INTERNAL MEDICINE

## 2025-04-01 PROCEDURE — 83735 ASSAY OF MAGNESIUM: CPT

## 2025-04-01 PROCEDURE — 99232 SBSQ HOSP IP/OBS MODERATE 35: CPT | Performed by: STUDENT IN AN ORGANIZED HEALTH CARE EDUCATION/TRAINING PROGRAM

## 2025-04-01 PROCEDURE — 6360000002 HC RX W HCPCS: Performed by: NURSE PRACTITIONER

## 2025-04-01 PROCEDURE — 2500000003 HC RX 250 WO HCPCS: Performed by: STUDENT IN AN ORGANIZED HEALTH CARE EDUCATION/TRAINING PROGRAM

## 2025-04-01 RX ADMIN — POTASSIUM CHLORIDE: 2 INJECTION, SOLUTION, CONCENTRATE INTRAVENOUS at 18:01

## 2025-04-01 RX ADMIN — HYDROMORPHONE HYDROCHLORIDE 0.25 MG: 1 INJECTION, SOLUTION INTRAMUSCULAR; INTRAVENOUS; SUBCUTANEOUS at 02:58

## 2025-04-01 RX ADMIN — SODIUM CHLORIDE, PRESERVATIVE FREE 10 ML: 5 INJECTION INTRAVENOUS at 20:43

## 2025-04-01 RX ADMIN — ONDANSETRON 4 MG: 2 INJECTION, SOLUTION INTRAMUSCULAR; INTRAVENOUS at 23:04

## 2025-04-01 RX ADMIN — SODIUM CHLORIDE, PRESERVATIVE FREE 10 ML: 5 INJECTION INTRAVENOUS at 09:35

## 2025-04-01 RX ADMIN — HYDROMORPHONE HYDROCHLORIDE 0.25 MG: 1 INJECTION, SOLUTION INTRAMUSCULAR; INTRAVENOUS; SUBCUTANEOUS at 13:20

## 2025-04-01 RX ADMIN — METOCLOPRAMIDE 5 MG: 5 INJECTION, SOLUTION INTRAMUSCULAR; INTRAVENOUS at 09:33

## 2025-04-01 RX ADMIN — METOCLOPRAMIDE 5 MG: 5 INJECTION, SOLUTION INTRAMUSCULAR; INTRAVENOUS at 15:49

## 2025-04-01 RX ADMIN — HYDROMORPHONE HYDROCHLORIDE 0.25 MG: 1 INJECTION, SOLUTION INTRAMUSCULAR; INTRAVENOUS; SUBCUTANEOUS at 09:46

## 2025-04-01 RX ADMIN — HYDROMORPHONE HYDROCHLORIDE 0.5 MG: 1 INJECTION, SOLUTION INTRAMUSCULAR; INTRAVENOUS; SUBCUTANEOUS at 20:55

## 2025-04-01 RX ADMIN — HYDROMORPHONE HYDROCHLORIDE 0.25 MG: 1 INJECTION, SOLUTION INTRAMUSCULAR; INTRAVENOUS; SUBCUTANEOUS at 06:44

## 2025-04-01 RX ADMIN — BISACODYL 10 MG: 10 SUPPOSITORY RECTAL at 20:42

## 2025-04-01 RX ADMIN — METOCLOPRAMIDE 5 MG: 5 INJECTION, SOLUTION INTRAMUSCULAR; INTRAVENOUS at 02:58

## 2025-04-01 RX ADMIN — SODIUM CHLORIDE 40 MG: 9 INJECTION, SOLUTION INTRAMUSCULAR; INTRAVENOUS; SUBCUTANEOUS at 00:31

## 2025-04-01 RX ADMIN — Medication 300 UNITS: at 09:35

## 2025-04-01 RX ADMIN — ONDANSETRON 4 MG: 2 INJECTION, SOLUTION INTRAMUSCULAR; INTRAVENOUS at 13:20

## 2025-04-01 RX ADMIN — SODIUM CHLORIDE 40 MG: 9 INJECTION, SOLUTION INTRAMUSCULAR; INTRAVENOUS; SUBCUTANEOUS at 13:19

## 2025-04-01 RX ADMIN — HYDROMORPHONE HYDROCHLORIDE 0.5 MG: 1 INJECTION, SOLUTION INTRAMUSCULAR; INTRAVENOUS; SUBCUTANEOUS at 16:22

## 2025-04-01 RX ADMIN — Medication 300 UNITS: at 20:43

## 2025-04-01 RX ADMIN — METOCLOPRAMIDE 5 MG: 5 INJECTION, SOLUTION INTRAMUSCULAR; INTRAVENOUS at 20:42

## 2025-04-01 RX ADMIN — SODIUM CHLORIDE 40 MG: 9 INJECTION, SOLUTION INTRAMUSCULAR; INTRAVENOUS; SUBCUTANEOUS at 23:04

## 2025-04-01 ASSESSMENT — PAIN DESCRIPTION - ORIENTATION
ORIENTATION: RIGHT;LEFT
ORIENTATION: RIGHT
ORIENTATION: RIGHT;LEFT
ORIENTATION: RIGHT;LEFT
ORIENTATION: MID;RIGHT;LEFT
ORIENTATION: RIGHT

## 2025-04-01 ASSESSMENT — PAIN DESCRIPTION - DESCRIPTORS
DESCRIPTORS: ACHING;SORE
DESCRIPTORS: ACHING;SHOOTING
DESCRIPTORS: ACHING;DISCOMFORT
DESCRIPTORS: ACHING;DISCOMFORT
DESCRIPTORS: ACHING
DESCRIPTORS: ACHING;SORE

## 2025-04-01 ASSESSMENT — PAIN DESCRIPTION - ONSET
ONSET: ON-GOING
ONSET: ON-GOING

## 2025-04-01 ASSESSMENT — PAIN SCALES - GENERAL
PAINLEVEL_OUTOF10: 8
PAINLEVEL_OUTOF10: 8
PAINLEVEL_OUTOF10: 5
PAINLEVEL_OUTOF10: 8
PAINLEVEL_OUTOF10: 6
PAINLEVEL_OUTOF10: 6
PAINLEVEL_OUTOF10: 8

## 2025-04-01 ASSESSMENT — PAIN - FUNCTIONAL ASSESSMENT
PAIN_FUNCTIONAL_ASSESSMENT: ACTIVITIES ARE NOT PREVENTED

## 2025-04-01 ASSESSMENT — PAIN DESCRIPTION - PAIN TYPE
TYPE: ACUTE PAIN
TYPE: ACUTE PAIN

## 2025-04-01 ASSESSMENT — PAIN DESCRIPTION - FREQUENCY: FREQUENCY: CONTINUOUS

## 2025-04-01 ASSESSMENT — PAIN DESCRIPTION - LOCATION
LOCATION: ABDOMEN;GENERALIZED
LOCATION: GENERALIZED
LOCATION: ABDOMEN;NECK
LOCATION: BACK;EAR
LOCATION: BACK;EAR

## 2025-04-01 ASSESSMENT — PAIN DESCRIPTION - DIRECTION: RADIATING_TOWARDS: R EAR

## 2025-04-01 NOTE — PROGRESS NOTES
Palliative Care Department  [phone]  Palliative Care Progress Note  Provider Galo Forte MD      PATIENT: Prasanna Parnell  : 1965  MRN: [id number]  ADMISSION DATE: 3/23/2025  4:23 AM  Referring Provider: Roberto Tomas MD     Palliative Medicine was consulted on hospital day 6 for assistance with Symptom management: intractable nausea/vomiting; head and neck cancer    HPI:     Clinical Summary:Prasanna Parnell is a 59 y.o. y/o male spondylolisthesis, retrolisthesis, neuropathy, COPD, squamous cell carcinoma of the head and neck in 2024. Laryngoscopy by ENT 2025 showed ulcerated masses seen in the area of the right palatine tonsil extending to the base of the tongue into the vallecula approaching midline. Pathology of the right tonsil biopsy and right base of the tongue biopsy was consistent with HPV associated squamous cell carcinoma. He had a bronchoscopy with fine-needle aspiration of right upper lobe lesion which was positive for malignancy consistent with squamous cell carcinoma. Patient was initiated on chemotherapy and is following with Dr. Tomas in medical oncology and radiation therapy concurrently.  He had a recent hospitalization due to nausea and vomiting, on 3/3/2025, he underwent PEG tube placement 3/11/2025, insurance company did not cover tube feedings despite PEG tube in place and patient unable to eat.  PICC line was placed on 3/16/2025 and TPN was initiated on 3/17/2025.  Case management was having difficulty time setting up TPN outpatient, patient eloped on 3/21/2024 before tube feeding to be set up at home.  Who presented to [facility] on 3/23/2025 with failure to thrive and nausea.  At ED, given fentanyl, Benadryl, 1 L bolus.  He was admitted for further medical management of intractable nausea and failure to thrive.    ASSESSMENT/PLAN:     Pertinent Hospital Diagnoses     Intractable nausea  Failure to thrive  SCC of tongue/tonsil/lung    Palliative Care Encounter /  open to other pain medications however he is limited by his disease.  Currently plan to continue IV hydromorphone until we can have either medication through PEG tube or oral.  Also plan to continue fentanyl patch for prolonged relief.  Has no other acute issues at this time.  Out of medicine will continue to follow patient.    Prognosis: Guarded    OBJECTIVE:     /61   Pulse 66   Temp 97.7 °F (36.5 °C) (Oral)   Resp 16   Ht 1.727 m (5' 8\")   Wt 52.7 kg (116 lb 1.6 oz)   SpO2 100%   BMI 17.65 kg/m²     Physical Examination:  Gen: thin, NAD, awake, alert   Lungs: respirations easy  Heart: regular rate and rhythm  Abdomen: non-distended  Skin: warm, dry without rashes, lesions, bruising  Neuro: awake, alert, oriented x 3, follows commands    Objective data reviewed: labs, images, records, medication use, vitals, and chart    Time/Communication  Greater than 50% of time spent, total 35 minutes in counseling and coordination of care at the bedside regarding goals of care.    Thank you for allowing Palliative Medicine to participate in the care of Prasanna Parnell.    Note: This report was completed using [employer] voiced recognition software.  Every effort has been made to ensure accuracy; however, inadvertent computerized transcription errors may be present.

## 2025-04-01 NOTE — PATIENT CARE CONFERENCE
[facility] Quality Flow/Interdisciplinary Rounds Progress Note        Quality Flow Rounds held on April 1, 2025    Disciplines Attending:  Bedside Nurse, , , and Nursing Unit Leadership    Prasanna Parnell was admitted on 3/23/2025  4:23 AM    Anticipated Discharge Date:       Disposition:    Rahul Score:  Rahul Scale Score: 19    [facility] RISK OF UNPLANNED READMISSION 2.0             25.2 Total Score        Discussed patient goal for the day, patient clinical progression, and barriers to discharge.  The following Goal(s) of the Day/Commitment(s) have been identified:  Labs - Report Results      Radha Mckeon RN  April 1, 2025

## 2025-04-01 NOTE — PLAN OF CARE
Problem: Pain  Goal: Verbalizes/displays adequate comfort level or baseline comfort level  Outcome: Progressing     Problem: Safety - Adult  Goal: Free from fall injury  Outcome: Progressing     Problem: Nutrition Deficit:  Goal: Optimize nutritional status  Outcome: Progressing     Problem: Gastrointestinal - Adult  Goal: Minimal or absence of nausea and vomiting  Outcome: Progressing     Problem: Metabolic/Fluid and Electrolytes - Adult  Goal: Electrolytes maintained within normal limits  Outcome: Progressing     Problem: Hematologic - Adult  Goal: Maintains hematologic stability  Outcome: Progressing     Problem: Skin/Tissue Integrity  Goal: Skin integrity remains intact  Description: 1.  Monitor for areas of redness and/or skin breakdown  2.  Assess vascular access sites hourly  3.  Every 4-6 hours minimum:  Change oxygen saturation probe site  4.  Every 4-6 hours:  If on nasal continuous positive airway pressure, respiratory therapy assess nares and determine need for appliance change or resting period  Outcome: Progressing  Flowsheets (Taken 4/1/2025 0211)  Skin Integrity Remains Intact: Monitor for areas of redness and/or skin breakdown

## 2025-04-01 NOTE — PROGRESS NOTES
PROGRESS NOTE        Patient Presents with/Seen in Consultation For      Reason for Consult: positive gastroccult   CHIEF COMPLAINT:  nausea   Subjective:     Patient seen states he is feeling much better today. EGD reviewed with patient. No current abd pain or nausea. Willing to now try oral nutrition and TF.   Review of Systems  Aside from what was mentioned in the PMH and HPI, essentially unremarkable, all others negative.    Objective:     /61   Pulse 66   Temp 97.7 °F (36.5 °C) (Oral)   Resp 16   Ht 1.727 m (5' 8\")   Wt 52.7 kg (116 lb 1.6 oz)   SpO2 100%   BMI 17.65 kg/m²     General appearance: alert, awake, laying in bed, and cooperative  Eyes: conjunctiva pale, sclera anicteric. PERRL.  Lungs: clear to auscultation bilaterally  Heart: regular rate and rhythm, no murmur, 2+ pulses; no edema  Abdomen: soft, non-tender; bowel sounds normal; no masses,  no organomegaly, PEG to gravity with clear green drainage noted   Extremities: extremities without edema  Pulses: 2+ and symmetric  Skin: Skin color, texture, turgor normal.   Neurologic: Grossly normal    metoclopramide (REGLAN) injection 5 mg, Q6H  heparin (PF) 100 UNIT/ML injection 300 Units, PRN  heparin (PF) 100 UNIT/ML injection 300 Units, BID  PN-Adult  3 IN 1 Central Line (Standard), Continuous TPN  promethazine (PHENERGAN) suppository 25 mg, Q6H PRN  pantoprazole (PROTONIX) 40 mg in sodium chloride (PF) 0.9 % 10 mL injection, Q12H  fentaNYL (DURAGESIC) 50 MCG/HR 1 patch, Q72H  scopolamine (TRANSDERM-SCOP) transdermal patch 1 patch, Q72H  ondansetron (ZOFRAN) injection 4 mg, Q6H PRN  bisacodyl (DULCOLAX) suppository 10 mg, Daily PRN  diatrizoate meglumine-sodium (GASTROGRAFIN) 66-10 % solution 30 mL, ONCE PRN  HYDROmorphone (DILAUDID) injection 0.25 mg, Q3H PRN  glucose chewable tablet 16 g, PRN  dextrose bolus 10% 125 mL, PRN   Or  dextrose bolus 10% 250 mL, PRN  glucagon injection 1 mg, PRN  dextrose 10 % infusion, Continuous  Panel:    Lab Results   Component Value Date/Time    ALKPHOS 104 03/31/2025 06:25 AM    ALT 58 03/31/2025 06:25 AM    AST 52 03/31/2025 06:25 AM    BILITOT 1.2 03/31/2025 06:25 AM    BILIDIR 0.5 03/31/2025 06:25 AM    IBILI 0.7 03/31/2025 06:25 AM     No components found for: \"CHLPL\"    Lab Results   Component Value Date    TRIG 138 03/25/2025    TRIG 256 (H) 03/18/2025    TRIG 137 03/04/2025       Lab Results   Component Value Date    HDL 37 (L) 03/04/2025     No components found for: \"LDLCALC\"  No components found for: \"LABVLDL\"   PT/INR:    Lab Results   Component Value Date/Time    PROTIME 13.0 01/23/2025 12:00 PM    INR 1.2 01/23/2025 12:00 PM     IRON:    Lab Results   Component Value Date/Time    IRON 44 03/14/2025 05:10 AM     Iron Saturation:  No components found for: \"PERCENTFE\"  FERRITIN:    Lab Results   Component Value Date/Time    FERRITIN 974 03/14/2025 05:10 AM         Assessment:     Intractable nausea  Gastroccult positive  SCC tongue/tonsil on chemo and radiation  S/p PEG -J 3/11/25  FTT  Anemia, normocytic   EGD 3/31/25- 1.  J-Tube malposition in the esophagus- Repositioning and anchoring in the distal duodenum with mantis clip and rat tooth forceps. 2.  Linear gastritis likely secondary to mechanical etiology from mal placed J-tube- bx performed. 3.  Large tonsillar appearing ulcer consistent with patient's malignancy    Plan:   Reglan 5 mg IV q 6  Pantoprazole 40 mg q 12  Daily suppositories as needed  Ok for TF from GI POV will defer to primary   Hgb stable   Clear diet, adv as tolerated   Supportive care  Will follow       Note: This report was completed utilizing computer voice recognition software. Every effort has been made to ensure accuracy, however; inadvertent computerized transcription errors may be present.     Discussed with Dr. Valladares  Plan per Dr. Jacquelyn Macias APRN-NP-C 4/1/2025  10:40 AM For Dr. Valladares      GI Attending Addendum:  The patient was seen and

## 2025-04-01 NOTE — CARE COORDINATION
Social Work discharge planning  Updated Dori with [facility] by Kulwinder of tf trial today per IDRs.  Electronically signed by IRWIN Aguirre on 4/1/2025 at 9:43 AM     Addendum  Pt does not have a PCP. He is scheduled for new PCP July 1st with Dr Hemphill. Pt does NOT want a different Dr for appointment to be sooner.   Pt was going to have [facility] for TPN with Dr Wiseman following. However, if he does not go home on TPN then he will not be able to have [facility]. There are a few c that offer PCP service, but pt does not want another  PCP, only Dr Hemphill. Electronically signed by IRWIN Aguirre on 4/1/2025 at 2:08 PM

## 2025-04-01 NOTE — PLAN OF CARE
Problem: Pain  Goal: Verbalizes/displays adequate comfort level or baseline comfort level  4/1/2025 1019 by Brooke Garcias RN  Outcome: Progressing     Problem: Safety - Adult  Goal: Free from fall injury  4/1/2025 1019 by Brooke Garcias RN  Outcome: Progressing     Problem: Nutrition Deficit:  Goal: Optimize nutritional status  4/1/2025 1019 by Brooke Garcias RN  Outcome: Progressing     Problem: Gastrointestinal - Adult  Goal: Minimal or absence of nausea and vomiting  4/1/2025 1019 by Brooke Garcias RN  Outcome: Progressing     Problem: Gastrointestinal - Adult  Goal: Maintains or returns to baseline bowel function  Outcome: Progressing     Problem: Gastrointestinal - Adult  Goal: Maintains adequate nutritional intake  Outcome: Progressing     Problem: Metabolic/Fluid and Electrolytes - Adult  Goal: Electrolytes maintained within normal limits  4/1/2025 1019 by Brooke Garcias RN  Outcome: Progressing     Problem: Metabolic/Fluid and Electrolytes - Adult  Goal: Glucose maintained within prescribed range  Outcome: Progressing     Problem: Hematologic - Adult  Goal: Maintains hematologic stability  4/1/2025 1019 by Brooke Garcias RN  Outcome: Progressing     Problem: Skin/Tissue Integrity  Goal: Skin integrity remains intact  Description: 1.  Monitor for areas of redness and/or skin breakdown  2.  Assess vascular access sites hourly  3.  Every 4-6 hours minimum:  Change oxygen saturation probe site  4.  Every 4-6 hours:  If on nasal continuous positive airway pressure, respiratory therapy assess nares and determine need for appliance change or resting period  4/1/2025 1019 by Brooke Garcias RN  Outcome: Progressing

## 2025-04-01 NOTE — PROGRESS NOTES
[facility] Hospitalist Progress Note    Admitting Date and Time: 3/23/2025  4:23 AM  Admit Dx: Throat cancer (HCC) [C14.0]  Failure to thrive in adult [R62.7]  Intractable nausea and vomiting [R11.2]    Subjective:  Patient is being followed for Throat cancer (HCC) [C14.0]  Failure to thrive in adult [R62.7]  Intractable nausea and vomiting [R11.2]   Patient was seen examined    ROS: denies fever, chills, cp, sob, n/v, HA unless stated above.      metoclopramide  5 mg IntraVENous Q6H    heparin (PF)  300 Units IntraCATHeter BID    pantoprazole (PROTONIX) 40 mg in sodium chloride (PF) 0.9 % 10 mL injection  40 mg IntraVENous Q12H    fentaNYL  1 patch TransDERmal Q72H    scopolamine  1 patch TransDERmal Q72H    sennosides-docusate sodium  1 tablet Oral Daily    chlorproMAZINE  25 mg Oral Once    nortriptyline  25 mg PEG Tube Nightly    polyethylene glycol  17 g Per G Tube Daily    [Held by provider] sucralfate  1 g Oral 4x Daily    sodium chloride flush  5-40 mL IntraVENous 2 times per day    [Held by provider] enoxaparin  30 mg SubCUTAneous Daily     heparin (PF), 300 Units, PRN  promethazine, 25 mg, Q6H PRN  ondansetron, 4 mg, Q6H PRN  bisacodyl, 10 mg, Daily PRN  diatrizoate meglumine-sodium, 30 mL, ONCE PRN  HYDROmorphone, 0.25 mg, Q3H PRN  glucose, 4 tablet, PRN  dextrose bolus, 125 mL, PRN   Or  dextrose bolus, 250 mL, PRN  glucagon (rDNA), 1 mg, PRN  dextrose, , Continuous PRN  lidocaine viscous hcl, 15 mL, Daily PRN  ondansetron, 8 mg, Q8H PRN  oxyCODONE, 10 mg, Q4H PRN  sodium chloride flush, 5-40 mL, PRN  sodium chloride, , PRN  potassium chloride, 40 mEq, PRN   Or  potassium alternative oral replacement, 40 mEq, PRN   Or  potassium chloride, 10 mEq, PRN  magnesium sulfate, 2,000 mg, PRN  acetaminophen, 650 mg, Q6H PRN  prochlorperazine, 10 mg, Q6H PRN         Objective:    /68   Pulse 82   Temp 97.5 °F (36.4 °C) (Oral)   Resp 16   Ht 1.727 m (5' 8\")   Wt 52.7 kg (116 lb 1.6 oz)   SpO2  100%   BMI 17.65 kg/m²     General Appearance: alert and oriented to person, place and time and in no acute distress  Skin: warm and dry  Head: normocephalic and atraumatic  Eyes: pupils equal, round, and reactive to light, extraocular eye movements intact, conjunctivae normal  Neck: neck supple and non tender without mass   Pulmonary/Chest: clear to auscultation bilaterally- no wheezes, rales or rhonchi, normal air movement, no respiratory distress  Cardiovascular: normal rate, normal S1 and S2 and no carotid bruits  Abdomen: soft, non-tender, non-distended, normal bowel sounds, no masses or organomegaly  Extremities: no cyanosis, no clubbing and no edema  Neurologic: no cranial nerve deficit and speech normal        Recent Labs     03/30/25  0230 03/31/25  0625 04/01/25  0355    135 141   K 4.2 4.4 4.4   CL 96* 97* 103   CO2 30* 31* 28   BUN 25* 24* 25*   CREATININE 0.7 0.7 0.7   GLUCOSE 109* 128* 97   CALCIUM 9.9 10.1 10.1       Recent Labs     03/30/25  0230 03/30/25  1402 03/31/25  0625 03/31/25  1310 04/01/25  0355   WBC 5.0  --  5.3  --  4.8   RBC 2.90*  --  2.87*  --  2.88*   HGB 9.4*   < > 9.3* 12.3* 9.3*   HCT 28.3*   < > 28.3* 38.1 28.7*   MCV 97.6  --  98.6  --  99.7   MCH 32.4  --  32.4  --  32.3   MCHC 33.2  --  32.9  --  32.4   RDW 15.7*  --  16.1*  --  16.1*     --  261  --  238   MPV 10.1  --  10.5  --  10.3    < > = values in this interval not displayed.       Radiology:     Assessment:    Principal Problem:    Intractable nausea and vomiting  Active Problems:    Severe protein-calorie malnutrition    Throat cancer (HCC)  Resolved Problems:    * No resolved hospital problems. *    Plan:  Intractable nausea/ failure to thrive - continue anti-emetics, NPO. Dietary consulted for TF vs TPN. Patient has right PICC line for TPN but no way to manage outpatient. Case management to follow - patient may need SNF vs LTAC for the next 3 weeks to get TPN. Not tolerating tube feeds, gets nauseuos

## 2025-04-01 NOTE — TELEPHONE ENCOUNTER
I called Prasanna this afternoon to confirm his decision re: holding off radiation therapy treatment, he answered the phone this time.  He confirmed that he wants to hold off RT treatment at this time.  He understands that holding off treatment is not advisable, but he states he's too weak, he lost about 40 pounds for the last 2 months, he wants to build a little bit of strength before restarting treatment.  He states possible discharge either today or tomorrow with [facility].

## 2025-04-01 NOTE — CONSULTS
Comprehensive Nutrition Assessment    Type and Reason for Visit:  Reassess, Consult, Nutrition support (TF Recommendation)    Nutrition Recommendations/Plan:     Continue current diet as tolerated, will monitor for diet progression    TF recommendation    Peptide Based (Vital AF) post pyloric (J-port), initiate @ 10 ml/hr, advancing as tolerated to goal rate of 60 ml/hr with 10 ml/hr free water flush  To provide: 1440 ml tv, 1728 kcal, 108 g pro, 1408 ml total free water    Continue current TPN and once pt tolerates TF at half of goal rate recommend wean TPN    Continue inpatient monitoring      Malnutrition Assessment:  Malnutrition Status:  Severe malnutrition (03/24/25 0929)    Context:  Chronic Illness     Findings of the 6 clinical characteristics of malnutrition:  Energy Intake:  75% or less estimated energy requirements for 1 month or longer  Weight Loss:  Greater than 7.5% over 3 months     Body Fat Loss:  Mild body fat loss (moderate) Buccal region   Muscle Mass Loss:  Severe muscle mass loss Temples (temporalis), Clavicles (pectoralis & deltoids), Scapula (trapezius)  Fluid Accumulation:  No fluid accumulation     Strength:  Not Performed    Nutrition Assessment:    Pt s/p EGD 3/31 showing J-tube malposition in the esophagus- s/p repositioning and anchoring in the distal duodenum. Will provide TF recommendation.    Nutrition Related Findings:    A&O X4, +36.1L, no edema, abd flat, hypo BS, N/V/loss of appetite, Reglan, PEG-J to gravity, Onc RD follows   Wound Type: None       Current Nutrition Intake & Therapies:    Average Meal Intake: Unable to assess (diet adv to clear liquids)  Average Supplements Intake: None Ordered  PN-Adult  3 IN 1 Central Line (Standard)  ADULT DIET; Clear Liquid  PN-Adult  3 IN 1 Central Line (Standard)  Current Parenteral Nutrition Orders:  Type and Formula: 3-in-1 Standard   Duration: Continuous  Rate/Volume: 70.8 ml/hr, 1700 ml TV  Current PN Order Provides: at goal  Goal  Planning:    Too soon to determine     Miranda D'Amico, RD, LD  Contact: [phone]

## 2025-04-01 NOTE — PROGRESS NOTES
Jayde Fernandez MD   ·   MD Adriana Lucero APRN  ·  FADI [facility]      [place] [facility] [place]  [address] [place] [address]  P: [phone]  F: [phone] [place] [facility] [place]  [address] [place] [address]  P: [phone]  F: [phone]  [place] [facility] [place]  [address] [place] [address]  P: [phone]  F: [phone]           Inpatient Medical Oncology/Hematology progress  Note            Room: 0524/0524-A      CHIEF COMPLAINT:  Nausea and vomiting    SUBJECTIVE:   Pt feeling a little better.   Has not having vomiting lately.   TPN and tube feeds running.       HISTORY OF PRESENT ILLNESS (3/23/2025):   Patient is a 59 y.o. male comes in for intractable nausea and vomiting.   Recently he was hospitalized for similar issues in which he had a prolonged hospital course, but eventually had left [facility].    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 He was recently started on chemoRT, having only completed 2 cycles of weekly cisplatin but has continued radiation during his last hospital course. Last admission, he struggled to keep foods done oral as well as from his tube feeds. He also c/o symptoms with tube flushes. We had consulted GI, in which his PEG was exchanged for a PEG-J, but patient still had issues w/ tube feeds, and subsequently, he was started on TPN. TPN had improved his symptoms.     Due to certain logistical/financial issues and lack of PCP, there were issues with setting up tube feeds, and there were tentative plans to discharged with PICC and TPN for home, but according to report, no specialist recommended take over management of this.     Today patient feels unwell. Even without eating, he c/o dry heaves.   While at home after leaving [facility], he tried pushing Ensure through PEG-J but did not tolerate very well.       Oncology History   Malignant  neoplasm of overlapping sites of tonsil (HCC)   2/20/2025 -  Chemotherapy    OP CISplatin 40 mg/m2 Q7D  Plan Provider: Roberto Tomas MD  Treatment goal: Curative  Line of treatment: 1st Line                      ROS:  Negative except as noted above.            Vitals:    04/01/25 1443   BP: 126/70   Pulse: 74   Resp: 16   Temp: 98 °F (36.7 °C)   SpO2:      Physical Exam  Constitutional:       General: He is not in acute distress.     Appearance: He is not toxic-appearing.   HENT:      Head: Normocephalic.      Mouth/Throat:      Mouth: Mucous membranes are moist.   Eyes:      General: No scleral icterus.  Cardiovascular:      Rate and Rhythm: Normal rate and regular rhythm.      Heart sounds: No murmur heard.  Pulmonary:      Effort: No respiratory distress.      Breath sounds: No stridor. No wheezing.   Abdominal:      General: There is no distension.      Palpations: There is no mass.      Tenderness: There is no abdominal tenderness.      Comments: PEG-J intact   Musculoskeletal:      Cervical back: Normal range of motion. No rigidity.      Right lower leg: No edema.      Left lower leg: No edema.   Lymphadenopathy:      Cervical: No cervical adenopathy (right side, improved).   Skin:     Coloration: Skin is not jaundiced or pale.   Neurological:      General: No focal deficit present.      Mental Status: He is alert and oriented to person, place, and time.      Motor: Weakness present.   Psychiatric:         Mood and Affect: Mood normal.         Behavior: Behavior normal.         Thought Content: Thought content normal.          ECOG PS: 1-2          Intake/Output Summary (Last 24 hours) at 4/1/2025 1834  Last data filed at 4/1/2025 1830  Gross per 24 hour   Intake 2351.27 ml   Output 225 ml   Net 2126.27 ml           DIAGNOSTIC DATA:   Lab Results   Component Value Date    WBC 4.8 04/01/2025    HGB 9.3 (L) 04/01/2025    HCT 28.7 (L) 04/01/2025    MCV 99.7 04/01/2025     04/01/2025     Lab Results

## 2025-04-02 ENCOUNTER — HOSPITAL ENCOUNTER (OUTPATIENT)
Dept: RADIATION ONCOLOGY | Age: 60
Discharge: HOME | End: 2025-04-02

## 2025-04-02 ENCOUNTER — APPOINTMENT (OUTPATIENT)
Dept: RADIATION ONCOLOGY | Age: 60
End: 2025-04-02
Payer: MEDICARE

## 2025-04-02 LAB
ALBUMIN SERPL-MCNC: 3.9 G/DL (ref 3.5–5.2)
ALP SERPL-CCNC: 127 U/L (ref 40–129)
ALT SERPL-CCNC: 96 U/L (ref 0–40)
ANION GAP SERPL CALCULATED.3IONS-SCNC: 8 MMOL/L (ref 7–16)
AST SERPL-CCNC: 64 U/L (ref 0–39)
BASOPHILS # BLD: 0.05 K/UL (ref 0–0.2)
BASOPHILS NFR BLD: 1 % (ref 0–2)
BILIRUB DIRECT SERPL-MCNC: 0.7 MG/DL (ref 0–0.3)
BILIRUB INDIRECT SERPL-MCNC: 0.5 MG/DL (ref 0–1)
BILIRUB SERPL-MCNC: 1.2 MG/DL (ref 0–1.2)
BUN SERPL-MCNC: 31 MG/DL (ref 6–20)
CALCIUM SERPL-MCNC: 9.8 MG/DL (ref 8.6–10.2)
CHLORIDE SERPL-SCNC: 105 MMOL/L (ref 98–107)
CO2 SERPL-SCNC: 28 MMOL/L (ref 22–29)
CREAT SERPL-MCNC: 0.7 MG/DL (ref 0.7–1.2)
EOSINOPHIL # BLD: 0.3 K/UL (ref 0.05–0.5)
EOSINOPHILS RELATIVE PERCENT: 6 % (ref 0–6)
ERYTHROCYTE [DISTWIDTH] IN BLOOD BY AUTOMATED COUNT: 16.1 % (ref 11.5–15)
GFR, ESTIMATED: >90 ML/MIN/1.73M2
GLUCOSE BLD-MCNC: 114 MG/DL (ref 74–99)
GLUCOSE BLD-MCNC: 143 MG/DL (ref 74–99)
GLUCOSE BLD-MCNC: 146 MG/DL (ref 74–99)
GLUCOSE SERPL-MCNC: 112 MG/DL (ref 74–99)
HCT VFR BLD AUTO: 28.6 % (ref 37–54)
HGB BLD-MCNC: 9.3 G/DL (ref 12.5–16.5)
IMM GRANULOCYTES # BLD AUTO: 0.03 K/UL (ref 0–0.58)
IMM GRANULOCYTES NFR BLD: 1 % (ref 0–5)
LYMPHOCYTES NFR BLD: 0.55 K/UL (ref 1.5–4)
LYMPHOCYTES RELATIVE PERCENT: 10 % (ref 20–42)
MAGNESIUM SERPL-MCNC: 2.3 MG/DL (ref 1.6–2.6)
MCH RBC QN AUTO: 32.3 PG (ref 26–35)
MCHC RBC AUTO-ENTMCNC: 32.5 G/DL (ref 32–34.5)
MCV RBC AUTO: 99.3 FL (ref 80–99.9)
MONOCYTES NFR BLD: 0.51 K/UL (ref 0.1–0.95)
MONOCYTES NFR BLD: 10 % (ref 2–12)
NEUTROPHILS NFR BLD: 73 % (ref 43–80)
NEUTS SEG NFR BLD: 3.86 K/UL (ref 1.8–7.3)
PHOSPHATE SERPL-MCNC: 3.5 MG/DL (ref 2.5–4.5)
PLATELET # BLD AUTO: 240 K/UL (ref 130–450)
PMV BLD AUTO: 10.8 FL (ref 7–12)
POTASSIUM SERPL-SCNC: 4.5 MMOL/L (ref 3.5–5)
PROT SERPL-MCNC: 6.8 G/DL (ref 6.4–8.3)
RBC # BLD AUTO: 2.88 M/UL (ref 3.8–5.8)
RBC # BLD: ABNORMAL 10*6/UL
SODIUM SERPL-SCNC: 141 MMOL/L (ref 132–146)
WBC OTHER # BLD: 5.3 K/UL (ref 4.5–11.5)

## 2025-04-02 PROCEDURE — 85025 COMPLETE CBC W/AUTO DIFF WBC: CPT

## 2025-04-02 PROCEDURE — 1200000000 HC SEMI PRIVATE

## 2025-04-02 PROCEDURE — 6360000002 HC RX W HCPCS: Performed by: STUDENT IN AN ORGANIZED HEALTH CARE EDUCATION/TRAINING PROGRAM

## 2025-04-02 PROCEDURE — 2500000003 HC RX 250 WO HCPCS: Performed by: INTERNAL MEDICINE

## 2025-04-02 PROCEDURE — 2580000003 HC RX 258: Performed by: STUDENT IN AN ORGANIZED HEALTH CARE EDUCATION/TRAINING PROGRAM

## 2025-04-02 PROCEDURE — 36592 COLLECT BLOOD FROM PICC: CPT

## 2025-04-02 PROCEDURE — 83735 ASSAY OF MAGNESIUM: CPT

## 2025-04-02 PROCEDURE — 99232 SBSQ HOSP IP/OBS MODERATE 35: CPT | Performed by: STUDENT IN AN ORGANIZED HEALTH CARE EDUCATION/TRAINING PROGRAM

## 2025-04-02 PROCEDURE — 6370000000 HC RX 637 (ALT 250 FOR IP): Performed by: STUDENT IN AN ORGANIZED HEALTH CARE EDUCATION/TRAINING PROGRAM

## 2025-04-02 PROCEDURE — 82962 GLUCOSE BLOOD TEST: CPT

## 2025-04-02 PROCEDURE — 80053 COMPREHEN METABOLIC PANEL: CPT

## 2025-04-02 PROCEDURE — 6360000002 HC RX W HCPCS

## 2025-04-02 PROCEDURE — 2500000003 HC RX 250 WO HCPCS: Performed by: STUDENT IN AN ORGANIZED HEALTH CARE EDUCATION/TRAINING PROGRAM

## 2025-04-02 PROCEDURE — 82248 BILIRUBIN DIRECT: CPT

## 2025-04-02 PROCEDURE — 6360000002 HC RX W HCPCS: Performed by: NURSE PRACTITIONER

## 2025-04-02 PROCEDURE — 84100 ASSAY OF PHOSPHORUS: CPT

## 2025-04-02 PROCEDURE — 99233 SBSQ HOSP IP/OBS HIGH 50: CPT | Performed by: STUDENT IN AN ORGANIZED HEALTH CARE EDUCATION/TRAINING PROGRAM

## 2025-04-02 RX ORDER — OLANZAPINE 5 MG/1
5 TABLET, ORALLY DISINTEGRATING ORAL 2 TIMES DAILY
Status: DISCONTINUED | OUTPATIENT
Start: 2025-04-02 | End: 2025-04-03 | Stop reason: HOSPADM

## 2025-04-02 RX ORDER — METOCLOPRAMIDE HYDROCHLORIDE 5 MG/ML
10 INJECTION INTRAMUSCULAR; INTRAVENOUS EVERY 6 HOURS
Status: DISCONTINUED | OUTPATIENT
Start: 2025-04-02 | End: 2025-04-03 | Stop reason: HOSPADM

## 2025-04-02 RX ADMIN — HYDROMORPHONE HYDROCHLORIDE 0.5 MG: 1 INJECTION, SOLUTION INTRAMUSCULAR; INTRAVENOUS; SUBCUTANEOUS at 19:23

## 2025-04-02 RX ADMIN — POTASSIUM CHLORIDE: 2 INJECTION, SOLUTION, CONCENTRATE INTRAVENOUS at 18:06

## 2025-04-02 RX ADMIN — OLANZAPINE 5 MG: 5 TABLET, ORALLY DISINTEGRATING ORAL at 15:12

## 2025-04-02 RX ADMIN — PROCHLORPERAZINE EDISYLATE 10 MG: 5 INJECTION INTRAMUSCULAR; INTRAVENOUS at 18:15

## 2025-04-02 RX ADMIN — Medication 300 UNITS: at 21:32

## 2025-04-02 RX ADMIN — Medication 300 UNITS: at 08:38

## 2025-04-02 RX ADMIN — METOCLOPRAMIDE 10 MG: 5 INJECTION, SOLUTION INTRAMUSCULAR; INTRAVENOUS at 21:31

## 2025-04-02 RX ADMIN — HYDROMORPHONE HYDROCHLORIDE 0.5 MG: 1 INJECTION, SOLUTION INTRAMUSCULAR; INTRAVENOUS; SUBCUTANEOUS at 08:38

## 2025-04-02 RX ADMIN — METOCLOPRAMIDE 10 MG: 5 INJECTION, SOLUTION INTRAMUSCULAR; INTRAVENOUS at 16:16

## 2025-04-02 RX ADMIN — SODIUM CHLORIDE, PRESERVATIVE FREE 10 ML: 5 INJECTION INTRAVENOUS at 08:38

## 2025-04-02 RX ADMIN — SODIUM CHLORIDE 40 MG: 9 INJECTION, SOLUTION INTRAMUSCULAR; INTRAVENOUS; SUBCUTANEOUS at 11:30

## 2025-04-02 RX ADMIN — ONDANSETRON 4 MG: 2 INJECTION, SOLUTION INTRAMUSCULAR; INTRAVENOUS at 22:43

## 2025-04-02 RX ADMIN — HYDROMORPHONE HYDROCHLORIDE 0.5 MG: 1 INJECTION, SOLUTION INTRAMUSCULAR; INTRAVENOUS; SUBCUTANEOUS at 16:15

## 2025-04-02 RX ADMIN — HYDROMORPHONE HYDROCHLORIDE 0.5 MG: 1 INJECTION, SOLUTION INTRAMUSCULAR; INTRAVENOUS; SUBCUTANEOUS at 22:43

## 2025-04-02 RX ADMIN — HYDROMORPHONE HYDROCHLORIDE 0.5 MG: 1 INJECTION, SOLUTION INTRAMUSCULAR; INTRAVENOUS; SUBCUTANEOUS at 12:52

## 2025-04-02 RX ADMIN — METOCLOPRAMIDE 5 MG: 5 INJECTION, SOLUTION INTRAMUSCULAR; INTRAVENOUS at 04:12

## 2025-04-02 RX ADMIN — METOCLOPRAMIDE 5 MG: 5 INJECTION, SOLUTION INTRAMUSCULAR; INTRAVENOUS at 08:38

## 2025-04-02 RX ADMIN — ONDANSETRON 4 MG: 2 INJECTION, SOLUTION INTRAMUSCULAR; INTRAVENOUS at 15:12

## 2025-04-02 RX ADMIN — ONDANSETRON 4 MG: 2 INJECTION, SOLUTION INTRAMUSCULAR; INTRAVENOUS at 08:38

## 2025-04-02 RX ADMIN — SODIUM CHLORIDE, PRESERVATIVE FREE 10 ML: 5 INJECTION INTRAVENOUS at 21:31

## 2025-04-02 RX ADMIN — PROCHLORPERAZINE EDISYLATE 10 MG: 5 INJECTION INTRAMUSCULAR; INTRAVENOUS at 10:13

## 2025-04-02 RX ADMIN — OLANZAPINE 5 MG: 5 TABLET, ORALLY DISINTEGRATING ORAL at 21:30

## 2025-04-02 RX ADMIN — PROCHLORPERAZINE EDISYLATE 10 MG: 5 INJECTION INTRAMUSCULAR; INTRAVENOUS at 00:07

## 2025-04-02 ASSESSMENT — PAIN DESCRIPTION - DESCRIPTORS
DESCRIPTORS: ACHING;SORE;TENDER
DESCRIPTORS: ACHING
DESCRIPTORS: ACHING
DESCRIPTORS: ACHING;SORE
DESCRIPTORS: ACHING;DISCOMFORT;STABBING;SORE

## 2025-04-02 ASSESSMENT — PAIN DESCRIPTION - ORIENTATION
ORIENTATION: RIGHT;LEFT
ORIENTATION: OTHER (COMMENT)

## 2025-04-02 ASSESSMENT — PAIN DESCRIPTION - FREQUENCY: FREQUENCY: CONTINUOUS

## 2025-04-02 ASSESSMENT — PAIN SCALES - GENERAL
PAINLEVEL_OUTOF10: 8
PAINLEVEL_OUTOF10: 7
PAINLEVEL_OUTOF10: 8
PAINLEVEL_OUTOF10: 3
PAINLEVEL_OUTOF10: 3
PAINLEVEL_OUTOF10: 8
PAINLEVEL_OUTOF10: 7

## 2025-04-02 ASSESSMENT — PAIN DESCRIPTION - ONSET: ONSET: ON-GOING

## 2025-04-02 ASSESSMENT — PAIN DESCRIPTION - LOCATION
LOCATION: GENERALIZED

## 2025-04-02 ASSESSMENT — PAIN DESCRIPTION - PAIN TYPE: TYPE: ACUTE PAIN

## 2025-04-02 NOTE — PLAN OF CARE
Problem: Pain  Goal: Verbalizes/displays adequate comfort level or baseline comfort level  Outcome: Progressing     Problem: Safety - Adult  Goal: Free from fall injury  Outcome: Progressing     Problem: Nutrition Deficit:  Goal: Optimize nutritional status  Outcome: Progressing     Problem: Gastrointestinal - Adult  Goal: Minimal or absence of nausea and vomiting  Outcome: Progressing     Problem: Gastrointestinal - Adult  Goal: Maintains or returns to baseline bowel function  Outcome: Progressing     Problem: Gastrointestinal - Adult  Goal: Maintains adequate nutritional intake  Outcome: Progressing     Problem: Metabolic/Fluid and Electrolytes - Adult  Goal: Electrolytes maintained within normal limits  Outcome: Progressing     Problem: Metabolic/Fluid and Electrolytes - Adult  Goal: Glucose maintained within prescribed range  Outcome: Progressing     Problem: Hematologic - Adult  Goal: Maintains hematologic stability  Outcome: Progressing     Problem: Skin/Tissue Integrity  Goal: Skin integrity remains intact  Description: 1.  Monitor for areas of redness and/or skin breakdown  2.  Assess vascular access sites hourly  3.  Every 4-6 hours minimum:  Change oxygen saturation probe site  4.  Every 4-6 hours:  If on nasal continuous positive airway pressure, respiratory therapy assess nares and determine need for appliance change or resting period  Outcome: Progressing     Problem: ABCDS Injury Assessment  Goal: Absence of physical injury  Outcome: Progressing

## 2025-04-02 NOTE — PROGRESS NOTES
[facility] Hospitalist Progress Note    Admitting Date and Time: 3/23/2025  4:23 AM  Admit Dx: Throat cancer (HCC) [C14.0]  Failure to thrive in adult [R62.7]  Intractable nausea and vomiting [R11.2]    Subjective:  Patient is being followed for Throat cancer (HCC) [C14.0]  Failure to thrive in adult [R62.7]  Intractable nausea and vomiting [R11.2]   Patient was seen examined    ROS: denies fever, chills, cp, sob, n/v, HA unless stated above.      metoclopramide  10 mg IntraVENous Q6H    heparin (PF)  300 Units IntraCATHeter BID    pantoprazole (PROTONIX) 40 mg in sodium chloride (PF) 0.9 % 10 mL injection  40 mg IntraVENous Q12H    fentaNYL  1 patch TransDERmal Q72H    scopolamine  1 patch TransDERmal Q72H    sennosides-docusate sodium  1 tablet Oral Daily    chlorproMAZINE  25 mg Oral Once    nortriptyline  25 mg PEG Tube Nightly    polyethylene glycol  17 g Per G Tube Daily    [Held by provider] sucralfate  1 g Oral 4x Daily    sodium chloride flush  5-40 mL IntraVENous 2 times per day    [Held by provider] enoxaparin  30 mg SubCUTAneous Daily     HYDROmorphone, 0.5 mg, Q3H PRN  heparin (PF), 300 Units, PRN  promethazine, 25 mg, Q6H PRN  ondansetron, 4 mg, Q6H PRN  bisacodyl, 10 mg, Daily PRN  diatrizoate meglumine-sodium, 30 mL, ONCE PRN  glucose, 4 tablet, PRN  dextrose bolus, 125 mL, PRN   Or  dextrose bolus, 250 mL, PRN  glucagon (rDNA), 1 mg, PRN  dextrose, , Continuous PRN  lidocaine viscous hcl, 15 mL, Daily PRN  ondansetron, 8 mg, Q8H PRN  oxyCODONE, 10 mg, Q4H PRN  sodium chloride flush, 5-40 mL, PRN  sodium chloride, , PRN  potassium chloride, 40 mEq, PRN   Or  potassium alternative oral replacement, 40 mEq, PRN   Or  potassium chloride, 10 mEq, PRN  magnesium sulfate, 2,000 mg, PRN  acetaminophen, 650 mg, Q6H PRN  prochlorperazine, 10 mg, Q6H PRN         Objective:    /64   Pulse 77   Temp 97.5 °F (36.4 °C) (Oral)   Resp 16   Ht 1.727 m (5' 8\")   Wt 50.3 kg (110 lb 14.3 oz)   SpO2  99%   BMI 16.86 kg/m²     General Appearance: alert and oriented to person, place and time and in no acute distress  Skin: warm and dry  Head: normocephalic and atraumatic  Eyes: pupils equal, round, and reactive to light, extraocular eye movements intact, conjunctivae normal  Neck: neck supple and non tender without mass   Pulmonary/Chest: clear to auscultation bilaterally- no wheezes, rales or rhonchi, normal air movement, no respiratory distress  Cardiovascular: normal rate, normal S1 and S2 and no carotid bruits  Abdomen: soft, non-tender, non-distended, normal bowel sounds, no masses or organomegaly  Extremities: no cyanosis, no clubbing and no edema  Neurologic: no cranial nerve deficit and speech normal        Recent Labs     03/31/25  0625 04/01/25  0355 04/02/25  0428    141 141   K 4.4 4.4 4.5   CL 97* 103 105   CO2 31* 28 28   BUN 24* 25* 31*   CREATININE 0.7 0.7 0.7   GLUCOSE 128* 97 112*   CALCIUM 10.1 10.1 9.8       Recent Labs     03/31/25  0625 03/31/25  1310 04/01/25  0355 04/02/25  0428   WBC 5.3  --  4.8 5.3   RBC 2.87*  --  2.88* 2.88*   HGB 9.3* 12.3* 9.3* 9.3*   HCT 28.3* 38.1 28.7* 28.6*   MCV 98.6  --  99.7 99.3   MCH 32.4  --  32.3 32.3   MCHC 32.9  --  32.4 32.5   RDW 16.1*  --  16.1* 16.1*     --  238 240   MPV 10.5  --  10.3 10.8       Radiology:     Assessment:    Principal Problem:    Intractable nausea and vomiting  Active Problems:    Severe protein-calorie malnutrition    Throat cancer (HCC)  Resolved Problems:    * No resolved hospital problems. *    Plan:  Intractable nausea/ failure to thrive - continue anti-emetics, NPO. Dietary consulted for TF vs TPN. Patient has right PICC line for TPN but no way to manage outpatient. Case management to follow - patient may need SNF vs LTAC for the next 3 weeks to get TPN. Not tolerating tube feeds, gets nauseuos even with fluids through PEG.  As needed rectal fentanyl added Zofran continue PPI IV twice daily  SCC of

## 2025-04-02 NOTE — PROGRESS NOTES
PROGRESS NOTE        Patient Presents with/Seen in Consultation For      Reason for Consult: positive gastroccult   CHIEF COMPLAINT:  nausea   Subjective:     Patient intermittent nausea, states yesterday they had to turn off the tube feeds.  Doing okay today so far.  No BM today.  Review of Systems  Aside from what was mentioned in the PMH and HPI, essentially unremarkable, all others negative.    Objective:     /64   Pulse 77   Temp 97.5 °F (36.4 °C) (Oral)   Resp 16   Ht 1.727 m (5' 8\")   Wt 50.3 kg (110 lb 14.3 oz)   SpO2 99%   BMI 16.86 kg/m²     General appearance: alert, awake, laying in bed, and cooperative  Eyes: conjunctiva pale, sclera anicteric. PERRL.  Lungs: clear to auscultation bilaterally  Heart: regular rate and rhythm, no murmur, 2+ pulses; no edema  Abdomen: soft, non-tender; bowel sounds normal; no masses,  no organomegaly, PEG to tube feeds  Extremities: extremities without edema  Pulses: 2+ and symmetric  Skin: Skin color, texture, turgor normal.   Neurologic: Grossly normal    PN-Adult  3 IN 1 Central Line (Standard), Continuous TPN  metoclopramide (REGLAN) injection 10 mg, Q6H  PN-Adult  3 IN 1 Central Line (Standard), Continuous TPN  HYDROmorphone (DILAUDID) injection 0.5 mg, Q3H PRN  heparin (PF) 100 UNIT/ML injection 300 Units, PRN  heparin (PF) 100 UNIT/ML injection 300 Units, BID  promethazine (PHENERGAN) suppository 25 mg, Q6H PRN  pantoprazole (PROTONIX) 40 mg in sodium chloride (PF) 0.9 % 10 mL injection, Q12H  fentaNYL (DURAGESIC) 50 MCG/HR 1 patch, Q72H  scopolamine (TRANSDERM-SCOP) transdermal patch 1 patch, Q72H  ondansetron (ZOFRAN) injection 4 mg, Q6H PRN  bisacodyl (DULCOLAX) suppository 10 mg, Daily PRN  diatrizoate meglumine-sodium (GASTROGRAFIN) 66-10 % solution 30 mL, ONCE PRN  glucose chewable tablet 16 g, PRN  dextrose bolus 10% 125 mL, PRN   Or  dextrose bolus 10% 250 mL, PRN  glucagon injection 1 mg, PRN  dextrose 10 % infusion, Continuous  the GI midlevel team.     José Luis Valladares, DO

## 2025-04-02 NOTE — PROGRESS NOTES
Palliative Care Department  [phone]  Palliative Care Progress Note  Provider Galo Forte MD      PATIENT: Prasanna Parnell  : 1965  MRN: [id number]  ADMISSION DATE: 3/23/2025  4:23 AM  Referring Provider: Roberto Tomas MD     Palliative Medicine was consulted on hospital day 7 for assistance with Symptom management: intractable nausea/vomiting; head and neck cancer    HPI:     Clinical Summary:Prasanna Parnell is a 59 y.o. y/o male spondylolisthesis, retrolisthesis, neuropathy, COPD, squamous cell carcinoma of the head and neck in 2024. Laryngoscopy by ENT 2025 showed ulcerated masses seen in the area of the right palatine tonsil extending to the base of the tongue into the vallecula approaching midline. Pathology of the right tonsil biopsy and right base of the tongue biopsy was consistent with HPV associated squamous cell carcinoma. He had a bronchoscopy with fine-needle aspiration of right upper lobe lesion which was positive for malignancy consistent with squamous cell carcinoma. Patient was initiated on chemotherapy and is following with Dr. Tomas in medical oncology and radiation therapy concurrently.  He had a recent hospitalization due to nausea and vomiting, on 3/3/2025, he underwent PEG tube placement 3/11/2025, insurance company did not cover tube feedings despite PEG tube in place and patient unable to eat.  PICC line was placed on 3/16/2025 and TPN was initiated on 3/17/2025.  Case management was having difficulty time setting up TPN outpatient, patient eloped on 3/21/2024 before tube feeding to be set up at home.  Who presented to [facility] on 3/23/2025 with failure to thrive and nausea.  At ED, given fentanyl, Benadryl, 1 L bolus.  He was admitted for further medical management of intractable nausea and failure to thrive.    ASSESSMENT/PLAN:     Pertinent Hospital Diagnoses     Intractable nausea  Failure to thrive  SCC of tongue/tonsil/lung    Palliative Care Encounter /  Resp 16   Ht 1.727 m (5' 8\")   Wt 50.3 kg (110 lb 14.3 oz)   SpO2 98%   BMI 16.86 kg/m²     Physical Examination:  Gen: thin, NAD, awake, alert   Lungs: respirations easy  Heart: regular rate and rhythm  Abdomen: non-distended  Skin: warm, dry without rashes, lesions, bruising  Neuro: awake, alert, oriented x 3, follows commands    Objective data reviewed: labs, images, records, medication use, vitals, and chart    Time/Communication  Greater than 50% of time spent, total 35 minutes in counseling and coordination of care at the bedside regarding goals of care.    Thank you for allowing Palliative Medicine to participate in the care of Prasanna Parnell.    Note: This report was completed using [employer] voiced recognition software.  Every effort has been made to ensure accuracy; however, inadvertent computerized transcription errors may be present.

## 2025-04-02 NOTE — PROGRESS NOTES
Patient began to complain of worsening nausea around 11pm. 30ml of tube feed residual noted after 65ml tube feeding infused.   Patient asked to have G-tube opened to gravity so clean pearce bag connected to G-tube port.  Patient was medicated for nausea and asked to not restart tube feeds.   Attempted to restart feedings again @ 1:30am and patient stated he instantly became nauseous. So feeding was again stopped.   Asked patient at 4am if he wanted to resume his tube feeding and he declined.   TPN infusion continued due to patient not tolerating tube feeding.

## 2025-04-02 NOTE — PATIENT CARE CONFERENCE
[facility] Quality Flow/Interdisciplinary Rounds Progress Note        Quality Flow Rounds held on April 2, 2025    Disciplines Attending:  Bedside Nurse, , , and Nursing Unit Leadership    Prasanna Parnell was admitted on 3/23/2025  4:23 AM    Anticipated Discharge Date:       Disposition:    Rahul Score:  Rahul Scale Score: 20    [facility] RISK OF UNPLANNED READMISSION 2.0             25.8 Total Score        Discussed patient goal for the day, patient clinical progression, and barriers to discharge.  The following Goal(s) of the Day/Commitment(s) have been identified:  Diagnostics - Report Results and Labs - Report Results      Mary Carrillo RN  April 2, 2025

## 2025-04-03 ENCOUNTER — APPOINTMENT (OUTPATIENT)
Dept: RADIATION ONCOLOGY | Age: 60
End: 2025-04-03
Payer: MEDICARE

## 2025-04-03 ENCOUNTER — HOSPITAL ENCOUNTER (OUTPATIENT)
Dept: INFUSION THERAPY | Age: 60
Discharge: HOME | End: 2025-04-03

## 2025-04-03 ENCOUNTER — HOSPITAL ENCOUNTER (OUTPATIENT)
Dept: RADIATION ONCOLOGY | Age: 60
End: 2025-04-03
Payer: MEDICARE

## 2025-04-03 ENCOUNTER — HOSPITAL ENCOUNTER (OUTPATIENT)
Dept: INFUSION THERAPY | Age: 60
End: 2025-04-03

## 2025-04-03 VITALS
OXYGEN SATURATION: 97 % | RESPIRATION RATE: 16 BRPM | WEIGHT: 110 LBS | HEIGHT: 68 IN | SYSTOLIC BLOOD PRESSURE: 122 MMHG | DIASTOLIC BLOOD PRESSURE: 60 MMHG | BODY MASS INDEX: 16.67 KG/M2 | TEMPERATURE: 98 F | HEART RATE: 87 BPM

## 2025-04-03 DIAGNOSIS — C09.8 MALIGNANT NEOPLASM OF OVERLAPPING SITES OF TONSIL (HCC): Primary | ICD-10-CM

## 2025-04-03 LAB
ANION GAP SERPL CALCULATED.3IONS-SCNC: 10 MMOL/L (ref 7–16)
BASOPHILS # BLD: 0.05 K/UL (ref 0–0.2)
BASOPHILS NFR BLD: 1 % (ref 0–2)
BUN SERPL-MCNC: 34 MG/DL (ref 6–20)
CALCIUM SERPL-MCNC: 9.9 MG/DL (ref 8.6–10.2)
CHLORIDE SERPL-SCNC: 106 MMOL/L (ref 98–107)
CO2 SERPL-SCNC: 28 MMOL/L (ref 22–29)
CREAT SERPL-MCNC: 0.7 MG/DL (ref 0.7–1.2)
EOSINOPHIL # BLD: 0.46 K/UL (ref 0.05–0.5)
EOSINOPHILS RELATIVE PERCENT: 9 % (ref 0–6)
ERYTHROCYTE [DISTWIDTH] IN BLOOD BY AUTOMATED COUNT: 16.5 % (ref 11.5–15)
GFR, ESTIMATED: >90 ML/MIN/1.73M2
GLUCOSE BLD-MCNC: 123 MG/DL (ref 74–99)
GLUCOSE BLD-MCNC: 170 MG/DL (ref 74–99)
GLUCOSE SERPL-MCNC: 115 MG/DL (ref 74–99)
HCT VFR BLD AUTO: 28.8 % (ref 37–54)
HGB BLD-MCNC: 9.6 G/DL (ref 12.5–16.5)
IMM GRANULOCYTES # BLD AUTO: 0.03 K/UL (ref 0–0.58)
IMM GRANULOCYTES NFR BLD: 1 % (ref 0–5)
LYMPHOCYTES NFR BLD: 0.57 K/UL (ref 1.5–4)
LYMPHOCYTES RELATIVE PERCENT: 12 % (ref 20–42)
MCH RBC QN AUTO: 33 PG (ref 26–35)
MCHC RBC AUTO-ENTMCNC: 33.3 G/DL (ref 32–34.5)
MCV RBC AUTO: 99 FL (ref 80–99.9)
MONOCYTES NFR BLD: 0.46 K/UL (ref 0.1–0.95)
MONOCYTES NFR BLD: 9 % (ref 2–12)
NEUTROPHILS NFR BLD: 68 % (ref 43–80)
NEUTS SEG NFR BLD: 3.32 K/UL (ref 1.8–7.3)
PLATELET # BLD AUTO: 232 K/UL (ref 130–450)
PMV BLD AUTO: 11.1 FL (ref 7–12)
POTASSIUM SERPL-SCNC: 4 MMOL/L (ref 3.5–5)
RBC # BLD AUTO: 2.91 M/UL (ref 3.8–5.8)
RBC # BLD: ABNORMAL 10*6/UL
SODIUM SERPL-SCNC: 144 MMOL/L (ref 132–146)
WBC OTHER # BLD: 4.9 K/UL (ref 4.5–11.5)

## 2025-04-03 PROCEDURE — 2580000003 HC RX 258: Performed by: STUDENT IN AN ORGANIZED HEALTH CARE EDUCATION/TRAINING PROGRAM

## 2025-04-03 PROCEDURE — 99232 SBSQ HOSP IP/OBS MODERATE 35: CPT | Performed by: STUDENT IN AN ORGANIZED HEALTH CARE EDUCATION/TRAINING PROGRAM

## 2025-04-03 PROCEDURE — 6360000002 HC RX W HCPCS: Performed by: STUDENT IN AN ORGANIZED HEALTH CARE EDUCATION/TRAINING PROGRAM

## 2025-04-03 PROCEDURE — 85025 COMPLETE CBC W/AUTO DIFF WBC: CPT

## 2025-04-03 PROCEDURE — 6370000000 HC RX 637 (ALT 250 FOR IP)

## 2025-04-03 PROCEDURE — 2500000003 HC RX 250 WO HCPCS: Performed by: INTERNAL MEDICINE

## 2025-04-03 PROCEDURE — 6370000000 HC RX 637 (ALT 250 FOR IP): Performed by: STUDENT IN AN ORGANIZED HEALTH CARE EDUCATION/TRAINING PROGRAM

## 2025-04-03 PROCEDURE — 99239 HOSP IP/OBS DSCHRG MGMT >30: CPT | Performed by: STUDENT IN AN ORGANIZED HEALTH CARE EDUCATION/TRAINING PROGRAM

## 2025-04-03 PROCEDURE — 82962 GLUCOSE BLOOD TEST: CPT

## 2025-04-03 PROCEDURE — 6370000000 HC RX 637 (ALT 250 FOR IP): Performed by: NURSE PRACTITIONER

## 2025-04-03 PROCEDURE — 6360000002 HC RX W HCPCS: Performed by: NURSE PRACTITIONER

## 2025-04-03 PROCEDURE — 80048 BASIC METABOLIC PNL TOTAL CA: CPT

## 2025-04-03 RX ORDER — HEPARIN 100 UNIT/ML
500 SYRINGE INTRAVENOUS PRN
Status: DISCONTINUED | OUTPATIENT
Start: 2025-04-03 | End: 2025-04-03

## 2025-04-03 RX ORDER — DIPHENHYDRAMINE HYDROCHLORIDE 50 MG/ML
50 INJECTION, SOLUTION INTRAMUSCULAR; INTRAVENOUS
OUTPATIENT
Start: 2025-04-03

## 2025-04-03 RX ORDER — SODIUM CHLORIDE 0.9 % (FLUSH) 0.9 %
5-40 SYRINGE (ML) INJECTION PRN
Status: DISCONTINUED | OUTPATIENT
Start: 2025-04-03 | End: 2025-04-03

## 2025-04-03 RX ORDER — FENTANYL 75 UG/1
1 PATCH TRANSDERMAL
Refills: 0 | Status: DISCONTINUED | OUTPATIENT
Start: 2025-04-03 | End: 2025-04-03 | Stop reason: HOSPADM

## 2025-04-03 RX ORDER — SCOPOLAMINE 1 MG/3D
1 PATCH, EXTENDED RELEASE TRANSDERMAL
Qty: 10 PATCH | Refills: 0 | Status: SHIPPED | OUTPATIENT
Start: 2025-04-06 | End: 2025-04-03

## 2025-04-03 RX ORDER — PROMETHAZINE HYDROCHLORIDE 25 MG/1
25 TABLET ORAL 4 TIMES DAILY PRN
Qty: 20 TABLET | Refills: 0 | Status: SHIPPED | OUTPATIENT
Start: 2025-04-03 | End: 2025-04-03

## 2025-04-03 RX ORDER — PANTOPRAZOLE SODIUM 40 MG/1
40 TABLET, DELAYED RELEASE ORAL 2 TIMES DAILY
Qty: 60 TABLET | Refills: 0 | Status: SHIPPED | OUTPATIENT
Start: 2025-04-03 | End: 2025-05-03

## 2025-04-03 RX ORDER — PROMETHAZINE HYDROCHLORIDE 25 MG/1
25 TABLET ORAL 4 TIMES DAILY PRN
Qty: 20 TABLET | Refills: 0 | Status: SHIPPED | OUTPATIENT
Start: 2025-04-03 | End: 2025-05-03

## 2025-04-03 RX ORDER — ALBUTEROL SULFATE 90 UG/1
4 INHALANT RESPIRATORY (INHALATION) PRN
OUTPATIENT
Start: 2025-04-03

## 2025-04-03 RX ORDER — ACETAMINOPHEN 325 MG/1
650 TABLET ORAL
OUTPATIENT
Start: 2025-04-03

## 2025-04-03 RX ORDER — HYDROCORTISONE SODIUM SUCCINATE 100 MG/2ML
100 INJECTION INTRAMUSCULAR; INTRAVENOUS
OUTPATIENT
Start: 2025-04-03

## 2025-04-03 RX ORDER — SCOPOLAMINE 1 MG/3D
1 PATCH, EXTENDED RELEASE TRANSDERMAL
Qty: 10 PATCH | Refills: 0 | Status: SHIPPED | OUTPATIENT
Start: 2025-04-06 | End: 2025-05-06

## 2025-04-03 RX ORDER — SODIUM CHLORIDE 9 MG/ML
INJECTION, SOLUTION INTRAVENOUS CONTINUOUS
OUTPATIENT
Start: 2025-04-03

## 2025-04-03 RX ORDER — MORPHINE SULFATE 10 MG/5ML
10 SOLUTION ORAL EVERY 4 HOURS PRN
Refills: 0 | Status: DISCONTINUED | OUTPATIENT
Start: 2025-04-03 | End: 2025-04-03 | Stop reason: HOSPADM

## 2025-04-03 RX ORDER — MORPHINE SULFATE 10 MG/5ML
10 SOLUTION ORAL EVERY 6 HOURS PRN
Refills: 0 | Status: DISCONTINUED | OUTPATIENT
Start: 2025-04-03 | End: 2025-04-03

## 2025-04-03 RX ORDER — SODIUM CHLORIDE 9 MG/ML
25 INJECTION, SOLUTION INTRAVENOUS PRN
OUTPATIENT
Start: 2025-04-03

## 2025-04-03 RX ORDER — PANTOPRAZOLE SODIUM 40 MG/1
40 TABLET, DELAYED RELEASE ORAL 2 TIMES DAILY
Qty: 60 TABLET | Refills: 0 | Status: SHIPPED | OUTPATIENT
Start: 2025-04-03 | End: 2025-04-03

## 2025-04-03 RX ORDER — HEPARIN 100 UNIT/ML
500 SYRINGE INTRAVENOUS PRN
OUTPATIENT
Start: 2025-04-03

## 2025-04-03 RX ORDER — FENTANYL 75 UG/1
1 PATCH TRANSDERMAL
Qty: 5 PATCH | Refills: 0 | Status: SHIPPED | OUTPATIENT
Start: 2025-04-03 | End: 2025-04-18

## 2025-04-03 RX ORDER — ONDANSETRON 2 MG/ML
8 INJECTION INTRAMUSCULAR; INTRAVENOUS
OUTPATIENT
Start: 2025-04-03

## 2025-04-03 RX ORDER — EPINEPHRINE 1 MG/ML
0.3 INJECTION, SOLUTION, CONCENTRATE INTRAVENOUS PRN
OUTPATIENT
Start: 2025-04-03

## 2025-04-03 RX ORDER — MORPHINE SULFATE 20 MG/ML
10 SOLUTION ORAL EVERY 4 HOURS PRN
Qty: 30 ML | Refills: 0 | Status: SHIPPED | OUTPATIENT
Start: 2025-04-03 | End: 2025-04-13

## 2025-04-03 RX ORDER — SODIUM CHLORIDE 0.9 % (FLUSH) 0.9 %
5-40 SYRINGE (ML) INJECTION PRN
OUTPATIENT
Start: 2025-04-03

## 2025-04-03 RX ADMIN — OLANZAPINE 5 MG: 5 TABLET, ORALLY DISINTEGRATING ORAL at 08:38

## 2025-04-03 RX ADMIN — HYDROMORPHONE HYDROCHLORIDE 0.5 MG: 1 INJECTION, SOLUTION INTRAMUSCULAR; INTRAVENOUS; SUBCUTANEOUS at 08:39

## 2025-04-03 RX ADMIN — Medication 300 UNITS: at 08:38

## 2025-04-03 RX ADMIN — SODIUM CHLORIDE, PRESERVATIVE FREE 10 ML: 5 INJECTION INTRAVENOUS at 08:38

## 2025-04-03 RX ADMIN — METOCLOPRAMIDE 10 MG: 5 INJECTION, SOLUTION INTRAMUSCULAR; INTRAVENOUS at 04:31

## 2025-04-03 RX ADMIN — SODIUM CHLORIDE 40 MG: 9 INJECTION, SOLUTION INTRAMUSCULAR; INTRAVENOUS; SUBCUTANEOUS at 01:15

## 2025-04-03 RX ADMIN — HYDROMORPHONE HYDROCHLORIDE 0.5 MG: 1 INJECTION, SOLUTION INTRAMUSCULAR; INTRAVENOUS; SUBCUTANEOUS at 11:41

## 2025-04-03 RX ADMIN — PROCHLORPERAZINE EDISYLATE 10 MG: 5 INJECTION INTRAMUSCULAR; INTRAVENOUS at 02:00

## 2025-04-03 RX ADMIN — SODIUM CHLORIDE, PRESERVATIVE FREE 10 ML: 5 INJECTION INTRAVENOUS at 14:51

## 2025-04-03 RX ADMIN — HYDROMORPHONE HYDROCHLORIDE 0.5 MG: 1 INJECTION, SOLUTION INTRAMUSCULAR; INTRAVENOUS; SUBCUTANEOUS at 02:00

## 2025-04-03 RX ADMIN — METOCLOPRAMIDE 10 MG: 5 INJECTION, SOLUTION INTRAMUSCULAR; INTRAVENOUS at 14:51

## 2025-04-03 RX ADMIN — SODIUM CHLORIDE 40 MG: 9 INJECTION, SOLUTION INTRAMUSCULAR; INTRAVENOUS; SUBCUTANEOUS at 11:41

## 2025-04-03 RX ADMIN — HYDROMORPHONE HYDROCHLORIDE 0.5 MG: 1 INJECTION, SOLUTION INTRAMUSCULAR; INTRAVENOUS; SUBCUTANEOUS at 04:49

## 2025-04-03 RX ADMIN — DOCUSATE SODIUM 50 MG AND SENNOSIDES 8.6 MG 1 TABLET: 8.6; 5 TABLET, FILM COATED ORAL at 08:38

## 2025-04-03 RX ADMIN — METOCLOPRAMIDE 10 MG: 5 INJECTION, SOLUTION INTRAMUSCULAR; INTRAVENOUS at 08:39

## 2025-04-03 ASSESSMENT — PAIN - FUNCTIONAL ASSESSMENT
PAIN_FUNCTIONAL_ASSESSMENT: PREVENTS OR INTERFERES SOME ACTIVE ACTIVITIES AND ADLS

## 2025-04-03 ASSESSMENT — PAIN SCALES - GENERAL
PAINLEVEL_OUTOF10: 8
PAINLEVEL_OUTOF10: 8
PAINLEVEL_OUTOF10: 3
PAINLEVEL_OUTOF10: 2
PAINLEVEL_OUTOF10: 8

## 2025-04-03 ASSESSMENT — PAIN DESCRIPTION - ORIENTATION
ORIENTATION: RIGHT;LEFT

## 2025-04-03 ASSESSMENT — PAIN DESCRIPTION - LOCATION
LOCATION: BACK;FACE;LEG
LOCATION: BACK;FACE

## 2025-04-03 ASSESSMENT — PAIN DESCRIPTION - ONSET
ONSET: ON-GOING
ONSET: ON-GOING

## 2025-04-03 ASSESSMENT — PAIN DESCRIPTION - FREQUENCY
FREQUENCY: CONTINUOUS
FREQUENCY: CONTINUOUS

## 2025-04-03 ASSESSMENT — PAIN SCALES - WONG BAKER
WONGBAKER_NUMERICALRESPONSE: NO HURT
WONGBAKER_NUMERICALRESPONSE: NO HURT

## 2025-04-03 ASSESSMENT — PAIN DESCRIPTION - PAIN TYPE
TYPE: ACUTE PAIN
TYPE: ACUTE PAIN

## 2025-04-03 ASSESSMENT — PAIN DESCRIPTION - DESCRIPTORS
DESCRIPTORS: ACHING;DISCOMFORT
DESCRIPTORS: ACHING;DISCOMFORT
DESCRIPTORS: ACHING;DISCOMFORT;SHARP
DESCRIPTORS: ACHING;CRAMPING;DISCOMFORT

## 2025-04-03 NOTE — PROGRESS NOTES
1015: Tube feed rate adjusted to 35 ml/hr.     1140: Pt tolerating TF rate. Tube feed rate adjusted to 40 ml/hr.     1235: Pt stating no nausea, but upset TF was at 40 ml/hr as he wants to go home today and said if he were to get sick, he would not be allowed to go home. He requested TF to be turned back down. Was okay to decrease to 35 ml/hr.      Electronically signed by Xi Palafox RN on 4/3/2025 at 10:16 AM

## 2025-04-03 NOTE — DISCHARGE SUMMARY
[facility] Hospitalist Physician Discharge Summary       [facility]  [address] [place] [address] [phone]        Roberto Tomas MD  [address]  [facility] [id number] [phone]    Follow up        Activity level: As tolerated     Dispo: home       Condition on discharge: Stable     Patient ID:  Prasanna Parnell  [id number]  59 y.o.  1965    Admit date: 3/23/2025    Discharge date and time:  4/3/2025  1:18 PM    Admission Diagnoses: Principal Problem:    Intractable nausea and vomiting  Active Problems:    Severe protein-calorie malnutrition    Throat cancer (HCC)  Resolved Problems:    * No resolved hospital problems. *      Discharge Diagnoses: Principal Problem:    Intractable nausea and vomiting  Active Problems:    Severe protein-calorie malnutrition    Throat cancer (HCC)  Resolved Problems:    * No resolved hospital problems. *      Consults:  IP CONSULT TO HOSPITALIST  IP CONSULT TO CASE MANAGEMENT  IP CONSULT TO ONCOLOGY  IP CONSULT TO PALLIATIVE CARE  IP CONSULT TO DIETITIAN  IP CONSULT TO RADIATION ONCOLOGY  IP CONSULT TO GI  IP CONSULT TO GI  IP CONSULT TO DIETITIAN  IP CONSULT TO HOME CARE NEEDS    Procedures:     Hospital Course:   This is a 59-year-old  male with history of throat cancer recently admitted at [facility] due to dysphagia and protein calorie malnutrition who was given pack tube initially did not change to a pack GJ tube for tube feed he has chronic nausea from stage IV throat cancer for long time and this is worsening nausea due to the PEG-J G-tube feeding GI consulted we gave him PPI and also symptomatic management however he continue to experience nausea vomiting we consult GI again, they did EGD which shows dislodged of this PEG J-tube into his esophagus the PEG-J tube was repositioned and after that we will transition from TPN to tube feed and successfully get him to the goal of  feeding tube gauze a very long time to figure out a discharge plan due to his insurance issues social situation so we finally, with solution to have oncology follow him as a PCP and set up home health care discharge this patient is medically stable discharge all symptoms resolved at discharge.  But I instructed him to come back to ER if he has new issues    Discharge Exam:    General Appearance: alert and oriented to person, place and time and in no acute distress  Skin: warm and dry  Head: normocephalic and atraumatic  Eyes: pupils equal, round, and reactive to light, extraocular eye movements intact, conjunctivae normal  Neck: neck supple and non tender without mass   Pulmonary/Chest: clear to auscultation bilaterally- no wheezes, rales or rhonchi, normal air movement, no respiratory distress  Cardiovascular: normal rate, normal S1 and S2 and no carotid bruits  Abdomen: soft, non-tender, non-distended, normal bowel sounds, no masses or organomegaly  Extremities: no cyanosis, no clubbing and no edema  Neurologic: no cranial nerve deficit and speech normal    I/O last 3 completed shifts:  In: 4154.4 [P.O.:360; I.V.:3; NG/GT:822]  Out: 600 [Urine:200; Emesis/NG output:400]  I/O this shift:  In: 240 [P.O.:240]  Out: -       LABS:  Recent Labs     04/01/25 0355 04/02/25 0428 04/03/25  0450    141 144   K 4.4 4.5 4.0    105 106   CO2 28 28 28   BUN 25* 31* 34*   CREATININE 0.7 0.7 0.7   GLUCOSE 97 112* 115*   CALCIUM 10.1 9.8 9.9       Recent Labs     04/01/25 0355 04/02/25 0428 04/03/25  0450   WBC 4.8 5.3 4.9   RBC 2.88* 2.88* 2.91*   HGB 9.3* 9.3* 9.6*   HCT 28.7* 28.6* 28.8*   MCV 99.7 99.3 99.0   MCH 32.3 32.3 33.0   MCHC 32.4 32.5 33.3   RDW 16.1* 16.1* 16.5*    240 232   MPV 10.3 10.8 11.1       Recent Labs     04/02/25  1613 04/02/25  2329 04/03/25  0617 04/03/25  1205   POCGLU 114* 146* 170* 123*       Imaging:  No results found.    Patient Instructions:      Medication List        START  syndrome; monitor/replace Lytes PRN.     J-tube for TF.            STOP taking these medications      fentaNYL 25 MCG/HR  Commonly known as: DURAGESIC  Replaced by: fentaNYL 75 MCG/HR     lidocaine viscous hcl 2 % Soln solution  Commonly known as: XYLOCAINE     nortriptyline 25 MG capsule  Commonly known as: PAMELOR     oxyCODONE 5 MG/5ML solution  Commonly known as: ROXICODONE     polyethylene glycol 17 GM/SCOOP powder  Commonly known as: GLYCOLAX               Where to Get Your Medications        These medications were sent to [facility] - [place], [place] - [address] [facility] [address] - P [phone] - F [phone] [address] [address]      Phone: [phone]   fentaNYL 75 MCG/HR  morphine 20MG/ML Soln concentrated solution       These medications were sent to [address] -  [phone] - F [phone]  [address] [facility] [address]      Phone: [phone]   pantoprazole 40 MG tablet  promethazine 25 MG tablet  scopolamine transdermal patch       You can get these medications from any pharmacy    Bring a paper prescription for each of these medications  TPN WITH LIPIDS (OUTPATIENT ONLY)           Note that more than 30 minutes was spent in preparing discharge papers, discussing discharge with patient, medication review, etc.    Signed:  Electronically signed by Lee Zarate DO on 4/3/2025 at 1:18 PM

## 2025-04-03 NOTE — PROGRESS NOTES
Palliative Care Department  [phone]  Palliative Care Progress Note  Provider Galo Forte MD      PATIENT: Prasanna Parnell  : 1965  MRN: [id number]  ADMISSION DATE: 3/23/2025  4:23 AM  Referring Provider: Roberto Tomas MD     Palliative Medicine was consulted on hospital day 8 for assistance with Symptom management: intractable nausea/vomiting; head and neck cancer    HPI:     Clinical Summary:Prasanna Parnell is a 59 y.o. y/o male spondylolisthesis, retrolisthesis, neuropathy, COPD, squamous cell carcinoma of the head and neck in 2024. Laryngoscopy by ENT 2025 showed ulcerated masses seen in the area of the right palatine tonsil extending to the base of the tongue into the vallecula approaching midline. Pathology of the right tonsil biopsy and right base of the tongue biopsy was consistent with HPV associated squamous cell carcinoma. He had a bronchoscopy with fine-needle aspiration of right upper lobe lesion which was positive for malignancy consistent with squamous cell carcinoma. Patient was initiated on chemotherapy and is following with Dr. Tomas in medical oncology and radiation therapy concurrently.  He had a recent hospitalization due to nausea and vomiting, on 3/3/2025, he underwent PEG tube placement 3/11/2025, insurance company did not cover tube feedings despite PEG tube in place and patient unable to eat.  PICC line was placed on 3/16/2025 and TPN was initiated on 3/17/2025.  Case management was having difficulty time setting up TPN outpatient, patient eloped on 3/21/2024 before tube feeding to be set up at home.  Who presented to [facility] on 3/23/2025 with failure to thrive and nausea.  At ED, given fentanyl, Benadryl, 1 L bolus.  He was admitted for further medical management of intractable nausea and failure to thrive.    ASSESSMENT/PLAN:     Pertinent Hospital Diagnoses     Intractable nausea  Failure to thrive  SCC of tongue/tonsil/lung    Palliative Care Encounter /  Counseling Regarding Goals of Care  Please see detailed goals of care discussion as below  At this time, Prasanna Parnell, Does have capacity for medical decision-making.  Capacity is time limited and situation/question specific  Outcome of goals of care meeting:  Symptom management  Patient wants to hold on radiation  Started TPN   Plan is d/c home Monday with [facility] and continue TPN  Code status Full Code  Advanced Directives: POA or living will in [facility]  Surrogate/Legal NOK:  Mary Anne Clement (partner/HC-POA): [phone]     Symptom management    Neoplasm related pain.     Increase Fentanyl patch to 75 mcg   Stop IV hydromorphone and oxycodone in anticipation of discharge  Start sublingual morphine 10 mg every 4 hours as needed  Lidocaine viscous daily as needed       2.   Intractable nausea and vomiting  Po or IV compazine every 6 hrs as needed  Scopolamine patch  Po  Zofran every 8 hours as needed  Carafate 4 times daily  Continue olanzapine 5 mg twice daily    3.  Prophylactic stool softener  GlycoLax daily  Added Dulcolax suppository  Start senna S1 tab daily    Spiritual assessment: no spiritual distress identified  Bereavement and grief: to be determined  Referrals to: none today    Thank you for the opportunity to participate in the care of Prasanna Parnell.     Palliative Medicine     SUBJECTIVE:     Details of Conversation:   Saw patient at bedside today.  Overall he states that he is feeling a little bit better.  Nausea and vomiting seems to been improved.  Of note patient did use significant amount of IV hydromorphone 7 doses for total of 3.5 mg of hydromorphone in a 24-hour period.  Given that he is getting ready for discharge need to switch him to a modalities that can be done at home.  Discussed with him the use of sublingual morphine given his difficulties with swallowing and PEG tube.  He is agreeable to give sublingual morphine to try.  Plan to start on sublingual morphine 10 mg every 4 hours as needed  will also increase fentanyl patch to 75 mcg.  Patient is to follow-up with out of medicine clinic for further medical management of his symptoms.  Otherwise no other acute issues at this time.  Palliative medicine will follow as outpatient.    Prognosis: Guarded    OBJECTIVE:     /60   Pulse 87   Temp 98 °F (36.7 °C) (Oral)   Resp 16   Ht 1.727 m (5' 8\")   Wt 49.9 kg (110 lb)   SpO2 97%   BMI 16.73 kg/m²     Physical Examination:  Gen: thin, NAD, awake, alert   Lungs: respirations easy  Heart: regular rate and rhythm  Abdomen: non-distended  Skin: warm, dry without rashes, lesions, bruising  Neuro: awake, alert, oriented x 3, follows commands    Objective data reviewed: labs, images, records, medication use, vitals, and chart    Time/Communication  Greater than 50% of time spent, total 35 minutes in counseling and coordination of care at the bedside regarding goals of care.    Thank you for allowing Palliative Medicine to participate in the care of Prasanna Parnell.    Note: This report was completed using computerEcoviate voiced recognition software.  Every effort has been made to ensure accuracy; however, inadvertent computerized transcription errors may be present.

## 2025-04-03 NOTE — PROGRESS NOTES
PROGRESS NOTE        Patient Presents with/Seen in Consultation For      Reason for Consult: positive gastroccult   CHIEF COMPLAINT:  nausea   Subjective:     Patient seen sitting up in bed.  States he feels well.  Tolerating tube feed.  Denies any nausea vomiting.  States he does have some slight pain when medications are administered.  Denies any bowel movement for 2 days,  passing gas.  Plan of care reviewed all questions answered.  No family present at this time.    Review of Systems  Aside from what was mentioned in the PMH and HPI, essentially unremarkable, all others negative.    Objective:     /60   Pulse 87   Temp 98 °F (36.7 °C) (Oral)   Resp 16   Ht 1.727 m (5' 8\")   Wt 49.9 kg (110 lb)   SpO2 97%   BMI 16.73 kg/m²     General appearance: alert, awake, laying in bed, and cooperative  Eyes: conjunctiva pale, sclera anicteric. PERRL.  Lungs: clear to auscultation bilaterally  Heart: regular rate and rhythm, no murmur, 2+ pulses; no edema  Abdomen: soft, non-tender; bowel sounds normal; no masses,  no organomegaly, PEG to tube feeds- infusing  Extremities: extremities without edema  Pulses: 2+ and symmetric  Skin: Skin color, texture, turgor normal.   Neurologic: Grossly normal    PN-Adult  3 IN 1 Central Line (Standard), Continuous TPN  metoclopramide (REGLAN) injection 10 mg, Q6H  OLANZapine zydis (ZYPREXA) disintegrating tablet 5 mg, BID  HYDROmorphone (DILAUDID) injection 0.5 mg, Q3H PRN  heparin (PF) 100 UNIT/ML injection 300 Units, PRN  heparin (PF) 100 UNIT/ML injection 300 Units, BID  promethazine (PHENERGAN) suppository 25 mg, Q6H PRN  pantoprazole (PROTONIX) 40 mg in sodium chloride (PF) 0.9 % 10 mL injection, Q12H  fentaNYL (DURAGESIC) 50 MCG/HR 1 patch, Q72H  scopolamine (TRANSDERM-SCOP) transdermal patch 1 patch, Q72H  ondansetron (ZOFRAN) injection 4 mg, Q6H PRN  bisacodyl (DULCOLAX) suppository 10 mg, Daily PRN  diatrizoate meglumine-sodium (GASTROGRAFIN) 66-10 % solution 30 mL,  Every effort has been made to ensure accuracy, however; inadvertent computerized transcription errors may be present.     Discussed with Dr. Valladares  Plan per Dr. Jacquelyn Henry, APRN - CNP  4/3/2025  9:15 AM For Dr. Valladares    GI attending addendum:  The patient case was reviewed and discussed with the GI midlevel team.     José Luis Valladares, DO

## 2025-04-03 NOTE — PATIENT CARE CONFERENCE
[facility] Quality Flow/Interdisciplinary Rounds Progress Note        Quality Flow Rounds held on April 3, 2025    Disciplines Attending:  Bedside Nurse, , , and Nursing Unit Leadership    Prasanna Parnell was admitted on 3/23/2025  4:23 AM    Anticipated Discharge Date:       Disposition:    Rahul Score:  Rahul Scale Score: 20    [facility] RISK OF UNPLANNED READMISSION 2.0             25.7 Total Score        Discussed patient goal for the day, patient clinical progression, and barriers to discharge.  The following Goal(s) of the Day/Commitment(s) have been identified:  Diagnostics - Report Results      Mary Carrillo RN  April 3, 2025

## 2025-04-03 NOTE — PROGRESS NOTES
Fentanyl patch applied at 1234. Pt called RN to room and stated he was not liking how patch was making him feel and wanted patch removed. Patch removed by RN.       Electronically signed by Xi Palafox RN on 4/3/2025 at 1:10 PM

## 2025-04-03 NOTE — CARE COORDINATION
Social Work discharge planning  Dori with [facility] said they can see pt at home tomorrow IF he has a Dr to follow for TPN (Dr Wiseman said he will) vs Tube feeding. Sw sent [facility] to NP with Oncology to see if they would be able to follow for outpt hhc with tf.   Will need order for home tube feeding for [facility] [phone]  Will need hhc order.  Electronically signed by IRWIN Aguirre on 4/3/2025 at 11:13 AM     Addendum  Adriana NP with Oncology advised Dr Tomas IS going to follow for hhc and tf via [facility]. Updated charge Rn.   Notified Ana with [facility] of TF order and discharge today.   Notified Dori with [facility] of discharge today. They will see pt at home tomorrow am.  Electronically signed by IRWIN Aguirre on 4/3/2025 at 11:36 AM

## 2025-04-04 ENCOUNTER — HOSPITAL ENCOUNTER (OUTPATIENT)
Dept: RADIATION ONCOLOGY | Age: 60
End: 2025-04-04
Payer: MEDICARE

## 2025-04-04 ENCOUNTER — TELEPHONE (OUTPATIENT)
Dept: CASE MANAGEMENT | Age: 60
End: 2025-04-04

## 2025-04-04 ENCOUNTER — TELEPHONE (OUTPATIENT)
Dept: ONCOLOGY | Age: 60
End: 2025-04-04

## 2025-04-04 NOTE — TELEPHONE ENCOUNTER
Chart checked and patient discharged from the hospital yesterday. Radiation Therapists asking for updates on patient and if he is going to continue with Radiation treatments. Called and left a message for the patient checking on his status and updates on if he would like to restart his Radiation Therapy treatments or any need for assistance with resuming treatments. Left my contact information for return call. Updated MACI White Navigator.

## 2025-04-04 NOTE — TELEPHONE ENCOUNTER
Contacted [facility] for clarification of nutrition orders. Pt was discharged from hospital 4/3, seemingly w/ orders for both EN and PN. Per Ana pharmacist, pt is currently receiving Vital AF, orders for 60 ml/hr x24 hr to provide 1440 TV, 1728 kcal, 108 gm pro, 1168 ml free water. He is NOT receiving TPN at this time 2/2 inability to acquire insurance coverage for both nutrition therapies. Of note, pt's EN was last running at 40 ml/hr prior to discharge, concern for ability to increase to goal rate w/o regular monitoring. Attempted to contact pt for check in, no answer, and no ability to leave voicemail at time of call. Will follow as able.  Electronically signed by Zaira Pratt MS, RD, LD on 4/4/2025 at 1:43 PM

## 2025-04-07 ENCOUNTER — APPOINTMENT (OUTPATIENT)
Dept: RADIATION ONCOLOGY | Age: 60
End: 2025-04-07
Payer: MEDICARE

## 2025-04-07 ENCOUNTER — TELEPHONE (OUTPATIENT)
Dept: CASE MANAGEMENT | Age: 60
End: 2025-04-07

## 2025-04-07 NOTE — TELEPHONE ENCOUNTER
Attempted to call patient regarding follow up appointment with Dr. Tomas. Patient did not answer and left message requesting call back.

## 2025-04-07 NOTE — PROGRESS NOTES
Physician Progress Note      PATIENT:               MCKAY BURGOS  CSN #:                  [id number]  :                       1965  ADMIT DATE:       3/3/2025 2:03 PM  DISCH DATE:        3/21/2025 2:49 PM  RESPONDING  PROVIDER #:        Joon Arzate MD          QUERY TEXT:    Patient admitted with failure to thrive and has a known history of   tongue/tonsil and lung cancer. Can the etiology of failure to thrive be   further specified as:    The clinical indicators include:  -\"He has tried multiple antiemetics without success. FTT, decreased po intake.   SCC tongue/tonsil. S/p PEG tube.\" (per H&P 3/3 by Dr. Higginbotham)  -\"Squamous cell carcinoma of the right base of the tongue/tonsil...metastatic   disease to right upper lung. 2025 biopsy of lung positive for squamous   cell carcinoma. started concurrent chemoradiation therapy on 2025.\" (per   Oncology Consult Note 3/4 by Nicole APONTE-MAURICIO & Dr. Ej Fernandez)  -\"Successful PEG to PEG-J exchange\" (per GI Progress Note 3/11 by Dr. Wiseman)  -\"Hold chemotherapy while inpatient\" (per Hematology/Oncology Progress Notes   3/5-3/21 by Dr. Tomas & Dr. Ej Fernandez)  -\"Failure to thrive in adult.  Severe protein-calorie malnutrition. Malignant   neoplasm of head, face, and neck.\" (Discharge Summary 3/21 by Dr. Arzate)  -Dietician Consulted 3/4-3/21  -CT Abdomen Pelvis \"1. No evidence of an acute intra-abdominal or pelvic   process. 2.Unremarkable appearance of percutaneous gastrostomy\"  -MRI Brain 3/12 \"No acute intracranial abnormality\"  -1L NS Bolus (3/3 MAR), TPN (3/17-3/21 MAR), IV Zofran (3/6-3/9, 3/12 MAR), IV   Compazine (3/7-3/21 MAR)  Options provided:  -- Failure to thrive due to squamous cell carcinoma of the right base of the   tongue/tonsil  -- Failure to thrive due to lung cancer  -- Failure to thrive due to squamous cell carcinoma of the right base of the   tongue/tonsil and lung cancer  -- Failure to thrive due to other, please specify.  --

## 2025-04-08 ENCOUNTER — HOSPITAL ENCOUNTER (OUTPATIENT)
Dept: RADIATION ONCOLOGY | Age: 60
End: 2025-04-08
Payer: MEDICARE

## 2025-04-09 ENCOUNTER — APPOINTMENT (OUTPATIENT)
Dept: RADIATION ONCOLOGY | Age: 60
End: 2025-04-09
Payer: MEDICARE

## 2025-04-09 LAB — SURGICAL PATHOLOGY REPORT: NORMAL

## 2025-04-10 ENCOUNTER — HOSPITAL ENCOUNTER (OUTPATIENT)
Dept: RADIATION ONCOLOGY | Age: 60
End: 2025-04-10
Payer: MEDICARE

## 2025-04-10 ENCOUNTER — APPOINTMENT (OUTPATIENT)
Dept: RADIATION ONCOLOGY | Age: 60
End: 2025-04-10
Payer: MEDICARE

## 2025-04-11 ENCOUNTER — HOSPITAL ENCOUNTER (OUTPATIENT)
Dept: RADIATION ONCOLOGY | Age: 60
Discharge: HOME | End: 2025-04-11

## 2025-04-14 ENCOUNTER — APPOINTMENT (OUTPATIENT)
Dept: RADIATION ONCOLOGY | Age: 60
End: 2025-04-14
Payer: MEDICARE

## 2025-04-15 ENCOUNTER — APPOINTMENT (OUTPATIENT)
Dept: RADIATION ONCOLOGY | Age: 60
End: 2025-04-15
Payer: MEDICARE

## 2025-04-16 ENCOUNTER — APPOINTMENT (OUTPATIENT)
Dept: RADIATION ONCOLOGY | Age: 60
End: 2025-04-16
Payer: MEDICARE

## 2025-04-17 ENCOUNTER — APPOINTMENT (OUTPATIENT)
Dept: RADIATION ONCOLOGY | Age: 60
End: 2025-04-17
Payer: MEDICARE

## 2025-04-18 ENCOUNTER — APPOINTMENT (OUTPATIENT)
Dept: RADIATION ONCOLOGY | Age: 60
End: 2025-04-18
Payer: MEDICARE

## 2025-04-21 ENCOUNTER — APPOINTMENT (OUTPATIENT)
Dept: RADIATION ONCOLOGY | Age: 60
End: 2025-04-21
Payer: MEDICARE

## 2025-04-22 ENCOUNTER — TELEPHONE (OUTPATIENT)
Dept: CASE MANAGEMENT | Age: 60
End: 2025-04-22

## 2025-04-22 ENCOUNTER — APPOINTMENT (OUTPATIENT)
Dept: RADIATION ONCOLOGY | Age: 60
End: 2025-04-22
Payer: MEDICARE

## 2025-04-22 NOTE — TELEPHONE ENCOUNTER
Received call from Neelam L[facility] radiation that radiation department had received a message from patient that he wanted to resume treatment. Per Neelam, radiation oncologist wants patient to be seen by both him and the medical oncologist prior. Message sent to  schedulers to call and schedule patient.

## 2025-04-23 ENCOUNTER — APPOINTMENT (OUTPATIENT)
Dept: RADIATION ONCOLOGY | Age: 60
End: 2025-04-23
Payer: MEDICARE

## 2025-04-23 ENCOUNTER — TELEPHONE (OUTPATIENT)
Dept: FAMILY MEDICINE CLINIC | Age: 60
End: 2025-04-23

## 2025-04-23 NOTE — TELEPHONE ENCOUNTER
Brenda FLOOD Took a message from Mary Anne Clement about Choco powers w/ a PCP at our office. Per the message I received he is looking to establish quickly so he can obtain orders for IV nutrition. He is going for daily radiation treatments at 4:45.     Unfortunately Dr Barry does not have an immediate opening for an establishing patient. I can schedule for his next available new patient appointment if Prasanna does not seen right away.

## 2025-04-23 NOTE — TELEPHONE ENCOUNTER
I spoke w/ Mary Anne and scheduled an appointment for 5/21 in [place]. Patient is currently at [facility] for TPN.

## 2025-04-24 ENCOUNTER — TELEPHONE (OUTPATIENT)
Dept: GASTROENTEROLOGY | Age: 60
End: 2025-04-24

## 2025-04-24 ENCOUNTER — APPOINTMENT (OUTPATIENT)
Dept: RADIATION ONCOLOGY | Age: 60
End: 2025-04-24
Payer: MEDICARE

## 2025-04-24 NOTE — TELEPHONE ENCOUNTER
Spoke with Gin Quijano regarding scheduling a f/u appt. Nurse stated that Dr was supposed to signed his TPN pt sigend an AMA form, and was only taking 60 ML of Ensure through his feeding tube. Pt refused all feedings too because pt wanted his TPN. Also she said f/u appt we will have to call the pt because pt wasn't there anymore.  When I spoke with the patient he did not want the f/u appt he just wanted  to sign for the TPN. Electronically signed by Mary Hanley MA on 4/24/25 at 11:20 AM EDT

## 2025-04-24 NOTE — TELEPHONE ENCOUNTER
Spoke with pt to schedule a follow-up appt for persistent nausea s/p PEG-J tube and TPN. Pt states he never received the TPN. Pt knew Dr was going to order it but never got anything and pt is still at [facility] and they won't let him go home. Pt was told I will message DR regarding this and give him a call back with an update. Electronically signed by Mary Hanley MA on 4/24/25 at 9:45 AM EDT

## 2025-04-25 ENCOUNTER — APPOINTMENT (OUTPATIENT)
Dept: RADIATION ONCOLOGY | Age: 60
End: 2025-04-25
Payer: MEDICARE

## 2025-04-27 NOTE — PROGRESS NOTES
Physician Progress Note      PATIENT:               PRASANNA PARNELL  CSN #:                  [id number]  :                       1965  ADMIT DATE:       3/23/2025 4:23 AM  DISCH DATE:        4/3/2025 3:37 PM  RESPONDING  PROVIDER #:        Lee Zarate DO          QUERY TEXT:    Please clarify in documentation the relationship, if any, between  Intractable nausea and vomiting and Linear gastritis secondary to mechanical   etiology from mal placed J-tube. Are the conditions:    The clinical indicators include:  Prasanna Parnell is a 59 y.o. y/o male was admitted for further medical   management of intractable nausea and failure to thrive      - Intractable nausea/ failure to thrive - continue anti-emetics, NPO.(from H&P   on 3/23 by Jennifer Haddad, APRN - NP)  - Intractable nausea, SCC tongue/tonsil on chemo and radiation (from   gastroenterology PN on  by Tanna Macias, APRN - CNP)  -J-Tube malposition in the esophagus  -Repositioning and anchoring in the distal duodenum with mantis clip and rat   tooth forceps  -Linear gastritis likely secondary to mechanical etiology from mal placed   J-tube. (from EGD on 3/31 by José Luis Valladares DO)  -Po or IV Compazine every 6 hrs as needed, Scopolamine patch, Po Zofran every   8 hours as needed, Carafate 4 times daily, Glycol ax daily, Added Dulcolax   suppository, Start senna S1 tab daily (from palliative care PN on  by Galo Forte MD)      Thank you,  Monique Godfrey, S, CDS  Options provided:  -- Related to or associated with each other  -- Unrelated to each other  -- Other - I will add my own diagnosis  -- Disagree - Not applicable / Not valid  -- Disagree - Clinically unable to determine / Unknown  -- Refer to Clinical Documentation Reviewer    PROVIDER RESPONSE TEXT:    The conditions noted are unrelated to each other.    Query created by: Monique Godfrey on 4/3/2025 2:55 AM      Electronically signed by:  Lee Zarate DO 2025 1:17

## 2025-04-29 ENCOUNTER — HOSPITAL ENCOUNTER (OUTPATIENT)
Dept: RADIATION ONCOLOGY | Age: 60
Discharge: HOME | End: 2025-04-29
Payer: MEDICARE

## 2025-04-29 VITALS
HEART RATE: 69 BPM | RESPIRATION RATE: 18 BRPM | OXYGEN SATURATION: 97 % | TEMPERATURE: 99.7 F | BODY MASS INDEX: 15.19 KG/M2 | DIASTOLIC BLOOD PRESSURE: 70 MMHG | SYSTOLIC BLOOD PRESSURE: 100 MMHG | WEIGHT: 99.9 LBS

## 2025-04-29 DIAGNOSIS — C09.8 MALIGNANT NEOPLASM OF OVERLAPPING SITES OF TONSIL (HCC): Primary | ICD-10-CM

## 2025-04-29 PROCEDURE — NBSRV NON-BILLABLE SERVICE: Performed by: SPECIALIST

## 2025-04-29 ASSESSMENT — PAIN SCALES - GENERAL: PAINLEVEL_OUTOF10: 7

## 2025-04-29 NOTE — PROGRESS NOTES
DEPARTMENT OF RADIATION ONCOLOGY   Follow-up Visit        2025    NAME:  Prasanna Parnell    :  1965 59 y.o. male     PCP: No primary care provider on file.    REFERRING PROVIDER: Kal    DIAGNOSIS:  [id number] vs M1 squamous cell carcinoma of the right tonsil with extension to the base of tongue, p16-positive     STAGING: Cancer Staging   No matching staging information was found for the patient.      RECENT HISTORY: Prasanna Parnell returns for a post incomplete radiation treatment follow up visit.  His last treatment was 2025 at which point he had received 40 Cross out of a planned 72 Gray (20 out of 36 fractions.  He was noted to have a [facility] with syncope and placed on TPN ultimately transferred to a facility ([facility]).  It is now just over 4 weeks since his last radiation of the been asked to see him regarding reinitiation of treatment.    Past medical, surgical, social and family histories reviewed and updated as indicated.    ALLERGIES:  Bee venom and Keflex [cephalexin]       MEDICATIONS:   Current Outpatient Medications:     scopolamine (TRANSDERM-SCOP) transdermal patch, Place 1 patch onto the skin every 72 hours, Disp: 10 patch, Rfl: 0    pantoprazole (PROTONIX) 40 MG tablet, Take 1 tablet by mouth in the morning and at bedtime, Disp: 60 tablet, Rfl: 0    promethazine (PHENERGAN) 25 MG tablet, Take 1 tablet by mouth 4 times daily as needed for Nausea, Disp: 20 tablet, Rfl: 0    TPN WITH LIPIDS, OUTPATIENT ONLY,, Infuse 2,040 mLs intravenously daily Standard 3-in-1 w/ Electrolytes @85ml/hr.  68g AA 1820kcal, Disp: 30 each, Rfl: 11    Nutritional Supplements (VITAL AF 1.2 MARE) LIQD, Take 60 mL/hr by mouth See Admin Instructions Peptide Based (Vital AF 1.2) @ 60ml/hr to provide 1440ml TV, 1728kcals, 108g pro, 1168ml free water.   Flush rec of 95ml q 4= 570ml water (1738ml total water).    Start at trickle rate (10cc/hr) and adv slowly to goal- monitor for refeeding

## 2025-04-29 NOTE — PROGRESS NOTES
Prasanna Parnell  4/29/2025  1:20 PM      Vitals:    04/29/25 1319   BP: 100/70   Pulse: 69   Resp: 18   Temp: 99.7 °F (37.6 °C)   SpO2: 97%    :    Wt Readings from Last 3 Encounters:   04/29/25 45.3 kg (99 lb 14.4 oz)   04/03/25 49.9 kg (110 lb)   03/21/25 55.3 kg (121 lb 14.5 oz)                Current Outpatient Medications:     scopolamine (TRANSDERM-SCOP) transdermal patch, Place 1 patch onto the skin every 72 hours, Disp: 10 patch, Rfl: 0    pantoprazole (PROTONIX) 40 MG tablet, Take 1 tablet by mouth in the morning and at bedtime, Disp: 60 tablet, Rfl: 0    promethazine (PHENERGAN) 25 MG tablet, Take 1 tablet by mouth 4 times daily as needed for Nausea, Disp: 20 tablet, Rfl: 0    TPN WITH LIPIDS, OUTPATIENT ONLY,, Infuse 2,040 mLs intravenously daily Standard 3-in-1 w/ Electrolytes @85ml/hr.  68g AA 1820kcal, Disp: 30 each, Rfl: 11    Nutritional Supplements (VITAL AF 1.2 MARE) LIQD, Take 60 mL/hr by mouth See Admin Instructions Peptide Based (Vital AF 1.2) @ 60ml/hr to provide 1440ml TV, 1728kcals, 108g pro, 1168ml free water.   Flush rec of 95ml q 4= 570ml water (1738ml total water).    Start at trickle rate (10cc/hr) and adv slowly to goal- monitor for refeeding syndrome; monitor/replace Lytes PRN.   J-tube for TF., Disp: 30 each, Rfl: 100    ondansetron (ZOFRAN-ODT) 8 MG TBDP disintegrating tablet, Take 1 tablet by mouth every 8 hours as needed for Nausea or Vomiting, Disp: 28 tablet, Rfl: 1    prochlorperazine (COMPAZINE) 10 MG tablet, Take 1 tablet by mouth every 6 hours as needed (nausea), Disp: 30 tablet, Rfl: 0    sucralfate (CARAFATE) 1 GM tablet, Take 1 tablet by mouth 4 times daily, Disp: 120 tablet, Rfl: 3    albuterol sulfate HFA (VENTOLIN HFA) 108 (90 Base) MCG/ACT inhaler, Inhale 2 puffs into the lungs every 6 hours as needed for Wheezing, Disp: , Rfl:     acetaminophen (TYLENOL) 160 MG/5ML suspension, Take 15.62 mLs by mouth every 4 hours as needed for Fever (Patient taking differently: Take

## 2025-04-29 NOTE — PROGRESS NOTES
Prasanna Parnell  4/29/2025  Ht Readings from Last 1 Encounters:   03/24/25 1.727 m (5' 8\")     Wt Readings from Last 10 Encounters:   04/29/25 45.3 kg (99 lb 14.4 oz)   04/03/25 49.9 kg (110 lb)   03/21/25 55.3 kg (121 lb 14.5 oz)   02/27/25 58.5 kg (128 lb 14.4 oz)   02/27/25 56.4 kg (124 lb 4.8 oz)   02/25/25 55.3 kg (122 lb)   02/25/25 55.3 kg (122 lb)   02/20/25 60.3 kg (132 lb 14.4 oz)   02/20/25 59 kg (130 lb)   02/13/25 56.7 kg (125 lb)     Body mass index is 15.19 kg/m².    Assessment: Met w/ pt during follow up appointment.  Pt now s/p extended [facility] Pt last assessed by this clinician ~2 months ago. He has since lost 29#, 22.7% loss x2 months. Pt w/ inability to tolerate feedings via original PEG, was replaced w/ PEG-J which became displaced. Pt had been on TPN during hospitalization, has had some difficulty w/ following physician, not currently under care of PCP. Medical oncologist unable to follow for TPN at this time. Pt's PEG-J replaced w/ [facility] at [facility]. Pt was discharged to [facility], left [place] last week. Pt has been feeding himself via PEG w/ Ensure and pureed foods. Pt unsure of how his pureed foods have been prepared. Explained that food products through PEG must be pureed to liquid form and run through fine mesh strainer to avoid clogging tube. He has not been taking any foods by mouth 2/2 N/V. He feels he has been able to tolerate Ensure through feeding tube, resistant to any TF formulas as he was intolerant to even peptide-based formulas while inpatient. Pt vomited with all EN formulas trialed. He reports he has not vomited while infusing Ensure shakes, however he is only able to bolus feed himself w/ 60 cc at a time every 1-2 hours. Discussed options, such as pump infused EN. Pt refuses EN formula at this time, notes that he does have EN pump at home and would be willing to use this if he is able to utilize Ensure via pump. Pt understands his body

## 2025-04-30 ENCOUNTER — APPOINTMENT (OUTPATIENT)
Dept: RADIATION ONCOLOGY | Age: 60
End: 2025-04-30
Payer: MEDICARE

## 2025-04-30 ENCOUNTER — HOSPITAL ENCOUNTER (OUTPATIENT)
Dept: RADIATION ONCOLOGY | Age: 60
End: 2025-04-30
Payer: MEDICARE

## 2025-04-30 ENCOUNTER — HOSPITAL ENCOUNTER (OUTPATIENT)
Dept: RADIATION ONCOLOGY | Age: 60
Discharge: HOME | End: 2025-04-30
Payer: MEDICARE

## 2025-04-30 PROCEDURE — 77334 RADIATION TREATMENT AID(S): CPT | Performed by: SPECIALIST

## 2025-05-01 ENCOUNTER — HOSPITAL ENCOUNTER (OUTPATIENT)
Dept: INFUSION THERAPY | Age: 60
Discharge: HOME | End: 2025-05-01
Payer: MEDICARE

## 2025-05-01 ENCOUNTER — OFFICE VISIT (OUTPATIENT)
Dept: ONCOLOGY | Age: 60
End: 2025-05-01
Payer: MEDICARE

## 2025-05-01 ENCOUNTER — APPOINTMENT (OUTPATIENT)
Dept: RADIATION ONCOLOGY | Age: 60
End: 2025-05-01
Payer: MEDICARE

## 2025-05-01 VITALS
WEIGHT: 102.5 LBS | DIASTOLIC BLOOD PRESSURE: 58 MMHG | HEIGHT: 68 IN | HEART RATE: 81 BPM | BODY MASS INDEX: 15.53 KG/M2 | TEMPERATURE: 98.1 F | SYSTOLIC BLOOD PRESSURE: 104 MMHG | OXYGEN SATURATION: 97 %

## 2025-05-01 DIAGNOSIS — B37.0 THRUSH: ICD-10-CM

## 2025-05-01 DIAGNOSIS — R91.8 MASS OF RIGHT LUNG: ICD-10-CM

## 2025-05-01 DIAGNOSIS — Z51.5 PALLIATIVE CARE ENCOUNTER: ICD-10-CM

## 2025-05-01 DIAGNOSIS — C76.0 MALIGNANT NEOPLASM OF HEAD, FACE, AND NECK (HCC): ICD-10-CM

## 2025-05-01 DIAGNOSIS — C09.8 MALIGNANT NEOPLASM OF OVERLAPPING SITES OF TONSIL (HCC): Primary | ICD-10-CM

## 2025-05-01 DIAGNOSIS — C14.0 THROAT CANCER (HCC): Primary | ICD-10-CM

## 2025-05-01 DIAGNOSIS — G89.3 NEOPLASM RELATED PAIN: ICD-10-CM

## 2025-05-01 LAB
ALBUMIN SERPL-MCNC: 3.6 G/DL (ref 3.5–5.2)
ALP SERPL-CCNC: 105 U/L (ref 40–129)
ALT SERPL-CCNC: 29 U/L (ref 0–50)
ANION GAP SERPL CALCULATED.3IONS-SCNC: 11 MMOL/L (ref 7–16)
AST SERPL-CCNC: 20 U/L (ref 0–50)
BASOPHILS # BLD: 0.03 K/UL (ref 0–0.2)
BASOPHILS NFR BLD: 1 % (ref 0–2)
BILIRUB SERPL-MCNC: 0.4 MG/DL (ref 0–1.2)
BUN SERPL-MCNC: 21 MG/DL (ref 6–20)
CALCIUM SERPL-MCNC: 9.4 MG/DL (ref 8.6–10)
CHLORIDE SERPL-SCNC: 99 MMOL/L (ref 98–107)
CO2 SERPL-SCNC: 26 MMOL/L (ref 22–29)
CREAT SERPL-MCNC: 0.6 MG/DL (ref 0.7–1.2)
EOSINOPHIL # BLD: 0.07 K/UL (ref 0.05–0.5)
EOSINOPHILS RELATIVE PERCENT: 2 % (ref 0–6)
ERYTHROCYTE [DISTWIDTH] IN BLOOD BY AUTOMATED COUNT: 13.4 % (ref 11.5–15)
GFR, ESTIMATED: >90 ML/MIN/1.73M2
GLUCOSE SERPL-MCNC: 118 MG/DL (ref 74–99)
HCT VFR BLD AUTO: 30 % (ref 37–54)
HGB BLD-MCNC: 10.5 G/DL (ref 12.5–16.5)
IMM GRANULOCYTES # BLD AUTO: <0.03 K/UL (ref 0–0.58)
IMM GRANULOCYTES NFR BLD: 0 % (ref 0–5)
LYMPHOCYTES NFR BLD: 0.69 K/UL (ref 1.5–4)
LYMPHOCYTES RELATIVE PERCENT: 14 % (ref 20–42)
MAGNESIUM SERPL-MCNC: 1.8 MG/DL (ref 1.6–2.6)
MCH RBC QN AUTO: 33.9 PG (ref 26–35)
MCHC RBC AUTO-ENTMCNC: 35 G/DL (ref 32–34.5)
MCV RBC AUTO: 96.8 FL (ref 80–99.9)
MONOCYTES NFR BLD: 0.29 K/UL (ref 0.1–0.95)
MONOCYTES NFR BLD: 6 % (ref 2–12)
NEUTROPHILS NFR BLD: 77 % (ref 43–80)
NEUTS SEG NFR BLD: 3.71 K/UL (ref 1.8–7.3)
PHOSPHATE SERPL-MCNC: 2.7 MG/DL (ref 2.5–4.5)
PLATELET # BLD AUTO: 250 K/UL (ref 130–450)
PMV BLD AUTO: 9.8 FL (ref 7–12)
POTASSIUM SERPL-SCNC: 3.6 MMOL/L (ref 3.5–5.1)
PROT SERPL-MCNC: 6.7 G/DL (ref 6.4–8.3)
RBC # BLD AUTO: 3.1 M/UL (ref 3.8–5.8)
SODIUM SERPL-SCNC: 136 MMOL/L (ref 136–145)
WBC OTHER # BLD: 4.8 K/UL (ref 4.5–11.5)

## 2025-05-01 PROCEDURE — 83735 ASSAY OF MAGNESIUM: CPT

## 2025-05-01 PROCEDURE — 99213 OFFICE O/P EST LOW 20 MIN: CPT | Performed by: STUDENT IN AN ORGANIZED HEALTH CARE EDUCATION/TRAINING PROGRAM

## 2025-05-01 PROCEDURE — G8419 CALC BMI OUT NRM PARAM NOF/U: HCPCS | Performed by: STUDENT IN AN ORGANIZED HEALTH CARE EDUCATION/TRAINING PROGRAM

## 2025-05-01 PROCEDURE — 3017F COLORECTAL CA SCREEN DOC REV: CPT | Performed by: STUDENT IN AN ORGANIZED HEALTH CARE EDUCATION/TRAINING PROGRAM

## 2025-05-01 PROCEDURE — 80053 COMPREHEN METABOLIC PANEL: CPT

## 2025-05-01 PROCEDURE — 2500000003 HC RX 250 WO HCPCS: Performed by: STUDENT IN AN ORGANIZED HEALTH CARE EDUCATION/TRAINING PROGRAM

## 2025-05-01 PROCEDURE — 84100 ASSAY OF PHOSPHORUS: CPT

## 2025-05-01 PROCEDURE — 1111F DSCHRG MED/CURRENT MED MERGE: CPT | Performed by: STUDENT IN AN ORGANIZED HEALTH CARE EDUCATION/TRAINING PROGRAM

## 2025-05-01 PROCEDURE — 4004F PT TOBACCO SCREEN RCVD TLK: CPT | Performed by: STUDENT IN AN ORGANIZED HEALTH CARE EDUCATION/TRAINING PROGRAM

## 2025-05-01 PROCEDURE — 99213 OFFICE O/P EST LOW 20 MIN: CPT

## 2025-05-01 PROCEDURE — 6360000002 HC RX W HCPCS: Performed by: STUDENT IN AN ORGANIZED HEALTH CARE EDUCATION/TRAINING PROGRAM

## 2025-05-01 PROCEDURE — 36591 DRAW BLOOD OFF VENOUS DEVICE: CPT

## 2025-05-01 PROCEDURE — 85025 COMPLETE CBC W/AUTO DIFF WBC: CPT

## 2025-05-01 PROCEDURE — G8427 DOCREV CUR MEDS BY ELIG CLIN: HCPCS | Performed by: STUDENT IN AN ORGANIZED HEALTH CARE EDUCATION/TRAINING PROGRAM

## 2025-05-01 RX ORDER — HEPARIN 100 UNIT/ML
500 SYRINGE INTRAVENOUS PRN
OUTPATIENT
Start: 2025-05-01

## 2025-05-01 RX ORDER — HEPARIN 100 UNIT/ML
500 SYRINGE INTRAVENOUS PRN
Status: DISCONTINUED | OUTPATIENT
Start: 2025-05-01 | End: 2025-05-02 | Stop reason: HOSPADM

## 2025-05-01 RX ORDER — SODIUM CHLORIDE 0.9 % (FLUSH) 0.9 %
5-40 SYRINGE (ML) INJECTION PRN
OUTPATIENT
Start: 2025-05-01

## 2025-05-01 RX ORDER — HYDROCORTISONE SODIUM SUCCINATE 100 MG/2ML
100 INJECTION INTRAMUSCULAR; INTRAVENOUS
OUTPATIENT
Start: 2025-05-01

## 2025-05-01 RX ORDER — DIPHENHYDRAMINE HYDROCHLORIDE 50 MG/ML
50 INJECTION, SOLUTION INTRAMUSCULAR; INTRAVENOUS
OUTPATIENT
Start: 2025-05-01

## 2025-05-01 RX ORDER — ACETAMINOPHEN 325 MG/1
650 TABLET ORAL
OUTPATIENT
Start: 2025-05-01

## 2025-05-01 RX ORDER — NYSTATIN 100000 [USP'U]/ML
500000 SUSPENSION ORAL 4 TIMES DAILY
Qty: 200 ML | Refills: 0 | Status: SHIPPED | OUTPATIENT
Start: 2025-05-01 | End: 2025-05-11

## 2025-05-01 RX ORDER — ALBUTEROL SULFATE 90 UG/1
4 INHALANT RESPIRATORY (INHALATION) PRN
OUTPATIENT
Start: 2025-05-01

## 2025-05-01 RX ORDER — EPINEPHRINE 1 MG/ML
0.3 INJECTION, SOLUTION, CONCENTRATE INTRAVENOUS PRN
OUTPATIENT
Start: 2025-05-01

## 2025-05-01 RX ORDER — SODIUM CHLORIDE 9 MG/ML
INJECTION, SOLUTION INTRAVENOUS CONTINUOUS
OUTPATIENT
Start: 2025-05-01

## 2025-05-01 RX ORDER — SODIUM CHLORIDE 0.9 % (FLUSH) 0.9 %
5-40 SYRINGE (ML) INJECTION PRN
Status: DISCONTINUED | OUTPATIENT
Start: 2025-05-01 | End: 2025-05-02 | Stop reason: HOSPADM

## 2025-05-01 RX ORDER — OXYCODONE HCL 5 MG/5 ML
10 SOLUTION, ORAL ORAL EVERY 4 HOURS PRN
Qty: 200 ML | Refills: 0 | Status: SHIPPED | OUTPATIENT
Start: 2025-05-01 | End: 2025-05-04

## 2025-05-01 RX ORDER — ONDANSETRON 2 MG/ML
8 INJECTION INTRAMUSCULAR; INTRAVENOUS
OUTPATIENT
Start: 2025-05-01

## 2025-05-01 RX ORDER — SODIUM CHLORIDE 9 MG/ML
25 INJECTION, SOLUTION INTRAVENOUS PRN
OUTPATIENT
Start: 2025-05-01

## 2025-05-01 RX ADMIN — SODIUM CHLORIDE, PRESERVATIVE FREE 10 ML: 5 INJECTION INTRAVENOUS at 15:16

## 2025-05-01 RX ADMIN — HEPARIN 500 UNITS: 100 SYRINGE at 15:16

## 2025-05-01 NOTE — TELEPHONE ENCOUNTER
Call from Choco stating he is home from [facility] after multiple hospitalizations. Choco confirms his appointment with Palliative care on Monday 5/5/25. Choco shares that he was started on Methadone at the hospital but it was not continued at the facility. Explained our team can evaluate this when we see him Monday. Choco states he is taking the Oxy IR solution every 6 hours. Choco needs a refill for Oxy IR solution and for Nystatin solution. Pharmacy is [facility] in [place].    Double Island Pedicle Flap Text: The defect edges were debeveled with a #15 scalpel blade.  Given the location of the defect, shape of the defect and the proximity to free margins a double island pedicle advancement flap was deemed most appropriate.  Using a sterile surgical marker, an appropriate advancement flap was drawn incorporating the defect, outlining the appropriate donor tissue and placing the expected incisions within the relaxed skin tension lines where possible.    The area thus outlined was incised deep to adipose tissue with a #15 scalpel blade.  The skin margins were undermined to an appropriate distance in all directions around the primary defect and laterally outward around the island pedicle utilizing iris scissors.  There was minimal undermining beneath the pedicle flap.

## 2025-05-01 NOTE — PROGRESS NOTES
Briefly met w/ pt following medical oncology follow up. Most recent orders still pending insurance auth from [facility] at this time. Sample pack of Petflow Peptide 1.5 confirmed shipment this morning. Pt appreciative of assistance, will continue to follow.  Electronically signed by Zaira Pratt MS, RD, LD on 5/1/2025 at 3:54 PM    
Patient refused printed AVS, pt states they have MYCHART. All questions answered.          
oligometastatic disease versus 2 separate primaries, consensus was to proceed with definitive approach with chemoradiotherapy as there could still be survival benefit regardless  I discussed this with patient and wife, noting possibility of Stage 4 disease and it's implications including incurable disease  Monitor and will readdress the lung nodule  S/p PEG  Send back to gen surg for port  Orders placed for cisplatin  Audiology eval and echo for pretreatment workup  S/p dental extractions x2 and needs 1 more extraction, scheduled for this Monday   Continues to have cancer related pain in spite of using OTCs; PDMP reviewed  Scheduled to meet palliative care 2/11/25 02/20/2025:   Discussed results of the updates on lung biopsy  Lung lesion is p16 +  Discussed implications of having distant metastatic disease  Discussed survival benefit even with local regional control with chemo RT  Okay to proceed with cycle 1 of cisplatin, and will start radiation later today  Also briefly discussed systemic therapy upon completion of chemo RT, consider immunotherapy  Also discussed briefly role of additional RT against oligometastatic disease depending on response of systemic therapy  Consider repeat chest CT after treatment  Patient also reported certain issues with [employer] financial assistance got approval then denial.  Will reach out to her team to assess  Follow-up with palliative care as directed    02/27/2025:   Reviewed recent ED visit for nausea and vomiting  Followed up with palliative care a few days ago earlier this week  Currently on Compazine, Zofran, scopolamine patch  Also on Carafate.  Consider adding on additional antacids.  Abdominal exam is unremarkable.  PEG tube intact, appears clean.  Consider symptoms associated with chemotherapy  We will write for Phenergan  Consider Zyprexa  I also discussed scheduling his antiemetics  Also has thrush  Will give extra IV fluids today    5/1/25:   Pt seems to be doing much

## 2025-05-02 ENCOUNTER — APPOINTMENT (OUTPATIENT)
Dept: RADIATION ONCOLOGY | Age: 60
End: 2025-05-02
Payer: MEDICARE

## 2025-05-05 ENCOUNTER — OFFICE VISIT (OUTPATIENT)
Dept: PALLATIVE CARE | Age: 60
End: 2025-05-05
Payer: MEDICARE

## 2025-05-05 ENCOUNTER — CLINICAL DOCUMENTATION (OUTPATIENT)
Dept: ONCOLOGY | Age: 60
End: 2025-05-05

## 2025-05-05 ENCOUNTER — APPOINTMENT (OUTPATIENT)
Dept: RADIATION ONCOLOGY | Age: 60
End: 2025-05-05
Payer: MEDICARE

## 2025-05-05 VITALS
WEIGHT: 105.2 LBS | HEART RATE: 78 BPM | TEMPERATURE: 97.5 F | DIASTOLIC BLOOD PRESSURE: 57 MMHG | SYSTOLIC BLOOD PRESSURE: 90 MMHG | BODY MASS INDEX: 16 KG/M2 | OXYGEN SATURATION: 96 %

## 2025-05-05 DIAGNOSIS — Z51.5 PALLIATIVE CARE ENCOUNTER: ICD-10-CM

## 2025-05-05 DIAGNOSIS — R11.0 NAUSEA: ICD-10-CM

## 2025-05-05 DIAGNOSIS — G89.3 NEOPLASM RELATED PAIN: ICD-10-CM

## 2025-05-05 DIAGNOSIS — C76.0 MALIGNANT NEOPLASM OF HEAD, FACE, AND NECK (HCC): Primary | ICD-10-CM

## 2025-05-05 PROCEDURE — G8428 CUR MEDS NOT DOCUMENT: HCPCS | Performed by: NURSE PRACTITIONER

## 2025-05-05 PROCEDURE — 3017F COLORECTAL CA SCREEN DOC REV: CPT | Performed by: NURSE PRACTITIONER

## 2025-05-05 PROCEDURE — 4004F PT TOBACCO SCREEN RCVD TLK: CPT | Performed by: NURSE PRACTITIONER

## 2025-05-05 PROCEDURE — G8419 CALC BMI OUT NRM PARAM NOF/U: HCPCS | Performed by: NURSE PRACTITIONER

## 2025-05-05 PROCEDURE — 99211 OFF/OP EST MAY X REQ PHY/QHP: CPT | Performed by: NURSE PRACTITIONER

## 2025-05-05 PROCEDURE — 99214 OFFICE O/P EST MOD 30 MIN: CPT | Performed by: NURSE PRACTITIONER

## 2025-05-05 RX ORDER — OXYCODONE HYDROCHLORIDE 10 MG/1
10 TABLET ORAL
Qty: 120 TABLET | Refills: 0 | Status: SHIPPED | OUTPATIENT
Start: 2025-05-05 | End: 2025-05-20

## 2025-05-05 RX ORDER — METHADONE HYDROCHLORIDE 5 MG/1
5 TABLET ORAL 2 TIMES DAILY
Qty: 30 TABLET | Refills: 0 | Status: SHIPPED | OUTPATIENT
Start: 2025-05-05 | End: 2025-05-20

## 2025-05-05 NOTE — PROGRESS NOTES
Palliative Care Department  Provider: FADI Colin - CNP    Referring Provider:  Dr. Bowden    Location of Service:   [facility]    Chief Complaint: Prasanna Parnell is a 59 y.o. 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 Tomas, Dr. Rodríguez, Dr. Bowden,   was undergoing concurrent chemoradiation therapy beginning 2/20/2025  treatment interupted and awaiting new treatment plan    Pain related to Neoplasm/Odynophagia:  oxycodone 10 mg Q 3 PRN  Methadone 5 mg twice daily  He has been using tylenol 4 tabs at a time, instructed to reduce use  He has also been using a lot of advil  Encouraged to stop smoking    Weight Loss/Odynophagia:  Dietician providing support, seen today  PEG in placed  encouraged to continue to continue with oral intake as much as able    Nausea:  Carafate  Compazine and Zofran  Scopolamine patch  Improved     Follow Up:  2 weeks.  They were encouraged to call with any questions, concerns, needs, or changes in symptoms.    Subjective:     HPI:  Prasanna Parnell is a 59 y.o. male with squamous cell carcinoma of the tongue, as well as known right upper lobe lung nodule, status post biopsy which also test positive for squamous cell carcinoma.  He is known to Dr. Tomas, Dr. Rodríguez, as well as Dr. Bowden.  He was referred to [facility] symptomatic support.    Subjective/Events/Discussions:  History of Present Illness  The patient is a 59-year-old male presenting today for a palliative follow-up regarding symptoms related to his squamous cell carcinoma of the tongue, with a recent prolonged hospitalization at [facility] requiring PEG tube placement.    He has been experiencing persistent pain in his right jaw and ear, described as sharp, stabbing, burning, and aching. The pain is particularly severe between the ear and jaw, and he reports no specific

## 2025-05-05 NOTE — PROGRESS NOTES
Received call from Ana at [facility]. Pt has been approved for Ensure Plus via PEG. Per Ana, pt prefers to bolus feed himself rather than utilizing pump. Bolus approved at this time. Will continue to follow.  Electronically signed by Zaira Pratt MS, RD, LD on 5/5/2025 at 3:46 PM

## 2025-05-06 ENCOUNTER — TELEPHONE (OUTPATIENT)
Dept: CASE MANAGEMENT | Age: 60
End: 2025-05-06

## 2025-05-06 ENCOUNTER — APPOINTMENT (OUTPATIENT)
Dept: RADIATION ONCOLOGY | Age: 60
End: 2025-05-06
Payer: MEDICARE

## 2025-05-06 NOTE — TELEPHONE ENCOUNTER
Updated by Radiation Nurse that ordered CT soft tissue neck isnt scheduled until 5/29/25. Called and left the patient a message requesting a return call to help get the CT scan moved to a sooner date but may need to be at a different location. Left my contact number. Will await return call.

## 2025-05-07 ENCOUNTER — APPOINTMENT (OUTPATIENT)
Dept: RADIATION ONCOLOGY | Age: 60
End: 2025-05-07
Payer: MEDICARE

## 2025-05-08 ENCOUNTER — APPOINTMENT (OUTPATIENT)
Dept: RADIATION ONCOLOGY | Age: 60
End: 2025-05-08
Payer: MEDICARE

## 2025-05-09 ENCOUNTER — TELEPHONE (OUTPATIENT)
Dept: CASE MANAGEMENT | Age: 60
End: 2025-05-09

## 2025-05-09 ENCOUNTER — APPOINTMENT (OUTPATIENT)
Dept: RADIATION ONCOLOGY | Age: 60
End: 2025-05-09
Payer: MEDICARE

## 2025-05-09 NOTE — TELEPHONE ENCOUNTER
Called patient in regards to rescheduling his CT scans. Patient is agreeable to this RN rescheduling. He is requesting [facility] location as it is closer to his home. I called Jina, radiology supervisor at [facility] to assist with obtaining an earlier appt. Awaiting call back.

## 2025-05-12 ENCOUNTER — APPOINTMENT (OUTPATIENT)
Dept: RADIATION ONCOLOGY | Age: 60
End: 2025-05-12
Payer: MEDICARE

## 2025-05-13 ENCOUNTER — APPOINTMENT (OUTPATIENT)
Dept: RADIATION ONCOLOGY | Age: 60
End: 2025-05-13
Payer: MEDICARE

## 2025-05-14 ENCOUNTER — APPOINTMENT (OUTPATIENT)
Dept: RADIATION ONCOLOGY | Age: 60
End: 2025-05-14
Payer: MEDICARE

## 2025-05-15 ENCOUNTER — APPOINTMENT (OUTPATIENT)
Dept: RADIATION ONCOLOGY | Age: 60
End: 2025-05-15
Payer: MEDICARE

## 2025-05-16 ENCOUNTER — APPOINTMENT (OUTPATIENT)
Dept: RADIATION ONCOLOGY | Age: 60
End: 2025-05-16
Payer: MEDICARE

## 2025-05-19 ENCOUNTER — HOSPITAL ENCOUNTER (OUTPATIENT)
Dept: RADIATION ONCOLOGY | Age: 60
Discharge: HOME | End: 2025-05-19
Payer: MEDICARE

## 2025-05-19 ENCOUNTER — APPOINTMENT (OUTPATIENT)
Dept: RADIATION ONCOLOGY | Age: 60
End: 2025-05-19
Payer: MEDICARE

## 2025-05-19 PROCEDURE — 77014 CHG CT GUIDANCE RADIATION THERAPY FLDS PLACEMENT: CPT | Performed by: SPECIALIST

## 2025-05-19 PROCEDURE — 77386 HC NTSTY MODUL RAD TX DLVR CPLX: CPT | Performed by: SPECIALIST

## 2025-05-19 PROCEDURE — 77014 HC CT TREATMENT PLAN: CPT | Performed by: SPECIALIST

## 2025-05-20 ENCOUNTER — HOSPITAL ENCOUNTER (OUTPATIENT)
Dept: CT IMAGING | Age: 60
Discharge: HOME | End: 2025-05-22
Attending: STUDENT IN AN ORGANIZED HEALTH CARE EDUCATION/TRAINING PROGRAM

## 2025-05-20 ENCOUNTER — APPOINTMENT (OUTPATIENT)
Dept: RADIATION ONCOLOGY | Age: 60
End: 2025-05-20
Payer: MEDICARE

## 2025-05-20 ENCOUNTER — HOSPITAL ENCOUNTER (OUTPATIENT)
Dept: CT IMAGING | Age: 60
Discharge: HOME | End: 2025-05-22
Attending: SPECIALIST

## 2025-05-20 ENCOUNTER — HOSPITAL ENCOUNTER (OUTPATIENT)
Dept: RADIATION ONCOLOGY | Age: 60
Discharge: HOME | End: 2025-05-20
Payer: MEDICARE

## 2025-05-20 ENCOUNTER — OFFICE VISIT (OUTPATIENT)
Age: 60
End: 2025-05-20
Payer: MEDICARE

## 2025-05-20 VITALS
OXYGEN SATURATION: 94 % | HEART RATE: 102 BPM | WEIGHT: 100.3 LBS | BODY MASS INDEX: 15.25 KG/M2 | TEMPERATURE: 97.3 F | SYSTOLIC BLOOD PRESSURE: 88 MMHG | DIASTOLIC BLOOD PRESSURE: 62 MMHG

## 2025-05-20 DIAGNOSIS — G89.3 NEOPLASM RELATED PAIN: ICD-10-CM

## 2025-05-20 DIAGNOSIS — C76.0 MALIGNANT NEOPLASM OF HEAD, FACE, AND NECK (HCC): ICD-10-CM

## 2025-05-20 DIAGNOSIS — R91.8 MASS OF RIGHT LUNG: ICD-10-CM

## 2025-05-20 DIAGNOSIS — C09.8 MALIGNANT NEOPLASM OF OVERLAPPING SITES OF TONSIL (HCC): ICD-10-CM

## 2025-05-20 DIAGNOSIS — Z51.5 PALLIATIVE CARE ENCOUNTER: ICD-10-CM

## 2025-05-20 DIAGNOSIS — C14.0 THROAT CANCER (HCC): ICD-10-CM

## 2025-05-20 PROCEDURE — 77014 HC CT TREATMENT PLAN: CPT | Performed by: SPECIALIST

## 2025-05-20 PROCEDURE — G8427 DOCREV CUR MEDS BY ELIG CLIN: HCPCS | Performed by: NURSE PRACTITIONER

## 2025-05-20 PROCEDURE — 3017F COLORECTAL CA SCREEN DOC REV: CPT | Performed by: NURSE PRACTITIONER

## 2025-05-20 PROCEDURE — 4004F PT TOBACCO SCREEN RCVD TLK: CPT | Performed by: NURSE PRACTITIONER

## 2025-05-20 PROCEDURE — 99213 OFFICE O/P EST LOW 20 MIN: CPT | Performed by: NURSE PRACTITIONER

## 2025-05-20 PROCEDURE — 77386 HC NTSTY MODUL RAD TX DLVR CPLX: CPT | Performed by: SPECIALIST

## 2025-05-20 PROCEDURE — 99214 OFFICE O/P EST MOD 30 MIN: CPT | Performed by: NURSE PRACTITIONER

## 2025-05-20 PROCEDURE — G8419 CALC BMI OUT NRM PARAM NOF/U: HCPCS | Performed by: NURSE PRACTITIONER

## 2025-05-20 RX ORDER — OXYCODONE HYDROCHLORIDE 20 MG/1
20 TABLET ORAL EVERY 4 HOURS PRN
Qty: 90 TABLET | Refills: 0 | Status: SHIPPED | OUTPATIENT
Start: 2025-05-20 | End: 2025-06-04

## 2025-05-20 RX ORDER — SCOPOLAMINE 1 MG/3D
1 PATCH, EXTENDED RELEASE TRANSDERMAL
COMMUNITY
Start: 2025-04-26 | End: 2025-05-20 | Stop reason: SDUPTHER

## 2025-05-20 RX ORDER — SCOPOLAMINE 1 MG/3D
1 PATCH, EXTENDED RELEASE TRANSDERMAL
Qty: 10 PATCH | Refills: 0 | Status: SHIPPED | OUTPATIENT
Start: 2025-05-20 | End: 2025-06-19

## 2025-05-20 RX ORDER — SODIUM CHLORIDE 0.9 % (FLUSH) 0.9 %
10 SYRINGE (ML) INJECTION PRN
Status: DISCONTINUED | OUTPATIENT
Start: 2025-05-20 | End: 2025-05-23 | Stop reason: HOSPADM

## 2025-05-20 RX ORDER — IOPAMIDOL 755 MG/ML
70 INJECTION, SOLUTION INTRAVASCULAR
Status: DISCONTINUED | OUTPATIENT
Start: 2025-05-20 | End: 2025-05-23 | Stop reason: HOSPADM

## 2025-05-20 RX ORDER — PROMETHAZINE HYDROCHLORIDE 25 MG/1
25 TABLET ORAL 4 TIMES DAILY PRN
Qty: 60 TABLET | Refills: 1 | Status: SHIPPED | OUTPATIENT
Start: 2025-05-20 | End: 2025-06-19

## 2025-05-20 NOTE — PROGRESS NOTES
Palliative Care Department  Provider: FADI Colin - CNP    Referring Provider:  Dr. Bowden    Location of Service:   [facility]    Chief Complaint: Prasanna Parnell is a 59 y.o. 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 Tomas, Dr. Rodríguez, Dr. Bowden,   was undergoing concurrent chemoradiation therapy beginning 2/20/2025  Interrupted by hospitalizations  Undergoing radiation    Pain related to Neoplasm/Odynophagia:  Oxycodone increase to 20 mg Q 4 PRN  He stopped Methadone, due to worse nausea and vomiting  Encouraged to stop smoking    Weight Loss/Odynophagia:  Dietician providing support, seen today  PEG in placed  encouraged to continue to continue with oral intake as much as able    Nausea:  Promethazine 25 mg Q 6 PRN  Scopolamine patch  Compazine and Zofran  Improved     Follow Up:  2 weeks.  They were encouraged to call with any questions, concerns, needs, or changes in symptoms.    Subjective:     HPI:  Prasanna Parnell is a 59 y.o. male with squamous cell carcinoma of the tongue, as well as known right upper lobe lung nodule, status post biopsy which also test positive for squamous cell carcinoma.  He is known to Dr. Tomas, Dr. Rodríguez, as well as Dr. Bowden.  He was referred to [facility] Medicine symptomatic support.    Subjective/Events/Discussions:  History of Present Illness  The patient presents for a palliative medicine follow-up regarding symptoms related to his oropharynx squamous cell carcinoma, including pain and nausea.    He reports an improvement in his pain levels. He has discontinued methadone due to its adverse effects, which included inducing nausea and vomiting. He is currently managing his pain with oxycodone, taking 2 tablets approximately 4 to 5 times daily, which he finds effective. However, he has exhausted his supply of oxycodone as of last

## 2025-05-21 ENCOUNTER — APPOINTMENT (OUTPATIENT)
Dept: RADIATION ONCOLOGY | Age: 60
End: 2025-05-21
Payer: MEDICARE

## 2025-05-21 ENCOUNTER — OFFICE VISIT (OUTPATIENT)
Dept: FAMILY MEDICINE CLINIC | Age: 60
End: 2025-05-21
Payer: MEDICARE

## 2025-05-21 ENCOUNTER — HOSPITAL ENCOUNTER (OUTPATIENT)
Dept: RADIATION ONCOLOGY | Age: 60
Discharge: HOME | End: 2025-05-21
Payer: MEDICARE

## 2025-05-21 VITALS
HEART RATE: 96 BPM | TEMPERATURE: 97.7 F | HEIGHT: 68 IN | OXYGEN SATURATION: 98 % | DIASTOLIC BLOOD PRESSURE: 64 MMHG | WEIGHT: 98.3 LBS | RESPIRATION RATE: 16 BRPM | SYSTOLIC BLOOD PRESSURE: 96 MMHG | BODY MASS INDEX: 14.9 KG/M2

## 2025-05-21 DIAGNOSIS — Z72.0 TOBACCO USE: ICD-10-CM

## 2025-05-21 DIAGNOSIS — Z91.030 BEE STING ALLERGY: Primary | ICD-10-CM

## 2025-05-21 DIAGNOSIS — C76.0 HEAD AND NECK CANCER (HCC): ICD-10-CM

## 2025-05-21 DIAGNOSIS — C09.8 MALIGNANT NEOPLASM OF OVERLAPPING SITES OF TONSIL (HCC): ICD-10-CM

## 2025-05-21 DIAGNOSIS — C06.9 CANCER OF ORAL CAVITY (HCC): ICD-10-CM

## 2025-05-21 PROCEDURE — 99203 OFFICE O/P NEW LOW 30 MIN: CPT | Performed by: FAMILY MEDICINE

## 2025-05-21 PROCEDURE — 3017F COLORECTAL CA SCREEN DOC REV: CPT | Performed by: FAMILY MEDICINE

## 2025-05-21 PROCEDURE — G8427 DOCREV CUR MEDS BY ELIG CLIN: HCPCS | Performed by: FAMILY MEDICINE

## 2025-05-21 PROCEDURE — G8419 CALC BMI OUT NRM PARAM NOF/U: HCPCS | Performed by: FAMILY MEDICINE

## 2025-05-21 PROCEDURE — 77014 HC CT TREATMENT PLAN: CPT | Performed by: SPECIALIST

## 2025-05-21 PROCEDURE — 4004F PT TOBACCO SCREEN RCVD TLK: CPT | Performed by: FAMILY MEDICINE

## 2025-05-21 PROCEDURE — 77386 HC NTSTY MODUL RAD TX DLVR CPLX: CPT | Performed by: SPECIALIST

## 2025-05-21 RX ORDER — EPINEPHRINE 0.3 MG/.3ML
0.3 INJECTION SUBCUTANEOUS ONCE
Qty: 0.3 ML | Refills: 0 | Status: SHIPPED | OUTPATIENT
Start: 2025-05-21 | End: 2025-05-21

## 2025-05-21 ASSESSMENT — PATIENT HEALTH QUESTIONNAIRE - PHQ9
SUM OF ALL RESPONSES TO PHQ QUESTIONS 1-9: 0
2. FEELING DOWN, DEPRESSED OR HOPELESS: NOT AT ALL
1. LITTLE INTEREST OR PLEASURE IN DOING THINGS: NOT AT ALL
SUM OF ALL RESPONSES TO PHQ QUESTIONS 1-9: 0

## 2025-05-21 NOTE — PROGRESS NOTES
Prasanna Parnell is a 59 y.o. male   CC:   Chief Complaint   Patient presents with    Establish Care     He is going thru radiation right now. He is seeing 2 oncologist. He states that he has cancer in his tonsil cavity.       History of Present Illness  The patient presents for evaluation of throat cancer and bee allergy.    Throat Cancer  He is currently undergoing radiation therapy for stage 4 throat cancer, which has metastasized to his lung. He is under the care of Dr. Tomas, a general oncologist, and Dr. Brown, a radiation oncologist. His treatment regimen includes medications for nausea and pain management, all of which are managed by palliative care. He has expressed a desire to avoid prolonged life support but wishes to continue fighting his illness. He retains decision-making capacity regarding his medical care, with his girlfriend designated as his surrogate decision-maker. He has undergone PET scans, which have not revealed any additional metastases. He continues to smoke but has reduced his consumption by one pack over the past week and a half. He is also receiving care at the [facility], where he undergoes regular blood work. He has a percutaneous endoscopic gastrostomy (PEG) tube in place, which he uses for nutrition, although he is encouraged to eat orally as well.  - Onset: Currently undergoing radiation therapy.  - Location: Throat cancer metastasized to the lung.  - Character: Stage 4 throat cancer.  - Alleviating/Aggravating Factors: Medications for nausea and pain management; reduced smoking by one pack over the past week and a half.  - Radiation: Metastasized to the lung.  - Severity: Stage 4 cancer; managed by palliative care.    Bee Allergy  He has a known allergy to bees and used an EpiPen last year. He is uncertain about his insurance coverage for a new EpiPen.  - Onset: Used an EpiPen last year.  - Character: Known allergy to bees.  - Alleviating Factors: EpiPen usage.  -

## 2025-05-22 ENCOUNTER — HOSPITAL ENCOUNTER (OUTPATIENT)
Dept: INFUSION THERAPY | Age: 60
Discharge: HOME | End: 2025-05-22

## 2025-05-22 ENCOUNTER — HOSPITAL ENCOUNTER (OUTPATIENT)
Dept: RADIATION ONCOLOGY | Age: 60
Discharge: HOME | End: 2025-05-22
Payer: MEDICARE

## 2025-05-22 ENCOUNTER — APPOINTMENT (OUTPATIENT)
Dept: RADIATION ONCOLOGY | Age: 60
End: 2025-05-22
Payer: MEDICARE

## 2025-05-22 VITALS
TEMPERATURE: 96.9 F | HEART RATE: 101 BPM | OXYGEN SATURATION: 96 % | DIASTOLIC BLOOD PRESSURE: 77 MMHG | BODY MASS INDEX: 14.93 KG/M2 | WEIGHT: 98.2 LBS | SYSTOLIC BLOOD PRESSURE: 107 MMHG | RESPIRATION RATE: 18 BRPM

## 2025-05-22 DIAGNOSIS — C09.8 MALIGNANT NEOPLASM OF OVERLAPPING SITES OF TONSIL (HCC): Primary | ICD-10-CM

## 2025-05-22 PROCEDURE — 77014 HC CT TREATMENT PLAN: CPT | Performed by: SPECIALIST

## 2025-05-22 PROCEDURE — 77386 HC NTSTY MODUL RAD TX DLVR CPLX: CPT | Performed by: SPECIALIST

## 2025-05-22 RX ORDER — SODIUM CHLORIDE 0.9 % (FLUSH) 0.9 %
5-40 SYRINGE (ML) INJECTION PRN
OUTPATIENT
Start: 2025-05-22

## 2025-05-22 RX ORDER — HYDROCORTISONE SODIUM SUCCINATE 100 MG/2ML
100 INJECTION INTRAMUSCULAR; INTRAVENOUS
OUTPATIENT
Start: 2025-05-22

## 2025-05-22 RX ORDER — SODIUM CHLORIDE 9 MG/ML
INJECTION, SOLUTION INTRAVENOUS CONTINUOUS
OUTPATIENT
Start: 2025-05-22

## 2025-05-22 RX ORDER — SODIUM CHLORIDE 0.9 % (FLUSH) 0.9 %
5-40 SYRINGE (ML) INJECTION PRN
Status: DISCONTINUED | OUTPATIENT
Start: 2025-05-22 | End: 2025-05-23 | Stop reason: HOSPADM

## 2025-05-22 RX ORDER — ACETAMINOPHEN 325 MG/1
650 TABLET ORAL
OUTPATIENT
Start: 2025-05-22

## 2025-05-22 RX ORDER — ONDANSETRON 2 MG/ML
8 INJECTION INTRAMUSCULAR; INTRAVENOUS
OUTPATIENT
Start: 2025-05-22

## 2025-05-22 RX ORDER — HEPARIN 100 UNIT/ML
500 SYRINGE INTRAVENOUS PRN
OUTPATIENT
Start: 2025-05-22

## 2025-05-22 RX ORDER — DIPHENHYDRAMINE HYDROCHLORIDE 50 MG/ML
50 INJECTION, SOLUTION INTRAMUSCULAR; INTRAVENOUS
OUTPATIENT
Start: 2025-05-22

## 2025-05-22 RX ORDER — HEPARIN 100 UNIT/ML
500 SYRINGE INTRAVENOUS PRN
Status: DISCONTINUED | OUTPATIENT
Start: 2025-05-22 | End: 2025-05-23 | Stop reason: HOSPADM

## 2025-05-22 RX ORDER — ALBUTEROL SULFATE 90 UG/1
4 INHALANT RESPIRATORY (INHALATION) PRN
OUTPATIENT
Start: 2025-05-22

## 2025-05-22 RX ORDER — SODIUM CHLORIDE 9 MG/ML
25 INJECTION, SOLUTION INTRAVENOUS PRN
OUTPATIENT
Start: 2025-05-22

## 2025-05-22 RX ORDER — EPINEPHRINE 1 MG/ML
0.3 INJECTION, SOLUTION, CONCENTRATE INTRAVENOUS PRN
OUTPATIENT
Start: 2025-05-22

## 2025-05-22 ASSESSMENT — PAIN DESCRIPTION - LOCATION: LOCATION: NECK

## 2025-05-22 ASSESSMENT — PAIN SCALES - GENERAL: PAINLEVEL_OUTOF10: 6

## 2025-05-22 NOTE — PROGRESS NOTES
Prasanna Parnell  5/22/2025  Wt Readings from Last 3 Encounters:   05/22/25 44.5 kg (98 lb 3.2 oz)   05/21/25 44.6 kg (98 lb 4.8 oz)   05/20/25 45.5 kg (100 lb 4.8 oz)     Body mass index is 14.93 kg/m².        Treatment Area:RT tong    Patient was seen today for weekly visit.   Comfort Alteration  KPS:90%  Fatigue: Mild    Mucous Membrane Alteration  Mucositis Due to Radiation: No  Thrush: No  Voice Changes: No    Nutritional Alteration  Anorexia: Yes   Nausea: No   Vomiting: No     Skin Alteration   Sensation:good    Radiation Dermatitis:  na    Ventilation Alteration  Cough yes    Emotional  Coping: effective    Injury, potential bleeding or infection: na    Radioprotectant Tolerance  na            Lab Results   Component Value Date    WBC 4.8 05/01/2025    HGB 10.5 (L) 05/01/2025    HCT 30.0 (L) 05/01/2025     05/01/2025         /77   Pulse (!) 101   Temp 96.9 °F (36.1 °C) (Skin)   Resp 18   Wt 44.5 kg (98 lb 3.2 oz)   SpO2 96%   BMI 14.93 kg/m²   BP within normal range? yes         Assessment/Plan: 24/36fx 4800/7200cGY and tolerating well    Jessica Monzon RN

## 2025-05-22 NOTE — PROGRESS NOTES
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      Past medical, surgical, social and family histories reviewed and updated as indicated.    PAIN: 2/10    ALLERGIES:  Bee venom and Keflex [cephalexin]         Current Outpatient Medications   Medication Sig Dispense Refill    EPINEPHrine (EPIPEN 2-JUAN CARLOS) 0.3 MG/0.3ML SOAJ injection Inject 0.3 mLs into the skin once for 1 dose Use as directed for allergic reaction 0.3 mL 0    oxyCODONE HCl (ROXICODONE) 20 MG immediate release tablet Take 1 tablet by mouth every 4 hours as needed for Pain for up to 15 days. Max Daily Amount: 120 mg 90 tablet 0    promethazine (PHENERGAN) 25 MG tablet Take 1 tablet by mouth 4 times daily as needed for Nausea 60 tablet 1    scopolamine (TRANSDERM-SCOP) transdermal patch Place 1 patch onto the skin every 72 hours 10 patch 0    pantoprazole (PROTONIX) 40 MG tablet Take 1 tablet by mouth in the morning and at bedtime 60 tablet 0    TPN WITH LIPIDS, OUTPATIENT ONLY, Infuse 2,040 mLs intravenously daily Standard 3-in-1 w/ Electrolytes @85ml/hr.    68g AA  1820kcal (Patient not taking: Reported on 5/21/2025) 30 each 11    Nutritional Supplements (VITAL AF 1.2 MARE) LIQD Take 60 mL/hr by mouth See Admin Instructions Peptide Based (Vital AF 1.2) @ 60ml/hr to provide 1440ml TV, 1728kcals, 108g pro, 1168ml free water.     Flush rec of 95ml q 4= 570ml water (1738ml total water).      Start at trickle rate (10cc/hr) and adv slowly to goal- monitor for refeeding syndrome; monitor/replace Lytes PRN.     J-tube for TF. 30 each 100    ondansetron (ZOFRAN-ODT) 8 MG TBDP disintegrating tablet Take 1 tablet by mouth

## 2025-05-23 ENCOUNTER — APPOINTMENT (OUTPATIENT)
Dept: RADIATION ONCOLOGY | Age: 60
End: 2025-05-23
Payer: MEDICARE

## 2025-05-23 ENCOUNTER — TELEPHONE (OUTPATIENT)
Dept: CASE MANAGEMENT | Age: 60
End: 2025-05-23

## 2025-05-23 ENCOUNTER — HOSPITAL ENCOUNTER (OUTPATIENT)
Dept: RADIATION ONCOLOGY | Age: 60
Discharge: HOME | End: 2025-05-23
Payer: MEDICARE

## 2025-05-23 PROCEDURE — 77336 RADIATION PHYSICS CONSULT: CPT | Performed by: SPECIALIST

## 2025-05-23 PROCEDURE — 77386 HC NTSTY MODUL RAD TX DLVR CPLX: CPT | Performed by: SPECIALIST

## 2025-05-23 PROCEDURE — 77014 HC CT TREATMENT PLAN: CPT | Performed by: SPECIALIST

## 2025-05-23 RX ORDER — FLUCONAZOLE 100 MG/1
TABLET ORAL
Qty: 11 TABLET | Refills: 0 | Status: SHIPPED | OUTPATIENT
Start: 2025-05-23 | End: 2025-06-02

## 2025-05-23 NOTE — TELEPHONE ENCOUNTER
Met with patient prior to his Radiation treatment today. Checked status of follow up with Medical Oncology. He stated he is rescheduled to see Dr Tomas on 6/5/25.

## 2025-05-27 ENCOUNTER — APPOINTMENT (OUTPATIENT)
Dept: RADIATION ONCOLOGY | Age: 60
End: 2025-05-27
Payer: MEDICARE

## 2025-05-27 ENCOUNTER — HOSPITAL ENCOUNTER (OUTPATIENT)
Dept: RADIATION ONCOLOGY | Age: 60
Discharge: HOME | End: 2025-05-27
Payer: MEDICARE

## 2025-05-27 PROCEDURE — 77386 HC NTSTY MODUL RAD TX DLVR CPLX: CPT | Performed by: SPECIALIST

## 2025-05-27 PROCEDURE — 77014 HC CT TREATMENT PLAN: CPT | Performed by: SPECIALIST

## 2025-05-28 ENCOUNTER — HOSPITAL ENCOUNTER (OUTPATIENT)
Dept: RADIATION ONCOLOGY | Age: 60
Discharge: HOME | End: 2025-05-28
Payer: MEDICARE

## 2025-05-28 PROCEDURE — 77014 HC CT TREATMENT PLAN: CPT | Performed by: SPECIALIST

## 2025-05-28 PROCEDURE — 77386 HC NTSTY MODUL RAD TX DLVR CPLX: CPT | Performed by: SPECIALIST

## 2025-05-29 ENCOUNTER — HOSPITAL ENCOUNTER (OUTPATIENT)
Dept: RADIATION ONCOLOGY | Age: 60
Discharge: HOME | End: 2025-05-29
Payer: MEDICARE

## 2025-05-29 ENCOUNTER — APPOINTMENT (OUTPATIENT)
Dept: RADIATION ONCOLOGY | Age: 60
End: 2025-05-29
Payer: MEDICARE

## 2025-05-29 VITALS
BODY MASS INDEX: 14.73 KG/M2 | DIASTOLIC BLOOD PRESSURE: 53 MMHG | RESPIRATION RATE: 18 BRPM | WEIGHT: 96.9 LBS | SYSTOLIC BLOOD PRESSURE: 94 MMHG | OXYGEN SATURATION: 97 % | HEART RATE: 92 BPM | TEMPERATURE: 98.1 F

## 2025-05-29 PROCEDURE — 77014 HC CT TREATMENT PLAN: CPT | Performed by: SPECIALIST

## 2025-05-29 PROCEDURE — 77386 HC NTSTY MODUL RAD TX DLVR CPLX: CPT | Performed by: SPECIALIST

## 2025-05-29 NOTE — PROGRESS NOTES
H&NGregory MICHAEL Parnell  5/29/2025  Wt Readings from Last 3 Encounters:   05/29/25 44 kg (96 lb 14.4 oz)   05/22/25 44.5 kg (98 lb 3.2 oz)   05/21/25 44.6 kg (98 lb 4.8 oz)     Body mass index is 14.73 kg/m².        Treatment Area:H&N    Patient was seen today for weekly visit.   Comfort Alteration  KPS:80%  Fatigue: Mild    Mucous Membrane Alteration  Mucositis Due to Radiation: No  Thrush: No  Voice Changes: No    Nutritional Alteration  Anorexia: Yes   Nausea: No   Vomiting: No     Skin Alteration   Sensation:no    Radiation Dermatitis:  no    Ventilation Alteration  Cough:No    Emotional  Coping: somewhat effective    Injury, potential bleeding or infection: no    Radioprotectant Tolerance  No            Lab Results   Component Value Date    WBC 4.8 05/01/2025    HGB 10.5 (L) 05/01/2025    HCT 30.0 (L) 05/01/2025     05/01/2025         BP (!) 94/53   Pulse 92   Temp 98.1 °F (36.7 °C) (Temporal)   Resp 18   Wt 44 kg (96 lb 14.4 oz)   SpO2 97%   BMI 14.73 kg/m²   BP within normal range? no   -if no, manually recheck in 5-10 min  NEW BP reading:  BP within normal range? yes   -if no, notify attending provider for further instruction      Assessment/Plan: Pt completed 28/36fx and 5600/7200cGy.    Carol Sosa RN

## 2025-05-29 NOTE — PROGRESS NOTES
Prasanna Parnell  5/29/2025  Ht Readings from Last 1 Encounters:   05/21/25 1.727 m (5' 8\")     Wt Readings from Last 10 Encounters:   05/29/25 44 kg (96 lb 14.4 oz)   05/22/25 44.5 kg (98 lb 3.2 oz)   05/21/25 44.6 kg (98 lb 4.8 oz)   05/20/25 45.5 kg (100 lb 4.8 oz)   05/05/25 47.7 kg (105 lb 3.2 oz)   05/01/25 46.5 kg (102 lb 8 oz)   04/29/25 45.3 kg (99 lb 14.4 oz)   04/03/25 49.9 kg (110 lb)   03/21/25 55.3 kg (121 lb 14.5 oz)   02/27/25 58.5 kg (128 lb 14.4 oz)     Body mass index is 14.73 kg/m².    Assessment: Met w/ pt during weekly visit. Pt continues to use mix of EN + PO intake. He reports he is usually using about 3 cartons of Boost Plus daily, infusing 1 syringe at a time throughout day. He does also puree food and consume this by mouth and/or via feeding tube. Again, stressed importance of liquidizing foods to avoid clogging PEG. Pt notes that he's \"done it for 4 weeks now\". Pt had refused pump infused feedings through [facility] pharmacy. Stressed importance of adequate nutrition, noting that it is highly likely that he is not getting as many calories in as he has been assuming. Explained that pt is getting 1050 kcal w/ reported Ensure use, however his body requires ~600-800 more calories. Recommend pt track PO/EN intake over next 1-2 days to compare actual intake vs estimated needs. Pt verbalizes understanding, though minimally receptive. Will continue to follow.    Weight change: -2# x1 week  Appetite: poor  Nutritional Side Effects: xerostomia, ageusia, dysphagia  Calculated Needs: 35-40 kcal/kg CBW =  kcal, 1.5-1.8 gm/kg CBW = 70-80 gm pro, 1 ml/kcal =  ml fluids  Malnutrition Status: Severe  Nutrition Diagnosis: Severe Malnutrition related to Catabolic Illness as evidenced by Diet history of poor intake , Severe Fat Loss , Severe Muscle loss , and Weight loss 28.9% x <6 months    Pre-Hab Eligible?: Yes    Recommendations: Pt to infuse at least 4 cartons Ensure Plus via PEG or by mouth

## 2025-05-29 NOTE — PROGRESS NOTES
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      Past medical, surgical, social and family histories reviewed and updated as indicated.    PAIN: 2/10    ALLERGIES:  Bee venom and Keflex [cephalexin]         Current Outpatient Medications   Medication Sig Dispense Refill    fluconazole (DIFLUCAN) 100 MG tablet Take 2 tablets by mouth daily for 1 day, THEN 1 tablet daily for 9 days. 11 tablet 0    EPINEPHrine (EPIPEN 2-JUAN CARLOS) 0.3 MG/0.3ML SOAJ injection Inject 0.3 mLs into the skin once for 1 dose Use as directed for allergic reaction 0.3 mL 0    oxyCODONE HCl (ROXICODONE) 20 MG immediate release tablet Take 1 tablet by mouth every 4 hours as needed for Pain for up to 15 days. Max Daily Amount: 120 mg 90 tablet 0    promethazine (PHENERGAN) 25 MG tablet Take 1 tablet by mouth 4 times daily as needed for Nausea 60 tablet 1    scopolamine (TRANSDERM-SCOP) transdermal patch Place 1 patch onto the skin every 72 hours 10 patch 0    pantoprazole (PROTONIX) 40 MG tablet Take 1 tablet by mouth in the morning and at bedtime 60 tablet 0    TPN WITH LIPIDS, OUTPATIENT ONLY, Infuse 2,040 mLs intravenously daily Standard 3-in-1 w/ Electrolytes @85ml/hr.    68g AA  1820kcal (Patient not taking: Reported on 5/21/2025) 30 each 11    Nutritional Supplements (VITAL AF 1.2 MARE) LIQD Take 60 mL/hr by mouth See Admin Instructions Peptide Based (Vital AF 1.2) @ 60ml/hr to provide 1440ml TV, 1728kcals, 108g pro, 1168ml free water.     Flush rec of 95ml q 4= 570ml water (1738ml total water).      Start at trickle rate (10cc/hr) and adv slowly to goal- monitor for refeeding syndrome; monitor/replace

## 2025-05-30 ENCOUNTER — HOSPITAL ENCOUNTER (OUTPATIENT)
Dept: RADIATION ONCOLOGY | Age: 60
Discharge: HOME | End: 2025-05-30
Payer: MEDICARE

## 2025-05-30 PROCEDURE — 77386 HC NTSTY MODUL RAD TX DLVR CPLX: CPT | Performed by: SPECIALIST

## 2025-06-02 ENCOUNTER — HOSPITAL ENCOUNTER (OUTPATIENT)
Dept: RADIATION ONCOLOGY | Age: 60
Discharge: HOME | End: 2025-06-02
Payer: MEDICARE

## 2025-06-02 PROCEDURE — 77336 RADIATION PHYSICS CONSULT: CPT | Performed by: SPECIALIST

## 2025-06-02 PROCEDURE — 77386 HC NTSTY MODUL RAD TX DLVR CPLX: CPT | Performed by: SPECIALIST

## 2025-06-02 PROCEDURE — 77427 RADIATION TX MANAGEMENT X5: CPT | Performed by: SPECIALIST

## 2025-06-02 PROCEDURE — 77014 CHG CT GUIDANCE RADIATION THERAPY FLDS PLACEMENT: CPT | Performed by: SPECIALIST

## 2025-06-03 ENCOUNTER — OFFICE VISIT (OUTPATIENT)
Age: 60
End: 2025-06-03
Payer: MEDICARE

## 2025-06-03 ENCOUNTER — HOSPITAL ENCOUNTER (OUTPATIENT)
Dept: RADIATION ONCOLOGY | Age: 60
Discharge: HOME | End: 2025-06-03
Payer: MEDICARE

## 2025-06-03 VITALS
HEART RATE: 86 BPM | DIASTOLIC BLOOD PRESSURE: 40 MMHG | SYSTOLIC BLOOD PRESSURE: 78 MMHG | OXYGEN SATURATION: 96 % | BODY MASS INDEX: 14.55 KG/M2 | RESPIRATION RATE: 18 BRPM | TEMPERATURE: 97.2 F | WEIGHT: 95.7 LBS

## 2025-06-03 VITALS
WEIGHT: 95.6 LBS | BODY MASS INDEX: 14.54 KG/M2 | SYSTOLIC BLOOD PRESSURE: 129 MMHG | DIASTOLIC BLOOD PRESSURE: 94 MMHG | OXYGEN SATURATION: 97 % | HEART RATE: 92 BPM | TEMPERATURE: 97.8 F

## 2025-06-03 DIAGNOSIS — Z51.5 PALLIATIVE CARE ENCOUNTER: ICD-10-CM

## 2025-06-03 DIAGNOSIS — G89.3 NEOPLASM RELATED PAIN: ICD-10-CM

## 2025-06-03 DIAGNOSIS — C76.0 MALIGNANT NEOPLASM OF HEAD, FACE, AND NECK (HCC): Primary | ICD-10-CM

## 2025-06-03 DIAGNOSIS — R11.0 NAUSEA: ICD-10-CM

## 2025-06-03 PROCEDURE — G8419 CALC BMI OUT NRM PARAM NOF/U: HCPCS | Performed by: NURSE PRACTITIONER

## 2025-06-03 PROCEDURE — 3017F COLORECTAL CA SCREEN DOC REV: CPT | Performed by: NURSE PRACTITIONER

## 2025-06-03 PROCEDURE — G8427 DOCREV CUR MEDS BY ELIG CLIN: HCPCS | Performed by: NURSE PRACTITIONER

## 2025-06-03 PROCEDURE — 99213 OFFICE O/P EST LOW 20 MIN: CPT | Performed by: NURSE PRACTITIONER

## 2025-06-03 PROCEDURE — 4004F PT TOBACCO SCREEN RCVD TLK: CPT | Performed by: NURSE PRACTITIONER

## 2025-06-03 PROCEDURE — 77386 HC NTSTY MODUL RAD TX DLVR CPLX: CPT | Performed by: SPECIALIST

## 2025-06-03 PROCEDURE — 99212 OFFICE O/P EST SF 10 MIN: CPT | Performed by: NURSE PRACTITIONER

## 2025-06-03 RX ORDER — PROCHLORPERAZINE MALEATE 10 MG
10 TABLET ORAL EVERY 6 HOURS PRN
Qty: 30 TABLET | Refills: 0 | Status: SHIPPED | OUTPATIENT
Start: 2025-06-03

## 2025-06-03 NOTE — PROGRESS NOTES
Prasanna Parnell  6/3/2025  Wt Readings from Last 3 Encounters:   06/03/25 43.4 kg (95 lb 11.2 oz)   06/03/25 43.4 kg (95 lb 9.6 oz)   05/29/25 44 kg (96 lb 14.4 oz)     Body mass index is 14.55 kg/m².        Treatment Area:rt. tongue    Patient was seen today for weekly visit.   Comfort Alteration  KPS:90%  Fatigue: Mild    Mucous Membrane Alteration  Mucositis Due to Radiation: No  Thrush: No  Voice Changes: No    Nutritional Alteration  Anorexia: No   Nausea: No   Vomiting: No     Skin Alteration   Sensation:neck using lotion    Radiation Dermatitis:  na    Ventilation Alteration  Cough:No    Emotional  Coping: effective    Injury, potential bleeding or infection: na    Radioprotectant Tolerance  No            Lab Results   Component Value Date    WBC 4.8 05/01/2025    HGB 10.5 (L) 05/01/2025    HCT 30.0 (L) 05/01/2025     05/01/2025         BP (!) 78/40   Temp 97.2 °F (36.2 °C) (Skin)   Resp 18   Wt 43.4 kg (95 lb 11.2 oz)   BMI 14.55 kg/m²   BP within normal range? No all vitals poor and encouraged to go to the hospital. Refused and Dr. Rodríguez aware.           Assessment/Plan:31/36fx  6200/7200cGY and tolerating    Jessica Monzon RN

## 2025-06-03 NOTE — PROGRESS NOTES
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      Past medical, surgical, social and family histories reviewed and updated as indicated.    PAIN: 2/10    ALLERGIES:  Bee venom and Keflex [cephalexin]         Current Outpatient Medications   Medication Sig Dispense Refill    prochlorperazine (COMPAZINE) 10 MG tablet Take 1 tablet by mouth every 6 hours as needed (nausea) 30 tablet 0    EPINEPHrine (EPIPEN 2-JUAN CARLOS) 0.3 MG/0.3ML SOAJ injection Inject 0.3 mLs into the skin once for 1 dose Use as directed for allergic reaction 0.3 mL 0    oxyCODONE HCl (ROXICODONE) 20 MG immediate release tablet Take 1 tablet by mouth every 4 hours as needed for Pain for up to 15 days. Max Daily Amount: 120 mg 90 tablet 0    promethazine (PHENERGAN) 25 MG tablet Take 1 tablet by mouth 4 times daily as needed for Nausea 60 tablet 1    scopolamine (TRANSDERM-SCOP) transdermal patch Place 1 patch onto the skin every 72 hours 10 patch 0    pantoprazole (PROTONIX) 40 MG tablet Take 1 tablet by mouth in the morning and at bedtime 60 tablet 0    TPN WITH LIPIDS, OUTPATIENT ONLY, Infuse 2,040 mLs intravenously daily Standard 3-in-1 w/ Electrolytes @85ml/hr.    68g AA  1820kcal 30 each 11    Nutritional Supplements (VITAL AF 1.2 MARE) LIQD Take 60 mL/hr by mouth See Admin Instructions Peptide Based (Vital AF 1.2) @ 60ml/hr to provide 1440ml TV, 1728kcals, 108g pro, 1168ml free water.     Flush rec of 95ml q 4= 570ml water (1738ml total water).      Start at trickle rate (10cc/hr) and adv slowly to goal- monitor for refeeding syndrome; monitor/replace Lytes PRN.     J-tube for TF. 30 each 100    ondansetron

## 2025-06-03 NOTE — PROGRESS NOTES
Palliative Care Department  Provider: FADI Colin - CNP    Referring Provider:  Dr. Bowden    Location of Service:   [facility]    Chief Complaint: Prasanna Parnell is a 59 y.o. 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 Tomas, Dr. Rodríguez, Dr. Bowden,   was undergoing concurrent chemoradiation therapy beginning 2/20/2025  Interrupted by hospitalizations  Undergoing radiation, 5 treatments remain    Pain related to Neoplasm/Odynophagia:  Oxycodone increase to 20 mg Q 4 PRN  Encouraged to stop smoking    Weight Loss/Odynophagia:  Dietician providing support, seen today  PEG in placed  encouraged to continue to continue with oral intake as much as able    Nausea:  Promethazine 25 mg Q 6 PRN  Scopolamine patch  Compazine and Zofran  Improved     Follow Up:  4 weeks.  They were encouraged to call with any questions, concerns, needs, or changes in symptoms.    Subjective:     HPI:  Prasanna Parnell is a 59 y.o. male with squamous cell carcinoma of the tongue, as well as known right upper lobe lung nodule, status post biopsy which also test positive for squamous cell carcinoma.  He is known to Dr. Tomas, Dr. Rodríguez, as well as Dr. Bowden.  He was referred to [facility] Palliative Medicine symptomatic support.    Subjective/Events/Discussions:  History of Present Illness  The patient presents today for a palliative medicine follow-up regarding his head and neck cancer symptoms, including pain and nausea.    He reports an improvement in his overall condition, with the exception of persistent throat pain. He experiences difficulty swallowing due to dryness, which often results in choking. Despite this, he continues to consume food orally as much as possible. His primary focus is on completing radiation therapy and maintaining his weight, which he monitors closely. He has been utilizing a feeding

## 2025-06-03 NOTE — PROGRESS NOTES
Spoke with patient with Dr. Rodríguez. He is refusing to go to the ED. Vitals remain tanked and he is not listening to reason.

## 2025-06-04 ENCOUNTER — HOSPITAL ENCOUNTER (OUTPATIENT)
Dept: RADIATION ONCOLOGY | Age: 60
Discharge: HOME | End: 2025-06-04
Payer: MEDICARE

## 2025-06-04 VITALS
OXYGEN SATURATION: 95 % | DIASTOLIC BLOOD PRESSURE: 56 MMHG | TEMPERATURE: 97.8 F | SYSTOLIC BLOOD PRESSURE: 103 MMHG | RESPIRATION RATE: 18 BRPM | HEART RATE: 86 BPM

## 2025-06-04 PROCEDURE — 77386 HC NTSTY MODUL RAD TX DLVR CPLX: CPT | Performed by: SPECIALIST

## 2025-06-05 ENCOUNTER — HOSPITAL ENCOUNTER (OUTPATIENT)
Dept: RADIATION ONCOLOGY | Age: 60
Discharge: HOME | End: 2025-06-05
Payer: MEDICARE

## 2025-06-05 ENCOUNTER — HOSPITAL ENCOUNTER (OUTPATIENT)
Dept: INFUSION THERAPY | Age: 60
Discharge: HOME | End: 2025-06-05

## 2025-06-05 ENCOUNTER — APPOINTMENT (OUTPATIENT)
Dept: RADIATION ONCOLOGY | Age: 60
End: 2025-06-05
Payer: MEDICARE

## 2025-06-05 DIAGNOSIS — C09.8 MALIGNANT NEOPLASM OF OVERLAPPING SITES OF TONSIL (HCC): Primary | ICD-10-CM

## 2025-06-05 PROCEDURE — 77386 HC NTSTY MODUL RAD TX DLVR CPLX: CPT | Performed by: SPECIALIST

## 2025-06-05 RX ORDER — DIPHENHYDRAMINE HYDROCHLORIDE 50 MG/ML
50 INJECTION, SOLUTION INTRAMUSCULAR; INTRAVENOUS
OUTPATIENT
Start: 2025-06-05

## 2025-06-05 RX ORDER — HEPARIN 100 UNIT/ML
500 SYRINGE INTRAVENOUS PRN
Status: DISCONTINUED | OUTPATIENT
Start: 2025-06-05 | End: 2025-06-06 | Stop reason: HOSPADM

## 2025-06-05 RX ORDER — ONDANSETRON 2 MG/ML
8 INJECTION INTRAMUSCULAR; INTRAVENOUS
OUTPATIENT
Start: 2025-06-05

## 2025-06-05 RX ORDER — SODIUM CHLORIDE 0.9 % (FLUSH) 0.9 %
5-40 SYRINGE (ML) INJECTION PRN
OUTPATIENT
Start: 2025-06-05

## 2025-06-05 RX ORDER — ALBUTEROL SULFATE 90 UG/1
4 INHALANT RESPIRATORY (INHALATION) PRN
OUTPATIENT
Start: 2025-06-05

## 2025-06-05 RX ORDER — SODIUM CHLORIDE 0.9 % (FLUSH) 0.9 %
5-40 SYRINGE (ML) INJECTION PRN
Status: DISCONTINUED | OUTPATIENT
Start: 2025-06-05 | End: 2025-06-06 | Stop reason: HOSPADM

## 2025-06-05 RX ORDER — ACETAMINOPHEN 325 MG/1
650 TABLET ORAL
OUTPATIENT
Start: 2025-06-05

## 2025-06-05 RX ORDER — SODIUM CHLORIDE 9 MG/ML
25 INJECTION, SOLUTION INTRAVENOUS PRN
OUTPATIENT
Start: 2025-06-05

## 2025-06-05 RX ORDER — HYDROCORTISONE SODIUM SUCCINATE 100 MG/2ML
100 INJECTION INTRAMUSCULAR; INTRAVENOUS
OUTPATIENT
Start: 2025-06-05

## 2025-06-05 RX ORDER — EPINEPHRINE 1 MG/ML
0.3 INJECTION, SOLUTION, CONCENTRATE INTRAVENOUS PRN
OUTPATIENT
Start: 2025-06-05

## 2025-06-05 RX ORDER — SODIUM CHLORIDE 9 MG/ML
INJECTION, SOLUTION INTRAVENOUS CONTINUOUS
OUTPATIENT
Start: 2025-06-05

## 2025-06-05 RX ORDER — HEPARIN 100 UNIT/ML
500 SYRINGE INTRAVENOUS PRN
OUTPATIENT
Start: 2025-06-05

## 2025-06-06 ENCOUNTER — HOSPITAL ENCOUNTER (OUTPATIENT)
Dept: RADIATION ONCOLOGY | Age: 60
Discharge: HOME | End: 2025-06-06
Payer: MEDICARE

## 2025-06-06 PROCEDURE — 77386 HC NTSTY MODUL RAD TX DLVR CPLX: CPT | Performed by: SPECIALIST

## 2025-06-09 ENCOUNTER — HOSPITAL ENCOUNTER (OUTPATIENT)
Dept: RADIATION ONCOLOGY | Age: 60
Discharge: HOME | End: 2025-06-09
Payer: MEDICARE

## 2025-06-09 DIAGNOSIS — G89.3 NEOPLASM RELATED PAIN: ICD-10-CM

## 2025-06-09 DIAGNOSIS — Z51.5 PALLIATIVE CARE ENCOUNTER: ICD-10-CM

## 2025-06-09 DIAGNOSIS — C76.0 MALIGNANT NEOPLASM OF HEAD, FACE, AND NECK (HCC): ICD-10-CM

## 2025-06-09 PROCEDURE — 77386 HC NTSTY MODUL RAD TX DLVR CPLX: CPT | Performed by: SPECIALIST

## 2025-06-09 PROCEDURE — 77336 RADIATION PHYSICS CONSULT: CPT | Performed by: SPECIALIST

## 2025-06-09 RX ORDER — OXYCODONE HYDROCHLORIDE 20 MG/1
20 TABLET ORAL EVERY 4 HOURS PRN
Qty: 90 TABLET | Refills: 0 | Status: SHIPPED | OUTPATIENT
Start: 2025-06-09 | End: 2025-06-24

## 2025-06-09 NOTE — TELEPHONE ENCOUNTER
Patient Prasanna called for refill oxycodone [facility]CI 20 mg. Next appointment 7-1-2025. [facility]. Routed call to A Sanjiv, CARLOS

## 2025-06-10 ENCOUNTER — HOSPITAL ENCOUNTER (OUTPATIENT)
Dept: RADIATION ONCOLOGY | Age: 60
Discharge: HOME | End: 2025-06-10
Payer: MEDICARE

## 2025-06-10 PROCEDURE — 77386 HC NTSTY MODUL RAD TX DLVR CPLX: CPT | Performed by: SPECIALIST

## 2025-06-12 ENCOUNTER — OFFICE VISIT (OUTPATIENT)
Age: 60
End: 2025-06-12
Payer: MEDICARE

## 2025-06-12 ENCOUNTER — HOSPITAL ENCOUNTER (OUTPATIENT)
Dept: INFUSION THERAPY | Age: 60
Discharge: HOME | End: 2025-06-12
Payer: MEDICARE

## 2025-06-12 VITALS
HEIGHT: 68 IN | WEIGHT: 91.9 LBS | DIASTOLIC BLOOD PRESSURE: 59 MMHG | TEMPERATURE: 97.1 F | OXYGEN SATURATION: 91 % | SYSTOLIC BLOOD PRESSURE: 119 MMHG | HEART RATE: 103 BPM | BODY MASS INDEX: 13.93 KG/M2

## 2025-06-12 DIAGNOSIS — C09.8 MALIGNANT NEOPLASM OF OVERLAPPING SITES OF TONSIL (HCC): Primary | ICD-10-CM

## 2025-06-12 DIAGNOSIS — E03.4 ACQUIRED ATROPHY OF THYROID: Primary | ICD-10-CM

## 2025-06-12 LAB
ALBUMIN SERPL-MCNC: 3.7 G/DL (ref 3.5–5.2)
ALP SERPL-CCNC: 96 U/L (ref 40–129)
ALT SERPL-CCNC: 24 U/L (ref 0–50)
ANION GAP SERPL CALCULATED.3IONS-SCNC: 13 MMOL/L (ref 7–16)
AST SERPL-CCNC: 28 U/L (ref 0–50)
BASOPHILS # BLD: 0.17 K/UL (ref 0–0.2)
BASOPHILS NFR BLD: 3 % (ref 0–2)
BILIRUB SERPL-MCNC: 0.4 MG/DL (ref 0–1.2)
BUN SERPL-MCNC: 16 MG/DL (ref 6–20)
CALCIUM SERPL-MCNC: 10.1 MG/DL (ref 8.6–10)
CHLORIDE SERPL-SCNC: 96 MMOL/L (ref 98–107)
CO2 SERPL-SCNC: 27 MMOL/L (ref 22–29)
CREAT SERPL-MCNC: 0.8 MG/DL (ref 0.7–1.2)
EOSINOPHIL # BLD: 0 K/UL (ref 0.05–0.5)
EOSINOPHILS RELATIVE PERCENT: 0 % (ref 0–6)
ERYTHROCYTE [DISTWIDTH] IN BLOOD BY AUTOMATED COUNT: 13.1 % (ref 11.5–15)
GFR, ESTIMATED: >90 ML/MIN/1.73M2
GLUCOSE SERPL-MCNC: 154 MG/DL (ref 74–99)
HCT VFR BLD AUTO: 34.4 % (ref 37–54)
HGB BLD-MCNC: 11.9 G/DL (ref 12.5–16.5)
LYMPHOCYTES NFR BLD: 0.23 K/UL (ref 1.5–4)
LYMPHOCYTES RELATIVE PERCENT: 4 % (ref 20–42)
MCH RBC QN AUTO: 32.4 PG (ref 26–35)
MCHC RBC AUTO-ENTMCNC: 34.6 G/DL (ref 32–34.5)
MCV RBC AUTO: 93.7 FL (ref 80–99.9)
MONOCYTES NFR BLD: 0.23 K/UL (ref 0.1–0.95)
MONOCYTES NFR BLD: 4 % (ref 2–12)
NEUTROPHILS NFR BLD: 90 % (ref 43–80)
NEUTS SEG NFR BLD: 6.06 K/UL (ref 1.8–7.3)
PHOSPHATE SERPL-MCNC: 2.7 MG/DL (ref 2.5–4.5)
PLATELET # BLD AUTO: 258 K/UL (ref 130–450)
PMV BLD AUTO: 9 FL (ref 7–12)
POTASSIUM SERPL-SCNC: 4.1 MMOL/L (ref 3.5–5.1)
PROT SERPL-MCNC: 7.1 G/DL (ref 6.4–8.3)
RBC # BLD AUTO: 3.67 M/UL (ref 3.8–5.8)
RBC # BLD: ABNORMAL 10*6/UL
RBC # BLD: ABNORMAL 10*6/UL
SODIUM SERPL-SCNC: 136 MMOL/L (ref 136–145)
WBC OTHER # BLD: 6.7 K/UL (ref 4.5–11.5)

## 2025-06-12 PROCEDURE — 99212 OFFICE O/P EST SF 10 MIN: CPT | Performed by: STUDENT IN AN ORGANIZED HEALTH CARE EDUCATION/TRAINING PROGRAM

## 2025-06-12 PROCEDURE — 85025 COMPLETE CBC W/AUTO DIFF WBC: CPT

## 2025-06-12 PROCEDURE — 36591 DRAW BLOOD OFF VENOUS DEVICE: CPT

## 2025-06-12 PROCEDURE — 2500000003 HC RX 250 WO HCPCS: Performed by: STUDENT IN AN ORGANIZED HEALTH CARE EDUCATION/TRAINING PROGRAM

## 2025-06-12 PROCEDURE — G8427 DOCREV CUR MEDS BY ELIG CLIN: HCPCS | Performed by: STUDENT IN AN ORGANIZED HEALTH CARE EDUCATION/TRAINING PROGRAM

## 2025-06-12 PROCEDURE — 84100 ASSAY OF PHOSPHORUS: CPT

## 2025-06-12 PROCEDURE — 80053 COMPREHEN METABOLIC PANEL: CPT

## 2025-06-12 PROCEDURE — 6360000002 HC RX W HCPCS: Performed by: STUDENT IN AN ORGANIZED HEALTH CARE EDUCATION/TRAINING PROGRAM

## 2025-06-12 PROCEDURE — 3017F COLORECTAL CA SCREEN DOC REV: CPT | Performed by: STUDENT IN AN ORGANIZED HEALTH CARE EDUCATION/TRAINING PROGRAM

## 2025-06-12 PROCEDURE — 99213 OFFICE O/P EST LOW 20 MIN: CPT | Performed by: STUDENT IN AN ORGANIZED HEALTH CARE EDUCATION/TRAINING PROGRAM

## 2025-06-12 PROCEDURE — G8419 CALC BMI OUT NRM PARAM NOF/U: HCPCS | Performed by: STUDENT IN AN ORGANIZED HEALTH CARE EDUCATION/TRAINING PROGRAM

## 2025-06-12 PROCEDURE — 4004F PT TOBACCO SCREEN RCVD TLK: CPT | Performed by: STUDENT IN AN ORGANIZED HEALTH CARE EDUCATION/TRAINING PROGRAM

## 2025-06-12 RX ORDER — HEPARIN 100 UNIT/ML
500 SYRINGE INTRAVENOUS PRN
OUTPATIENT
Start: 2025-06-12

## 2025-06-12 RX ORDER — SODIUM CHLORIDE 9 MG/ML
INJECTION, SOLUTION INTRAVENOUS CONTINUOUS
OUTPATIENT
Start: 2025-06-12

## 2025-06-12 RX ORDER — ONDANSETRON 2 MG/ML
8 INJECTION INTRAMUSCULAR; INTRAVENOUS
OUTPATIENT
Start: 2025-06-12

## 2025-06-12 RX ORDER — ACETAMINOPHEN 325 MG/1
650 TABLET ORAL
OUTPATIENT
Start: 2025-06-12

## 2025-06-12 RX ORDER — HYDROCORTISONE SODIUM SUCCINATE 100 MG/2ML
100 INJECTION INTRAMUSCULAR; INTRAVENOUS
OUTPATIENT
Start: 2025-06-12

## 2025-06-12 RX ORDER — SODIUM CHLORIDE 9 MG/ML
25 INJECTION, SOLUTION INTRAVENOUS PRN
OUTPATIENT
Start: 2025-06-12

## 2025-06-12 RX ORDER — SODIUM CHLORIDE 0.9 % (FLUSH) 0.9 %
5-40 SYRINGE (ML) INJECTION PRN
Status: DISCONTINUED | OUTPATIENT
Start: 2025-06-12 | End: 2025-06-13 | Stop reason: HOSPADM

## 2025-06-12 RX ORDER — HEPARIN 100 UNIT/ML
500 SYRINGE INTRAVENOUS PRN
Status: DISCONTINUED | OUTPATIENT
Start: 2025-06-12 | End: 2025-06-13 | Stop reason: HOSPADM

## 2025-06-12 RX ORDER — ALBUTEROL SULFATE 90 UG/1
4 INHALANT RESPIRATORY (INHALATION) PRN
OUTPATIENT
Start: 2025-06-12

## 2025-06-12 RX ORDER — SODIUM CHLORIDE 0.9 % (FLUSH) 0.9 %
5-40 SYRINGE (ML) INJECTION PRN
OUTPATIENT
Start: 2025-06-12

## 2025-06-12 RX ORDER — EPINEPHRINE 1 MG/ML
0.3 INJECTION, SOLUTION, CONCENTRATE INTRAVENOUS PRN
OUTPATIENT
Start: 2025-06-12

## 2025-06-12 RX ORDER — DIPHENHYDRAMINE HYDROCHLORIDE 50 MG/ML
50 INJECTION, SOLUTION INTRAMUSCULAR; INTRAVENOUS
OUTPATIENT
Start: 2025-06-12

## 2025-06-12 RX ADMIN — HEPARIN 500 UNITS: 100 SYRINGE at 13:27

## 2025-06-12 RX ADMIN — SODIUM CHLORIDE, PRESERVATIVE FREE 10 ML: 5 INJECTION INTRAVENOUS at 13:27

## 2025-06-12 NOTE — PROGRESS NOTES
Prasanna Parnell  6/12/2025  Ht Readings from Last 1 Encounters:   06/12/25 1.727 m (5' 8\")     Wt Readings from Last 10 Encounters:   06/12/25 41.7 kg (91 lb 14.4 oz)   06/03/25 43.4 kg (95 lb 11.2 oz)   06/03/25 43.4 kg (95 lb 9.6 oz)   05/29/25 44 kg (96 lb 14.4 oz)   05/22/25 44.5 kg (98 lb 3.2 oz)   05/21/25 44.6 kg (98 lb 4.8 oz)   05/20/25 45.5 kg (100 lb 4.8 oz)   05/05/25 47.7 kg (105 lb 3.2 oz)   05/01/25 46.5 kg (102 lb 8 oz)   04/29/25 45.3 kg (99 lb 14.4 oz)     BMI: 13.97    Assessment: Met w/ pt following medical oncology visit. Pt w/ continued wt loss, CBW reflects 11# wt loss since last medical oncology visit ~1 month ago (10.8%). He reports continued use of pureed foods through PEG, use of Ensure Plus as able. Again stressed importance of ensuring appropriate consistency of purees through PEG, recommending blenderizing foods to liquid form, then running them through a fine mesh strainer to ensure a smooth liquid texture. Educated on regular use of Ensure Plus. Pt reports he is using 4 Ensure Plus daily, to provide 1400 kcal, 52 gm pro to meet >75% estimated needs. Questionable reliability of pt's account of daily nutritional intake. Pt previously refused use of pump for EN infusion despite being able to tolerate only 60ml of Ensure via tube at a time. Pt assures this clinician that he can tolerate more now. Pt resistant to assistance at this time. Will continue to follow as able.    Weight change: -57# since initial medical oncology consult, 38.5% loss x5 months  Appetite: poor  Nutritional Side Effects: anorexia, N/V  Calculated Needs: 35-40  kcal/kg CBW =  kcal, 1.5-1.8 gm/kg CBW = 65-75 gm pro, 1 ml/kcal =  ml fluids  Malnutrition Status: Severe  Nutrition Diagnosis: Severe Malnutrition related to Catabolic Illness as evidenced by Diet history of poor intake , Severe Fat Loss , Severe Muscle loss , and Weight loss of >20% x <1 year    Recommendations: Pt to infuse 5 cartons of  Ensure Plus via PEG or PO daily to provide 1750 kcal, 65 gm pro.     Zaira Pratt, MS, RD, LD

## 2025-06-12 NOTE — PROGRESS NOTES
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  This prolonged his overall treatment course for elapsed days of 105 days.  He lost to 30 pounds over the course of treatment despite aggressive support and encouragement.  Patient will return in 30 days for a post-treatment assessment with imaging to follow.    Follow-up:  30-day in the office with Radiation Oncology      Thank you for the opportunity to participate in multidisciplinary management of this remarkable and pleasant patient.        Nicko Rodríguez MD, SANDEE, FACRO, FACR, FCTSI, FASTRO    Department of Radiation Oncology  [facility]: [phone] (FAX: [phone])  [facility] ([facility]: [phone] (FAX: [phone])  [facility]:  [phone] (FAX:  [phone])    NOTE: This report was transcribed using voice recognition software. Every effort was made to ensure accuracy; however, inadvertent computerized transcription errors may be present.

## 2025-06-13 ENCOUNTER — TELEPHONE (OUTPATIENT)
Dept: CASE MANAGEMENT | Age: 60
End: 2025-06-13

## 2025-06-13 NOTE — PROGRESS NOTES
Patient did NOT stop at check out window, left without AVS.  Patient has MYCHART.      
right oropharynx/tonsil.  No well-formed abscess.  Suspected involvement of the right tongue base.  The valleculae, epiglottis, aryepiglottic folds and pyriform sinuses appear unremarkable.  The true and false vocal cords are normal in appearance. SALIVARY GLANDS/THYROID:  The parotid and submandibular glands appear unremarkable.  The thyroid gland appears unremarkable. LYMPH NODES:  Abnormally enlarged, heterogeneous right level 2A lymph node measuring 1.8 cm in short axis dimensions.  Nonenlarged, although abnormal right level 3 lymph node measuring 8 mm in short axis dimensions with central low attenuation.  Nonenlarged, although abnormal appearing right level 4/5 lymph node measuring 8 mm in short axis dimensions with uniform low attenuation. SOFT TISSUES:  No appreciable soft tissue swelling or mass is seen. BRAIN/ORBITS/SINUSES:  The visualized portion of the intracranial contents appear unremarkable.  The visualized portion of the orbits, paranasal sinuses and mastoid air cells demonstrate no acute abnormality. LUNG APICES/SUPERIOR MEDIASTINUM:  1.8 cm masslike opacity within the posterior right upper lobe.  No superior mediastinal lymphadenopathy or mass. The visualized portion of the trachea appears unremarkable. BONES:  No aggressive appearing lytic or blastic bony lesion.     1. Right-sided submucosal edema and ill-defined enhancement of the right oropharynx/tonsil.  Suspected involvement of the right tongue base. No well-formed abscess.  Differential includes pharyngitis/tonsillitis versus neoplasm.  Direct visual inspection and ENT consultation is advised. 2. Abnormally enlarged, heterogeneous right level 2A lymph node measuring 1.8 cm in short axis dimensions. Nonenlarged, although abnormal appearing right level 3 and 4/5 lymph nodes measuring 8 mm in short axis dimensions with central low attenuation. Findings either suggest supportive lymphadenitis versus necrotic metastatic lymph nodes. 3.

## 2025-06-13 NOTE — TELEPHONE ENCOUNTER
Patient called back. States that he discussed with Dr Tomas that he wasn't supposed to have imaging done for a couple of weeks. Will clarify with doctor. Patient also requesting to have imaging scheduled at [place] as it is closer to his home. Once scheduled I will fax over port use order to [place] radiology.

## 2025-06-13 NOTE — TELEPHONE ENCOUNTER
Called radiology supervisor Diann in reference to CT Scan that were unable to be completed due to poor IV access. OK to use patient port. Order was scanned into patient EMR but needs faxed to radiology department. May need new orders placed as current orders are \"resulted\" yet not done. She will let me know as soon as she finds out. I attempted to call patient to reschedule for next week. Left  for patient to return my call and provided my contact information.

## 2025-06-16 ENCOUNTER — TELEPHONE (OUTPATIENT)
Dept: ENT CLINIC | Age: 60
End: 2025-06-16

## 2025-06-16 NOTE — TELEPHONE ENCOUNTER
Dr. Bowden sent a staff message requesting patient be scheduled with her early July for cancer follow up. MA LVM for patient to call back to schedule.    Electronically signed by Tammie Nix MA on 6/16/25 at 8:55 AM EDT

## 2025-06-25 ENCOUNTER — TELEPHONE (OUTPATIENT)
Dept: CASE MANAGEMENT | Age: 60
End: 2025-06-25

## 2025-06-25 NOTE — TELEPHONE ENCOUNTER
Called patient in reference to testing that has been scheduled for July 1st. Left message with my contact information for return call.

## 2025-06-26 ENCOUNTER — HOSPITAL ENCOUNTER (OUTPATIENT)
Dept: INFUSION THERAPY | Age: 60
Discharge: HOME | End: 2025-06-26
Payer: MEDICARE

## 2025-06-26 ENCOUNTER — OFFICE VISIT (OUTPATIENT)
Age: 60
End: 2025-06-26
Payer: MEDICARE

## 2025-06-26 ENCOUNTER — TELEPHONE (OUTPATIENT)
Dept: CASE MANAGEMENT | Age: 60
End: 2025-06-26

## 2025-06-26 VITALS
WEIGHT: 91.7 LBS | HEIGHT: 68 IN | TEMPERATURE: 97 F | OXYGEN SATURATION: 97 % | SYSTOLIC BLOOD PRESSURE: 81 MMHG | HEART RATE: 68 BPM | BODY MASS INDEX: 13.9 KG/M2 | DIASTOLIC BLOOD PRESSURE: 54 MMHG

## 2025-06-26 VITALS
SYSTOLIC BLOOD PRESSURE: 90 MMHG | DIASTOLIC BLOOD PRESSURE: 52 MMHG | OXYGEN SATURATION: 97 % | HEART RATE: 65 BPM | TEMPERATURE: 98.4 F

## 2025-06-26 DIAGNOSIS — E03.4 ACQUIRED ATROPHY OF THYROID: ICD-10-CM

## 2025-06-26 DIAGNOSIS — C09.8 MALIGNANT NEOPLASM OF OVERLAPPING SITES OF TONSIL (HCC): Primary | ICD-10-CM

## 2025-06-26 DIAGNOSIS — R13.12 OROPHARYNGEAL DYSPHAGIA: ICD-10-CM

## 2025-06-26 DIAGNOSIS — C09.8 MALIGNANT NEOPLASM OF OVERLAPPING SITES OF TONSIL (HCC): ICD-10-CM

## 2025-06-26 DIAGNOSIS — C76.0 HEAD AND NECK CANCER (HCC): ICD-10-CM

## 2025-06-26 DIAGNOSIS — C06.9 CANCER OF ORAL CAVITY (HCC): Primary | ICD-10-CM

## 2025-06-26 LAB
ALBUMIN SERPL-MCNC: 3.7 G/DL (ref 3.5–5.2)
ALP SERPL-CCNC: 102 U/L (ref 40–129)
ALT SERPL-CCNC: 57 U/L (ref 0–50)
ANION GAP SERPL CALCULATED.3IONS-SCNC: 10 MMOL/L (ref 7–16)
AST SERPL-CCNC: 48 U/L (ref 0–50)
BASOPHILS # BLD: 0 K/UL (ref 0–0.2)
BASOPHILS NFR BLD: 0 % (ref 0–2)
BILIRUB SERPL-MCNC: 0.3 MG/DL (ref 0–1.2)
BUN SERPL-MCNC: 20 MG/DL (ref 6–20)
CALCIUM SERPL-MCNC: 9.9 MG/DL (ref 8.6–10)
CHLORIDE SERPL-SCNC: 96 MMOL/L (ref 98–107)
CO2 SERPL-SCNC: 30 MMOL/L (ref 22–29)
CREAT SERPL-MCNC: 0.6 MG/DL (ref 0.7–1.2)
EOSINOPHIL # BLD: 0.04 K/UL (ref 0.05–0.5)
EOSINOPHILS RELATIVE PERCENT: 1 % (ref 0–6)
ERYTHROCYTE [DISTWIDTH] IN BLOOD BY AUTOMATED COUNT: 13.3 % (ref 11.5–15)
GFR, ESTIMATED: >90 ML/MIN/1.73M2
GLUCOSE SERPL-MCNC: 137 MG/DL (ref 74–99)
HCT VFR BLD AUTO: 30.6 % (ref 37–54)
HGB BLD-MCNC: 10.9 G/DL (ref 12.5–16.5)
LYMPHOCYTES NFR BLD: 0.27 K/UL (ref 1.5–4)
LYMPHOCYTES RELATIVE PERCENT: 6 % (ref 20–42)
MCH RBC QN AUTO: 32.9 PG (ref 26–35)
MCHC RBC AUTO-ENTMCNC: 35.6 G/DL (ref 32–34.5)
MCV RBC AUTO: 92.4 FL (ref 80–99.9)
MONOCYTES NFR BLD: 0.15 K/UL (ref 0.1–0.95)
MONOCYTES NFR BLD: 4 % (ref 2–12)
NEUTROPHILS NFR BLD: 90 % (ref 43–80)
NEUTS SEG NFR BLD: 3.94 K/UL (ref 1.8–7.3)
PHOSPHATE SERPL-MCNC: 2.6 MG/DL (ref 2.5–4.5)
PLATELET # BLD AUTO: 214 K/UL (ref 130–450)
PMV BLD AUTO: 9.3 FL (ref 7–12)
POTASSIUM SERPL-SCNC: 3.7 MMOL/L (ref 3.5–5.1)
PROT SERPL-MCNC: 6.8 G/DL (ref 6.4–8.3)
RBC # BLD AUTO: 3.31 M/UL (ref 3.8–5.8)
RBC # BLD: ABNORMAL 10*6/UL
SODIUM SERPL-SCNC: 136 MMOL/L (ref 136–145)
WBC OTHER # BLD: 4.4 K/UL (ref 4.5–11.5)

## 2025-06-26 PROCEDURE — G8419 CALC BMI OUT NRM PARAM NOF/U: HCPCS | Performed by: STUDENT IN AN ORGANIZED HEALTH CARE EDUCATION/TRAINING PROGRAM

## 2025-06-26 PROCEDURE — 80053 COMPREHEN METABOLIC PANEL: CPT

## 2025-06-26 PROCEDURE — 96413 CHEMO IV INFUSION 1 HR: CPT

## 2025-06-26 PROCEDURE — 6360000002 HC RX W HCPCS: Performed by: STUDENT IN AN ORGANIZED HEALTH CARE EDUCATION/TRAINING PROGRAM

## 2025-06-26 PROCEDURE — 36591 DRAW BLOOD OFF VENOUS DEVICE: CPT

## 2025-06-26 PROCEDURE — G8427 DOCREV CUR MEDS BY ELIG CLIN: HCPCS | Performed by: STUDENT IN AN ORGANIZED HEALTH CARE EDUCATION/TRAINING PROGRAM

## 2025-06-26 PROCEDURE — 2500000003 HC RX 250 WO HCPCS: Performed by: STUDENT IN AN ORGANIZED HEALTH CARE EDUCATION/TRAINING PROGRAM

## 2025-06-26 PROCEDURE — 99214 OFFICE O/P EST MOD 30 MIN: CPT | Performed by: STUDENT IN AN ORGANIZED HEALTH CARE EDUCATION/TRAINING PROGRAM

## 2025-06-26 PROCEDURE — 96361 HYDRATE IV INFUSION ADD-ON: CPT

## 2025-06-26 PROCEDURE — 96375 TX/PRO/DX INJ NEW DRUG ADDON: CPT

## 2025-06-26 PROCEDURE — 3017F COLORECTAL CA SCREEN DOC REV: CPT | Performed by: STUDENT IN AN ORGANIZED HEALTH CARE EDUCATION/TRAINING PROGRAM

## 2025-06-26 PROCEDURE — 2580000003 HC RX 258: Performed by: STUDENT IN AN ORGANIZED HEALTH CARE EDUCATION/TRAINING PROGRAM

## 2025-06-26 PROCEDURE — 84100 ASSAY OF PHOSPHORUS: CPT

## 2025-06-26 PROCEDURE — 99213 OFFICE O/P EST LOW 20 MIN: CPT | Performed by: STUDENT IN AN ORGANIZED HEALTH CARE EDUCATION/TRAINING PROGRAM

## 2025-06-26 PROCEDURE — 85025 COMPLETE CBC W/AUTO DIFF WBC: CPT

## 2025-06-26 PROCEDURE — 4004F PT TOBACCO SCREEN RCVD TLK: CPT | Performed by: STUDENT IN AN ORGANIZED HEALTH CARE EDUCATION/TRAINING PROGRAM

## 2025-06-26 RX ORDER — FAMOTIDINE 10 MG/ML
20 INJECTION, SOLUTION INTRAVENOUS
Status: CANCELLED | OUTPATIENT
Start: 2025-06-26

## 2025-06-26 RX ORDER — EPINEPHRINE 1 MG/ML
0.3 INJECTION, SOLUTION, CONCENTRATE INTRAVENOUS PRN
OUTPATIENT
Start: 2025-06-26

## 2025-06-26 RX ORDER — HYDROCORTISONE SODIUM SUCCINATE 100 MG/2ML
100 INJECTION INTRAMUSCULAR; INTRAVENOUS
Status: CANCELLED | OUTPATIENT
Start: 2025-06-26

## 2025-06-26 RX ORDER — HEPARIN 100 UNIT/ML
500 SYRINGE INTRAVENOUS PRN
OUTPATIENT
Start: 2025-06-26

## 2025-06-26 RX ORDER — 0.9 % SODIUM CHLORIDE 0.9 %
1000 INTRAVENOUS SOLUTION INTRAVENOUS ONCE
OUTPATIENT
Start: 2025-06-26 | End: 2025-06-26

## 2025-06-26 RX ORDER — HYDROCORTISONE SODIUM SUCCINATE 100 MG/2ML
100 INJECTION INTRAMUSCULAR; INTRAVENOUS
OUTPATIENT
Start: 2025-06-26

## 2025-06-26 RX ORDER — SODIUM CHLORIDE 9 MG/ML
INJECTION, SOLUTION INTRAVENOUS CONTINUOUS
OUTPATIENT
Start: 2025-06-26

## 2025-06-26 RX ORDER — EPINEPHRINE 1 MG/ML
0.3 INJECTION, SOLUTION, CONCENTRATE INTRAVENOUS PRN
Status: CANCELLED | OUTPATIENT
Start: 2025-06-26

## 2025-06-26 RX ORDER — ONDANSETRON 2 MG/ML
8 INJECTION INTRAMUSCULAR; INTRAVENOUS
OUTPATIENT
Start: 2025-06-26

## 2025-06-26 RX ORDER — DIPHENHYDRAMINE HYDROCHLORIDE 50 MG/ML
50 INJECTION, SOLUTION INTRAMUSCULAR; INTRAVENOUS
OUTPATIENT
Start: 2025-06-26

## 2025-06-26 RX ORDER — ACETAMINOPHEN 325 MG/1
650 TABLET ORAL
OUTPATIENT
Start: 2025-06-26

## 2025-06-26 RX ORDER — SODIUM CHLORIDE 0.9 % (FLUSH) 0.9 %
5-40 SYRINGE (ML) INJECTION PRN
OUTPATIENT
Start: 2025-06-26

## 2025-06-26 RX ORDER — ONDANSETRON 2 MG/ML
8 INJECTION INTRAMUSCULAR; INTRAVENOUS
Status: CANCELLED | OUTPATIENT
Start: 2025-06-26

## 2025-06-26 RX ORDER — SODIUM CHLORIDE 9 MG/ML
5-250 INJECTION, SOLUTION INTRAVENOUS PRN
Status: CANCELLED | OUTPATIENT
Start: 2025-06-26

## 2025-06-26 RX ORDER — HEPARIN SODIUM (PORCINE) LOCK FLUSH IV SOLN 100 UNIT/ML 100 UNIT/ML
500 SOLUTION INTRAVENOUS PRN
Status: CANCELLED | OUTPATIENT
Start: 2025-06-26

## 2025-06-26 RX ORDER — SODIUM CHLORIDE 9 MG/ML
5-250 INJECTION, SOLUTION INTRAVENOUS PRN
Status: DISCONTINUED | OUTPATIENT
Start: 2025-06-26 | End: 2025-06-27 | Stop reason: HOSPADM

## 2025-06-26 RX ORDER — ALBUTEROL SULFATE 90 UG/1
4 INHALANT RESPIRATORY (INHALATION) PRN
OUTPATIENT
Start: 2025-06-26

## 2025-06-26 RX ORDER — SODIUM CHLORIDE 9 MG/ML
25 INJECTION, SOLUTION INTRAVENOUS PRN
OUTPATIENT
Start: 2025-06-26

## 2025-06-26 RX ORDER — SODIUM CHLORIDE 0.9 % (FLUSH) 0.9 %
5-40 SYRINGE (ML) INJECTION PRN
Status: DISCONTINUED | OUTPATIENT
Start: 2025-06-26 | End: 2025-06-27 | Stop reason: HOSPADM

## 2025-06-26 RX ORDER — ONDANSETRON 2 MG/ML
8 INJECTION INTRAMUSCULAR; INTRAVENOUS
Status: COMPLETED | OUTPATIENT
Start: 2025-06-26 | End: 2025-06-26

## 2025-06-26 RX ORDER — MEPERIDINE HYDROCHLORIDE 50 MG/ML
12.5 INJECTION INTRAMUSCULAR; INTRAVENOUS; SUBCUTANEOUS PRN
Status: CANCELLED | OUTPATIENT
Start: 2025-06-26

## 2025-06-26 RX ORDER — SODIUM CHLORIDE 0.9 % (FLUSH) 0.9 %
5-40 SYRINGE (ML) INJECTION PRN
Status: CANCELLED | OUTPATIENT
Start: 2025-06-26

## 2025-06-26 RX ORDER — SODIUM CHLORIDE 9 MG/ML
INJECTION, SOLUTION INTRAVENOUS CONTINUOUS
Status: CANCELLED | OUTPATIENT
Start: 2025-06-26

## 2025-06-26 RX ORDER — ALBUTEROL SULFATE 90 UG/1
4 INHALANT RESPIRATORY (INHALATION) PRN
Status: CANCELLED | OUTPATIENT
Start: 2025-06-26

## 2025-06-26 RX ORDER — SODIUM CHLORIDE 9 MG/ML
5-250 INJECTION, SOLUTION INTRAVENOUS PRN
OUTPATIENT
Start: 2025-06-26

## 2025-06-26 RX ORDER — 0.9 % SODIUM CHLORIDE 0.9 %
1000 INTRAVENOUS SOLUTION INTRAVENOUS ONCE
Status: COMPLETED | OUTPATIENT
Start: 2025-06-26 | End: 2025-06-26

## 2025-06-26 RX ORDER — ACETAMINOPHEN 325 MG/1
650 TABLET ORAL
Status: CANCELLED | OUTPATIENT
Start: 2025-06-26

## 2025-06-26 RX ORDER — DIPHENHYDRAMINE HYDROCHLORIDE 50 MG/ML
50 INJECTION, SOLUTION INTRAMUSCULAR; INTRAVENOUS
Status: CANCELLED | OUTPATIENT
Start: 2025-06-26

## 2025-06-26 RX ORDER — HEPARIN 100 UNIT/ML
500 SYRINGE INTRAVENOUS PRN
Status: DISCONTINUED | OUTPATIENT
Start: 2025-06-26 | End: 2025-06-27 | Stop reason: HOSPADM

## 2025-06-26 RX ADMIN — ONDANSETRON 8 MG: 2 INJECTION, SOLUTION INTRAMUSCULAR; INTRAVENOUS at 11:46

## 2025-06-26 RX ADMIN — SODIUM CHLORIDE, PRESERVATIVE FREE 10 ML: 5 INJECTION INTRAVENOUS at 12:59

## 2025-06-26 RX ADMIN — HEPARIN 500 UNITS: 100 SYRINGE at 13:00

## 2025-06-26 RX ADMIN — SODIUM CHLORIDE 200 MG: 9 INJECTION, SOLUTION INTRAVENOUS at 12:04

## 2025-06-26 RX ADMIN — SODIUM CHLORIDE 1000 ML: 0.9 INJECTION, SOLUTION INTRAVENOUS at 10:49

## 2025-06-26 NOTE — PROGRESS NOTES
Patient presents to clinic for labs today.  Left Chest SQ port accessed per policy using 20 G 0.75 inch Eduardo needle for good blood return.  Aspirate for waste and specimen sent to lab.  Site flushed easily with 10 mL NSS. Dry sterile dressing applied. Patient tolerated procedure well. Patient left accessed to receive treatment today. Patient instructed that if the doctor decides not to proceed with treatment today to let a RN de access their port before leaving the clinic. Patient understanding of instructions.  Encouraged to schedule port flush every 4 weeks.

## 2025-06-26 NOTE — TELEPHONE ENCOUNTER
Met with patient during treatment.Introduced myself and provided my contact information. We have previously spoke on the phone only. Provided patient with CT scan appointment for July 1st at [facility] location at 10:00. Instructed patient npo 3 hours prior,may take meds if needed with sip of water. Instructed patient to arrive 9:45 for registration.Patient voices understanding and acceptance of date and time. Order for mediport used was faxed to radiology department and is also scanned into patient media tab in EMR.

## 2025-06-26 NOTE — PROGRESS NOTES
Patient tolerated infusion and hydration well. Patient alert and oriented x3.   No distress noted.   Vital signs stable.   Patient denies any new or worsening pain.     Patient educated on signs and symptoms of reaction to medication.   Educated patient on possible side effects and treatment of medication.     Patient verbalized understanding.   Offered patient education an/or discharge material.  Patient declined.   Patient denies any needs.  All questions answered.

## 2025-06-27 NOTE — PROGRESS NOTES
Patient did NOT stop at check out window, left without AVS.  Patient has MYCHART.  RTC disposition note:  Return in about 3 weeks (around 7/17/25) for office visit and labs, immunotherapy: transfer care to [place].      Sent in basket message to Karen MOREJON @ [facility] to schedule patient.

## 2025-06-27 NOTE — PROGRESS NOTES
[facility]  [address] [address]  Dept: [phone]  Loc: [phone]    Jayde Fernandez MD   ·   MD Adriana Lucero APRN  ·  FADI Bermeo      [place] [facility] in [address] [address]  P: [phone]  F: [phone] [place] [facility] [place]  [address] [place] [address]  P: [phone]  F: [phone]  [place] [facility] [place]  [address] [place] [address]  P: [phone]  F: [phone]                 Hematology/Oncology   Clinic Progress Note              Patient: Prasanna Parnell,  59 y.o. male    Date of Encounter: 06/26/2025     Referring Provider:  Jonathan Bowden DO     Reason for Visit:   Right tonsil/base of tongue squamous cell carcinoma, p16 +, with distant metastasis        PCP:  Kanwal Barry MD      Chief Complaint   Patient presents with    hroat cancer (HCC)    Follow-up       SUBJECTIVE:  No major issues but c/o dysphagia since radiation.   Here to start pembro.       HPI from Initial Outpatient Consultation  (1/16/2025):  The patient is a 59 y.o. male comes in for evaluation for probable/suspected cancer of head and neck primary, located of the right palate teen tonsil/base of tongue.    Relevant history begins about 1.5 years ago, when patient was in the hospital for wound/osteomyelitis of the right index finger, requiring amputation back in July 2023.  Patient was having increased ear pain, in which he had addressed to another provider previously, but his complaints were never readdressed since then.    And then in more recent weeks/months, he has had increased odynophagia and presented to the ED on 12/27/2024, in which CT soft tissue neck was completed, detecting a right sided submucosal edema and ill defined enhancement of the right oropharynx/tonsil.  There is also enlarged right neck lymph nodes, located at station

## 2025-06-30 RX ORDER — PANTOPRAZOLE SODIUM 40 MG/1
40 TABLET, DELAYED RELEASE ORAL 2 TIMES DAILY
Qty: 60 TABLET | Refills: 0 | Status: SHIPPED | OUTPATIENT
Start: 2025-06-30 | End: 2025-07-30

## 2025-06-30 NOTE — TELEPHONE ENCOUNTER
Patient Prasanna's contact Mary Anne called for refill protonix 40 mg. Next appointment 7-. [facility]. She also needed to reschedule his f/u appointment with Palliative Care for 7-1-2025 to 7- due to other appointments in [place] on 7-1-2025. Routed call to A CARLOS Kincaid

## 2025-07-01 ENCOUNTER — HOSPITAL ENCOUNTER (OUTPATIENT)
Dept: CT IMAGING | Age: 60
Discharge: HOME | End: 2025-07-03
Attending: SPECIALIST
Payer: MEDICARE

## 2025-07-01 ENCOUNTER — HOSPITAL ENCOUNTER (OUTPATIENT)
Dept: CT IMAGING | Age: 60
Discharge: HOME | End: 2025-07-03
Attending: STUDENT IN AN ORGANIZED HEALTH CARE EDUCATION/TRAINING PROGRAM
Payer: MEDICARE

## 2025-07-01 ENCOUNTER — TELEPHONE (OUTPATIENT)
Dept: INFUSION THERAPY | Age: 60
End: 2025-07-01

## 2025-07-01 DIAGNOSIS — C14.0 THROAT CANCER (HCC): ICD-10-CM

## 2025-07-01 DIAGNOSIS — R91.8 MASS OF RIGHT LUNG: ICD-10-CM

## 2025-07-01 DIAGNOSIS — C09.8 MALIGNANT NEOPLASM OF OVERLAPPING SITES OF TONSIL (HCC): ICD-10-CM

## 2025-07-01 PROCEDURE — 6360000004 HC RX CONTRAST MEDICATION: Performed by: RADIOLOGY

## 2025-07-01 PROCEDURE — 71250 CT THORAX DX C-: CPT

## 2025-07-01 PROCEDURE — 70492 CT SFT TSUE NCK W/O & W/DYE: CPT

## 2025-07-01 PROCEDURE — 6360000002 HC RX W HCPCS: Performed by: STUDENT IN AN ORGANIZED HEALTH CARE EDUCATION/TRAINING PROGRAM

## 2025-07-01 RX ORDER — HEPARIN 100 UNIT/ML
500 SYRINGE INTRAVENOUS ONCE
Status: COMPLETED | OUTPATIENT
Start: 2025-07-01 | End: 2025-07-01

## 2025-07-01 RX ORDER — IOPAMIDOL 755 MG/ML
75 INJECTION, SOLUTION INTRAVASCULAR
Status: COMPLETED | OUTPATIENT
Start: 2025-07-01 | End: 2025-07-01

## 2025-07-01 RX ADMIN — IOPAMIDOL 75 ML: 755 INJECTION, SOLUTION INTRAVENOUS at 10:00

## 2025-07-01 RX ADMIN — Medication 500 UNITS: at 10:24

## 2025-07-01 NOTE — TELEPHONE ENCOUNTER
Patient was contacted about any concerns with his airway. He stated that there is nothing noticeably different form when he saw Dr. Tomas last but will make sure he gets evaluated in the ER if he feels that his airway is compromised in any way.

## 2025-07-07 DIAGNOSIS — G89.3 NEOPLASM RELATED PAIN: ICD-10-CM

## 2025-07-07 DIAGNOSIS — C76.0 MALIGNANT NEOPLASM OF HEAD, FACE, AND NECK (HCC): ICD-10-CM

## 2025-07-07 DIAGNOSIS — Z51.5 PALLIATIVE CARE ENCOUNTER: ICD-10-CM

## 2025-07-07 RX ORDER — OXYCODONE HYDROCHLORIDE 20 MG/1
20 TABLET ORAL EVERY 4 HOURS PRN
Qty: 90 TABLET | Refills: 0 | Status: SHIPPED | OUTPATIENT
Start: 2025-07-07 | End: 2025-07-22

## 2025-07-07 NOTE — TELEPHONE ENCOUNTER
Patient Prasanna called for refill oxycodone 20 mg. Next appointment 7-. [facility]. Routed call to A Sanjiv, NP

## 2025-07-08 DIAGNOSIS — C09.8 MALIGNANT NEOPLASM OF OVERLAPPING SITES OF TONSIL (HCC): ICD-10-CM

## 2025-07-08 DIAGNOSIS — E03.4 ACQUIRED ATROPHY OF THYROID: Primary | ICD-10-CM

## 2025-07-14 ENCOUNTER — TELEPHONE (OUTPATIENT)
Age: 60
End: 2025-07-14

## 2025-07-14 ENCOUNTER — HOSPITAL ENCOUNTER (OUTPATIENT)
Age: 60
Discharge: HOME | End: 2025-07-14
Payer: MEDICARE

## 2025-07-14 ENCOUNTER — OFFICE VISIT (OUTPATIENT)
Age: 60
End: 2025-07-14
Payer: MEDICARE

## 2025-07-14 VITALS
TEMPERATURE: 98.3 F | WEIGHT: 89.2 LBS | BODY MASS INDEX: 13.56 KG/M2 | SYSTOLIC BLOOD PRESSURE: 103 MMHG | DIASTOLIC BLOOD PRESSURE: 68 MMHG | HEART RATE: 79 BPM | OXYGEN SATURATION: 96 %

## 2025-07-14 DIAGNOSIS — G89.3 NEOPLASM RELATED PAIN: ICD-10-CM

## 2025-07-14 DIAGNOSIS — C76.0 MALIGNANT NEOPLASM OF HEAD, FACE, AND NECK (HCC): Primary | ICD-10-CM

## 2025-07-14 DIAGNOSIS — Z51.5 PALLIATIVE CARE ENCOUNTER: ICD-10-CM

## 2025-07-14 LAB
ALBUMIN SERPL-MCNC: 3.6 G/DL (ref 3.5–5.2)
ALP SERPL-CCNC: 92 U/L (ref 40–129)
ALT SERPL-CCNC: 31 U/L (ref 0–50)
ANION GAP SERPL CALCULATED.3IONS-SCNC: 11 MMOL/L (ref 7–16)
AST SERPL-CCNC: 33 U/L (ref 0–50)
BASOPHILS # BLD: 0.02 K/UL (ref 0–0.2)
BASOPHILS NFR BLD: 0 % (ref 0–2)
BILIRUB SERPL-MCNC: 0.4 MG/DL (ref 0–1.2)
BUN SERPL-MCNC: 19 MG/DL (ref 6–20)
CALCIUM SERPL-MCNC: 10 MG/DL (ref 8.6–10)
CHLORIDE SERPL-SCNC: 100 MMOL/L (ref 98–107)
CO2 SERPL-SCNC: 28 MMOL/L (ref 22–29)
CREAT SERPL-MCNC: 0.7 MG/DL (ref 0.7–1.2)
EOSINOPHIL # BLD: 0.05 K/UL (ref 0.05–0.5)
EOSINOPHILS RELATIVE PERCENT: 1 % (ref 0–6)
ERYTHROCYTE [DISTWIDTH] IN BLOOD BY AUTOMATED COUNT: 14.6 % (ref 11.5–15)
GFR, ESTIMATED: >90 ML/MIN/1.73M2
GLUCOSE SERPL-MCNC: 105 MG/DL (ref 74–99)
HCT VFR BLD AUTO: 37.6 % (ref 37–54)
HGB BLD-MCNC: 12.3 G/DL (ref 12.5–16.5)
IMM GRANULOCYTES # BLD AUTO: <0.03 K/UL (ref 0–0.58)
IMM GRANULOCYTES NFR BLD: 0 % (ref 0–5)
LYMPHOCYTES NFR BLD: 0.41 K/UL (ref 1.5–4)
LYMPHOCYTES RELATIVE PERCENT: 7 % (ref 20–42)
MCH RBC QN AUTO: 31.9 PG (ref 26–35)
MCHC RBC AUTO-ENTMCNC: 32.7 G/DL (ref 32–34.5)
MCV RBC AUTO: 97.4 FL (ref 80–99.9)
MONOCYTES NFR BLD: 0.27 K/UL (ref 0.1–0.95)
MONOCYTES NFR BLD: 5 % (ref 2–12)
NEUTROPHILS NFR BLD: 87 % (ref 43–80)
NEUTS SEG NFR BLD: 5.25 K/UL (ref 1.8–7.3)
PLATELET # BLD AUTO: 248 K/UL (ref 130–450)
PMV BLD AUTO: 9.6 FL (ref 7–12)
POTASSIUM SERPL-SCNC: 4 MMOL/L (ref 3.5–5.1)
PROT SERPL-MCNC: 6.9 G/DL (ref 6.4–8.3)
RBC # BLD AUTO: 3.86 M/UL (ref 3.8–5.8)
RBC # BLD: ABNORMAL 10*6/UL
SODIUM SERPL-SCNC: 139 MMOL/L (ref 136–145)
TSH SERPL DL<=0.05 MIU/L-ACNC: 2.91 UIU/ML (ref 0.27–4.2)
WBC OTHER # BLD: 6 K/UL (ref 4.5–11.5)

## 2025-07-14 PROCEDURE — 4004F PT TOBACCO SCREEN RCVD TLK: CPT | Performed by: NURSE PRACTITIONER

## 2025-07-14 PROCEDURE — 99212 OFFICE O/P EST SF 10 MIN: CPT | Performed by: NURSE PRACTITIONER

## 2025-07-14 PROCEDURE — 3017F COLORECTAL CA SCREEN DOC REV: CPT | Performed by: NURSE PRACTITIONER

## 2025-07-14 PROCEDURE — 84443 ASSAY THYROID STIM HORMONE: CPT

## 2025-07-14 PROCEDURE — 80053 COMPREHEN METABOLIC PANEL: CPT

## 2025-07-14 PROCEDURE — G8427 DOCREV CUR MEDS BY ELIG CLIN: HCPCS | Performed by: NURSE PRACTITIONER

## 2025-07-14 PROCEDURE — 85025 COMPLETE CBC W/AUTO DIFF WBC: CPT

## 2025-07-14 PROCEDURE — 99213 OFFICE O/P EST LOW 20 MIN: CPT | Performed by: NURSE PRACTITIONER

## 2025-07-14 PROCEDURE — G8419 CALC BMI OUT NRM PARAM NOF/U: HCPCS | Performed by: NURSE PRACTITIONER

## 2025-07-14 NOTE — TELEPHONE ENCOUNTER
Patient Choco called [facility]. His truck broke down on [address]. Took off Palliative Care schedule for today. Will call patient back tomorrow to reschedule

## 2025-07-14 NOTE — PROGRESS NOTES
PLACEMENT performed by Haile Wiseman MD at [facility]    UPPER GASTROINTESTINAL ENDOSCOPY N/A 3/31/2025    ESOPHAGOGASTRODUODENOSCOPY BIOPSY performed by José Luis Valladares DO at [facility] ENDOSCOPY    WRIST SURGERY Right     tendon repair       Family History   Problem Relation Age of Onset    Cancer Mother 65        leukemia    Alcohol Abuse Father     High Blood Pressure Father     Heart Attack Father     Stroke Father     Breast Cancer Maternal Grandmother     Cancer Paternal Grandfather         prostate       ROS: UNLESS STATED ABOVE PATIENT DENIES:  CONSTITUTIONAL:  fever, chill, rigors, nausea, vomiting, fatigue.  HEENT: blurry vision, double vision, hearing problem, tinnitus, hoarseness, dysphagia, odynophagia  RESPIRATORY: cough, shortness of breath, sputum expectoration.  CARDIOVASCULAR:  Chest pain/pressure, palpitation, syncope, irregular beats  GASTROINTESTINAL:  abdominal or rectal pain, diarrhea, constipation, .  GENITOURINARY:  Burning, frequency, urgency, incontinence, discharge  INTEGUMENTARY: rash, wound, pruritis  HEMATOLOGIC/LYMPHATIC:  Swelling, sores, gum bleeding, easy bruising, pica.  MUSCULOSKELETAL:  pain, edema, joint swelling or redness  NEUROLOGICAL:  light headed, dizziness, loss of consciousness, weakness, change in memory, seizures, tremors    Objective:     Physical Exam  There were no vitals taken for this visit.    Gen:  Alert, appears stated age, well nourished, in no acute distress  HEENT:  Normocephalic, conjunctiva pink, mucosa moist  Neck:  Supple  Lungs:  Unlabored  Heart:  RRR  Abd:  Soft, non tender, non distended, BS+  M/S/Ext:  Moving all extremities, no edema, pulses present  Skin:  Warm and dry  Neuro:  PERRL, Alert, oriented x 3; following commands    Spencer Symptom Assessment Score       7/14/2025     6:00 PM 6/3/2025     1:00 PM 5/20/2025     8:00 AM 5/5/2025     5:00 PM 2/25/2025     3:00 PM   Spencer Score   Pain Score 7 6 8 8 7   Tiredness Score 4 4 6 6 Worst

## 2025-07-15 ENCOUNTER — HOSPITAL ENCOUNTER (OUTPATIENT)
Dept: INFUSION THERAPY | Age: 60
Discharge: HOME | End: 2025-07-15
Payer: MEDICARE

## 2025-07-15 ENCOUNTER — OFFICE VISIT (OUTPATIENT)
Age: 60
End: 2025-07-15
Payer: MEDICARE

## 2025-07-15 VITALS
OXYGEN SATURATION: 98 % | TEMPERATURE: 97.3 F | HEART RATE: 66 BPM | SYSTOLIC BLOOD PRESSURE: 98 MMHG | DIASTOLIC BLOOD PRESSURE: 68 MMHG

## 2025-07-15 VITALS
OXYGEN SATURATION: 93 % | BODY MASS INDEX: 13.34 KG/M2 | SYSTOLIC BLOOD PRESSURE: 100 MMHG | HEART RATE: 82 BPM | DIASTOLIC BLOOD PRESSURE: 61 MMHG | HEIGHT: 68 IN | TEMPERATURE: 98 F | WEIGHT: 88 LBS

## 2025-07-15 DIAGNOSIS — E03.4 ACQUIRED ATROPHY OF THYROID: ICD-10-CM

## 2025-07-15 DIAGNOSIS — C09.8 MALIGNANT NEOPLASM OF OVERLAPPING SITES OF TONSIL (HCC): ICD-10-CM

## 2025-07-15 DIAGNOSIS — C09.8 MALIGNANT NEOPLASM OF OVERLAPPING SITES OF TONSIL (HCC): Primary | ICD-10-CM

## 2025-07-15 DIAGNOSIS — E03.4 ACQUIRED ATROPHY OF THYROID: Primary | ICD-10-CM

## 2025-07-15 PROCEDURE — 2500000003 HC RX 250 WO HCPCS: Performed by: STUDENT IN AN ORGANIZED HEALTH CARE EDUCATION/TRAINING PROGRAM

## 2025-07-15 PROCEDURE — G8427 DOCREV CUR MEDS BY ELIG CLIN: HCPCS | Performed by: STUDENT IN AN ORGANIZED HEALTH CARE EDUCATION/TRAINING PROGRAM

## 2025-07-15 PROCEDURE — 96375 TX/PRO/DX INJ NEW DRUG ADDON: CPT

## 2025-07-15 PROCEDURE — 96413 CHEMO IV INFUSION 1 HR: CPT

## 2025-07-15 PROCEDURE — 2580000003 HC RX 258: Performed by: STUDENT IN AN ORGANIZED HEALTH CARE EDUCATION/TRAINING PROGRAM

## 2025-07-15 PROCEDURE — 4004F PT TOBACCO SCREEN RCVD TLK: CPT | Performed by: STUDENT IN AN ORGANIZED HEALTH CARE EDUCATION/TRAINING PROGRAM

## 2025-07-15 PROCEDURE — 3017F COLORECTAL CA SCREEN DOC REV: CPT | Performed by: STUDENT IN AN ORGANIZED HEALTH CARE EDUCATION/TRAINING PROGRAM

## 2025-07-15 PROCEDURE — 6360000002 HC RX W HCPCS: Performed by: STUDENT IN AN ORGANIZED HEALTH CARE EDUCATION/TRAINING PROGRAM

## 2025-07-15 PROCEDURE — G8419 CALC BMI OUT NRM PARAM NOF/U: HCPCS | Performed by: STUDENT IN AN ORGANIZED HEALTH CARE EDUCATION/TRAINING PROGRAM

## 2025-07-15 PROCEDURE — 99215 OFFICE O/P EST HI 40 MIN: CPT | Performed by: STUDENT IN AN ORGANIZED HEALTH CARE EDUCATION/TRAINING PROGRAM

## 2025-07-15 PROCEDURE — 99214 OFFICE O/P EST MOD 30 MIN: CPT | Performed by: STUDENT IN AN ORGANIZED HEALTH CARE EDUCATION/TRAINING PROGRAM

## 2025-07-15 RX ORDER — ALBUTEROL SULFATE 90 UG/1
4 INHALANT RESPIRATORY (INHALATION) PRN
Status: CANCELLED | OUTPATIENT
Start: 2025-07-15

## 2025-07-15 RX ORDER — EPINEPHRINE 1 MG/ML
0.3 INJECTION, SOLUTION, CONCENTRATE INTRAVENOUS PRN
Status: CANCELLED | OUTPATIENT
Start: 2025-07-15

## 2025-07-15 RX ORDER — MEPERIDINE HYDROCHLORIDE 50 MG/ML
12.5 INJECTION INTRAMUSCULAR; INTRAVENOUS; SUBCUTANEOUS PRN
Status: CANCELLED | OUTPATIENT
Start: 2025-07-15

## 2025-07-15 RX ORDER — ONDANSETRON 2 MG/ML
8 INJECTION INTRAMUSCULAR; INTRAVENOUS
Status: CANCELLED | OUTPATIENT
Start: 2025-07-15

## 2025-07-15 RX ORDER — SODIUM CHLORIDE 9 MG/ML
INJECTION, SOLUTION INTRAVENOUS CONTINUOUS
Status: CANCELLED | OUTPATIENT
Start: 2025-07-15

## 2025-07-15 RX ORDER — SODIUM CHLORIDE 0.9 % (FLUSH) 0.9 %
5-40 SYRINGE (ML) INJECTION PRN
Status: DISCONTINUED | OUTPATIENT
Start: 2025-07-15 | End: 2025-07-16 | Stop reason: HOSPADM

## 2025-07-15 RX ORDER — SODIUM CHLORIDE 9 MG/ML
5-250 INJECTION, SOLUTION INTRAVENOUS PRN
Status: CANCELLED | OUTPATIENT
Start: 2025-07-15

## 2025-07-15 RX ORDER — SODIUM CHLORIDE 0.9 % (FLUSH) 0.9 %
5-40 SYRINGE (ML) INJECTION PRN
Status: CANCELLED | OUTPATIENT
Start: 2025-07-15

## 2025-07-15 RX ORDER — ACETAMINOPHEN 325 MG/1
650 TABLET ORAL
Status: CANCELLED | OUTPATIENT
Start: 2025-07-15

## 2025-07-15 RX ORDER — HYDROCORTISONE SODIUM SUCCINATE 100 MG/2ML
100 INJECTION INTRAMUSCULAR; INTRAVENOUS
Status: CANCELLED | OUTPATIENT
Start: 2025-07-15

## 2025-07-15 RX ORDER — ONDANSETRON 2 MG/ML
8 INJECTION INTRAMUSCULAR; INTRAVENOUS
Status: COMPLETED | OUTPATIENT
Start: 2025-07-15 | End: 2025-07-15

## 2025-07-15 RX ORDER — FAMOTIDINE 10 MG/ML
20 INJECTION, SOLUTION INTRAVENOUS
Status: CANCELLED | OUTPATIENT
Start: 2025-07-15

## 2025-07-15 RX ORDER — DIPHENHYDRAMINE HYDROCHLORIDE 50 MG/ML
50 INJECTION, SOLUTION INTRAMUSCULAR; INTRAVENOUS
Status: CANCELLED | OUTPATIENT
Start: 2025-07-15

## 2025-07-15 RX ORDER — HEPARIN SODIUM (PORCINE) LOCK FLUSH IV SOLN 100 UNIT/ML 100 UNIT/ML
500 SOLUTION INTRAVENOUS PRN
Status: CANCELLED | OUTPATIENT
Start: 2025-07-15

## 2025-07-15 RX ORDER — HEPARIN 100 UNIT/ML
500 SYRINGE INTRAVENOUS PRN
Status: DISCONTINUED | OUTPATIENT
Start: 2025-07-15 | End: 2025-07-16 | Stop reason: HOSPADM

## 2025-07-15 RX ORDER — SODIUM CHLORIDE 9 MG/ML
5-250 INJECTION, SOLUTION INTRAVENOUS PRN
Status: DISCONTINUED | OUTPATIENT
Start: 2025-07-15 | End: 2025-07-16 | Stop reason: HOSPADM

## 2025-07-15 RX ADMIN — HEPARIN 500 UNITS: 100 SYRINGE at 15:49

## 2025-07-15 RX ADMIN — SODIUM CHLORIDE 200 MG: 9 INJECTION, SOLUTION INTRAVENOUS at 15:00

## 2025-07-15 RX ADMIN — SODIUM CHLORIDE, PRESERVATIVE FREE 10 ML: 5 INJECTION INTRAVENOUS at 15:49

## 2025-07-15 RX ADMIN — SODIUM CHLORIDE 20 ML/HR: 9 INJECTION, SOLUTION INTRAVENOUS at 14:38

## 2025-07-15 RX ADMIN — ONDANSETRON 8 MG: 2 INJECTION, SOLUTION INTRAMUSCULAR; INTRAVENOUS at 14:38

## 2025-07-15 NOTE — PROGRESS NOTES
[facility] [place] [facility] MEDICAL ONCOLOGY  [address] [facility] [address]  Dept: [phone]  Loc: [phone]    Jayde Fernandez MD   ·   MD Adriana Lucero APRN  ·  FADI Bermeo      [place] [facility] [place]  [address] [facility], [place] [address]  P: [phone]  F: [phone] [place] [facility] [place]  [address] [place] [address]  P: [phone]  F: [phone]  [place] [facility] [place]  [address] [place] [address]  P: [phone]  F: [phone]                 Hematology/Oncology   Clinic Progress Note              Patient: Prasanna Parnell,  60 y.o. male    Date of Encounter: 07/15/2025     Referring Provider:  Jonathan Bowden DO     Reason for Visit:   Right tonsil/base of tongue squamous cell carcinoma, p16 +, with distant metastasis        PCP:  Kanwal Barry MD      Chief Complaint   Patient presents with    Malignant neoplasm of overlapping sites of tonsil       SUBJECTIVE:  Pt doing ok. No major events.   Has ongoing sore throat and dysphagia since completion of radiation.       HPI from Initial Outpatient Consultation  (1/16/2025):  The patient is a 60 y.o. male comes in for evaluation for probable/suspected cancer of head and neck primary, located of the right palate teen tonsil/base of tongue.    Relevant history begins about 1.5 years ago, when patient was in the hospital for wound/osteomyelitis of the right index finger, requiring amputation back in July 2023.  Patient was having increased ear pain, in which he had addressed to another provider previously, but his complaints were never readdressed since then.    And then in more recent weeks/months, he has had increased odynophagia and presented to the ED on 12/27/2024, in which CT soft tissue neck was completed, detecting a right sided submucosal edema and ill defined enhancement of the right oropharynx/tonsil.  There is

## 2025-07-22 ENCOUNTER — HOSPITAL ENCOUNTER (OUTPATIENT)
Dept: RADIATION ONCOLOGY | Age: 60
Discharge: HOME | End: 2025-07-22

## 2025-07-22 VITALS
WEIGHT: 84.8 LBS | OXYGEN SATURATION: 97 % | RESPIRATION RATE: 18 BRPM | TEMPERATURE: 97.6 F | HEART RATE: 76 BPM | SYSTOLIC BLOOD PRESSURE: 109 MMHG | BODY MASS INDEX: 12.89 KG/M2 | DIASTOLIC BLOOD PRESSURE: 57 MMHG

## 2025-07-22 DIAGNOSIS — C09.8 MALIGNANT NEOPLASM OF OVERLAPPING SITES OF TONSIL (HCC): Primary | ICD-10-CM

## 2025-07-22 NOTE — PROGRESS NOTES
RADIATION ONCOLOGY-[facility]  6 week follow up       07/22/2205      NAME:  Prasanna Parnell    YOB: 1965    Diagnosis:  H+N cancer.     Subjective:  On 06/10/2025, Prasanna Parnell completed 7200 cGy in 36 fractions directed to the R tonsil, BLHNs + LNs.     The patient is seen today in 6 week post radiation therapy completion visit. The patient states he is eating one meal per day plus 1/2- 3/4 carton EN 5-6 times per day. The patient continues to lose weight. The patient complains of xerostomia and dysgeusia. The patient denies oral mouth sores or pain. No pain to neck. No fevers or chills. No nausea, vomiting or diarrhea.      Patient is following with:    Medical Oncology- Dr. Tomas.    Otolaryngology- Dr. Bowden.     Pain: controlled.     Past medical, surgical, social and family histories reviewed and updated as indicated.    ALLERGIES:  Bee venom and Keflex [cephalexin]         Current Outpatient Medications   Medication Sig Dispense Refill    oxyCODONE (ROXICODONE) 20 MG immediate release tablet Take 1 tablet by mouth every 4 hours as needed for Pain for up to 15 days. Max Daily Amount: 120 mg 90 tablet 0    pantoprazole (PROTONIX) 40 MG tablet Take 1 tablet by mouth in the morning and at bedtime 60 tablet 0    prochlorperazine (COMPAZINE) 10 MG tablet Take 1 tablet by mouth every 6 hours as needed (nausea) (Patient not taking: Reported on 7/15/2025) 30 tablet 0    EPINEPHrine (EPIPEN 2-JUAN CARLOS) 0.3 MG/0.3ML SOAJ injection Inject 0.3 mLs into the skin once for 1 dose Use as directed for allergic reaction 0.3 mL 0    TPN WITH LIPIDS, OUTPATIENT ONLY, Infuse 2,040 mLs intravenously daily Standard 3-in-1 w/ Electrolytes @85ml/hr.    68g AA  1820kcal (Patient not taking: Reported on 7/15/2025) 30 each 11    Nutritional Supplements (VITAL AF 1.2 MARE) LIQD Take 60 mL/hr by mouth See Admin Instructions Peptide Based (Vital AF 1.2) @ 60ml/hr to provide 1440ml TV, 1728kcals, 108g pro, 1168ml free water.

## 2025-07-22 NOTE — PROGRESS NOTES
Prasanna Parnell  7/22/2025  1:49 PM      Vitals:    07/22/25 1347   BP: (!) 109/57   Pulse: 76   Resp: 18   Temp: 97.6 °F (36.4 °C)   SpO2: 97%    :    Wt Readings from Last 3 Encounters:   07/22/25 38.5 kg (84 lb 12.8 oz)   07/15/25 39.9 kg (88 lb)   07/14/25 40.5 kg (89 lb 3.2 oz)                Current Outpatient Medications:     oxyCODONE (ROXICODONE) 20 MG immediate release tablet, Take 1 tablet by mouth every 4 hours as needed for Pain for up to 15 days. Max Daily Amount: 120 mg, Disp: 90 tablet, Rfl: 0    pantoprazole (PROTONIX) 40 MG tablet, Take 1 tablet by mouth in the morning and at bedtime, Disp: 60 tablet, Rfl: 0    prochlorperazine (COMPAZINE) 10 MG tablet, Take 1 tablet by mouth every 6 hours as needed (nausea) (Patient not taking: Reported on 7/15/2025), Disp: 30 tablet, Rfl: 0    EPINEPHrine (EPIPEN 2-JUAN CARLOS) 0.3 MG/0.3ML SOAJ injection, Inject 0.3 mLs into the skin once for 1 dose Use as directed for allergic reaction, Disp: 0.3 mL, Rfl: 0    TPN WITH LIPIDS, OUTPATIENT ONLY,, Infuse 2,040 mLs intravenously daily Standard 3-in-1 w/ Electrolytes @85ml/hr.  68g AA 1820kcal (Patient not taking: Reported on 7/15/2025), Disp: 30 each, Rfl: 11    Nutritional Supplements (VITAL AF 1.2 MARE) LIQD, Take 60 mL/hr by mouth See Admin Instructions Peptide Based (Vital AF 1.2) @ 60ml/hr to provide 1440ml TV, 1728kcals, 108g pro, 1168ml free water.   Flush rec of 95ml q 4= 570ml water (1738ml total water).    Start at trickle rate (10cc/hr) and adv slowly to goal- monitor for refeeding syndrome; monitor/replace Lytes PRN.   J-tube for TF., Disp: 30 each, Rfl: 100    ondansetron (ZOFRAN-ODT) 8 MG TBDP disintegrating tablet, Take 1 tablet by mouth every 8 hours as needed for Nausea or Vomiting, Disp: 28 tablet, Rfl: 1    albuterol sulfate HFA (VENTOLIN HFA) 108 (90 Base) MCG/ACT inhaler, Inhale 2 puffs into the lungs every 6 hours as needed for Wheezing, Disp: , Rfl:     acetaminophen (TYLENOL) 160 MG/5ML suspension,

## 2025-07-22 NOTE — PROGRESS NOTES
Prasanna Parnell  7/22/2025  Ht Readings from Last 1 Encounters:   07/15/25 1.727 m (5' 8\")     Wt Readings from Last 10 Encounters:   07/22/25 38.5 kg (84 lb 12.8 oz)   07/15/25 39.9 kg (88 lb)   07/14/25 40.5 kg (89 lb 3.2 oz)   06/26/25 41.6 kg (91 lb 11.2 oz)   06/12/25 41.7 kg (91 lb 14.4 oz)   06/03/25 43.4 kg (95 lb 11.2 oz)   06/03/25 43.4 kg (95 lb 9.6 oz)   05/29/25 44 kg (96 lb 14.4 oz)   05/22/25 44.5 kg (98 lb 3.2 oz)   05/21/25 44.6 kg (98 lb 4.8 oz)     Body mass index is 12.89 kg/m².    Assessment: Met w/ pt during radiation oncology follow up. Pt has lost 11# since completion of radiation therapy 6 weeks ago (11.6%). Pt assures this clinician that he is \"eating all the time\" with one large hot meal each evening. He estimates he is using 5-6 cartons Ensure Plus via PEG each day, however he reports he uses 2-3 syringes (1/2 -3/4 carton) every 3-4 hours each day. While it is feasible for him to be receiving total reported amount, it would require always providing 3 syringes and shortest reported interval. This schedule not conducive to healthy sleep pattern. Given pt's ongoing wt loss, it is unlikely that he is providing himself w/ reported nutrition. 5 cartons of Ensure Plus daily would provide 1750 kcal, 65 gm pro. 6 cartons would provide 2100 kcal, 78 gm pro. Pt reports that last night he had 2 heaping scoops of armstrong's pie. He reports he consumes candy all day long. He is drinking Orange Crush, [employer] Rootbeer, and Gatorade all day for additional calories. Again, question validity of reported PO intake. Pt was ordered MBS, has not yet scheduled this as he would prefer to see ENT first. Of note, he admits to coughing w/ PO intake. Last SLP recommendations per EMR from 4/11/25 @ [facility], pureed consistency + thin liquids. Pt has been noncompliant w/ this clinician's recommendations historically. Again suggest pt consider pump infusion of EN as he is intolerant to large bolus feedings. Pt uninterested at

## 2025-07-23 ENCOUNTER — OFFICE VISIT (OUTPATIENT)
Dept: FAMILY MEDICINE CLINIC | Age: 60
End: 2025-07-23
Payer: MEDICARE

## 2025-07-23 VITALS
HEIGHT: 68 IN | DIASTOLIC BLOOD PRESSURE: 76 MMHG | SYSTOLIC BLOOD PRESSURE: 112 MMHG | TEMPERATURE: 97.4 F | WEIGHT: 84.3 LBS | OXYGEN SATURATION: 99 % | RESPIRATION RATE: 16 BRPM | HEART RATE: 84 BPM | BODY MASS INDEX: 12.78 KG/M2

## 2025-07-23 DIAGNOSIS — R63.0 LOSS OF APPETITE: Primary | ICD-10-CM

## 2025-07-23 DIAGNOSIS — Z00.00 INITIAL MEDICARE ANNUAL WELLNESS VISIT: ICD-10-CM

## 2025-07-23 PROCEDURE — G0438 PPPS, INITIAL VISIT: HCPCS | Performed by: FAMILY MEDICINE

## 2025-07-23 PROCEDURE — G8419 CALC BMI OUT NRM PARAM NOF/U: HCPCS | Performed by: FAMILY MEDICINE

## 2025-07-23 PROCEDURE — 4004F PT TOBACCO SCREEN RCVD TLK: CPT | Performed by: FAMILY MEDICINE

## 2025-07-23 PROCEDURE — 3017F COLORECTAL CA SCREEN DOC REV: CPT | Performed by: FAMILY MEDICINE

## 2025-07-23 PROCEDURE — 99214 OFFICE O/P EST MOD 30 MIN: CPT | Performed by: FAMILY MEDICINE

## 2025-07-23 PROCEDURE — G8427 DOCREV CUR MEDS BY ELIG CLIN: HCPCS | Performed by: FAMILY MEDICINE

## 2025-07-23 SDOH — ECONOMIC STABILITY: FOOD INSECURITY: WITHIN THE PAST 12 MONTHS, YOU WORRIED THAT YOUR FOOD WOULD RUN OUT BEFORE YOU GOT MONEY TO BUY MORE.: NEVER TRUE

## 2025-07-23 SDOH — ECONOMIC STABILITY: FOOD INSECURITY: WITHIN THE PAST 12 MONTHS, THE FOOD YOU BOUGHT JUST DIDN'T LAST AND YOU DIDN'T HAVE MONEY TO GET MORE.: NEVER TRUE

## 2025-07-23 ASSESSMENT — PATIENT HEALTH QUESTIONNAIRE - PHQ9
2. FEELING DOWN, DEPRESSED OR HOPELESS: NOT AT ALL
1. LITTLE INTEREST OR PLEASURE IN DOING THINGS: NOT AT ALL
SUM OF ALL RESPONSES TO PHQ QUESTIONS 1-9: 0

## 2025-07-23 ASSESSMENT — LIFESTYLE VARIABLES
HOW OFTEN DO YOU HAVE A DRINK CONTAINING ALCOHOL: NEVER
HOW MANY STANDARD DRINKS CONTAINING ALCOHOL DO YOU HAVE ON A TYPICAL DAY: PATIENT DOES NOT DRINK

## 2025-07-24 RX ORDER — MIRTAZAPINE 15 MG/1
15 TABLET, FILM COATED ORAL NIGHTLY
Qty: 30 TABLET | Refills: 3 | Status: ON HOLD | OUTPATIENT
Start: 2025-07-24

## 2025-07-25 DIAGNOSIS — G89.3 NEOPLASM RELATED PAIN: ICD-10-CM

## 2025-07-25 DIAGNOSIS — C76.0 MALIGNANT NEOPLASM OF HEAD, FACE, AND NECK (HCC): ICD-10-CM

## 2025-07-25 DIAGNOSIS — Z51.5 PALLIATIVE CARE ENCOUNTER: ICD-10-CM

## 2025-07-25 RX ORDER — OXYCODONE HYDROCHLORIDE 20 MG/1
20 TABLET ORAL EVERY 4 HOURS PRN
Qty: 90 TABLET | Refills: 0 | Status: SHIPPED | OUTPATIENT
Start: 2025-07-25 | End: 2025-08-09

## 2025-07-25 NOTE — TELEPHONE ENCOUNTER
Call from [facility] requesting refill for Oxy IR 20 mg. Pharmacy is [facility] in [place]. Next anthony 8/25/25.

## 2025-07-28 ENCOUNTER — OFFICE VISIT (OUTPATIENT)
Dept: ENT CLINIC | Age: 60
End: 2025-07-28
Payer: MEDICARE

## 2025-07-28 VITALS
TEMPERATURE: 97.2 F | WEIGHT: 83.8 LBS | HEART RATE: 67 BPM | BODY MASS INDEX: 12.7 KG/M2 | OXYGEN SATURATION: 85 % | SYSTOLIC BLOOD PRESSURE: 91 MMHG | HEIGHT: 68 IN | DIASTOLIC BLOOD PRESSURE: 59 MMHG

## 2025-07-28 DIAGNOSIS — C10.9 OROPHARYNGEAL CANCER (HCC): Primary | ICD-10-CM

## 2025-07-28 DIAGNOSIS — R59.0 CERVICAL LYMPHADENOPATHY: ICD-10-CM

## 2025-07-28 DIAGNOSIS — R13.12 OROPHARYNGEAL DYSPHAGIA: ICD-10-CM

## 2025-07-28 DIAGNOSIS — R25.2 TRISMUS: ICD-10-CM

## 2025-07-28 PROCEDURE — 4004F PT TOBACCO SCREEN RCVD TLK: CPT | Performed by: OTOLARYNGOLOGY

## 2025-07-28 PROCEDURE — 3017F COLORECTAL CA SCREEN DOC REV: CPT | Performed by: OTOLARYNGOLOGY

## 2025-07-28 PROCEDURE — 31575 DIAGNOSTIC LARYNGOSCOPY: CPT | Performed by: OTOLARYNGOLOGY

## 2025-07-28 PROCEDURE — G8419 CALC BMI OUT NRM PARAM NOF/U: HCPCS | Performed by: OTOLARYNGOLOGY

## 2025-07-28 PROCEDURE — 99214 OFFICE O/P EST MOD 30 MIN: CPT | Performed by: OTOLARYNGOLOGY

## 2025-07-28 PROCEDURE — G8427 DOCREV CUR MEDS BY ELIG CLIN: HCPCS | Performed by: OTOLARYNGOLOGY

## 2025-07-28 RX ORDER — NYSTATIN 100000 [USP'U]/ML
500000 SUSPENSION ORAL 4 TIMES DAILY
Qty: 200 ML | Refills: 0 | Status: ON HOLD | OUTPATIENT
Start: 2025-07-28 | End: 2025-08-07

## 2025-07-28 RX ORDER — DEXTROMETHORPHAN HYDROBROMIDE 10 MG/1
30 TABLET, CHEWABLE ORAL 2 TIMES DAILY
Qty: 1800 TABLET | Refills: 1 | Status: ON HOLD | OUTPATIENT
Start: 2025-07-28 | End: 2025-08-27

## 2025-07-28 RX ORDER — METOCLOPRAMIDE 10 MG/1
10 TABLET ORAL
Qty: 120 TABLET | Refills: 0 | Status: ON HOLD | OUTPATIENT
Start: 2025-07-28

## 2025-07-28 RX ORDER — PREDNISONE 20 MG/1
50 TABLET ORAL DAILY
Qty: 18 TABLET | Refills: 0 | Status: ON HOLD | OUTPATIENT
Start: 2025-07-28 | End: 2025-08-04

## 2025-07-28 ASSESSMENT — ENCOUNTER SYMPTOMS
SINUS PRESSURE: 0
SHORTNESS OF BREATH: 1
COUGH: 0
WHEEZING: 0
SINUS PAIN: 0
TROUBLE SWALLOWING: 1
SORE THROAT: 1
RHINORRHEA: 0
CHOKING: 0
STRIDOR: 0

## 2025-07-28 NOTE — PROGRESS NOTES
oral nystatin for noted thrush today as Magic mouthwash has not helped in the past.  We will also give patient Reglan to use prior to tube feeds.    Close follow-up in 3 to 4 weeks    This note may be dictated with vocal recognition software. All grammatical or semantic errors should be considered accordingly.

## 2025-07-28 NOTE — PATIENT INSTRUCTIONS
Oral nystatin- thursh  Prednisone- 7 days for swelling  Mucinex twice daily- continue   Need to do tube feeds, continuous- take reglan before starting tube feed

## 2025-07-29 ENCOUNTER — TELEPHONE (OUTPATIENT)
Age: 60
End: 2025-07-29

## 2025-07-29 ENCOUNTER — TELEMEDICINE (OUTPATIENT)
Dept: ENT CLINIC | Age: 60
End: 2025-07-29

## 2025-07-29 DIAGNOSIS — C10.9 OROPHARYNGEAL CANCER (HCC): Primary | ICD-10-CM

## 2025-07-29 NOTE — TELEPHONE ENCOUNTER
Spoke w/ ENT over phone regarding pt's tube feeding. Pt has historically been noncompliant w/ this clinician's (and others') recommendations regarding nutrition. Pt has had EN pump in home in past, however he opted not to use this and returned it to [facility]. Per physician, pt is now agreeable to trying EN pump. Pt historically has been unable to tolerate majority of EN formulas, including peptide-based options. He was sent sample pack of [employer] Peptide 1.5 in the past, but he did not try these for fear of intolerance. ENT had mentioned TwoCal HN to pt, however it is unlikely pt will be able to tolerate this very dense formula given prior intolerance to Jevity 1.5 or Vital AF. Of note, pt refused to attempt either of these formulas at goal rate 2/2 intense N/V. Pt was on TPN for period of time earlier this year as well. Previously, pt was only agreeable to Ensure Plus through PEG. On 4/29/25 this clinician spoke w/ pt about options, including Ensure via EN pump as that was the only formula pt was agreeable to at the time. Ensure Plus and pump were set up through [facility] at that time. Pt had EN pump in home from prior hospital discharge, however had not used it in the home setting. This clinician was informed by pharmacist on 5/5/25 that pt had returned EN pump as he preferred to bolus feed himself. Has extensive conversation w/ pt last week at radiation oncology follow up. He has lost 43% of his body weight since initial oncology nutrition assessment 6 months ago. He is cachectic, clearly significantly underfeeding himself despite his reported EN/PO intake. Attempted to discuss EN pump w/ pt at that time and he continues to prefer small volume bolus feedings.     Following phone conversation w/ ENT, attempted to contact pt by phone. Left voicemail w/ contact information. Await return call.    Recommendations if agreeable: Pt to infuse Ensure Plus through PEG via EN pump at 60 ml/hr x ~20 hr for total

## 2025-07-30 ENCOUNTER — TELEPHONE (OUTPATIENT)
Age: 60
End: 2025-07-30

## 2025-07-30 NOTE — TELEPHONE ENCOUNTER
Attempted to contact pt per physician request for EN set up. Pt has not returned yesterday's call. Left another voicemail w/ contact information requesting call back.     Spoke w/ Brooke at [facility] who reports pt will require TF teaching for pump since he returned it prior to ever using it. Pt has historically been wary of [facility]. Brooke also reports it is possible pt will not have insurance coverage for Ensure via PEG with or without pump despite the fact that pt has been receiving Ensure Plus. She is going to review this and call back with any issues. Await return call.    Spoke w/ Mary at [facility] intake department who reports they will be able to accept for TF teaching if order for med compliance also placed. Will discuss w/ physician and place orders as able.  Electronically signed by Zaira Pratt MS, RD, LD on 7/30/2025 at 12:19 PM

## 2025-07-31 ENCOUNTER — TELEPHONE (OUTPATIENT)
Age: 60
End: 2025-07-31

## 2025-07-31 NOTE — TELEPHONE ENCOUNTER
Spoke w/ pt over phone regarding EN. Pt acknowledges that he spoke w/ ENT this week and understands that he needs significantly more nutrition than he is currently receiving. He is open to using pump for EN infusion. Discussed EN formula options. Pt has attempted Vital AF and Jevity 1.5. ENT suggested TwoCal HN. Pt is unwilling to try any other EN formula 2/2 prior significant intolerance issues w/ EN formulas, including intractable N/V. He has had no issues w/ infusion of Ensure Plus through PEG and plans to continue utilizing this 2/2 prior intolerance issues. Pt is agreeable to [facility] for TF teaching and med compliance. He is currently receiving Ensure Plus through [facility]. Will send updated referrals for pump infused EN. Pt would benefit from dual chamber pump to allow for easier EN/water flush infusion. Pt appreciative of assistance. Will follow, pt to transition to oncology dietitian in [facility] office as able.  Electronically signed by Zaira Pratt MS, RD, LD on 7/31/2025 at 3:58 PM

## 2025-08-01 ENCOUNTER — TELEPHONE (OUTPATIENT)
Age: 60
End: 2025-08-01

## 2025-08-01 PROBLEM — E86.0 DEHYDRATION: Status: ACTIVE | Noted: 2025-08-01

## 2025-08-01 PROBLEM — R53.81 PHYSICAL DECONDITIONING: Status: ACTIVE | Noted: 2025-08-01

## 2025-08-01 NOTE — TELEPHONE ENCOUNTER
Edward w/ Radha at [facility], who reports pt will be tentatively set for start of care 8/5/25. Of note, pt is currently in ED for FTT. Radha will monitor chart for plan, start of care date to be adjusted accordingly. Ana at [facility] notified of tentative SOC date.  Electronically signed by Zaira Pratt, MS, RD, LD on 8/1/2025 at 10:50 AM

## 2025-08-02 PROBLEM — D69.6 THROMBOCYTOPENIA: Status: ACTIVE | Noted: 2025-08-02

## 2025-08-02 PROBLEM — J18.9 COMMUNITY ACQUIRED PNEUMONIA OF RIGHT LOWER LOBE OF LUNG: Status: ACTIVE | Noted: 2025-08-02

## 2025-08-04 PROBLEM — J69.0 ASPIRATION PNEUMONIA (HCC): Status: ACTIVE | Noted: 2025-01-01

## 2025-08-04 PROBLEM — R94.31 EKG ABNORMALITIES: Status: ACTIVE | Noted: 2025-01-01

## 2025-08-04 PROBLEM — Z29.89 IMMUNOTHERAPY: Status: ACTIVE | Noted: 2025-08-04

## 2025-08-09 PROBLEM — D61.818 PANCYTOPENIA (HCC): Status: ACTIVE | Noted: 2025-01-01

## 2025-08-20 LAB
MICROORGANISM SPEC CULT: NORMAL
MICROORGANISM/AGENT SPEC: NORMAL
SPECIMEN DESCRIPTION: NORMAL

## 2025-08-27 LAB
MICROORGANISM SPEC CULT: NORMAL
MICROORGANISM/AGENT SPEC: NORMAL
SPECIMEN DESCRIPTION: NORMAL

## 2025-09-03 LAB
MICROORGANISM SPEC CULT: NORMAL
MICROORGANISM/AGENT SPEC: NORMAL
SPECIMEN DESCRIPTION: NORMAL

## (undated) DEVICE — ADAPTER TBNG DIA15MM SWVL FBROPT BRONCHSCP TERM 2 AXIS PEEP

## (undated) DEVICE — SINGLE USE SUCTION VALVE MAJ-209: Brand: SINGLE USE SUCTION VALVE (STERILE)

## (undated) DEVICE — BLOCK BITE 60FR RUBBER ADLT DENTAL

## (undated) DEVICE — BOOT,SUTURE,STANDARD,YELLOW-IN-BLUE: Brand: MEDLINE

## (undated) DEVICE — SPONGE,LAP,4"X18",XR,ST,5/PK,40PK/CS: Brand: MEDLINE INDUSTRIES, INC.

## (undated) DEVICE — SPONGE GZ W4XL4IN RAYON POLY CVR W/NONWOVEN FAB STRL 2/PK

## (undated) DEVICE — LIQUIBAND RAPID ADHESIVE 36/CS 0.8ML: Brand: MEDLINE

## (undated) DEVICE — Device

## (undated) DEVICE — TUBING, SUCTION, 3/16" X 10', STRAIGHT: Brand: MEDLINE

## (undated) DEVICE — SYRINGE MED 10ML TRNSLUC BRL PLUNG BLK MRK POLYPR CTRL

## (undated) DEVICE — ELECTRODE PT RET AD L9FT HI MOIST COND ADH HYDRGEL CORDED

## (undated) DEVICE — FORCEPS L110MM DIA1.7MM PREMARKED REINF SHFT

## (undated) DEVICE — DOUBLE BASIN SET: Brand: MEDLINE INDUSTRIES, INC.

## (undated) DEVICE — SUTURE VICRYL + SZ 0 L27IN ABSRB UD CT-1 L36MM 1/2 CIR TAPR VCP260H

## (undated) DEVICE — DRAPE C ARM W41XL74IN UNIV MOB W RUBBERBAND CLP

## (undated) DEVICE — ANTI-FOG SOLUTION WITH FOAM PAD: Brand: DEVON

## (undated) DEVICE — BRONCHOSCOPY PACK: Brand: MEDLINE INDUSTRIES, INC.

## (undated) DEVICE — SPONGE,DRAIN,NONWVN,4"X4",6PLY,STRL,LF: Brand: MEDLINE

## (undated) DEVICE — NEEDLE HYPO 25GA L1.5IN BLU POLYPR HUB S STL REG BVL STR

## (undated) DEVICE — 3M™ MICROPORE™ TAPE, 1530-2: Brand: 3M™ MICROPORE™

## (undated) DEVICE — TOWEL,OR,DSP,ST,BLUE,STD,6/PK,12PK/CS: Brand: MEDLINE

## (undated) DEVICE — BLADE,STAINLESS-STEEL,11,STRL,DISPOSABLE: Brand: MEDLINE

## (undated) DEVICE — DEVICE GRSP L230CM SHTH DIA2.4MM HYBRID JAW FLX DST WIRE

## (undated) DEVICE — GLOVE ORANGE PI 7   MSG9070

## (undated) DEVICE — RESCUE COMBO FORCEPS

## (undated) DEVICE — GAUZE,SPONGE,4"X4",12PLY,STERILE,LF,2'S: Brand: MEDLINE

## (undated) DEVICE — STERILE POLYISOPRENE POWDER-FREE SURGICAL GLOVES WITH EMOLLIENT COATING: Brand: PROTEXIS

## (undated) DEVICE — KIT PROCEDURE BRONCHOSCOPY GALAXY (MUST BE PURCHASED IN INCREMENTS OF 4 EA)

## (undated) DEVICE — GRADUATE TRIANG MEASURE 1000ML BLK PRNT

## (undated) DEVICE — 4-PORT MANIFOLD: Brand: NEPTUNE 2

## (undated) DEVICE — PEN: MARKING STD 100/CS: Brand: MEDICAL ACTION INDUSTRIES

## (undated) DEVICE — ECHOTIP PROCORE, ENDOBRONCHIAL HD ULTRASOUND BIOPSY NEEDLE FOR OLYMPUS SCOPES: Brand: ECHOTIP PROCORE

## (undated) DEVICE — SYRINGE MED 10ML LUERLOCK TIP W/O SFTY DISP

## (undated) DEVICE — NEEDLE BRONCHSCP 21GA HNDL SHTH NDL STYL PERIVIEW FLX

## (undated) DEVICE — ENDOSCOPIC KIT 1.1+ OP4 NO CP DE

## (undated) DEVICE — CODMAN® SURGICAL PATTIES 1/2" X 3" (1.27CM X 7.62CM): Brand: CODMAN®

## (undated) DEVICE — APPLICATOR MEDICATED 26 CC SOLUTION HI LT ORNG CHLORAPREP

## (undated) DEVICE — HEAD AND NECK PACK: Brand: CONVERTORS

## (undated) DEVICE — DRAPE,LAPAROTOMY,PCH,STERILE: Brand: MEDLINE

## (undated) DEVICE — AIR/WATER CLEANING ADAPTER FOR OLYMPUS® GI ENDOSCOPE: Brand: BULLDOG®

## (undated) DEVICE — DECANTER: Brand: UNBRANDED

## (undated) DEVICE — SOLUTION IRRIG 1000ML 0.9% SOD CHL USP POUR PLAS BTL